# Patient Record
Sex: FEMALE | Race: WHITE | NOT HISPANIC OR LATINO | URBAN - METROPOLITAN AREA
[De-identification: names, ages, dates, MRNs, and addresses within clinical notes are randomized per-mention and may not be internally consistent; named-entity substitution may affect disease eponyms.]

---

## 2018-01-12 ENCOUNTER — EMERGENCY (EMERGENCY)
Facility: HOSPITAL | Age: 73
LOS: 1 days | Discharge: HOME | End: 2018-01-12
Attending: EMERGENCY MEDICINE | Admitting: EMERGENCY MEDICINE
Payer: MEDICARE

## 2018-01-12 VITALS
RESPIRATION RATE: 16 BRPM | DIASTOLIC BLOOD PRESSURE: 69 MMHG | TEMPERATURE: 98 F | SYSTOLIC BLOOD PRESSURE: 155 MMHG | OXYGEN SATURATION: 98 % | HEART RATE: 84 BPM

## 2018-01-12 PROCEDURE — 71046 X-RAY EXAM CHEST 2 VIEWS: CPT | Mod: 26

## 2018-01-12 PROCEDURE — 99284 EMERGENCY DEPT VISIT MOD MDM: CPT | Mod: 25,GC

## 2018-01-12 RX ORDER — ACETAMINOPHEN 500 MG
975 TABLET ORAL ONCE
Qty: 0 | Refills: 0 | Status: COMPLETED | OUTPATIENT
Start: 2018-01-12 | End: 2018-01-12

## 2018-01-12 RX ADMIN — Medication 975 MILLIGRAM(S): at 07:51

## 2018-01-12 NOTE — ED PROVIDER NOTE - ATTENDING CONTRIBUTION TO CARE
Pt with 2 days of cough, nasal congestion, body aches today.  Went to  last night, taking abx and  tessalon.  co mild sob last night.  Pos productive cough.  No fever no chills.  no headache no abd pain.  pe ncat s1s2 rrr abd no sinus congestion  pos nasal congestion  abd soft no cce  lungs cta  imp viral syndrome  r/o  pneumonia Pt with 2 days of cough, nasal congestion, body aches today.  Went to  last night, taking abx and  tessalon.  co mild sob last night.  Pos productive cough.  No fever no chills.  no headache no abd pain.  pe ncat s1s2 rrr abd no sinus congestion  pos nasal congestion  abd soft no cce  lungs cta  imp viral syndrome  r/o  pneumonia-  xray no infilrate interpreted by me, dc with nasal spray  and instructions to continue all current medication

## 2018-01-12 NOTE — ED PROVIDER NOTE - MEDICAL DECISION MAKING DETAILS
72 year-old female p/w generalized URI symptoms ongoing for a couple days; seen at urgent care yesterday and given antibiotics and cough pills.  Patient likely has a viral bronchitis - rule-out PNA with chest xray.  Low likelihood of ACS (no chest pain), PE (not hypoxic or tachypneic) and CHF (no worsened LE swelling) given patient's constellation of symptoms.

## 2018-01-12 NOTE — ED PROVIDER NOTE - PLAN OF CARE
Follow-up with your Primary Care Physician within 24-48 hours.  Please return to the Emergency Department immediately for any new, worsening or concerning symptoms; specifically those included in the attached information brochure.     You likely have a viral upper respiratory tract infection.  Please drink plenty of fluids and follow the instructions below:   1)	If you have fever greater than 100F or generalized bodyaches then please take Tylenol 650 mg every 4 hours and Motrin 600 mg every 6 hours.   2)	If you have head congestion, sinus pressure, or nasal congestion then please take Allegra 120-180 mg every 24 hours or Zyrtec use as directed.   3)	If you have throat discomfort please take cough drops, Cepacol, or Chloraseptic spray.   4)	If you have persistent dry cough please take Delsym or Robitussin.   5)	If you have difficulty bringing up mucus please take Mucinex use as directed.

## 2018-01-12 NOTE — ED PROVIDER NOTE - OBJECTIVE STATEMENT
72 year-old female with history of atrial fibrillation (on Eliquis), hypertension, diabetes mellitus (type 2) presents to the Emergency Department for congestion.  Sore throat, cough, congestion, headache, and generalized body aches ongoing for the past couple of days.  Some improvement with Tylenol and nasal sprays.  Mentions she became SOB last night and came to ED for evaluation.  No fevers, chills, vomiting, chest pain, abdominal pain, diarrhea, rash.  Was seen at urgent care yesterday - diagnosed as a viral illness and given Doxycycline and Benzonate pills.

## 2018-01-12 NOTE — ED PROVIDER NOTE - CARE PLAN
Principal Discharge DX:	Viral illness Principal Discharge DX:	Viral illness  Instructions for follow-up, activity and diet:	Follow-up with your Primary Care Physician within 24-48 hours.  Please return to the Emergency Department immediately for any new, worsening or concerning symptoms; specifically those included in the attached information brochure.     You likely have a viral upper respiratory tract infection.  Please drink plenty of fluids and follow the instructions below:   1)	If you have fever greater than 100F or generalized bodyaches then please take Tylenol 650 mg every 4 hours and Motrin 600 mg every 6 hours.   2)	If you have head congestion, sinus pressure, or nasal congestion then please take Allegra 120-180 mg every 24 hours or Zyrtec use as directed.   3)	If you have throat discomfort please take cough drops, Cepacol, or Chloraseptic spray.   4)	If you have persistent dry cough please take Delsym or Robitussin.   5)	If you have difficulty bringing up mucus please take Mucinex use as directed. Principal Discharge DX:	Viral illness  Instructions for follow-up, activity and diet:	Follow-up with your Primary Care Physician within 24-48 hours.  Please return to the Emergency Department immediately for any new, worsening or concerning symptoms; specifically those included in the attached information brochure.     You likely have a viral upper respiratory tract infection.  Please drink plenty of fluids and follow the instructions below:   1)	If you have fever greater than 100F or generalized bodyaches then please take Tylenol 650 mg every 4 hours and Motrin 600 mg every 6 hours.   2)	If you have head congestion, sinus pressure, or nasal congestion then please take Allegra 120-180 mg every 24 hours or Zyrtec use as directed.   3)	If you have throat discomfort please take cough drops, Cepacol, or Chloraseptic spray.   4)	If you have persistent dry cough please take Delsym or Robitussin.   5)	If you have difficulty bringing up mucus please take Mucinex use as directed.  Secondary Diagnosis:	Acute bronchitis, unspecified organism

## 2019-01-22 PROBLEM — E11.69 TYPE 2 DIABETES MELLITUS WITH OTHER SPECIFIED COMPLICATION: Chronic | Status: ACTIVE | Noted: 2018-01-12

## 2019-01-22 PROBLEM — I10 ESSENTIAL (PRIMARY) HYPERTENSION: Chronic | Status: ACTIVE | Noted: 2018-01-12

## 2019-01-22 PROBLEM — E78.5 HYPERLIPIDEMIA, UNSPECIFIED: Chronic | Status: ACTIVE | Noted: 2018-01-12

## 2019-01-22 PROBLEM — I48.91 UNSPECIFIED ATRIAL FIBRILLATION: Chronic | Status: ACTIVE | Noted: 2018-01-12

## 2019-01-28 ENCOUNTER — APPOINTMENT (OUTPATIENT)
Dept: PULMONOLOGY | Facility: CLINIC | Age: 74
End: 2019-01-28
Payer: MEDICARE

## 2019-01-28 VITALS
SYSTOLIC BLOOD PRESSURE: 121 MMHG | HEIGHT: 62 IN | HEART RATE: 61 BPM | BODY MASS INDEX: 46.38 KG/M2 | DIASTOLIC BLOOD PRESSURE: 74 MMHG | OXYGEN SATURATION: 94 % | WEIGHT: 252 LBS

## 2019-01-28 DIAGNOSIS — Z00.00 ENCOUNTER FOR GENERAL ADULT MEDICAL EXAMINATION W/OUT ABNORMAL FINDINGS: ICD-10-CM

## 2019-01-28 LAB — POCT - HEMOGLOBIN (HGB), QUANTITATIVE, TRANSCUTANEOUS: 14.5

## 2019-01-28 PROCEDURE — 94727 GAS DIL/WSHOT DETER LNG VOL: CPT

## 2019-01-28 PROCEDURE — 88738 HGB QUANT TRANSCUTANEOUS: CPT

## 2019-01-28 PROCEDURE — 94729 DIFFUSING CAPACITY: CPT

## 2019-01-28 PROCEDURE — 99204 OFFICE O/P NEW MOD 45 MIN: CPT | Mod: 25

## 2019-01-28 PROCEDURE — 94060 EVALUATION OF WHEEZING: CPT

## 2019-01-29 NOTE — PROCEDURE
[FreeTextEntry1] : Pulmonary function testing.\par There is a mild ventilatory impairment in a restrictive pattern. There is elevation in the RV/TLC ratio indicative of possible air trapping. There is a mild-mod diffusion impairment Corrects to normal with lung volume correction \par \par

## 2019-01-29 NOTE — PHYSICAL EXAM
[General Appearance - Well Developed] : well developed [General Appearance - Well Nourished] : well nourished [Normal Oropharynx] : normal oropharynx [Enlarged Base of the Tongue] : enlargement of the base of the tongue [Heart Sounds] : normal S1 and S2 [Murmurs] : no murmurs present [Auscultation Breath Sounds / Voice Sounds] : lungs were clear to auscultation bilaterally [Kyphosis] : kyphosis [Abdomen Soft] : soft [Abdomen Tenderness] : non-tender [Nail Clubbing] : no clubbing of the fingernails [Cyanosis, Localized] : no localized cyanosis [Petechial Hemorrhages (___cm)] : no petechial hemorrhages [] : no ischemic changes [FreeTextEntry1] : Overweight [Elongated Uvula] : no elongated uvula

## 2019-01-29 NOTE — ASSESSMENT
[FreeTextEntry1] : Program exercise and wt. loss\par trial of QVAR\par HSS\par \par F/U 1 month assess response to tx.

## 2019-01-29 NOTE — HISTORY OF PRESENT ILLNESS
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 \par \par No significant cough. Feels some congestion in chest. \par Some upper airway congestion\par \par 30 lb wt gain past year.\par \par Had cxr last week [facility] told scarring. \par \par Wheezing precip by walking.\par \par No prior hx. pulm disease. \par \par No residual defect neuro.\par no diff. swallowing or cough with sleep. \par \par No nocturnal.\par No fam hx pulm disease\par No occup.

## 2019-01-29 NOTE — CONSULT LETTER
[Dear  ___] : Dear ~SEFERINO, [Consult Letter:] : I had the pleasure of evaluating your patient, [unfilled]. [Please see my note below.] : Please see my note below. [Sincerely,] : Sincerely, [FreeTextEntry2] : Aj Boss MD\par  [FreeTextEntry3] : Mt Roman MD FCCP\par

## 2019-01-29 NOTE — DISCUSSION/SUMMARY
[FreeTextEntry1] : Restrictive disease likely related to obesity\par Less likely symptoms related to airways reactivity but cannot exclude\par R/O ANNABEL

## 2019-02-14 ENCOUNTER — MEDICATION RENEWAL (OUTPATIENT)
Age: 74
End: 2019-02-14

## 2019-02-26 ENCOUNTER — APPOINTMENT (OUTPATIENT)
Dept: PULMONOLOGY | Facility: CLINIC | Age: 74
End: 2019-02-26

## 2019-03-08 ENCOUNTER — RX RENEWAL (OUTPATIENT)
Age: 74
End: 2019-03-08

## 2019-03-11 ENCOUNTER — OTHER (OUTPATIENT)
Age: 74
End: 2019-03-11

## 2019-04-24 ENCOUNTER — APPOINTMENT (OUTPATIENT)
Dept: CARDIOLOGY | Facility: CLINIC | Age: 74
End: 2019-04-24
Payer: MEDICARE

## 2019-04-24 ENCOUNTER — APPOINTMENT (OUTPATIENT)
Dept: ENDOCRINOLOGY | Facility: CLINIC | Age: 74
End: 2019-04-24
Payer: MEDICARE

## 2019-04-24 ENCOUNTER — NON-APPOINTMENT (OUTPATIENT)
Age: 74
End: 2019-04-24

## 2019-04-24 VITALS
HEIGHT: 62 IN | TEMPERATURE: 98.3 F | OXYGEN SATURATION: 96 % | SYSTOLIC BLOOD PRESSURE: 120 MMHG | HEART RATE: 61 BPM | BODY MASS INDEX: 47.29 KG/M2 | WEIGHT: 257 LBS | DIASTOLIC BLOOD PRESSURE: 70 MMHG

## 2019-04-24 VITALS
HEART RATE: 62 BPM | OXYGEN SATURATION: 97 % | BODY MASS INDEX: 47.29 KG/M2 | TEMPERATURE: 98.3 F | DIASTOLIC BLOOD PRESSURE: 70 MMHG | HEIGHT: 62 IN | WEIGHT: 257 LBS | SYSTOLIC BLOOD PRESSURE: 120 MMHG

## 2019-04-24 DIAGNOSIS — Z82.49 FAMILY HISTORY OF ISCHEMIC HEART DISEASE AND OTHER DISEASES OF THE CIRCULATORY SYSTEM: ICD-10-CM

## 2019-04-24 DIAGNOSIS — Z87.898 PERSONAL HISTORY OF OTHER SPECIFIED CONDITIONS: ICD-10-CM

## 2019-04-24 DIAGNOSIS — Z87.440 PERSONAL HISTORY OF URINARY (TRACT) INFECTIONS: ICD-10-CM

## 2019-04-24 PROCEDURE — 83036 HEMOGLOBIN GLYCOSYLATED A1C: CPT | Mod: QW

## 2019-04-24 PROCEDURE — 82962 GLUCOSE BLOOD TEST: CPT

## 2019-04-24 PROCEDURE — 36415 COLL VENOUS BLD VENIPUNCTURE: CPT

## 2019-04-24 PROCEDURE — 93000 ELECTROCARDIOGRAM COMPLETE: CPT

## 2019-04-24 PROCEDURE — 99204 OFFICE O/P NEW MOD 45 MIN: CPT | Mod: 25

## 2019-04-24 PROCEDURE — 99214 OFFICE O/P EST MOD 30 MIN: CPT

## 2019-04-24 RX ORDER — BIOTIN 10 MG
TABLET ORAL
Refills: 0 | Status: ACTIVE | COMMUNITY

## 2019-04-24 RX ORDER — ADHESIVE TAPE 3"X 2.3 YD
50 MCG TAPE, NON-MEDICATED TOPICAL
Refills: 0 | Status: ACTIVE | COMMUNITY

## 2019-04-24 NOTE — PHYSICAL EXAM
[General Appearance - Well Developed] : well developed [Well Groomed] : well groomed [Normal Appearance] : normal appearance [General Appearance - Well Nourished] : well nourished [No Deformities] : no deformities [Normal Conjunctiva] : the conjunctiva exhibited no abnormalities [General Appearance - In No Acute Distress] : no acute distress [Eyelids - No Xanthelasma] : the eyelids demonstrated no xanthelasmas [Normal Oral Mucosa] : normal oral mucosa [No Oral Pallor] : no oral pallor [No Oral Cyanosis] : no oral cyanosis [Normal Jugular Venous A Waves Present] : normal jugular venous A waves present [Normal Jugular Venous V Waves Present] : normal jugular venous V waves present [No Jugular Venous Carballo A Waves] : no jugular venous carballo A waves [Respiration, Rhythm And Depth] : normal respiratory rhythm and effort [Exaggerated Use Of Accessory Muscles For Inspiration] : no accessory muscle use [Auscultation Breath Sounds / Voice Sounds] : lungs were clear to auscultation bilaterally [Heart Rate And Rhythm] : heart rate and rhythm were normal [Heart Sounds] : normal S1 and S2 [Abdomen Soft] : soft [Murmurs] : no murmurs present [Abdomen Tenderness] : non-tender [Abdomen Mass (___ Cm)] : no abdominal mass palpated [Abnormal Walk] : normal gait [Gait - Sufficient For Exercise Testing] : the gait was sufficient for exercise testing [Nail Clubbing] : no clubbing of the fingernails [Cyanosis, Localized] : no localized cyanosis [Petechial Hemorrhages (___cm)] : no petechial hemorrhages [Skin Color & Pigmentation] : normal skin color and pigmentation [] : no rash [No Venous Stasis] : no venous stasis [Skin Lesions] : no skin lesions [No Skin Ulcers] : no skin ulcer [Oriented To Time, Place, And Person] : oriented to person, place, and time [No Xanthoma] : no  xanthoma was observed [Affect] : the affect was normal [Mood] : the mood was normal [No Anxiety] : not feeling anxious

## 2019-04-24 NOTE — HISTORY OF PRESENT ILLNESS
[FreeTextEntry1] : Ms. HEAD is a 73 year year old  female who  returns today in follow up with regard to a history of type 2 diabetes mellitus.  There is no known history of retinopathy, nephropathy. She  too denies any history of neuropathy. .Current dm medication include Metformin 1000 mg  bid and Farxiga 5 mg.  HGM of late has shown values to be running 135 range.  There has been no significant hypoglycemia.  denies any chest pain, sob, neurologic or ophthalmologic complaints. She  too denies any new podiatric concerns. She  is up to date with his ophthalmologic visit.\par Additional medical history includes that of hypertension, hyperlipidemia, and obesity along with with a-fib and vitamin d deficiency Is on D3 2,000 iu daily.\par \par Did have 2nd bout of kidney stone-dx as uric acid on potassium citrate.\par \par \par

## 2019-04-24 NOTE — PHYSICAL EXAM
[General Appearance - Well Developed] : well developed [Well Groomed] : well groomed [Normal Appearance] : normal appearance [General Appearance - Well Nourished] : well nourished [No Deformities] : no deformities [General Appearance - In No Acute Distress] : no acute distress [Normal Conjunctiva] : the conjunctiva exhibited no abnormalities [Eyelids - No Xanthelasma] : the eyelids demonstrated no xanthelasmas [Normal Oral Mucosa] : normal oral mucosa [No Oral Pallor] : no oral pallor [No Oral Cyanosis] : no oral cyanosis [Normal Jugular Venous A Waves Present] : normal jugular venous A waves present [Normal Jugular Venous V Waves Present] : normal jugular venous V waves present [No Jugular Venous Carballo A Waves] : no jugular venous carblalo A waves [Respiration, Rhythm And Depth] : normal respiratory rhythm and effort [Exaggerated Use Of Accessory Muscles For Inspiration] : no accessory muscle use [Auscultation Breath Sounds / Voice Sounds] : lungs were clear to auscultation bilaterally [Heart Rate And Rhythm] : heart rate and rhythm were normal [Heart Sounds] : normal S1 and S2 [Abdomen Soft] : soft [Murmurs] : no murmurs present [Abdomen Tenderness] : non-tender [Abdomen Mass (___ Cm)] : no abdominal mass palpated [Gait - Sufficient For Exercise Testing] : the gait was sufficient for exercise testing [Abnormal Walk] : normal gait [Nail Clubbing] : no clubbing of the fingernails [Cyanosis, Localized] : no localized cyanosis [Petechial Hemorrhages (___cm)] : no petechial hemorrhages [Skin Color & Pigmentation] : normal skin color and pigmentation [] : no rash [No Venous Stasis] : no venous stasis [Skin Lesions] : no skin lesions [No Skin Ulcers] : no skin ulcer [No Xanthoma] : no  xanthoma was observed [Oriented To Time, Place, And Person] : oriented to person, place, and time [Affect] : the affect was normal [No Anxiety] : not feeling anxious [Mood] : the mood was normal

## 2019-04-27 LAB
BASOPHILS # BLD AUTO: 0.04 K/UL
BASOPHILS NFR BLD AUTO: 0.5 %
EOSINOPHIL # BLD AUTO: 0.08 K/UL
EOSINOPHIL NFR BLD AUTO: 1 %
HCT VFR BLD CALC: 44.5 %
HGB BLD-MCNC: 13.9 G/DL
IMM GRANULOCYTES NFR BLD AUTO: 0.3 %
LYMPHOCYTES # BLD AUTO: 2.18 K/UL
LYMPHOCYTES NFR BLD AUTO: 28.6 %
MAN DIFF?: NORMAL
MCHC RBC-ENTMCNC: 29.9 PG
MCHC RBC-ENTMCNC: 31.2 GM/DL
MCV RBC AUTO: 95.7 FL
MONOCYTES # BLD AUTO: 0.51 K/UL
MONOCYTES NFR BLD AUTO: 6.7 %
NEUTROPHILS # BLD AUTO: 4.79 K/UL
NEUTROPHILS NFR BLD AUTO: 62.9 %
PLATELET # BLD AUTO: 190 K/UL
RBC # BLD: 4.65 M/UL
RBC # FLD: 14.9 %
WBC # FLD AUTO: 7.62 K/UL

## 2019-04-30 ENCOUNTER — MEDICATION RENEWAL (OUTPATIENT)
Age: 74
End: 2019-04-30

## 2019-05-01 ENCOUNTER — RX RENEWAL (OUTPATIENT)
Age: 74
End: 2019-05-01

## 2019-05-01 NOTE — HISTORY OF PRESENT ILLNESS
[FreeTextEntry1] : This is a 73 year old  female who presents today to follow up and establish care in the new office. She states she is feeling good and has no major complaints today. Patient denies dyspnea, palpitations, chest pain, nausea, vomiting, dizziness and lightheadedness.

## 2019-05-02 LAB
25(OH)D3 SERPL-MCNC: 27 NG/ML
ALBUMIN SERPL ELPH-MCNC: 4.5 G/DL
ALP BLD-CCNC: 74 U/L
ALT SERPL-CCNC: 25 U/L
ANION GAP SERPL CALC-SCNC: 15 MMOL/L
AST SERPL-CCNC: 24 U/L
BILIRUB DIRECT SERPL-MCNC: 0.2 MG/DL
BILIRUB INDIRECT SERPL-MCNC: 0.2 MG/DL
BILIRUB SERPL-MCNC: 0.4 MG/DL
BUN SERPL-MCNC: 36 MG/DL
CALCIUM SERPL-MCNC: 9.5 MG/DL
CHLORIDE SERPL-SCNC: 109 MMOL/L
CHOLEST SERPL-MCNC: 134 MG/DL
CHOLEST/HDLC SERPL: 2.2 RATIO
CK SERPL-CCNC: 146 U/L
CO2 SERPL-SCNC: 19 MMOL/L
CREAT SERPL-MCNC: 1.3 MG/DL
ESTIMATED AVERAGE GLUCOSE: 148 MG/DL
GLUCOSE BLDC GLUCOMTR-MCNC: 122
GLUCOSE SERPL-MCNC: 127 MG/DL
HBA1C MFR BLD HPLC: 6.7
HBA1C MFR BLD HPLC: 6.8 %
HDLC SERPL-MCNC: 62 MG/DL
LDLC SERPL CALC-MCNC: 50 MG/DL
MAGNESIUM SERPL-MCNC: 1.2 MG/DL
POTASSIUM SERPL-SCNC: 4 MMOL/L
PROT SERPL-MCNC: 7.1 G/DL
SODIUM SERPL-SCNC: 143 MMOL/L
T4 FREE SERPL-MCNC: 1.3 NG/DL
TRIGL SERPL-MCNC: 110 MG/DL
TSH SERPL-ACNC: 2.33 UIU/ML

## 2019-05-06 ENCOUNTER — MESSAGE (OUTPATIENT)
Age: 74
End: 2019-05-06

## 2019-05-12 LAB — MAGNESIUM SERPL-MCNC: 1.7 MG/DL

## 2019-05-17 ENCOUNTER — MEDICATION RENEWAL (OUTPATIENT)
Age: 74
End: 2019-05-17

## 2019-05-17 RX ORDER — POTASSIUM CITRATE 10 MEQ/1
10 MEQ TABLET, EXTENDED RELEASE ORAL
Refills: 0 | Status: DISCONTINUED | COMMUNITY
End: 2019-05-17

## 2019-06-17 ENCOUNTER — INBOUND DOCUMENT (OUTPATIENT)
Age: 74
End: 2019-06-17

## 2019-07-19 ENCOUNTER — INBOUND DOCUMENT (OUTPATIENT)
Age: 74
End: 2019-07-19

## 2019-07-24 ENCOUNTER — APPOINTMENT (OUTPATIENT)
Dept: ENDOCRINOLOGY | Facility: CLINIC | Age: 74
End: 2019-07-24
Payer: MEDICARE

## 2019-07-24 VITALS
HEART RATE: 62 BPM | WEIGHT: 253 LBS | TEMPERATURE: 98.6 F | BODY MASS INDEX: 46.56 KG/M2 | DIASTOLIC BLOOD PRESSURE: 70 MMHG | SYSTOLIC BLOOD PRESSURE: 138 MMHG | HEIGHT: 62 IN | OXYGEN SATURATION: 96 %

## 2019-07-24 PROCEDURE — 82962 GLUCOSE BLOOD TEST: CPT

## 2019-07-24 PROCEDURE — 99214 OFFICE O/P EST MOD 30 MIN: CPT | Mod: 25

## 2019-07-24 PROCEDURE — 36415 COLL VENOUS BLD VENIPUNCTURE: CPT

## 2019-07-24 PROCEDURE — 83036 HEMOGLOBIN GLYCOSYLATED A1C: CPT | Mod: QW

## 2019-07-24 PROCEDURE — 82044 UR ALBUMIN SEMIQUANTITATIVE: CPT | Mod: QW

## 2019-07-24 NOTE — HISTORY OF PRESENT ILLNESS
[FreeTextEntry1] : Ms. HEAD is a 73 year year old  female who  returns today in follow up with regard to a history of type 2 diabetes mellitus.  There is no known history of retinopathy, nephropathy. She  too denies any history of neuropathy. .Current dm medication include Metformin 1000 mg  bid and Farxiga 5 mg.  HGM of late has shown values to be running in the 150's. Tests BG 1x daily.\par  There has been no significant hypoglycemia.\par Denies any chest pain, sob, neurologic or ophthalmologic complaints. She  too denies any new podiatric concerns. She  is up to date with his ophthalmologic visit- last week. \par A1c was 6.8% and  vit d 25-OH was 27. (April 2019)\par Additional medical history includes that of hypertension, hyperlipidemia, and obesity along with with a-fib and vitamin d deficiency Is on D3 2,000 iu daily.\par \par Did have 2nd bout of kidney stone-dx as uric acid  and is on potassium citrate.\par \par \par

## 2019-07-24 NOTE — PHYSICAL EXAM
[Alert] : alert [No Acute Distress] : no acute distress [Well Nourished] : well nourished [Normal Sclera/Conjunctiva] : normal sclera/conjunctiva [Well Developed] : well developed [No Proptosis] : no proptosis [Normal Oropharynx] : the oropharynx was normal [EOMI] : extra ocular movement intact [No Thyroid Nodules] : there were no palpable thyroid nodules [Thyroid Not Enlarged] : the thyroid was not enlarged [No Respiratory Distress] : no respiratory distress [No Accessory Muscle Use] : no accessory muscle use [Clear to Auscultation] : lungs were clear to auscultation bilaterally [Normal S1, S2] : normal S1 and S2 [Normal Rate] : heart rate was normal  [Regular Rhythm] : with a regular rhythm [Pedal Pulses Normal] : the pedal pulses are present [Normal Bowel Sounds] : normal bowel sounds [No Edema] : there was no peripheral edema [Not Tender] : non-tender [Soft] : abdomen soft [Anterior Cervical Nodes] : anterior cervical nodes [Post Cervical Nodes] : posterior cervical nodes [Not Distended] : not distended [Axillary Nodes] : axillary nodes [Normal] : normal and non tender [Spine Straight] : spine straight [No Spinal Tenderness] : no spinal tenderness [Normal Gait] : normal gait [No Stigmata of Cushings Syndrome] : no stigmata of cushings syndrome [Normal Strength/Tone] : muscle strength and tone were normal [No Rash] : no rash [Normal Reflexes] : deep tendon reflexes were 2+ and symmetric [No Tremors] : no tremors [Oriented x3] : oriented to person, place, and time [Acanthosis Nigricans] : no acanthosis nigricans

## 2019-08-20 LAB
25(OH)D3 SERPL-MCNC: 32.4 NG/ML
ALBUMIN SERPL ELPH-MCNC: 4.7 G/DL
ALP BLD-CCNC: 78 U/L
ALT SERPL-CCNC: 32 U/L
ANION GAP SERPL CALC-SCNC: 17 MMOL/L
AST SERPL-CCNC: 28 U/L
BILIRUB SERPL-MCNC: 0.6 MG/DL
BUN SERPL-MCNC: 35 MG/DL
CALCIUM SERPL-MCNC: 9.9 MG/DL
CHLORIDE SERPL-SCNC: 106 MMOL/L
CO2 SERPL-SCNC: 22 MMOL/L
CREAT SERPL-MCNC: 1.37 MG/DL
CREAT SPEC-SCNC: 50
CREAT SPEC-SCNC: 72 MG/DL
ESTIMATED AVERAGE GLUCOSE: 160 MG/DL
FRUCTOSAMINE SERPL-MCNC: 279 UMOL/L
GLUCOSE BLDC GLUCOMTR-MCNC: 136
GLUCOSE SERPL-MCNC: 142 MG/DL
GLYCOMARK.: 4.1 UG/ML
HBA1C MFR BLD HPLC: 7.1
HBA1C MFR BLD HPLC: 7.2 %
HDLC SERPL-MCNC: 60 MG/DL
LDLC SERPL DIRECT ASSAY-MCNC: 62 MG/DL
MAGNESIUM SERPL-MCNC: 1.6 MG/DL
MICROALBUMIN 24H UR DL<=1MG/L-MCNC: 11.5 MG/DL
MICROALBUMIN 24H UR DL<=1MG/L-MCNC: 150
MICROALBUMIN/CREAT 24H UR-RTO: 160 MG/G
MICROALBUMIN/CREAT 24H UR-RTO: >300
POTASSIUM SERPL-SCNC: 4 MMOL/L
PROT SERPL-MCNC: 7.7 G/DL
SODIUM SERPL-SCNC: 144 MMOL/L
T4 FREE SERPL-MCNC: 1.3 NG/DL
TRIGL SERPL-MCNC: 109 MG/DL
TSH SERPL-ACNC: 1.48 UIU/ML

## 2019-08-27 ENCOUNTER — APPOINTMENT (OUTPATIENT)
Dept: CARDIOLOGY | Facility: CLINIC | Age: 74
End: 2019-08-27
Payer: MEDICARE

## 2019-08-27 ENCOUNTER — NON-APPOINTMENT (OUTPATIENT)
Age: 74
End: 2019-08-27

## 2019-08-27 VITALS
HEART RATE: 71 BPM | BODY MASS INDEX: 46.74 KG/M2 | DIASTOLIC BLOOD PRESSURE: 70 MMHG | SYSTOLIC BLOOD PRESSURE: 140 MMHG | OXYGEN SATURATION: 95 % | TEMPERATURE: 98.4 F | WEIGHT: 254 LBS | HEIGHT: 62 IN

## 2019-08-27 PROCEDURE — 93000 ELECTROCARDIOGRAM COMPLETE: CPT

## 2019-08-27 PROCEDURE — 99213 OFFICE O/P EST LOW 20 MIN: CPT

## 2019-08-27 NOTE — PHYSICAL EXAM
[Normal Appearance] : normal appearance [General Appearance - Well Developed] : well developed [General Appearance - Well Nourished] : well nourished [Well Groomed] : well groomed [No Deformities] : no deformities [General Appearance - In No Acute Distress] : no acute distress [Eyelids - No Xanthelasma] : the eyelids demonstrated no xanthelasmas [Normal Conjunctiva] : the conjunctiva exhibited no abnormalities [Normal Oral Mucosa] : normal oral mucosa [No Oral Cyanosis] : no oral cyanosis [No Oral Pallor] : no oral pallor [Normal Jugular Venous A Waves Present] : normal jugular venous A waves present [Normal Jugular Venous V Waves Present] : normal jugular venous V waves present [No Jugular Venous Carballo A Waves] : no jugular venous carballo A waves [Respiration, Rhythm And Depth] : normal respiratory rhythm and effort [Exaggerated Use Of Accessory Muscles For Inspiration] : no accessory muscle use [Heart Rate And Rhythm] : heart rate and rhythm were normal [Auscultation Breath Sounds / Voice Sounds] : lungs were clear to auscultation bilaterally [Heart Sounds] : normal S1 and S2 [Murmurs] : no murmurs present [Abdomen Soft] : soft [Abdomen Tenderness] : non-tender [Abdomen Mass (___ Cm)] : no abdominal mass palpated [Gait - Sufficient For Exercise Testing] : the gait was sufficient for exercise testing [Abnormal Walk] : normal gait [Cyanosis, Localized] : no localized cyanosis [Nail Clubbing] : no clubbing of the fingernails [Petechial Hemorrhages (___cm)] : no petechial hemorrhages [Skin Color & Pigmentation] : normal skin color and pigmentation [] : no rash [No Venous Stasis] : no venous stasis [Skin Lesions] : no skin lesions [No Skin Ulcers] : no skin ulcer [No Xanthoma] : no  xanthoma was observed [Affect] : the affect was normal [Oriented To Time, Place, And Person] : oriented to person, place, and time [No Anxiety] : not feeling anxious [Mood] : the mood was normal [FreeTextEntry1] : overweight

## 2019-08-27 NOTE — HISTORY OF PRESENT ILLNESS
[FreeTextEntry1] : pt presents for f/u pt feels well ocassional fatigue .pt with dm  hgba1c 7.2% .pt with borderline creat pt s/p cva paf ..\par pt denies any chest  pain dizziness ,lightheadedness ,nausea vomiting diaphoresis\par pt with ocassional anxiety requests refill on ativan as pt with ocassional anxiety

## 2019-09-02 LAB
ANION GAP SERPL CALC-SCNC: 20 MMOL/L
APPEARANCE: ABNORMAL
BACTERIA UR CULT: NORMAL
BACTERIA: NEGATIVE
BASOPHILS # BLD AUTO: 0.04 K/UL
BASOPHILS NFR BLD AUTO: 0.5 %
BILIRUBIN URINE: NEGATIVE
BLOOD URINE: NEGATIVE
BUN SERPL-MCNC: 35 MG/DL
CALCIUM SERPL-MCNC: 10.1 MG/DL
CHLORIDE SERPL-SCNC: 104 MMOL/L
CO2 SERPL-SCNC: 21 MMOL/L
COLOR: NORMAL
CREAT SERPL-MCNC: 1.35 MG/DL
EOSINOPHIL # BLD AUTO: 0.07 K/UL
EOSINOPHIL NFR BLD AUTO: 0.9 %
FERRITIN SERPL-MCNC: 51 NG/ML
FOLATE SERPL-MCNC: 7.2 NG/ML
GLUCOSE QUALITATIVE U: ABNORMAL
GLUCOSE SERPL-MCNC: 120 MG/DL
HAPTOGLOB SERPL-MCNC: 196 MG/DL
HCT VFR BLD CALC: 49.1 %
HGB BLD-MCNC: 15.2 G/DL
HYALINE CASTS: 1 /LPF
IMM GRANULOCYTES NFR BLD AUTO: 0.3 %
IRON SERPL-MCNC: 55 UG/DL
KETONES URINE: NEGATIVE
LDH SERPL-CCNC: 208 U/L
LEUKOCYTE ESTERASE URINE: ABNORMAL
LYMPHOCYTES # BLD AUTO: 1.68 K/UL
LYMPHOCYTES NFR BLD AUTO: 22 %
MAGNESIUM SERPL-MCNC: 1.5 MG/DL
MAN DIFF?: NORMAL
MCHC RBC-ENTMCNC: 28.7 PG
MCHC RBC-ENTMCNC: 31 GM/DL
MCV RBC AUTO: 92.6 FL
MICROSCOPIC-UA: NORMAL
MONOCYTES # BLD AUTO: 0.55 K/UL
MONOCYTES NFR BLD AUTO: 7.2 %
NEUTROPHILS # BLD AUTO: 5.29 K/UL
NEUTROPHILS NFR BLD AUTO: 69.1 %
NITRITE URINE: NEGATIVE
PH URINE: 6
PLATELET # BLD AUTO: 182 K/UL
POTASSIUM SERPL-SCNC: 3.9 MMOL/L
PROTEIN URINE: NORMAL
RBC # BLD: 5.3 M/UL
RBC # FLD: 14.4 %
RED BLOOD CELLS URINE: 5 /HPF
SODIUM SERPL-SCNC: 145 MMOL/L
SPECIFIC GRAVITY URINE: 1.02
SQUAMOUS EPITHELIAL CELLS: 10 /HPF
TRANSFERRIN SERPL-MCNC: 266 MG/DL
UROBILINOGEN URINE: NORMAL
VIT B12 SERPL-MCNC: 422 PG/ML
WBC # FLD AUTO: 7.65 K/UL
WHITE BLOOD CELLS URINE: 52 /HPF

## 2019-09-06 ENCOUNTER — MEDICATION RENEWAL (OUTPATIENT)
Age: 74
End: 2019-09-06

## 2019-09-06 RX ORDER — CALCIUM CARBONATE/VITAMIN D3 500-10/5ML
400 LIQUID (ML) ORAL DAILY
Refills: 0 | Status: DISCONTINUED | COMMUNITY
End: 2019-09-06

## 2019-09-13 ENCOUNTER — INBOUND DOCUMENT (OUTPATIENT)
Age: 74
End: 2019-09-13

## 2019-10-22 ENCOUNTER — APPOINTMENT (OUTPATIENT)
Dept: CARDIOLOGY | Facility: CLINIC | Age: 74
End: 2019-10-22
Payer: MEDICARE

## 2019-10-22 DIAGNOSIS — Z23 ENCOUNTER FOR IMMUNIZATION: ICD-10-CM

## 2019-10-22 PROCEDURE — G0008: CPT

## 2019-10-22 PROCEDURE — 90653 IIV ADJUVANT VACCINE IM: CPT

## 2019-11-18 ENCOUNTER — MEDICATION RENEWAL (OUTPATIENT)
Age: 74
End: 2019-11-18

## 2019-11-18 DIAGNOSIS — Z87.39 PERSONAL HISTORY OF OTHER DISEASES OF THE MUSCULOSKELETAL SYSTEM AND CONNECTIVE TISSUE: ICD-10-CM

## 2019-11-19 ENCOUNTER — APPOINTMENT (OUTPATIENT)
Dept: ENDOCRINOLOGY | Facility: CLINIC | Age: 74
End: 2019-11-19
Payer: MEDICARE

## 2019-11-19 VITALS
BODY MASS INDEX: 47.11 KG/M2 | DIASTOLIC BLOOD PRESSURE: 82 MMHG | OXYGEN SATURATION: 95 % | WEIGHT: 256 LBS | HEART RATE: 58 BPM | HEIGHT: 62 IN | SYSTOLIC BLOOD PRESSURE: 125 MMHG | TEMPERATURE: 97.4 F

## 2019-11-19 PROCEDURE — 82962 GLUCOSE BLOOD TEST: CPT

## 2019-11-19 PROCEDURE — 99214 OFFICE O/P EST MOD 30 MIN: CPT | Mod: 25

## 2019-11-19 PROCEDURE — 36415 COLL VENOUS BLD VENIPUNCTURE: CPT

## 2019-11-19 PROCEDURE — 83036 HEMOGLOBIN GLYCOSYLATED A1C: CPT | Mod: QW

## 2019-11-19 PROCEDURE — 82044 UR ALBUMIN SEMIQUANTITATIVE: CPT | Mod: QW

## 2019-11-30 LAB
25(OH)D3 SERPL-MCNC: 31.7 NG/ML
ALBUMIN SERPL ELPH-MCNC: 4.5 G/DL
ALBUMIN: 10
ALP BLD-CCNC: 82 U/L
ALT SERPL-CCNC: 27 U/L
ANION GAP SERPL CALC-SCNC: 19 MMOL/L
AST SERPL-CCNC: 23 U/L
BASOPHILS # BLD AUTO: 0.03 K/UL
BASOPHILS NFR BLD AUTO: 0.4 %
BILIRUB SERPL-MCNC: 0.6 MG/DL
BUN SERPL-MCNC: 43 MG/DL
CALCIUM SERPL-MCNC: 10.3 MG/DL
CHLORIDE SERPL-SCNC: 99 MMOL/L
CO2 SERPL-SCNC: 25 MMOL/L
CREAT SERPL-MCNC: 1.7 MG/DL
CREAT SPEC-SCNC: 24 MG/DL
CREATININE: 50
EOSINOPHIL # BLD AUTO: 0.05 K/UL
EOSINOPHIL NFR BLD AUTO: 0.6 %
ESTIMATED AVERAGE GLUCOSE: 166 MG/DL
FOLATE SERPL-MCNC: 7.5 NG/ML
FRUCTOSAMINE SERPL-MCNC: 282 UMOL/L
GLUCOSE BLDC GLUCOMTR-MCNC: 111
GLUCOSE SERPL-MCNC: 109 MG/DL
GLYCOMARK.: 4.1 UG/ML
HBA1C MFR BLD HPLC: 7.3
HBA1C MFR BLD HPLC: 7.4 %
HCT VFR BLD CALC: 46 %
HDLC SERPL-MCNC: 61 MG/DL
HGB BLD-MCNC: 14.1 G/DL
IMM GRANULOCYTES NFR BLD AUTO: 0.2 %
LDLC SERPL DIRECT ASSAY-MCNC: 68 MG/DL
LYMPHOCYTES # BLD AUTO: 2.41 K/UL
LYMPHOCYTES NFR BLD AUTO: 28.7 %
MAGNESIUM SERPL-MCNC: 1.6 MG/DL
MAN DIFF?: NORMAL
MCHC RBC-ENTMCNC: 28.4 PG
MCHC RBC-ENTMCNC: 30.7 GM/DL
MCV RBC AUTO: 92.6 FL
MICROALBUMIN 24H UR DL<=1MG/L-MCNC: 1.5 MG/DL
MICROALBUMIN/CREAT 24H UR-RTO: 62 MG/G
MICROALBUMIN/CREAT UR TEST STR-RTO: NORMAL
MONOCYTES # BLD AUTO: 0.68 K/UL
MONOCYTES NFR BLD AUTO: 8.1 %
NEUTROPHILS # BLD AUTO: 5.2 K/UL
NEUTROPHILS NFR BLD AUTO: 62 %
PLATELET # BLD AUTO: 212 K/UL
POTASSIUM SERPL-SCNC: 3.7 MMOL/L
PROT SERPL-MCNC: 7.5 G/DL
RBC # BLD: 4.97 M/UL
RBC # FLD: 14.7 %
SODIUM SERPL-SCNC: 143 MMOL/L
T4 FREE SERPL-MCNC: 1.6 NG/DL
TRIGL SERPL-MCNC: 124 MG/DL
TSH SERPL-ACNC: 2.15 UIU/ML
VIT B12 SERPL-MCNC: 369 PG/ML
WBC # FLD AUTO: 8.39 K/UL

## 2019-12-02 ENCOUNTER — INBOUND DOCUMENT (OUTPATIENT)
Age: 74
End: 2019-12-02

## 2019-12-07 NOTE — PHYSICAL EXAM
[Alert] : alert [No Acute Distress] : no acute distress [Well Nourished] : well nourished [Normal Sclera/Conjunctiva] : normal sclera/conjunctiva [Well Developed] : well developed [EOMI] : extra ocular movement intact [No Proptosis] : no proptosis [Thyroid Not Enlarged] : the thyroid was not enlarged [Normal Oropharynx] : the oropharynx was normal [No Thyroid Nodules] : there were no palpable thyroid nodules [No Respiratory Distress] : no respiratory distress [No Accessory Muscle Use] : no accessory muscle use [Normal Rate] : heart rate was normal  [Clear to Auscultation] : lungs were clear to auscultation bilaterally [Regular Rhythm] : with a regular rhythm [Normal S1, S2] : normal S1 and S2 [Pedal Pulses Normal] : the pedal pulses are present [Normal Bowel Sounds] : normal bowel sounds [No Edema] : there was no peripheral edema [Not Tender] : non-tender [Not Distended] : not distended [Soft] : abdomen soft [Anterior Cervical Nodes] : anterior cervical nodes [Post Cervical Nodes] : posterior cervical nodes [Normal] : normal and non tender [Axillary Nodes] : axillary nodes [No Spinal Tenderness] : no spinal tenderness [Spine Straight] : spine straight [Normal Gait] : normal gait [No Stigmata of Cushings Syndrome] : no stigmata of cushings syndrome [Normal Strength/Tone] : muscle strength and tone were normal [No Rash] : no rash [Normal Reflexes] : deep tendon reflexes were 2+ and symmetric [Oriented x3] : oriented to person, place, and time [No Tremors] : no tremors [Acanthosis Nigricans] : no acanthosis nigricans

## 2019-12-07 NOTE — HISTORY OF PRESENT ILLNESS
[FreeTextEntry1] : Ms. HEAD is a 74 year year old  female who  returns today in follow up with regard to a history of type 2 diabetes mellitus. Today 's A1c is 7.3 % and  POCT bg 111mg/dl .  There is no known history of retinopathy, nephropathy. She  too denies any history of neuropathy. .Current dm medication include Metformin 1000 mg  bid and Farxiga 5 mg daily.  HGM of late has shown values to be running in the  am prior break fast 130-150's. \par  There has been no significant hypoglycemia.\par Denies any chest pain, sob, neurologic or ophthalmologic complaints. She  too denies any new podiatric concerns. She  is up to date with his ophthalmologic visit- last week. \par Additional medical history includes that of hypertension, hyperlipidemia, and obesity along with with a-fib and vitamin d deficiency Is on D3 2,000 iu daily.\par \par Did have 2nd bout of kidney stone-dx as uric acid  and is on potassium citrate.\par \par \par

## 2020-01-07 ENCOUNTER — APPOINTMENT (OUTPATIENT)
Dept: CARDIOLOGY | Facility: CLINIC | Age: 75
End: 2020-01-07
Payer: MEDICARE

## 2020-01-07 ENCOUNTER — NON-APPOINTMENT (OUTPATIENT)
Age: 75
End: 2020-01-07

## 2020-01-07 VITALS
HEIGHT: 62 IN | SYSTOLIC BLOOD PRESSURE: 140 MMHG | OXYGEN SATURATION: 96 % | TEMPERATURE: 97.6 F | HEART RATE: 68 BPM | BODY MASS INDEX: 47.29 KG/M2 | DIASTOLIC BLOOD PRESSURE: 60 MMHG | WEIGHT: 257 LBS

## 2020-01-07 DIAGNOSIS — M25.562 PAIN IN LEFT KNEE: ICD-10-CM

## 2020-01-07 PROCEDURE — 99214 OFFICE O/P EST MOD 30 MIN: CPT

## 2020-01-07 PROCEDURE — 93306 TTE W/DOPPLER COMPLETE: CPT

## 2020-01-07 PROCEDURE — 93000 ELECTROCARDIOGRAM COMPLETE: CPT

## 2020-01-07 NOTE — PHYSICAL EXAM
[General Appearance - Well Developed] : well developed [Normal Appearance] : normal appearance [No Deformities] : no deformities [Well Groomed] : well groomed [General Appearance - Well Nourished] : well nourished [Normal Conjunctiva] : the conjunctiva exhibited no abnormalities [General Appearance - In No Acute Distress] : no acute distress [Eyelids - No Xanthelasma] : the eyelids demonstrated no xanthelasmas [No Oral Pallor] : no oral pallor [Normal Oral Mucosa] : normal oral mucosa [Normal Jugular Venous V Waves Present] : normal jugular venous V waves present [Normal Jugular Venous A Waves Present] : normal jugular venous A waves present [No Oral Cyanosis] : no oral cyanosis [No Jugular Venous Carballo A Waves] : no jugular venous carballo A waves [Respiration, Rhythm And Depth] : normal respiratory rhythm and effort [Exaggerated Use Of Accessory Muscles For Inspiration] : no accessory muscle use [Auscultation Breath Sounds / Voice Sounds] : lungs were clear to auscultation bilaterally [Heart Rate And Rhythm] : heart rate and rhythm were normal [Murmurs] : no murmurs present [Heart Sounds] : normal S1 and S2 [Abdomen Soft] : soft [Abdomen Tenderness] : non-tender [Abdomen Mass (___ Cm)] : no abdominal mass palpated [Nail Clubbing] : no clubbing of the fingernails [Abnormal Walk] : normal gait [Gait - Sufficient For Exercise Testing] : the gait was sufficient for exercise testing [Cyanosis, Localized] : no localized cyanosis [Petechial Hemorrhages (___cm)] : no petechial hemorrhages [Skin Color & Pigmentation] : normal skin color and pigmentation [] : no rash [Skin Lesions] : no skin lesions [No Venous Stasis] : no venous stasis [No Skin Ulcers] : no skin ulcer [Oriented To Time, Place, And Person] : oriented to person, place, and time [No Xanthoma] : no  xanthoma was observed [Mood] : the mood was normal [No Anxiety] : not feeling anxious [Affect] : the affect was normal [FreeTextEntry1] : left knee swelling mild

## 2020-01-07 NOTE — HISTORY OF PRESENT ILLNESS
[FreeTextEntry1] : Nel is a 75yo female who presents for routine follow-up. Patient has PMH of Afib, anxiety, HLD, HTN, and mild renal insufficiency. Patient reports she is doing well overall. Reports she has been experiencing left knee pain after twisting her knee while getting up. She has been using a walker to assist with walking and states she will follow-up with ortho if no improvement. Patient also requesting refill medications. Denies chest pain, palpitations or shortness of breath.

## 2020-01-10 ENCOUNTER — MOBILE ON CALL (OUTPATIENT)
Age: 75
End: 2020-01-10

## 2020-01-10 ENCOUNTER — APPOINTMENT (OUTPATIENT)
Dept: CARDIOLOGY | Facility: CLINIC | Age: 75
End: 2020-01-10
Payer: MEDICARE

## 2020-01-10 PROCEDURE — 78452 HT MUSCLE IMAGE SPECT MULT: CPT

## 2020-01-10 PROCEDURE — A9500: CPT

## 2020-01-10 PROCEDURE — 93015 CV STRESS TEST SUPVJ I&R: CPT

## 2020-01-14 ENCOUNTER — INPATIENT (INPATIENT)
Facility: HOSPITAL | Age: 75
LOS: 1 days | Discharge: HOME | DRG: 247 | End: 2020-01-16
Attending: INTERNAL MEDICINE | Admitting: INTERNAL MEDICINE
Payer: COMMERCIAL

## 2020-01-14 ENCOUNTER — TRANSCRIPTION ENCOUNTER (OUTPATIENT)
Age: 75
End: 2020-01-14

## 2020-01-14 VITALS
DIASTOLIC BLOOD PRESSURE: 70 MMHG | TEMPERATURE: 98 F | OXYGEN SATURATION: 98 % | HEIGHT: 63 IN | SYSTOLIC BLOOD PRESSURE: 169 MMHG | HEART RATE: 57 BPM | WEIGHT: 257.06 LBS | RESPIRATION RATE: 16 BRPM

## 2020-01-14 DIAGNOSIS — R94.39 ABNORMAL RESULT OF OTHER CARDIOVASCULAR FUNCTION STUDY: ICD-10-CM

## 2020-01-14 DIAGNOSIS — Z90.49 ACQUIRED ABSENCE OF OTHER SPECIFIED PARTS OF DIGESTIVE TRACT: Chronic | ICD-10-CM

## 2020-01-14 LAB
ALBUMIN SERPL ELPH-MCNC: 4.5 G/DL — SIGNIFICANT CHANGE UP (ref 3.3–5)
ALP SERPL-CCNC: 85 U/L — SIGNIFICANT CHANGE UP (ref 40–120)
ALT FLD-CCNC: 29 U/L — SIGNIFICANT CHANGE UP (ref 10–45)
ANION GAP SERPL CALC-SCNC: 15 MMOL/L — SIGNIFICANT CHANGE UP (ref 5–17)
AST SERPL-CCNC: 24 U/L — SIGNIFICANT CHANGE UP (ref 10–40)
BILIRUB SERPL-MCNC: 0.6 MG/DL — SIGNIFICANT CHANGE UP (ref 0.2–1.2)
BUN SERPL-MCNC: 23 MG/DL — SIGNIFICANT CHANGE UP (ref 7–23)
CALCIUM SERPL-MCNC: 9.8 MG/DL — SIGNIFICANT CHANGE UP (ref 8.4–10.5)
CHLORIDE SERPL-SCNC: 103 MMOL/L — SIGNIFICANT CHANGE UP (ref 96–108)
CO2 SERPL-SCNC: 25 MMOL/L — SIGNIFICANT CHANGE UP (ref 22–31)
CREAT SERPL-MCNC: 1.23 MG/DL — SIGNIFICANT CHANGE UP (ref 0.5–1.3)
GLUCOSE BLDC GLUCOMTR-MCNC: 119 MG/DL — HIGH (ref 70–99)
GLUCOSE BLDC GLUCOMTR-MCNC: 124 MG/DL — HIGH (ref 70–99)
GLUCOSE SERPL-MCNC: 139 MG/DL — HIGH (ref 70–99)
HCT VFR BLD CALC: 44.7 % — SIGNIFICANT CHANGE UP (ref 34.5–45)
HGB BLD-MCNC: 14 G/DL — SIGNIFICANT CHANGE UP (ref 11.5–15.5)
MCHC RBC-ENTMCNC: 28.2 PG — SIGNIFICANT CHANGE UP (ref 27–34)
MCHC RBC-ENTMCNC: 31.3 GM/DL — LOW (ref 32–36)
MCV RBC AUTO: 89.9 FL — SIGNIFICANT CHANGE UP (ref 80–100)
NRBC # BLD: 0 /100 WBCS — SIGNIFICANT CHANGE UP (ref 0–0)
PLATELET # BLD AUTO: 207 K/UL — SIGNIFICANT CHANGE UP (ref 150–400)
POTASSIUM SERPL-MCNC: 3.8 MMOL/L — SIGNIFICANT CHANGE UP (ref 3.5–5.3)
POTASSIUM SERPL-SCNC: 3.8 MMOL/L — SIGNIFICANT CHANGE UP (ref 3.5–5.3)
PROT SERPL-MCNC: 7.8 G/DL — SIGNIFICANT CHANGE UP (ref 6–8.3)
RBC # BLD: 4.97 M/UL — SIGNIFICANT CHANGE UP (ref 3.8–5.2)
RBC # FLD: 14.8 % — HIGH (ref 10.3–14.5)
SODIUM SERPL-SCNC: 143 MMOL/L — SIGNIFICANT CHANGE UP (ref 135–145)
WBC # BLD: 8.08 K/UL — SIGNIFICANT CHANGE UP (ref 3.8–10.5)
WBC # FLD AUTO: 8.08 K/UL — SIGNIFICANT CHANGE UP (ref 3.8–10.5)

## 2020-01-14 PROCEDURE — 93571 IV DOP VEL&/PRESS C FLO 1ST: CPT | Mod: 26,LD

## 2020-01-14 PROCEDURE — 93010 ELECTROCARDIOGRAM REPORT: CPT | Mod: 76

## 2020-01-14 PROCEDURE — 93458 L HRT ARTERY/VENTRICLE ANGIO: CPT | Mod: 26,59

## 2020-01-14 PROCEDURE — 92928 PRQ TCAT PLMT NTRAC ST 1 LES: CPT | Mod: LD

## 2020-01-14 PROCEDURE — 99152 MOD SED SAME PHYS/QHP 5/>YRS: CPT

## 2020-01-14 RX ORDER — DILTIAZEM HCL 120 MG
360 CAPSULE, EXT RELEASE 24 HR ORAL DAILY
Refills: 0 | Status: DISCONTINUED | OUTPATIENT
Start: 2020-01-14 | End: 2020-01-16

## 2020-01-14 RX ORDER — METFORMIN HYDROCHLORIDE 850 MG/1
1 TABLET ORAL
Qty: 0 | Refills: 0 | DISCHARGE

## 2020-01-14 RX ORDER — ASPIRIN/CALCIUM CARB/MAGNESIUM 324 MG
1 TABLET ORAL
Qty: 0 | Refills: 0 | DISCHARGE

## 2020-01-14 RX ORDER — DOXAZOSIN MESYLATE 4 MG
1 TABLET ORAL AT BEDTIME
Refills: 0 | Status: DISCONTINUED | OUTPATIENT
Start: 2020-01-14 | End: 2020-01-16

## 2020-01-14 RX ORDER — CLOPIDOGREL BISULFATE 75 MG/1
75 TABLET, FILM COATED ORAL DAILY
Refills: 0 | Status: DISCONTINUED | OUTPATIENT
Start: 2020-01-15 | End: 2020-01-16

## 2020-01-14 RX ORDER — DEXTROSE 50 % IN WATER 50 %
15 SYRINGE (ML) INTRAVENOUS ONCE
Refills: 0 | Status: DISCONTINUED | OUTPATIENT
Start: 2020-01-14 | End: 2020-01-16

## 2020-01-14 RX ORDER — APIXABAN 2.5 MG/1
5 TABLET, FILM COATED ORAL EVERY 12 HOURS
Refills: 0 | Status: DISCONTINUED | OUTPATIENT
Start: 2020-01-15 | End: 2020-01-16

## 2020-01-14 RX ORDER — CLOPIDOGREL BISULFATE 75 MG/1
1 TABLET, FILM COATED ORAL
Qty: 90 | Refills: 4
Start: 2020-01-14 | End: 2021-04-07

## 2020-01-14 RX ORDER — ACETAMINOPHEN 500 MG
650 TABLET ORAL ONCE
Refills: 0 | Status: COMPLETED | OUTPATIENT
Start: 2020-01-14 | End: 2020-01-14

## 2020-01-14 RX ORDER — GLUCAGON INJECTION, SOLUTION 0.5 MG/.1ML
1 INJECTION, SOLUTION SUBCUTANEOUS ONCE
Refills: 0 | Status: DISCONTINUED | OUTPATIENT
Start: 2020-01-14 | End: 2020-01-16

## 2020-01-14 RX ORDER — SODIUM CHLORIDE 9 MG/ML
1000 INJECTION, SOLUTION INTRAVENOUS
Refills: 0 | Status: DISCONTINUED | OUTPATIENT
Start: 2020-01-14 | End: 2020-01-16

## 2020-01-14 RX ORDER — LISINOPRIL 2.5 MG/1
40 TABLET ORAL DAILY
Refills: 0 | Status: DISCONTINUED | OUTPATIENT
Start: 2020-01-15 | End: 2020-01-16

## 2020-01-14 RX ORDER — SODIUM CHLORIDE 9 MG/ML
1000 INJECTION INTRAMUSCULAR; INTRAVENOUS; SUBCUTANEOUS
Refills: 0 | Status: DISCONTINUED | OUTPATIENT
Start: 2020-01-14 | End: 2020-01-16

## 2020-01-14 RX ORDER — DEXTROSE 50 % IN WATER 50 %
12.5 SYRINGE (ML) INTRAVENOUS ONCE
Refills: 0 | Status: DISCONTINUED | OUTPATIENT
Start: 2020-01-14 | End: 2020-01-16

## 2020-01-14 RX ORDER — DEXTROSE 50 % IN WATER 50 %
25 SYRINGE (ML) INTRAVENOUS ONCE
Refills: 0 | Status: DISCONTINUED | OUTPATIENT
Start: 2020-01-14 | End: 2020-01-16

## 2020-01-14 RX ORDER — DRONEDARONE 400 MG/1
400 TABLET, FILM COATED ORAL
Refills: 0 | Status: DISCONTINUED | OUTPATIENT
Start: 2020-01-14 | End: 2020-01-16

## 2020-01-14 RX ORDER — METOPROLOL TARTRATE 50 MG
25 TABLET ORAL DAILY
Refills: 0 | Status: DISCONTINUED | OUTPATIENT
Start: 2020-01-14 | End: 2020-01-15

## 2020-01-14 RX ORDER — INSULIN LISPRO 100/ML
VIAL (ML) SUBCUTANEOUS
Refills: 0 | Status: DISCONTINUED | OUTPATIENT
Start: 2020-01-14 | End: 2020-01-16

## 2020-01-14 RX ORDER — ASPIRIN/CALCIUM CARB/MAGNESIUM 324 MG
1 TABLET ORAL
Qty: 90 | Refills: 0
Start: 2020-01-14 | End: 2020-04-12

## 2020-01-14 RX ORDER — INSULIN LISPRO 100/ML
VIAL (ML) SUBCUTANEOUS AT BEDTIME
Refills: 0 | Status: DISCONTINUED | OUTPATIENT
Start: 2020-01-14 | End: 2020-01-16

## 2020-01-14 RX ORDER — ASPIRIN/CALCIUM CARB/MAGNESIUM 324 MG
81 TABLET ORAL DAILY
Refills: 0 | Status: DISCONTINUED | OUTPATIENT
Start: 2020-01-14 | End: 2020-01-16

## 2020-01-14 RX ORDER — ATORVASTATIN CALCIUM 80 MG/1
40 TABLET, FILM COATED ORAL AT BEDTIME
Refills: 0 | Status: DISCONTINUED | OUTPATIENT
Start: 2020-01-14 | End: 2020-01-16

## 2020-01-14 RX ADMIN — DRONEDARONE 400 MILLIGRAM(S): 400 TABLET, FILM COATED ORAL at 17:25

## 2020-01-14 RX ADMIN — SODIUM CHLORIDE 100 MILLILITER(S): 9 INJECTION INTRAMUSCULAR; INTRAVENOUS; SUBCUTANEOUS at 18:48

## 2020-01-14 RX ADMIN — ATORVASTATIN CALCIUM 40 MILLIGRAM(S): 80 TABLET, FILM COATED ORAL at 21:32

## 2020-01-14 RX ADMIN — Medication 650 MILLIGRAM(S): at 18:14

## 2020-01-14 RX ADMIN — Medication 1 MILLIGRAM(S): at 21:32

## 2020-01-14 RX ADMIN — Medication 650 MILLIGRAM(S): at 17:25

## 2020-01-14 NOTE — DISCHARGE NOTE PROVIDER - CARE PROVIDERS DIRECT ADDRESSES
[email or web address]

## 2020-01-14 NOTE — DISCHARGE NOTE PROVIDER - NSDCFUADDINST_GEN_ALL_CORE_FT
Continue your medications. Do not stop Aspirin or Plavix unless instructed by your cardiologist.  No heavy lifting or pushing/pulling or strenuous activity with procedure arm for 2 weeks. No driving for 2 days. You may shower 24 hours following procedure but no soaking of your wrist in water (such as in a pool, sink, or tub) for 1 week. Check wrist site for bleeding and/or swelling daily following procedure. Call your doctor/cardiologist immediately for bleeding or swelling or if you have increased/persistent pain or drainage at the wrist site or if you have numbness, tingling or blue or white coloring of your hand or fingers.  Follow up with your cardiologist in 1- 2 weeks. You may call [facility] at [phone] or [phone] after office hours and weekends with any questions or concerns following your procedure.

## 2020-01-14 NOTE — DISCHARGE NOTE PROVIDER - CARE PROVIDER_API CALL
Aj Boss)  Cardiology; Internal Medicine  [address], [address]  [place] [address]  Phone: [phone]  Fax: [phone]  Follow Up Time: Aj Boss (MD)  Cardiology; Internal Medicine  [address] [facility] [address]  [place] [address]  Phone: [phone]  Fax: [phone]  Follow Up Time: 2 weeks    Ulises Sweet (DO)  Neurology; Vascular Neurology  [address] [facility] [address]  [place] [address]  Phone: [phone]  Fax: [phone]  Follow Up Time: 2 weeks

## 2020-01-14 NOTE — DISCHARGE NOTE PROVIDER - HOSPITAL COURSE
This is a 73 yo morbidly obese,  female with PMH of Afib ( On Eliquis, last dose on Friday 1/10/2020), CVA in 2017 w/o residual weakness, DM type 2 ( last A1C 7.3 in 2019, well managed w/o complications), anxiety, HLD, HTN, and mild renal insufficiency ( last creat 1.38 on 2019). FH significant for early CAD ( both father and mother  at age 56 from [place]).  Seen and evaluated by Dr Boss for c/c of dyspnea on exertion after ambulating a "few steps", denies chest pain, dizziness, palpitations, N&V, HA.  Had abnormal NST on 1/10/2020  which revealed " inferior ischemia" ( as per [employer] note).  Referred here today for [facility] for further evaluation.  Presently remains asymptomatic.         : MIA x1 to midLAD via RRA. Aspirin, Plavix, Eliquis for one month then, stop aspirin.     : Patient stable for discharge home today and followup with Dr. Boss in 2 weeks. This is a 75 yo morbidly obese,  female with PMH of Afib ( On Eliquis, last dose on Friday 1/10/2020), CVA in 2017 w/o residual weakness, DM type 2 ( last A1C 7.3 in 2019, well managed w/o complications), anxiety, HLD, HTN, and mild renal insufficiency ( last creat 1.38 on 2019). FH significant for early CAD ( both father and mother  at age 56 from [place]).  Seen and evaluated by Dr Boss for c/c of dyspnea on exertion after ambulating a "few steps", denies chest pain, dizziness, palpitations, N&V, HA.  Had abnormal NST on 1/10/2020  which revealed " inferior ischemia" ( as per [employer] note).  Referred here today for [facility] for further evaluation.  Presently remains asymptomatic.         : MIA x1 to midLAD via RRA. Aspirin, Plavix, Eliquis for one month then, stop aspirin.     : Patient stable for discharge home today and followup with Dr. Boss in 2 weeks.     Do not stop you Aspirin or Plavix unless instructed to do so by your cardiologist.

## 2020-01-14 NOTE — CHART NOTE - NSCHARTNOTEFT_GEN_A_CORE
Patient underwent a PCI procedure and is being admitted as they are at increased risk for major adverse cardiac and vascular events if discharged due to the following high risk characteristics:  bifurcation lesion.

## 2020-01-14 NOTE — H&P CARDIOLOGY - HISTORY OF PRESENT ILLNESS
This is a 73 yo obese,   female with PMH of Afib ( On Eliquis), anxiety, HLD, HTN, and mild renal insufficiency. Had abnormal NST which revealed " inferior ischemia" ( as per [employer] note).  Referred here today for [facility] for further evaluation.       ECHO 01/07/2020  EF 60-65%  trace MR This is a 73 yo obese,   female with PMH of Afib ( On Eliquis, last dose on), anxiety, HLD, HTN, and mild renal insufficiency ( last creat 1.38 on 09/23/2019). Had abnormal NST which revealed " inferior ischemia" ( as per [employer] note).  Referred here today for [facility] for further evaluation.       ECHO 01/07/2020  EF 60-65%  trace MR This is a 75 yo morbidly obese,  female with PMH of Afib ( On Eliquis, last dose on Friday 1/10/2020), CVA in 2017 w/o residual weakness, DM type 2 ( last A1C 7.3 in 2019, well managed w/o complications), anxiety, HLD, HTN, and mild renal insufficiency ( last creat 1.38 on 2019). FH significant for early CAD ( both father and mother  at age 56 from [place]).  Seen and evaluated by Dr Boss for c/c of dyspnea on exertion after ambulating a "few steps", denies chest pain, dizziness, palpitations, N&V, HA.  Had abnormal NST on 1/10/2020  which revealed " inferior ischemia" ( as per [employer] note).  Referred here today for [facility] for further evaluation.  Presently remains asymptomatic.   Of note: pt reports a 30lbs weight gain since CVA in , and believes shortness of breath is r/t to that.       ECHO 2020  EF 60-65%  trace MR    Narrative:     Symptoms:        Angina (Class): III       Ischemic Symptoms: dyspnea on minimal exertion     Heart Failure: no       Systolic/Diastolic/Combined:        NYHA Class (within 2 weeks):     Assessment of LVEF:       EF: 60-65%       Assessed by: ECHO       Date: 2020    Prior Cardiac Interventions:       PCI's: no       CABG: no    Noninvasive Testing:  see above   Stress Test: Date:        Protocol:        Duration of Exercise:        Symptoms:        EKG Changes:        DTS:        Myocardial Imaging:        Risk Assessment:     Echo: yes see above    Antianginal Therapies:        Beta Blockers:  Metoprolol        Calcium Channel Blockers:        Long Acting Nitrates:        Ranexa:     Associated Risk Factors:        Cerebrovascular Disease: YES        Chronic Lung Disease: No       Peripheral Arterial Disease: No       Chronic Kidney Disease (if yes, what is GFR): YES, GFR 38        Uncontrolled Diabetes (if yes, what is HgbA1C or FBS): No       Poorly Controlled Hypertension (if yes, what is SBP): No       Morbid Obesity (if yes, what is BMI): YES, BMI 45.5       History of Recent Ventricular Arrhythmia: No       Inability to Ambulate Safely: No       Need for Therapeutic Anticoagulation: YES, Eliquis        Antiplatelet or Contrast Allergy: No This is a 73 yo morbidly obese,  female with PMH of Afib ( On Eliquis, last dose on Friday 1/10/2020), CVA in 2017 w/o residual weakness, DM type 2 ( last A1C 7.3 in 2019, well managed w/o complications), anxiety, HLD, HTN, and mild renal insufficiency ( last creat 1.38 on 2019). FH significant for early CAD ( both father and mother  at age 56 from [place]).  Seen and evaluated by Dr Boss for c/c of dyspnea on exertion after ambulating a "few steps", denies chest pain, dizziness, palpitations, N&V, HA.  Had abnormal NST on 1/10/2020  which revealed " inferior ischemia" ( as per [employer] note).  Referred here today for [facility] for further evaluation.  Presently remains asymptomatic.   Of note: pt reports a 30lbs weight gain since CVA in , and believes shortness of breath is r/t to that.     +++ with no cardiac implant device/s+++    ECHO 2020  EF 60-65%  trace MR    Narrative:     Symptoms:        Angina (Class): III       Ischemic Symptoms: dyspnea on minimal exertion     Heart Failure: no       Systolic/Diastolic/Combined:        NYHA Class (within 2 weeks):     Assessment of LVEF:       EF: 60-65%       Assessed by: ECHO       Date: 2020    Prior Cardiac Interventions:       PCI's: no       CABG: no    Noninvasive Testing:  see above   Stress Test: Date:        Protocol:        Duration of Exercise:        Symptoms:        EKG Changes:        DTS:        Myocardial Imaging:        Risk Assessment:     Echo: yes see above    Antianginal Therapies:        Beta Blockers:  Metoprolol        Calcium Channel Blockers:        Long Acting Nitrates:        Ranexa:     Associated Risk Factors:        Cerebrovascular Disease: YES        Chronic Lung Disease: No       Peripheral Arterial Disease: No       Chronic Kidney Disease (if yes, what is GFR): YES, GFR 38        Uncontrolled Diabetes (if yes, what is HgbA1C or FBS): No       Poorly Controlled Hypertension (if yes, what is SBP): No       Morbid Obesity (if yes, what is BMI): YES, BMI 45.5       History of Recent Ventricular Arrhythmia: No       Inability to Ambulate Safely: No       Need for Therapeutic Anticoagulation: YES, Eliquis        Antiplatelet or Contrast Allergy: No

## 2020-01-14 NOTE — DISCHARGE NOTE PROVIDER - PROVIDER TOKENS
PROVIDER:[TOKEN:[[id number]]] PROVIDER:[TOKEN:[[id number]],FOLLOWUP:[2 weeks]],PROVIDER:[TOKEN:[[id number]],FOLLOWUP:[2 weeks]]

## 2020-01-14 NOTE — H&P CARDIOLOGY - PMH
Anxiety    Atrial fibrillation    CVA (cerebral vascular accident)    Diabetes mellitus type 2 in obese    Hyperlipemia    Hypertension    Hypomagnesemia    Knee pain, left    Renal insufficiency

## 2020-01-14 NOTE — DISCHARGE NOTE PROVIDER - NSDCCPCAREPLAN_GEN_ALL_CORE_FT
PRINCIPAL DISCHARGE DIAGNOSIS  Diagnosis: Coronary artery disease  Assessment and Plan of Treatment: one stent placed to mid LAD artery via right radial artery. Take all medications as dorected; DO NOT miss doeses of aspirin or clopidogrel (Plavix)--these medications keep your stent open. You will be on aspirin, Plavix, and Eliquis for one month, then stop taking aspirin. PRINCIPAL DISCHARGE DIAGNOSIS  Diagnosis: Coronary artery disease  Assessment and Plan of Treatment: one stent placed to mid LAD artery via right radial artery. Take all medications as dorected; DO NOT miss doeses of aspirin or clopidogrel (Plavix)--these medications keep your stent open. You will be on aspirin, Plavix, and Eliquis for one month, then stop taking aspirin.      SECONDARY DISCHARGE DIAGNOSES  Diagnosis: HLD (hyperlipidemia)  Assessment and Plan of Treatment: Your LDL cholesterol will be less than 70mg/dL   Continue with your cholesterol medications. Eat a heart healthy diet that is low in saturated fats and salt, and includes whole grains, fruits, vegetables and lean protein; exercise regularly (consult with your physician or cardiologist first); maintain a heart healthy weight. Continue to follow with your primary physician or cardiologist for treatment goals, continue medication, have liver function testing every 3 months as anti lipid medications can cause liver irritation. If you smoke - quit (A resource to help you stop smoking is the [facility] [employer] – phone number [phone].).    Diagnosis: HTN (hypertension)  Assessment and Plan of Treatment: Your blood pressure will be controlled.   Continue with your blood pressure medications; eat a heart healthy diet with low salt diet; exercise regularly (consult with your physician or cardiologist first); maintain a heart healthy weight; if you smoke - quit (A resource to help you stop smoking is the [facility] [employer] – phone number [phone].); include healthy ways to manage stress. Continue to follow with your primary care physician or cardiologist.    Diagnosis: Afib  Assessment and Plan of Treatment: Your heart rate and rhythm will be controlled.   Continue with your cardiologist and primary care MD. Continue your current medications. Call your physician for palpitations, feelings of rapid heart beat, lightheadedness, or dizziness.

## 2020-01-14 NOTE — DISCHARGE NOTE PROVIDER - NSDCMRMEDTOKEN_GEN_ALL_CORE_FT
Aspirin Enteric Coated 81 mg oral delayed release tablet: 1 tab(s) orally once a day    atorvastatin 20 mg oral tablet: 1 tab(s) orally once a day  biotin 1000 mcg oral tablet: 1 tab(s) orally once a day  Cardizem  mg/24 hours oral capsule, extended release: 1 cap(s) orally once a day  clopidogrel 75 mg oral tablet: 1 tab(s) orally once a day  doxazosin 1 mg oral tablet: 1 tab(s) orally once a day  Eliquis 5 mg oral tablet: 1 tab(s) orally 2 times a day  Farxiga 5 mg oral tablet: 1 tab(s) orally once a day  Klor-Con 10 mEq oral tablet, extended release: 2 tab(s) orally 2 times a day  lisinopril 40 mg oral tablet: 1 tab(s) orally once a day  LORazepam 0.5 mg oral tablet: 0.5 tab(s) orally once a day  magnesium oxide: 1600 milligram(s) orally once a day ( total dose) pt splits it up into 3x daily   metFORMIN 1000 mg oral tablet: 1 tab(s) orally 2 times a day; DO NOT TAKE UNTIL 1/17  Metoprolol Succinate ER 25 mg oral tablet, extended release: 1 tab(s) orally once a day  Multaq 400 mg oral tablet: 1 tab(s) orally 2 times a day Aspirin Enteric Coated 81 mg oral delayed release tablet: 1 tab(s) orally once a day    atorvastatin 20 mg oral tablet: 1 tab(s) orally once a day  biotin 1000 mcg oral tablet: 1 tab(s) orally once a day  Cardizem  mg/24 hours oral capsule, extended release: 1 cap(s) orally once a day  doxazosin 1 mg oral tablet: 1 tab(s) orally once a day  Eliquis 5 mg oral tablet: 1 tab(s) orally 2 times a day  Farxiga 5 mg oral tablet: 1 tab(s) orally once a day  Klor-Con 10 mEq oral tablet, extended release: 2 tab(s) orally 2 times a day  lisinopril 40 mg oral tablet: 1 tab(s) orally once a day  LORazepam 0.5 mg oral tablet: 0.5 tab(s) orally once a day  magnesium oxide: 1600 milligram(s) orally once a day ( total dose) pt splits it up into 3x daily   metFORMIN 1000 mg oral tablet: 1 tab(s) orally 2 times a day; DO NOT TAKE UNTIL 1/17  Metoprolol Succinate ER 25 mg oral tablet, extended release: 1 tab(s) orally once a day  Multaq 400 mg oral tablet: 1 tab(s) orally 2 times a day Aspirin Enteric Coated 81 mg oral delayed release tablet: 1 tab(s) orally once a day  for x 1 month then discontinue it   continue plavix &amp; eliquis  atorvastatin 40 mg oral tablet: 1 tab(s) orally once a day (at bedtime)  biotin 1000 mcg oral tablet: 1 tab(s) orally once a day  Cardizem  mg/24 hours oral capsule, extended release: 1 cap(s) orally once a day  clopidogrel 75 mg oral tablet: 1 tab(s) orally once a day  doxazosin 1 mg oral tablet: 1 tab(s) orally once a day  Eliquis 5 mg oral tablet: 1 tab(s) orally 2 times a day  Farxiga 5 mg oral tablet: 1 tab(s) orally once a day  Klor-Con 10 mEq oral tablet, extended release: 2 tab(s) orally 2 times a day  lisinopril 40 mg oral tablet: 1 tab(s) orally once a day  LORazepam 0.5 mg oral tablet: 0.5 tab(s) orally once a day  magnesium oxide: 1600 milligram(s) orally once a day ( total dose) pt splits it up into 3x daily   metFORMIN 1000 mg oral tablet: 1 tab(s) orally 2 times a day; DO NOT TAKE UNTIL 1/17  Multaq 400 mg oral tablet: 1 tab(s) orally 2 times a day

## 2020-01-14 NOTE — DISCHARGE NOTE PROVIDER - NSDCCPTREATMENT_GEN_ALL_CORE_FT
PRINCIPAL PROCEDURE  Procedure: Left heart catheterization  Findings and Treatment: MIA x 2 mLAD via RRA access

## 2020-01-14 NOTE — DISCHARGE NOTE PROVIDER - NSDCFUSCHEDAPPT_GEN_ALL_CORE_FT
ERNESTINA HEAD ; 03/19/2020 ; [facility] [address] ERNESTINA HEAD ; 03/19/2020 ; [facility] [address] ERNESTINA HEAD ; 03/19/2020 ; [facility] [address] ERNESTINA HEAD ; 03/19/2020 ; [facility] [address]

## 2020-01-15 DIAGNOSIS — E11.69 TYPE 2 DIABETES MELLITUS WITH OTHER SPECIFIED COMPLICATION: ICD-10-CM

## 2020-01-15 DIAGNOSIS — N28.9 DISORDER OF KIDNEY AND URETER, UNSPECIFIED: ICD-10-CM

## 2020-01-15 DIAGNOSIS — I48.91 UNSPECIFIED ATRIAL FIBRILLATION: ICD-10-CM

## 2020-01-15 DIAGNOSIS — I10 ESSENTIAL (PRIMARY) HYPERTENSION: ICD-10-CM

## 2020-01-15 DIAGNOSIS — I63.9 CEREBRAL INFARCTION, UNSPECIFIED: ICD-10-CM

## 2020-01-15 DIAGNOSIS — I25.10 ATHEROSCLEROTIC HEART DISEASE OF NATIVE CORONARY ARTERY WITHOUT ANGINA PECTORIS: ICD-10-CM

## 2020-01-15 DIAGNOSIS — E78.5 HYPERLIPIDEMIA, UNSPECIFIED: ICD-10-CM

## 2020-01-15 LAB
ANION GAP SERPL CALC-SCNC: 14 MMOL/L — SIGNIFICANT CHANGE UP (ref 5–17)
APTT BLD: 25.8 SEC — LOW (ref 27.5–36.3)
BUN SERPL-MCNC: 22 MG/DL — SIGNIFICANT CHANGE UP (ref 7–23)
CALCIUM SERPL-MCNC: 9.2 MG/DL — SIGNIFICANT CHANGE UP (ref 8.4–10.5)
CHLORIDE SERPL-SCNC: 103 MMOL/L — SIGNIFICANT CHANGE UP (ref 96–108)
CO2 SERPL-SCNC: 21 MMOL/L — LOW (ref 22–31)
CREAT SERPL-MCNC: 1.23 MG/DL — SIGNIFICANT CHANGE UP (ref 0.5–1.3)
GLUCOSE BLDC GLUCOMTR-MCNC: 127 MG/DL — HIGH (ref 70–99)
GLUCOSE BLDC GLUCOMTR-MCNC: 131 MG/DL — HIGH (ref 70–99)
GLUCOSE BLDC GLUCOMTR-MCNC: 131 MG/DL — HIGH (ref 70–99)
GLUCOSE BLDC GLUCOMTR-MCNC: 142 MG/DL — HIGH (ref 70–99)
GLUCOSE BLDC GLUCOMTR-MCNC: 158 MG/DL — HIGH (ref 70–99)
GLUCOSE BLDC GLUCOMTR-MCNC: 167 MG/DL — HIGH (ref 70–99)
GLUCOSE BLDC GLUCOMTR-MCNC: 168 MG/DL — HIGH (ref 70–99)
GLUCOSE SERPL-MCNC: 190 MG/DL — HIGH (ref 70–99)
HCT VFR BLD CALC: 41.9 % — SIGNIFICANT CHANGE UP (ref 34.5–45)
HGB BLD-MCNC: 13.2 G/DL — SIGNIFICANT CHANGE UP (ref 11.5–15.5)
INR BLD: 1 RATIO — SIGNIFICANT CHANGE UP (ref 0.88–1.16)
MAGNESIUM SERPL-MCNC: 1.7 MG/DL — SIGNIFICANT CHANGE UP (ref 1.6–2.6)
MCHC RBC-ENTMCNC: 28.3 PG — SIGNIFICANT CHANGE UP (ref 27–34)
MCHC RBC-ENTMCNC: 31.5 GM/DL — LOW (ref 32–36)
MCV RBC AUTO: 89.7 FL — SIGNIFICANT CHANGE UP (ref 80–100)
NRBC # BLD: 0 /100 WBCS — SIGNIFICANT CHANGE UP (ref 0–0)
PLATELET # BLD AUTO: 204 K/UL — SIGNIFICANT CHANGE UP (ref 150–400)
POTASSIUM SERPL-MCNC: 3.4 MMOL/L — LOW (ref 3.5–5.3)
POTASSIUM SERPL-SCNC: 3.4 MMOL/L — LOW (ref 3.5–5.3)
PROTHROM AB SERPL-ACNC: 11.5 SEC — SIGNIFICANT CHANGE UP (ref 10–12.9)
RBC # BLD: 4.67 M/UL — SIGNIFICANT CHANGE UP (ref 3.8–5.2)
RBC # FLD: 14.9 % — HIGH (ref 10.3–14.5)
SODIUM SERPL-SCNC: 138 MMOL/L — SIGNIFICANT CHANGE UP (ref 135–145)
WBC # BLD: 7.47 K/UL — SIGNIFICANT CHANGE UP (ref 3.8–10.5)
WBC # FLD AUTO: 7.47 K/UL — SIGNIFICANT CHANGE UP (ref 3.8–10.5)

## 2020-01-15 PROCEDURE — 99231 SBSQ HOSP IP/OBS SF/LOW 25: CPT

## 2020-01-15 PROCEDURE — 0042T: CPT

## 2020-01-15 PROCEDURE — 93306 TTE W/DOPPLER COMPLETE: CPT | Mod: 26

## 2020-01-15 PROCEDURE — 70551 MRI BRAIN STEM W/O DYE: CPT | Mod: 26

## 2020-01-15 PROCEDURE — 70450 CT HEAD/BRAIN W/O DYE: CPT | Mod: 26,59

## 2020-01-15 PROCEDURE — 70498 CT ANGIOGRAPHY NECK: CPT | Mod: 26

## 2020-01-15 PROCEDURE — 70496 CT ANGIOGRAPHY HEAD: CPT | Mod: 26

## 2020-01-15 RX ORDER — ACETAMINOPHEN 500 MG
650 TABLET ORAL EVERY 6 HOURS
Refills: 0 | Status: DISCONTINUED | OUTPATIENT
Start: 2020-01-15 | End: 2020-01-16

## 2020-01-15 RX ORDER — MAGNESIUM OXIDE 400 MG ORAL TABLET 241.3 MG
400 TABLET ORAL
Refills: 0 | Status: DISCONTINUED | OUTPATIENT
Start: 2020-01-15 | End: 2020-01-16

## 2020-01-15 RX ORDER — SODIUM CHLORIDE 0.65 %
1 AEROSOL, SPRAY (ML) NASAL
Refills: 0 | Status: DISCONTINUED | OUTPATIENT
Start: 2020-01-15 | End: 2020-01-16

## 2020-01-15 RX ORDER — AMLODIPINE BESYLATE 2.5 MG/1
5 TABLET ORAL DAILY
Refills: 0 | Status: DISCONTINUED | OUTPATIENT
Start: 2020-01-15 | End: 2020-01-16

## 2020-01-15 RX ORDER — CLOPIDOGREL BISULFATE 75 MG/1
1 TABLET, FILM COATED ORAL
Qty: 90 | Refills: 4
Start: 2020-01-15 | End: 2021-04-08

## 2020-01-15 RX ORDER — ACETAMINOPHEN 500 MG
650 TABLET ORAL ONCE
Refills: 0 | Status: COMPLETED | OUTPATIENT
Start: 2020-01-15 | End: 2020-01-15

## 2020-01-15 RX ORDER — MAGNESIUM SULFATE 500 MG/ML
1 VIAL (ML) INJECTION ONCE
Refills: 0 | Status: COMPLETED | OUTPATIENT
Start: 2020-01-15 | End: 2020-01-15

## 2020-01-15 RX ORDER — METOPROLOL TARTRATE 50 MG
25 TABLET ORAL AT BEDTIME
Refills: 0 | Status: DISCONTINUED | OUTPATIENT
Start: 2020-01-15 | End: 2020-01-16

## 2020-01-15 RX ORDER — MAGNESIUM OXIDE 400 MG ORAL TABLET 241.3 MG
400 TABLET ORAL ONCE
Refills: 0 | Status: COMPLETED | OUTPATIENT
Start: 2020-01-15 | End: 2020-01-15

## 2020-01-15 RX ORDER — HYDRALAZINE HCL 50 MG
10 TABLET ORAL ONCE
Refills: 0 | Status: COMPLETED | OUTPATIENT
Start: 2020-01-15 | End: 2020-01-15

## 2020-01-15 RX ORDER — HYDRALAZINE HCL 50 MG
25 TABLET ORAL THREE TIMES A DAY
Refills: 0 | Status: DISCONTINUED | OUTPATIENT
Start: 2020-01-15 | End: 2020-01-15

## 2020-01-15 RX ORDER — POTASSIUM CHLORIDE 20 MEQ
20 PACKET (EA) ORAL ONCE
Refills: 0 | Status: COMPLETED | OUTPATIENT
Start: 2020-01-15 | End: 2020-01-15

## 2020-01-15 RX ADMIN — Medication 360 MILLIGRAM(S): at 05:48

## 2020-01-15 RX ADMIN — APIXABAN 5 MILLIGRAM(S): 2.5 TABLET, FILM COATED ORAL at 18:00

## 2020-01-15 RX ADMIN — MAGNESIUM OXIDE 400 MG ORAL TABLET 400 MILLIGRAM(S): 241.3 TABLET ORAL at 18:01

## 2020-01-15 RX ADMIN — Medication 100 GRAM(S): at 02:15

## 2020-01-15 RX ADMIN — APIXABAN 5 MILLIGRAM(S): 2.5 TABLET, FILM COATED ORAL at 05:37

## 2020-01-15 RX ADMIN — MAGNESIUM OXIDE 400 MG ORAL TABLET 400 MILLIGRAM(S): 241.3 TABLET ORAL at 00:17

## 2020-01-15 RX ADMIN — Medication 650 MILLIGRAM(S): at 00:15

## 2020-01-15 RX ADMIN — Medication 81 MILLIGRAM(S): at 05:37

## 2020-01-15 RX ADMIN — Medication 650 MILLIGRAM(S): at 20:52

## 2020-01-15 RX ADMIN — Medication 650 MILLIGRAM(S): at 00:46

## 2020-01-15 RX ADMIN — LISINOPRIL 40 MILLIGRAM(S): 2.5 TABLET ORAL at 07:52

## 2020-01-15 RX ADMIN — Medication 25 MILLIGRAM(S): at 22:23

## 2020-01-15 RX ADMIN — Medication 10 MILLIGRAM(S): at 02:15

## 2020-01-15 RX ADMIN — Medication 20 MILLIEQUIVALENT(S): at 05:37

## 2020-01-15 RX ADMIN — AMLODIPINE BESYLATE 5 MILLIGRAM(S): 2.5 TABLET ORAL at 05:38

## 2020-01-15 RX ADMIN — CLOPIDOGREL BISULFATE 75 MILLIGRAM(S): 75 TABLET, FILM COATED ORAL at 05:37

## 2020-01-15 RX ADMIN — Medication 650 MILLIGRAM(S): at 21:22

## 2020-01-15 RX ADMIN — MAGNESIUM OXIDE 400 MG ORAL TABLET 400 MILLIGRAM(S): 241.3 TABLET ORAL at 13:31

## 2020-01-15 RX ADMIN — DRONEDARONE 400 MILLIGRAM(S): 400 TABLET, FILM COATED ORAL at 05:39

## 2020-01-15 RX ADMIN — Medication 1 SPRAY(S): at 03:22

## 2020-01-15 RX ADMIN — Medication 1 MILLIGRAM(S): at 22:23

## 2020-01-15 RX ADMIN — Medication 650 MILLIGRAM(S): at 12:00

## 2020-01-15 RX ADMIN — ATORVASTATIN CALCIUM 40 MILLIGRAM(S): 80 TABLET, FILM COATED ORAL at 22:23

## 2020-01-15 RX ADMIN — DRONEDARONE 400 MILLIGRAM(S): 400 TABLET, FILM COATED ORAL at 18:01

## 2020-01-15 RX ADMIN — Medication 650 MILLIGRAM(S): at 12:30

## 2020-01-15 NOTE — PROGRESS NOTE ADULT - SUBJECTIVE AND OBJECTIVE BOX
This is a 73 yo morbidly obese,  female with PMH of Afib ( On Eliquis, last dose on Friday 1/10/2020; started ASA 81mg ), CVA in 2017 w/o residual weakness, DM type 2 ( last A1C 7.3 in 2019, well managed w/o complications), anxiety, HLD, HTN, and mild renal insufficiency ( last creat 1.38 on 2019). FH significant for early CAD ( both father and mother  at age 56 from [place]).  Seen and evaluated by Dr Boss for c/c of dyspnea on exertion after ambulating a "few steps", denies chest pain, dizziness, palpitations, N&V, HA.  Had abnormal NST on 1/10/2020  which revealed " inferior ischemia" ( as per [employer] note).  Referred here today for [facility] for further evaluation.  Presently remains asymptomatic.     Subjective/Observations:   Patient s/p MIA x2 to mLAD via RRA  and was loaded with 600mg Plavix in cath lab post procedure. Around 1215am, patient reported mild headache and requested Tylenol (650mg Tylenol given as well as magnesium oxide 400mg PO). At 1243am, called to bedside by RN for change in patients mental status. Patient could not remember her own name, or name of her daughter Iris at the bedside. Patient was able to articulate her  as well as the name of her cardiologist Dr. Boss. VSS as documented. Code Stroke called. Neuro team examined patient and their exam revealed the following:  "Mild-to-moderate aphasia; some obvious loss of fluency or facility of comprehension, without significant limitation on ideas expressed or form of expression. Reduction of speech and/or comprehension, however, makes conversation about provided material difficult or impossible. For example, in conversation about provided materials, examiner can identify picture or naming card content from patient's response."	  Patient taken for emergent CT head w/o contrast and CT angio of head and neck. CT head did not reveal any acute findings; CT angio images diagnostically suboptimal. Patient returned to [facility] where her symptoms began to resolve-reevaluation by neuro team showed no need for TPA as risk outweighed benefit in this particular case. Neuro recommends MRI of head in the morning. Magnesium 1gm IV and hydralazine 10mg IV x1 given to manage headache symptoms.      REVIEW OF SYSTEMS: All other review of systems is negative unless indicated above    MEDICATIONS  (STANDING):  apixaban 5 milliGRAM(s) Oral every 12 hours  aspirin enteric coated 81 milliGRAM(s) Oral daily  atorvastatin 40 milliGRAM(s) Oral at bedtime  clopidogrel Tablet 75 milliGRAM(s) Oral daily  dextrose 5%. 1000 milliLiter(s) (50 mL/Hr) IV Continuous <Continuous>  dextrose 50% Injectable 12.5 Gram(s) IV Push once  dextrose 50% Injectable 25 Gram(s) IV Push once  dextrose 50% Injectable 25 Gram(s) IV Push once  diltiazem    milliGRAM(s) Oral daily  doxazosin 1 milliGRAM(s) Oral at bedtime  dronedarone 400 milliGRAM(s) Oral two times a day  hydrALAZINE Injectable 10 milliGRAM(s) IV Push once  insulin lispro (HumaLOG) corrective regimen sliding scale   SubCutaneous three times a day before meals  insulin lispro (HumaLOG) corrective regimen sliding scale   SubCutaneous at bedtime  lisinopril 40 milliGRAM(s) Oral daily  LORazepam     Tablet 0.5 milliGRAM(s) Oral daily  magnesium sulfate  IVPB 1 Gram(s) IV Intermittent once  metoprolol succinate ER 25 milliGRAM(s) Oral daily  potassium chloride    Tablet ER 20 milliEquivalent(s) Oral once  sodium chloride 0.9%. 1000 milliLiter(s) (100 mL/Hr) IV Continuous <Continuous>    MEDICATIONS  (PRN):  dextrose 40% Gel 15 Gram(s) Oral once PRN Blood Glucose LESS THAN 70 milliGRAM(s)/deciliter  glucagon  Injectable 1 milliGRAM(s) IntraMuscular once PRN Glucose LESS THAN 70 milligrams/deciliter      Allergies    No Known Allergies    Intolerances      Vital Signs Last 24 Hrs  T(C): 37 (2020 20:30), Max: 37 (2020 20:30)  T(F): 98.6 (2020 20:30), Max: 98.6 (2020 20:30)  HR: 59 (2020 21:30) (56 - 70)  BP: 157/78 (2020 21:30) (123/96 - 187/166)  BP(mean): 103 (2020 12:57) (103 - 103)  RR: 18 (2020 21:30) (16 - 18)  SpO2: 95% (2020 21:30) (91% - 98%)          I&O's Summary    2020 07:01  -  15 Jayson 2020 01:59  --------------------------------------------------------  IN: 240 mL / OUT: 0 mL / NET: 240 mL      Weight (kg): 116.6 ( @ 16:00)    PHYSICAL EXAM:  General: anxious but in no acute distress; daughter says patient has anxiety at baseline since CVA in 2017.   Neurology: Awake, focal deficits from Code Stroke improving, POOLE x 4  Respiratory: CTA B/L, No wheezing, rales, rhonchi  CV: RRR, S1S2, no murmurs, rubs or gallops  Abdominal: Soft, NT, ND +BS,   Extremities: No edema, + peripheral pulses  Skin: No Rashes, Hematoma, Ecchymosis  Psych: Oriented x 3, normal affect      LABS: All Labs Reviewed:                        13.2   7.47  )-----------( 204      ( 15 Jayson 2020 00:46 )             41.9                         14.0   8.08  )-----------( 207      ( 2020 14:46 )             44.7     15 Jayson 2020 00:46    138    |  103    |  22     ----------------------------<  190    3.4     |  21     |  1.23   2020 14:46    143    |  103    |  23     ----------------------------<  139    3.8     |  25     |  1.23     Ca    9.2        15 Jayson 2020 00:46  Ca    9.8        2020 14:46    TPro  7.8    /  Alb  4.5    /  TBili  0.6    /  DBili  x      /  AST  24     /  ALT  29     /  AlkPhos  85     2020 14:46

## 2020-01-15 NOTE — PROGRESS NOTE ADULT - PROBLEM SELECTOR PLAN 2
-s/p Code Stroke as described above  -plan for MRI brain today  -cardiology fellow aware of Code Stroke; Dr. Del Valle to be contacted in early AM

## 2020-01-15 NOTE — CHART NOTE - NSCHARTNOTEFT_GEN_A_CORE
Please see NP progress note from 1:59 am for code stroke event notes    Lisa Gray, BHARATH-BC  [phone]

## 2020-01-15 NOTE — PROVIDER CONTACT NOTE (CHANGE IN STATUS NOTIFICATION) - SITUATION
Pt was A&O x4 after procedure, started complaining of headache, and couldn't remember her own name or her daughter's name at bedside, and could not remember today's date or month.

## 2020-01-15 NOTE — PROVIDER CONTACT NOTE (CHANGE IN STATUS NOTIFICATION) - BACKGROUND
Pt s/p cath 1/14/2020 w/ PMH of CVA (no deficits) in 2017 on Eliquis at home for afib, no eliquis since friday 1/10

## 2020-01-15 NOTE — PROGRESS NOTE ADULT - PROBLEM SELECTOR PLAN 1
-s/p MIA x2 to mLAD via RRA  -continue aspirin, plavix, eliquis for one month, then stop aspirin  -continue statin  -follow up with Dr. Boss in 2 weeks.

## 2020-01-15 NOTE — CONSULT NOTE ADULT - ATTENDING COMMENTS
VASCULAR NEUROLOGY ATTENDING  The patient is seen and examined the history and imaging are reviewed. I agree with the resident note unless otherwise noted. Overall low suspicion for cerebrovascular event. Finding of cerebrovascular event unlikely to . Continue AC and DAPT.

## 2020-01-15 NOTE — CONSULT NOTE ADULT - ASSESSMENT
75 yo right handed lady for whom Neurology is consulted via Code Stroke in th early morning of 1/15/20 regarding complaints of intermittent headache and disorientation after undergoing cardiac catheterization and cardiac stent placement the day prior. Patient harbors a past medical history significant for morbid obesity, Afib ( On Eliquis, last dose on Friday 1/10/2020), CVA in 2017 w/o residual weakness, given TPA, DM type 2 ( last A1C 7.3 in 2019, well managed w/o complications), anxiety, HLD, HTN, and mild renal insufficiency ( last creat 1.38 on 2019). FH significant for early CAD ( both father and mother  at age 56 from MI).  Patient initially admitted on the afternoon of 20 for elective cardiac catheterization after being seen and evaluated by Dr Boss for complaints of dyspnea on exertion after ambulating a "few steps". Patient had abnormal NST on 1/10/2020  which revealed " inferior ischemia". Code Stroke called at 00:46 on 1/15/20 for difficulty recalling patients own name as well as her daughters. NIHSS of 2 on arrival for intermittently dropped words when asked to repeat and reciting her birthday when asked for her name. CTH demonstrated old R frontal infarcts and previously undescribed BL subcortical lacunar infarctions. CTA Head and Neck unremarkable. Patients disorientation improved after CT scans considerably according to my assessment and the daughter at bedside however bifrontal headache remained in the setting of fluctuating BP's. [place] of TPA was discussed with patient and daughter but with currently nonfocal exam and seeming resolution of prior deficits, the risks of therapy were deemed to outweigh the benefits to mutual agreement. This however does not extinguish the possibility that patient may have suffered acute cerebral infarction given extensive risk factors and as such further investigations are warranted.      Impression: Transient Encephalopathy 2/2 TIA vs Stroke vs Seizure w/ preserved awareness    Recommendations:    Headache to be treated symptomatically (IVF, Tylenol, Magnesium)  MRI Brain w/o  TTE with bubble study for possible valvular heart disease  Consider EEG if symptoms recur and above work up is negative  ASA 81 mg PO QD once patient passes swallow evaluation  Lipitor 80 mg PO QHS  Resume Eliquis for Atrial Fibrillatio  DVT prophylaxis with heparin/lovenox  NPO until patient passes dysphagia screen  Tele monitor  PT/OT

## 2020-01-15 NOTE — OCCUPATIONAL THERAPY INITIAL EVALUATION ADULT - LIVES WITH, PROFILE
pt lives in a private 1 story house + 3 steps to enter. Pt independent with ADLs. and ambulation./spouse/children

## 2020-01-15 NOTE — PROVIDER CONTACT NOTE (CHANGE IN STATUS NOTIFICATION) - ASSESSMENT
Pt A&Ox2, currently complaining of headache 8/10, VS consistently trending, procedure site is benign and CDI, all pulses present

## 2020-01-15 NOTE — PROGRESS NOTE ADULT - ASSESSMENT
74 yr old female with PMH of Afib on Eliquis (last dose 1/10/20), CVA 2017 w/o deficits, DM2, anxiety, HTN, HLD, CKD III s/p positive stress test presented for elective cath; 2 stents placed to LAD via RRA.

## 2020-01-15 NOTE — CONSULT NOTE ADULT - SUBJECTIVE AND OBJECTIVE BOX
HPI:  Patient is a 75 yo right handed lady for whom Neurology is consulted via Code Stroke in th early morning of 1/15/20 regarding complaints of intermittent headache and disorientation after undergoing cardiac catheterization and cardiac stent placement the day prior. Patient harbors a past medical history significant for morbid obesity, Afib ( On Eliquis, last dose on Friday 1/10/2020), CVA in 2017 w/o residual weakness, DM type 2 ( last A1C 7.3 in 2019, well managed w/o complications), anxiety, HLD, HTN, and mild renal insufficiency ( last creat 1.38 on 2019). FH significant for early CAD ( both father and mother  at age 56 from MI).  Patient initially admitted on the afternoon of 20 for elective cardiac catheterization after being seen and evaluated by Dr Boss for complaints of dyspnea on exertion after ambulating a "few steps". Patient Had abnormal NST on 1/10/2020  which revealed " inferior ischemia". Code Stroke called at 00:46 on 1/15/20 for difficulty recalling patients own name as well as her daughters. NIHSS of 2 on arrival for intermittently dropped words when asked to repeat and reciting her birthday when asked for her name. CTH demonstrated old R frontal infarcts and previously undescribed BL subcortical lacunar infarctions. CTA Head and Neck unremarkable.     (Stroke only)  NIHSS: 2  MRS: 1  ICH: 0    A 10-system ROS was performed and is negative except for those items noted above and/or in the HPI.    PAST MEDICAL & SURGICAL HISTORY:  Hypomagnesemia  Anxiety  Knee pain, left  Renal insufficiency  CVA (cerebral vascular accident)  Atrial fibrillation  Diabetes mellitus type 2 in obese  Hyperlipemia  Hypertension  History of cholecystectomy    FAMILY HISTORY:  Family history of myocardial infarction    MEDICATIONS (HOME):  Home Medications:  atorvastatin 20 mg oral tablet: 1 tab(s) orally once a day (2020 16:10)  biotin 1000 mcg oral tablet: 1 tab(s) orally once a day (2020 16:10)  Cardizem  mg/24 hours oral capsule, extended release: 1 cap(s) orally once a day (2020 16:10)  doxazosin 1 mg oral tablet: 1 tab(s) orally once a day (2020 16:10)  Eliquis 5 mg oral tablet: 1 tab(s) orally 2 times a day (2020 16:10)  Farxiga 5 mg oral tablet: 1 tab(s) orally once a day (2020 16:10)  Klor-Con 10 mEq oral tablet, extended release: 2 tab(s) orally 2 times a day (2020 16:10)  lisinopril 40 mg oral tablet: 1 tab(s) orally once a day (2020 16:10)  LORazepam 0.5 mg oral tablet: 0.5 tab(s) orally once a day (2020 16:10)  magnesium oxide: 1600 milligram(s) orally once a day ( total dose) pt splits it up into 3x daily  (2020 16:10)  metFORMIN 1000 mg oral tablet: 1 tab(s) orally 2 times a day; DO NOT TAKE UNTIL  (2020 22:36)  Metoprolol Succinate ER 25 mg oral tablet, extended release: 1 tab(s) orally once a day (2020 16:10)  Multaq 400 mg oral tablet: 1 tab(s) orally 2 times a day (2020 16:10)    MEDICATIONS  (STANDING):  amLODIPine   Tablet 5 milliGRAM(s) Oral daily  apixaban 5 milliGRAM(s) Oral every 12 hours  aspirin enteric coated 81 milliGRAM(s) Oral daily  atorvastatin 40 milliGRAM(s) Oral at bedtime  clopidogrel Tablet 75 milliGRAM(s) Oral daily  dextrose 5%. 1000 milliLiter(s) (50 mL/Hr) IV Continuous <Continuous>  dextrose 50% Injectable 12.5 Gram(s) IV Push once  dextrose 50% Injectable 25 Gram(s) IV Push once  dextrose 50% Injectable 25 Gram(s) IV Push once  diltiazem    milliGRAM(s) Oral daily  doxazosin 1 milliGRAM(s) Oral at bedtime  dronedarone 400 milliGRAM(s) Oral two times a day  insulin lispro (HumaLOG) corrective regimen sliding scale   SubCutaneous three times a day before meals  insulin lispro (HumaLOG) corrective regimen sliding scale   SubCutaneous at bedtime  lisinopril 40 milliGRAM(s) Oral daily  LORazepam     Tablet 0.5 milliGRAM(s) Oral daily  metoprolol succinate ER 25 milliGRAM(s) Oral at bedtime  potassium chloride    Tablet ER 20 milliEquivalent(s) Oral once  sodium chloride 0.9%. 1000 milliLiter(s) (100 mL/Hr) IV Continuous <Continuous>    MEDICATIONS  (PRN):  dextrose 40% Gel 15 Gram(s) Oral once PRN Blood Glucose LESS THAN 70 milliGRAM(s)/deciliter  glucagon  Injectable 1 milliGRAM(s) IntraMuscular once PRN Glucose LESS THAN 70 milligrams/deciliter  sodium chloride 0.65% Nasal 1 Spray(s) Both Nostrils two times a day PRN Nasal Congestion    ALLERGIES/INTOLERANCES:  Allergies  No Known Allergies    Intolerances    VITALS & EXAMINATION:  Vital Signs Last 24 Hrs  T(C): 37 (2020 20:30), Max: 37 (2020 20:30)  T(F): 98.6 (2020 20:30), Max: 98.6 (2020 20:30)  HR: 78 (15 Jayson 2020 01:13) (56 - 78)  BP: 155/95 (15 Jayson 2020 01:13) (123/96 - 187/166)  BP(mean): 103 (2020 12:57) (103 - 103)  RR: 18 (15 Jayson 2020 01:13) (16 - 18)  SpO2: 95% (2020 21:30) (91% - 98%)    General:  Constitutional: Obese Female, appears stated age, anxious appearing  Head: Normocephalic & atraumatic.  Extremities: No cyanosis, clubbing. At least 1+ Edema present BL in LE  Skin: No rashes, bruising, or discoloration.    Neurological (>12):  MS: Awake, alert, oriented to person, place, situation, (Believes it is 2020, corrects to January when questioned. Anxious affect. Follows all commands. Struggles with serial 7's (daughter suspects this would be present at baseline) and cannot spell World backwards    Language: Speech is clear, fluent with slight impaired repetition (able to name objects: watch, pen, glove)    CNs: PERRLA (R = 3mm, L = 3mm). VFF. EOMI no nystagmus, no diplopia. V1-3 intact to LT/pinprick, well developed masseter muscles b/l. No facial asymmetry b/l, full eye closure strength b/l. Hearing grossly normal (rubbing fingers) b/l. Symmetric palate elevation in midline. Gag reflex deferred. Head turning & shoulder shrug intact b/l. Tongue midline, normal movements, no atrophy.      Motor: Normal muscle bulk & tone. No noticeable tremor or seizure. No pronator drift. Chronic Back pain limiting LLE movement but BL LE demonstrating antigravity function in accordance with baseline    Sensation: Intact to LT/PP/Temp/Vibration/Position b/l throughout.     Cortical: Extinction on DSS (neglect): none    Reflexes:              Biceps(C5)       BR(C6)     Triceps(C7)               Patellar(L4)    Achilles(S1)    Plantar Resp  R	2	          2	             2		        2		    2		Down   L	2	          2	             2		        2		    2		Down     Coordination: intact rapid-alt movements. No dysmetria to FTN/HTS      LABORATORY:  CBC                       13.2   7.47  )-----------( 204      ( 15 Jayson 2020 00:46 )             41.9     Chem -15    138  |  103  |  22  ----------------------------<  190<H>  3.4<L>   |  21<L>  |  1.23    Ca    9.2      15 Jayson 2020 00:46  Mg     1.7     -15    TPro  7.8  /  Alb  4.5  /  TBili  0.6  /  DBili  x   /  AST  24  /  ALT  29  /  AlkPhos  85  01-14    LFTs LIVER FUNCTIONS - ( 2020 14:46 )  Alb: 4.5 g/dL / Pro: 7.8 g/dL / ALK PHOS: 85 U/L / ALT: 29 U/L / AST: 24 U/L / GGT: x           Coagulopathy PT/INR - ( 15 Jayson 2020 01:49 )   PT: 11.5 sec;   INR: 1.00 ratio         PTT - ( 15 Jayson 2020 01:49 )  PTT:25.8 sec    Radiology (XR, CT, MR, U/S, TTE/PEDRO):    < from: CT Head No Cont (01.15.20 @ 01:24) >    IMPRESSION:     No acute intracranial hemorrhage or mass effect.    Chronic right anterior MCA infarct.    < end of copied text >    < from: CT Angio Neck w/ IV Cont (01.15.20 @ 01:26) >  IMPRESSION:     CT angiography neck: No evidence of hemodynamically significant stenosisusing NASCET criteria. No evidence of vascular dissection.    CT angiography brain: No major vessel occlusion or proximal stenosis.      CT perfusion:     CBF < 30%: 0  TMax > 6s: 6  Mismatch volume: 6  Mismatch ratio: Infinite    < end of copied text >

## 2020-01-16 ENCOUNTER — TRANSCRIPTION ENCOUNTER (OUTPATIENT)
Age: 75
End: 2020-01-16

## 2020-01-16 VITALS
DIASTOLIC BLOOD PRESSURE: 59 MMHG | SYSTOLIC BLOOD PRESSURE: 122 MMHG | HEART RATE: 57 BPM | RESPIRATION RATE: 17 BRPM | OXYGEN SATURATION: 98 %

## 2020-01-16 DIAGNOSIS — I10 ESSENTIAL (PRIMARY) HYPERTENSION: ICD-10-CM

## 2020-01-16 DIAGNOSIS — E78.5 HYPERLIPIDEMIA, UNSPECIFIED: ICD-10-CM

## 2020-01-16 DIAGNOSIS — I25.10 ATHEROSCLEROTIC HEART DISEASE OF NATIVE CORONARY ARTERY WITHOUT ANGINA PECTORIS: ICD-10-CM

## 2020-01-16 DIAGNOSIS — I48.91 UNSPECIFIED ATRIAL FIBRILLATION: ICD-10-CM

## 2020-01-16 PROBLEM — M25.562 PAIN IN LEFT KNEE: Chronic | Status: ACTIVE | Noted: 2020-01-14

## 2020-01-16 PROBLEM — I63.9 CEREBRAL INFARCTION, UNSPECIFIED: Chronic | Status: ACTIVE | Noted: 2020-01-14

## 2020-01-16 PROBLEM — F41.9 ANXIETY DISORDER, UNSPECIFIED: Chronic | Status: ACTIVE | Noted: 2020-01-14

## 2020-01-16 PROBLEM — E83.42 HYPOMAGNESEMIA: Chronic | Status: ACTIVE | Noted: 2020-01-14

## 2020-01-16 PROBLEM — N28.9 DISORDER OF KIDNEY AND URETER, UNSPECIFIED: Chronic | Status: ACTIVE | Noted: 2020-01-14

## 2020-01-16 LAB
ANION GAP SERPL CALC-SCNC: 14 MMOL/L — SIGNIFICANT CHANGE UP (ref 5–17)
BUN SERPL-MCNC: 28 MG/DL — HIGH (ref 7–23)
CALCIUM SERPL-MCNC: 9.4 MG/DL — SIGNIFICANT CHANGE UP (ref 8.4–10.5)
CHLORIDE SERPL-SCNC: 104 MMOL/L — SIGNIFICANT CHANGE UP (ref 96–108)
CO2 SERPL-SCNC: 21 MMOL/L — LOW (ref 22–31)
CREAT SERPL-MCNC: 1.29 MG/DL — SIGNIFICANT CHANGE UP (ref 0.5–1.3)
GLUCOSE BLDC GLUCOMTR-MCNC: 150 MG/DL — HIGH (ref 70–99)
GLUCOSE BLDC GLUCOMTR-MCNC: 189 MG/DL — HIGH (ref 70–99)
GLUCOSE SERPL-MCNC: 167 MG/DL — HIGH (ref 70–99)
POTASSIUM SERPL-MCNC: 3.6 MMOL/L — SIGNIFICANT CHANGE UP (ref 3.5–5.3)
POTASSIUM SERPL-SCNC: 3.6 MMOL/L — SIGNIFICANT CHANGE UP (ref 3.5–5.3)
SODIUM SERPL-SCNC: 139 MMOL/L — SIGNIFICANT CHANGE UP (ref 135–145)

## 2020-01-16 PROCEDURE — 97165 OT EVAL LOW COMPLEX 30 MIN: CPT

## 2020-01-16 PROCEDURE — 85027 COMPLETE CBC AUTOMATED: CPT

## 2020-01-16 PROCEDURE — 85610 PROTHROMBIN TIME: CPT

## 2020-01-16 PROCEDURE — C1887: CPT

## 2020-01-16 PROCEDURE — 93571 IV DOP VEL&/PRESS C FLO 1ST: CPT | Mod: LD

## 2020-01-16 PROCEDURE — 70450 CT HEAD/BRAIN W/O DYE: CPT

## 2020-01-16 PROCEDURE — 99153 MOD SED SAME PHYS/QHP EA: CPT

## 2020-01-16 PROCEDURE — C1769: CPT

## 2020-01-16 PROCEDURE — 85730 THROMBOPLASTIN TIME PARTIAL: CPT

## 2020-01-16 PROCEDURE — 0042T: CPT

## 2020-01-16 PROCEDURE — 93010 ELECTROCARDIOGRAM REPORT: CPT

## 2020-01-16 PROCEDURE — C1725: CPT

## 2020-01-16 PROCEDURE — 99152 MOD SED SAME PHYS/QHP 5/>YRS: CPT

## 2020-01-16 PROCEDURE — 70496 CT ANGIOGRAPHY HEAD: CPT

## 2020-01-16 PROCEDURE — 80053 COMPREHEN METABOLIC PANEL: CPT

## 2020-01-16 PROCEDURE — 93005 ELECTROCARDIOGRAM TRACING: CPT

## 2020-01-16 PROCEDURE — 70551 MRI BRAIN STEM W/O DYE: CPT

## 2020-01-16 PROCEDURE — 80048 BASIC METABOLIC PNL TOTAL CA: CPT

## 2020-01-16 PROCEDURE — 70498 CT ANGIOGRAPHY NECK: CPT

## 2020-01-16 PROCEDURE — 93306 TTE W/DOPPLER COMPLETE: CPT

## 2020-01-16 PROCEDURE — C9600: CPT | Mod: LD

## 2020-01-16 PROCEDURE — C1894: CPT

## 2020-01-16 PROCEDURE — 93458 L HRT ARTERY/VENTRICLE ANGIO: CPT | Mod: 59

## 2020-01-16 PROCEDURE — 83735 ASSAY OF MAGNESIUM: CPT

## 2020-01-16 PROCEDURE — C1874: CPT

## 2020-01-16 PROCEDURE — 82962 GLUCOSE BLOOD TEST: CPT

## 2020-01-16 RX ORDER — ATORVASTATIN CALCIUM 80 MG/1
1 TABLET, FILM COATED ORAL
Qty: 0 | Refills: 0 | DISCHARGE

## 2020-01-16 RX ORDER — ATORVASTATIN CALCIUM 80 MG/1
1 TABLET, FILM COATED ORAL
Qty: 30 | Refills: 3
Start: 2020-01-16 | End: 2020-05-14

## 2020-01-16 RX ORDER — ASPIRIN/CALCIUM CARB/MAGNESIUM 324 MG
1 TABLET ORAL
Qty: 90 | Refills: 0
Start: 2020-01-16 | End: 2020-04-14

## 2020-01-16 RX ORDER — METOPROLOL TARTRATE 50 MG
1 TABLET ORAL
Qty: 0 | Refills: 0 | DISCHARGE

## 2020-01-16 RX ORDER — CLOPIDOGREL BISULFATE 75 MG/1
1 TABLET, FILM COATED ORAL
Qty: 90 | Refills: 4
Start: 2020-01-16 | End: 2021-04-09

## 2020-01-16 RX ADMIN — Medication 81 MILLIGRAM(S): at 05:54

## 2020-01-16 RX ADMIN — DRONEDARONE 400 MILLIGRAM(S): 400 TABLET, FILM COATED ORAL at 05:55

## 2020-01-16 RX ADMIN — APIXABAN 5 MILLIGRAM(S): 2.5 TABLET, FILM COATED ORAL at 05:54

## 2020-01-16 RX ADMIN — LISINOPRIL 40 MILLIGRAM(S): 2.5 TABLET ORAL at 05:54

## 2020-01-16 RX ADMIN — MAGNESIUM OXIDE 400 MG ORAL TABLET 400 MILLIGRAM(S): 241.3 TABLET ORAL at 12:07

## 2020-01-16 RX ADMIN — CLOPIDOGREL BISULFATE 75 MILLIGRAM(S): 75 TABLET, FILM COATED ORAL at 05:55

## 2020-01-16 RX ADMIN — AMLODIPINE BESYLATE 5 MILLIGRAM(S): 2.5 TABLET ORAL at 05:54

## 2020-01-16 RX ADMIN — Medication 360 MILLIGRAM(S): at 05:55

## 2020-01-16 NOTE — PROGRESS NOTE ADULT - SUBJECTIVE AND OBJECTIVE BOX
Patient is a 74y old  Female with  + ST (15 Jayson 2020 01:59) now s/p LHC MIA x 2 mLAD via RRA access           Allergies    No Known Allergies    Intolerances        Medications:  acetaminophen   Tablet .. 650 milliGRAM(s) Oral every 6 hours PRN  amLODIPine   Tablet 5 milliGRAM(s) Oral daily  apixaban 5 milliGRAM(s) Oral every 12 hours  aspirin enteric coated 81 milliGRAM(s) Oral daily  atorvastatin 40 milliGRAM(s) Oral at bedtime  clopidogrel Tablet 75 milliGRAM(s) Oral daily  dextrose 40% Gel 15 Gram(s) Oral once PRN  dextrose 5%. 1000 milliLiter(s) IV Continuous <Continuous>  dextrose 50% Injectable 12.5 Gram(s) IV Push once  dextrose 50% Injectable 25 Gram(s) IV Push once  dextrose 50% Injectable 25 Gram(s) IV Push once  diltiazem    milliGRAM(s) Oral daily  doxazosin 1 milliGRAM(s) Oral at bedtime  dronedarone 400 milliGRAM(s) Oral two times a day  glucagon  Injectable 1 milliGRAM(s) IntraMuscular once PRN  insulin lispro (HumaLOG) corrective regimen sliding scale   SubCutaneous three times a day before meals  insulin lispro (HumaLOG) corrective regimen sliding scale   SubCutaneous at bedtime  lisinopril 40 milliGRAM(s) Oral daily  LORazepam     Tablet 0.5 milliGRAM(s) Oral daily  magnesium oxide 400 milliGRAM(s) Oral three times a day with meals  metoprolol succinate ER 25 milliGRAM(s) Oral at bedtime  sodium chloride 0.65% Nasal 1 Spray(s) Both Nostrils two times a day PRN  sodium chloride 0.9%. 1000 milliLiter(s) IV Continuous <Continuous>      Vitals:  T(C): 36.9 (01-15-20 @ 20:40), Max: 37.7 (01-15-20 @ 13:30)  HR: 61 (01-15-20 @ 22:19) (61 - 78)  BP: 126/68 (01-15-20 @ 22:19) (126/68 - 163/66)  BP(mean): --  RR: 18 (01-15-20 @ 20:40) (18 - 18)  SpO2: 94% (01-15-20 @ 20:40) (93% - 98%)  Wt(kg): --  Daily     Daily   I&O's Summary    14 Jan 2020 07:01  -  15 Jayson 2020 07:00  --------------------------------------------------------  IN: 360 mL / OUT: 0 mL / NET: 360 mL    15 Jayson 2020 07:01  -  16 Jan 2020 00:37  --------------------------------------------------------  IN: 840 mL / OUT: 0 mL / NET: 840 mL          Physical Exam:  Appearance: Normal  Cardiovascular: S1S2, RRR, No M/R/G, no JVD, No Lower extremity edema  Procedural Access Site: RRA access. No hematoma, Non-tender to palpation, 2+ pulse, No bruit, No Ecchymosis  Musculoskeletal: No clubbing, No joint deformity   Neurologic: Non-focal  Psychiatry: AAOx3, Mood & affect appropriate  Skin: No rashes, No ecchymoses, No cyanosis    01-15    138  |  103  |  22  ----------------------------<  190<H>  3.4<L>   |  21<L>  |  1.23    Ca    9.2      15 Jayson 2020 00:46  Mg     1.7     01-15    TPro  7.8  /  Alb  4.5  /  TBili  0.6  /  DBili  x   /  AST  24  /  ALT  29  /  AlkPhos  85  01-14    PT/INR - ( 15 Jayson 2020 01:49 )   PT: 11.5 sec;   INR: 1.00 ratio         PTT - ( 15 Jayson 2020 01:49 )  PTT:25.8 sec      Interpretation of Telemetry:

## 2020-01-16 NOTE — CHART NOTE - NSCHARTNOTEFT_GEN_A_CORE
Patient is a 74y old  Female with  + ST (15 Jayson 2020 01:59) now s/p LHC MIA x 2 mLAD via RRA access, Neurology is consulted via Code Stroke in th early morning of 1/15/20 regarding complaints of intermittent headache and disorientation after undergoing cardiac catheterization and cardiac stent placement, pt has h/o CVA  Overall low suspicion for cerebrovascular event as per neurology Continue AC and DAPT. and pt to be discharged from neurology stand point. case d/w Dr huggins. Patient is a 74y old  Female with  + ST (15 Jayson 2020 01:59) now s/p LHC MIA x 2 mLAD via RRA access, Neurology is consulted via Code Stroke in th early morning of 1/15/20 regarding complaints of intermittent headache and disorientation after undergoing cardiac catheterization and cardiac stent placement, pt has h/o CVA  Overall low suspicion for cerebrovascular event as per neurology Continue AC and DAPT. and pt to be discharged from neurology stand point. case d/w Dr huggins.  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

## 2020-01-16 NOTE — PROGRESS NOTE ADULT - PROBLEM SELECTOR PLAN 4
Your heart rate and rhythm will be controlled.   Continue with your cardiologist and primary care MD. Continue your current medications. Call your physician for palpitations, feelings of rapid heart beat, lightheadedness, or dizziness.

## 2020-01-16 NOTE — PROGRESS NOTE ADULT - PROBLEM SELECTOR PLAN 3
Your LDL cholesterol will be less than 70mg/dL   Continue with your cholesterol medications. Eat a heart healthy diet that is low in saturated fats and salt, and includes whole grains, fruits, vegetables and lean protein; exercise regularly (consult with your physician or cardiologist first); maintain a heart healthy weight. Continue to follow with your primary physician or cardiologist for treatment goals, continue medication, have liver function testing every 3 months as anti lipid medications can cause liver irritation. If you smoke - quit (A resource to help you stop smoking is the [facility] – phone number [phone].).

## 2020-01-16 NOTE — PHYSICAL THERAPY INITIAL EVALUATION ADULT - PERTINENT HX OF CURRENT PROBLEM, REHAB EVAL
75 yo morbidly obese,  female with PMH of Afib ( On Eliquis ), CVA in 2017 w/o residual weakness, DM type 2, anxiety, HLD, HTN, and mild renal insufficiency. FH significant for early CAD.  Seen and evaluated by Dr Boss for c/c of dyspnea on exertion after ambulating a "few steps", presently remains asymptomatic.

## 2020-01-16 NOTE — PHYSICAL THERAPY INITIAL EVALUATION ADULT - PRECAUTIONS/LIMITATIONS, REHAB EVAL
pt reports a 30lbs weight gain since CVA in 2017, and believes shortness of breath is r/t to that. CT A Neck/ CT Brain (-) MRIHead (-) 1/14:cardiac cath-LHC MIA x 2 mLAD via RRA access 1/15: code stroke

## 2020-01-16 NOTE — PROGRESS NOTE ADULT - ASSESSMENT
Patient is a 74y old  Female with  + ST (15 Jayson 2020 01:59) now s/p LHC MIA x 2 mLAD via RRA access. Pt tolerated the procedure well, site benign. Post-procedure discharge instructions discussed and questions addressed   Pt awaiting results of   1) Bubble study  2) Brain MRI

## 2020-01-16 NOTE — DISCHARGE NOTE NURSING/CASE MANAGEMENT/SOCIAL WORK - NSDCPEPTSTRK_GEN_ALL_CORE
Stroke warning signs and symptoms/Prescribed medications/Risk factors for stroke/Need for follow up after discharge/Stroke support groups for patients, families, and friends/Stroke education booklet/Signs and symptoms of stroke/Call 911 for stroke

## 2020-01-16 NOTE — DISCHARGE NOTE NURSING/CASE MANAGEMENT/SOCIAL WORK - PATIENT PORTAL LINK FT
You can access the FollowMyHealth Patient Portal offered by [facility] by registering at the following website: [email or web address]. By joining [facility]’s FollowMyHealth portal, you will also be able to view your health information using other applications (apps) compatible with our system.

## 2020-01-16 NOTE — PROGRESS NOTE ADULT - PROBLEM SELECTOR PLAN 1
Pt remains chest pain free and understands post cath discharge instructions   No heavy lifting or pushing/pulling with procedure arm for 2 weeks. No driving for 2 days. You may shower 24 hours following the procedure but avoid baths/swimming for 1 week. Check your wrist site for bleeding and/or swelling daily following procedure and call your doctor immediately if it occurs or if you experience increased pain at the site. Follow up with your cardiologist in 1-2 weeks. You may call [facility] if you have any questions/concerns regarding your procedure [phone].

## 2020-01-22 ENCOUNTER — INPATIENT (INPATIENT)
Facility: HOSPITAL | Age: 75
LOS: 3 days | Discharge: HOME | DRG: 378 | End: 2020-01-26
Attending: HOSPITALIST | Admitting: STUDENT IN AN ORGANIZED HEALTH CARE EDUCATION/TRAINING PROGRAM
Payer: COMMERCIAL

## 2020-01-22 VITALS
SYSTOLIC BLOOD PRESSURE: 150 MMHG | DIASTOLIC BLOOD PRESSURE: 75 MMHG | HEART RATE: 85 BPM | OXYGEN SATURATION: 95 % | RESPIRATION RATE: 20 BRPM | TEMPERATURE: 98 F | HEIGHT: 63 IN | WEIGHT: 257.06 LBS

## 2020-01-22 DIAGNOSIS — F41.9 ANXIETY DISORDER, UNSPECIFIED: ICD-10-CM

## 2020-01-22 DIAGNOSIS — I10 ESSENTIAL (PRIMARY) HYPERTENSION: ICD-10-CM

## 2020-01-22 DIAGNOSIS — Z86.73 PERSONAL HISTORY OF TRANSIENT ISCHEMIC ATTACK (TIA), AND CEREBRAL INFARCTION WITHOUT RESIDUAL DEFICITS: ICD-10-CM

## 2020-01-22 DIAGNOSIS — I25.10 ATHEROSCLEROTIC HEART DISEASE OF NATIVE CORONARY ARTERY WITHOUT ANGINA PECTORIS: ICD-10-CM

## 2020-01-22 DIAGNOSIS — I48.20 CHRONIC ATRIAL FIBRILLATION, UNSPECIFIED: ICD-10-CM

## 2020-01-22 DIAGNOSIS — R53.1 WEAKNESS: ICD-10-CM

## 2020-01-22 DIAGNOSIS — E11.9 TYPE 2 DIABETES MELLITUS WITHOUT COMPLICATIONS: ICD-10-CM

## 2020-01-22 DIAGNOSIS — D62 ACUTE POSTHEMORRHAGIC ANEMIA: ICD-10-CM

## 2020-01-22 DIAGNOSIS — Z90.49 ACQUIRED ABSENCE OF OTHER SPECIFIED PARTS OF DIGESTIVE TRACT: Chronic | ICD-10-CM

## 2020-01-22 DIAGNOSIS — Z02.9 ENCOUNTER FOR ADMINISTRATIVE EXAMINATIONS, UNSPECIFIED: ICD-10-CM

## 2020-01-22 LAB
ALBUMIN SERPL ELPH-MCNC: 3.9 G/DL — SIGNIFICANT CHANGE UP (ref 3.3–5)
ALP SERPL-CCNC: 74 U/L — SIGNIFICANT CHANGE UP (ref 40–120)
ALT FLD-CCNC: 21 U/L — SIGNIFICANT CHANGE UP (ref 10–45)
ANION GAP SERPL CALC-SCNC: 16 MMOL/L — SIGNIFICANT CHANGE UP (ref 5–17)
APTT BLD: 28.9 SEC — SIGNIFICANT CHANGE UP (ref 27.5–36.3)
AST SERPL-CCNC: 18 U/L — SIGNIFICANT CHANGE UP (ref 10–40)
BASE EXCESS BLDV CALC-SCNC: -2.1 MMOL/L — LOW (ref -2–2)
BASOPHILS # BLD AUTO: 0.03 K/UL — SIGNIFICANT CHANGE UP (ref 0–0.2)
BASOPHILS NFR BLD AUTO: 0.3 % — SIGNIFICANT CHANGE UP (ref 0–2)
BILIRUB SERPL-MCNC: 0.3 MG/DL — SIGNIFICANT CHANGE UP (ref 0.2–1.2)
BLD GP AB SCN SERPL QL: NEGATIVE — SIGNIFICANT CHANGE UP
BUN SERPL-MCNC: 46 MG/DL — HIGH (ref 7–23)
CA-I SERPL-SCNC: 1.21 MMOL/L — SIGNIFICANT CHANGE UP (ref 1.12–1.3)
CALCIUM SERPL-MCNC: 9.5 MG/DL — SIGNIFICANT CHANGE UP (ref 8.4–10.5)
CHLORIDE BLDV-SCNC: 110 MMOL/L — HIGH (ref 96–108)
CHLORIDE SERPL-SCNC: 104 MMOL/L — SIGNIFICANT CHANGE UP (ref 96–108)
CK MB CFR SERPL CALC: 2.5 NG/ML — SIGNIFICANT CHANGE UP (ref 0–3.8)
CO2 BLDV-SCNC: 23 MMOL/L — SIGNIFICANT CHANGE UP (ref 22–30)
CO2 SERPL-SCNC: 19 MMOL/L — LOW (ref 22–31)
CREAT SERPL-MCNC: 1.36 MG/DL — HIGH (ref 0.5–1.3)
EOSINOPHIL # BLD AUTO: 0.06 K/UL — SIGNIFICANT CHANGE UP (ref 0–0.5)
EOSINOPHIL NFR BLD AUTO: 0.7 % — SIGNIFICANT CHANGE UP (ref 0–6)
GAS PNL BLDV: 137 MMOL/L — SIGNIFICANT CHANGE UP (ref 135–145)
GAS PNL BLDV: SIGNIFICANT CHANGE UP
GLUCOSE BLDC GLUCOMTR-MCNC: 128 MG/DL — HIGH (ref 70–99)
GLUCOSE BLDV-MCNC: 180 MG/DL — HIGH (ref 70–99)
GLUCOSE SERPL-MCNC: 184 MG/DL — HIGH (ref 70–99)
HCO3 BLDV-SCNC: 22 MMOL/L — SIGNIFICANT CHANGE UP (ref 21–29)
HCT VFR BLD CALC: 35.6 % — SIGNIFICANT CHANGE UP (ref 34.5–45)
HCT VFR BLDA CALC: 38 % — LOW (ref 39–50)
HGB BLD CALC-MCNC: 12.4 G/DL — SIGNIFICANT CHANGE UP (ref 11.5–15.5)
HGB BLD-MCNC: 11 G/DL — LOW (ref 11.5–15.5)
IMM GRANULOCYTES NFR BLD AUTO: 0.6 % — SIGNIFICANT CHANGE UP (ref 0–1.5)
INR BLD: 1.23 RATIO — HIGH (ref 0.88–1.16)
LACTATE BLDV-MCNC: 3.8 MMOL/L — HIGH (ref 0.7–2)
LYMPHOCYTES # BLD AUTO: 2 K/UL — SIGNIFICANT CHANGE UP (ref 1–3.3)
LYMPHOCYTES # BLD AUTO: 23.3 % — SIGNIFICANT CHANGE UP (ref 13–44)
MCHC RBC-ENTMCNC: 28.3 PG — SIGNIFICANT CHANGE UP (ref 27–34)
MCHC RBC-ENTMCNC: 30.9 GM/DL — LOW (ref 32–36)
MCV RBC AUTO: 91.5 FL — SIGNIFICANT CHANGE UP (ref 80–100)
MONOCYTES # BLD AUTO: 0.62 K/UL — SIGNIFICANT CHANGE UP (ref 0–0.9)
MONOCYTES NFR BLD AUTO: 7.2 % — SIGNIFICANT CHANGE UP (ref 2–14)
NEUTROPHILS # BLD AUTO: 5.82 K/UL — SIGNIFICANT CHANGE UP (ref 1.8–7.4)
NEUTROPHILS NFR BLD AUTO: 67.9 % — SIGNIFICANT CHANGE UP (ref 43–77)
NRBC # BLD: 0 /100 WBCS — SIGNIFICANT CHANGE UP (ref 0–0)
OB PNL STL: POSITIVE
PCO2 BLDV: 38 MMHG — SIGNIFICANT CHANGE UP (ref 35–50)
PH BLDV: 7.38 — SIGNIFICANT CHANGE UP (ref 7.35–7.45)
PLATELET # BLD AUTO: 221 K/UL — SIGNIFICANT CHANGE UP (ref 150–400)
PO2 BLDV: 57 MMHG — HIGH (ref 25–45)
POTASSIUM BLDV-SCNC: 3.7 MMOL/L — SIGNIFICANT CHANGE UP (ref 3.5–5.3)
POTASSIUM SERPL-MCNC: 4 MMOL/L — SIGNIFICANT CHANGE UP (ref 3.5–5.3)
POTASSIUM SERPL-SCNC: 4 MMOL/L — SIGNIFICANT CHANGE UP (ref 3.5–5.3)
PROT SERPL-MCNC: 7 G/DL — SIGNIFICANT CHANGE UP (ref 6–8.3)
PROTHROM AB SERPL-ACNC: 14.2 SEC — HIGH (ref 10–12.9)
RBC # BLD: 3.89 M/UL — SIGNIFICANT CHANGE UP (ref 3.8–5.2)
RBC # FLD: 15.1 % — HIGH (ref 10.3–14.5)
RH IG SCN BLD-IMP: POSITIVE — SIGNIFICANT CHANGE UP
RH IG SCN BLD-IMP: POSITIVE — SIGNIFICANT CHANGE UP
SAO2 % BLDV: 87 % — SIGNIFICANT CHANGE UP (ref 67–88)
SODIUM SERPL-SCNC: 139 MMOL/L — SIGNIFICANT CHANGE UP (ref 135–145)
TROPONIN T, HIGH SENSITIVITY RESULT: 21 NG/L — SIGNIFICANT CHANGE UP (ref 0–51)
WBC # BLD: 8.58 K/UL — SIGNIFICANT CHANGE UP (ref 3.8–10.5)
WBC # FLD AUTO: 8.58 K/UL — SIGNIFICANT CHANGE UP (ref 3.8–10.5)

## 2020-01-22 PROCEDURE — 99285 EMERGENCY DEPT VISIT HI MDM: CPT

## 2020-01-22 PROCEDURE — 71045 X-RAY EXAM CHEST 1 VIEW: CPT | Mod: 26

## 2020-01-22 PROCEDURE — 93010 ELECTROCARDIOGRAM REPORT: CPT

## 2020-01-22 PROCEDURE — 99223 1ST HOSP IP/OBS HIGH 75: CPT

## 2020-01-22 RX ORDER — DOXAZOSIN MESYLATE 4 MG
1 TABLET ORAL AT BEDTIME
Refills: 0 | Status: DISCONTINUED | OUTPATIENT
Start: 2020-01-22 | End: 2020-01-22

## 2020-01-22 RX ORDER — INSULIN LISPRO 100/ML
VIAL (ML) SUBCUTANEOUS
Refills: 0 | Status: DISCONTINUED | OUTPATIENT
Start: 2020-01-22 | End: 2020-01-26

## 2020-01-22 RX ORDER — DEXTROSE 50 % IN WATER 50 %
25 SYRINGE (ML) INTRAVENOUS ONCE
Refills: 0 | Status: DISCONTINUED | OUTPATIENT
Start: 2020-01-22 | End: 2020-01-26

## 2020-01-22 RX ORDER — GLUCAGON INJECTION, SOLUTION 0.5 MG/.1ML
1 INJECTION, SOLUTION SUBCUTANEOUS ONCE
Refills: 0 | Status: DISCONTINUED | OUTPATIENT
Start: 2020-01-22 | End: 2020-01-26

## 2020-01-22 RX ORDER — DEXTROSE 50 % IN WATER 50 %
15 SYRINGE (ML) INTRAVENOUS ONCE
Refills: 0 | Status: DISCONTINUED | OUTPATIENT
Start: 2020-01-22 | End: 2020-01-26

## 2020-01-22 RX ORDER — DRONEDARONE 400 MG/1
400 TABLET, FILM COATED ORAL
Refills: 0 | Status: DISCONTINUED | OUTPATIENT
Start: 2020-01-22 | End: 2020-01-26

## 2020-01-22 RX ORDER — LISINOPRIL 2.5 MG/1
40 TABLET ORAL DAILY
Refills: 0 | Status: DISCONTINUED | OUTPATIENT
Start: 2020-01-22 | End: 2020-01-26

## 2020-01-22 RX ORDER — DEXTROSE 50 % IN WATER 50 %
12.5 SYRINGE (ML) INTRAVENOUS ONCE
Refills: 0 | Status: DISCONTINUED | OUTPATIENT
Start: 2020-01-22 | End: 2020-01-26

## 2020-01-22 RX ORDER — DILTIAZEM HCL 120 MG
360 CAPSULE, EXT RELEASE 24 HR ORAL DAILY
Refills: 0 | Status: DISCONTINUED | OUTPATIENT
Start: 2020-01-22 | End: 2020-01-26

## 2020-01-22 RX ORDER — ASPIRIN/CALCIUM CARB/MAGNESIUM 324 MG
81 TABLET ORAL DAILY
Refills: 0 | Status: DISCONTINUED | OUTPATIENT
Start: 2020-01-22 | End: 2020-01-23

## 2020-01-22 RX ORDER — MAGNESIUM OXIDE 400 MG ORAL TABLET 241.3 MG
400 TABLET ORAL
Refills: 0 | Status: DISCONTINUED | OUTPATIENT
Start: 2020-01-22 | End: 2020-01-26

## 2020-01-22 RX ORDER — CLOPIDOGREL BISULFATE 75 MG/1
75 TABLET, FILM COATED ORAL DAILY
Refills: 0 | Status: DISCONTINUED | OUTPATIENT
Start: 2020-01-22 | End: 2020-01-26

## 2020-01-22 RX ORDER — POTASSIUM CHLORIDE 20 MEQ
2 PACKET (EA) ORAL
Qty: 0 | Refills: 0 | DISCHARGE

## 2020-01-22 RX ORDER — PANTOPRAZOLE SODIUM 20 MG/1
40 TABLET, DELAYED RELEASE ORAL EVERY 12 HOURS
Refills: 0 | Status: DISCONTINUED | OUTPATIENT
Start: 2020-01-22 | End: 2020-01-26

## 2020-01-22 RX ORDER — SODIUM CHLORIDE 9 MG/ML
1000 INJECTION, SOLUTION INTRAVENOUS
Refills: 0 | Status: DISCONTINUED | OUTPATIENT
Start: 2020-01-22 | End: 2020-01-26

## 2020-01-22 RX ORDER — INSULIN LISPRO 100/ML
VIAL (ML) SUBCUTANEOUS AT BEDTIME
Refills: 0 | Status: DISCONTINUED | OUTPATIENT
Start: 2020-01-22 | End: 2020-01-26

## 2020-01-22 RX ORDER — ATORVASTATIN CALCIUM 80 MG/1
40 TABLET, FILM COATED ORAL AT BEDTIME
Refills: 0 | Status: DISCONTINUED | OUTPATIENT
Start: 2020-01-22 | End: 2020-01-26

## 2020-01-22 RX ADMIN — ATORVASTATIN CALCIUM 40 MILLIGRAM(S): 80 TABLET, FILM COATED ORAL at 22:24

## 2020-01-22 RX ADMIN — PANTOPRAZOLE SODIUM 40 MILLIGRAM(S): 20 TABLET, DELAYED RELEASE ORAL at 22:24

## 2020-01-22 RX ADMIN — MAGNESIUM OXIDE 400 MG ORAL TABLET 400 MILLIGRAM(S): 241.3 TABLET ORAL at 22:24

## 2020-01-22 RX ADMIN — DRONEDARONE 400 MILLIGRAM(S): 400 TABLET, FILM COATED ORAL at 23:54

## 2020-01-22 NOTE — H&P ADULT - NSICDXPASTMEDICALHX_GEN_ALL_CORE_FT
PAST MEDICAL HISTORY:  Anxiety     Atrial fibrillation     CVA (cerebral vascular accident)     Diabetes mellitus type 2 in obese     Hyperlipemia     Hypertension     Hypomagnesemia     Knee pain, left     Renal insufficiency

## 2020-01-22 NOTE — ED PROVIDER NOTE - NS ED ROS FT
CONSTITUTIONAL: No fever, ++Generalized weakness  EYES: No redness  ENT: no sore throat  CARDIOVASCULAR: No chest pain,  RESPIRATORY: No cough, no shortness of breath  GI: No abdominal pain, no nausea, no vomiting,  GENITOURINARY: No dysuria  MUSKULOSKELETAL: No new pain in joints/muscles  SKIN: No rash  NEURO: No headache  ALL OTHER SYSTEMS NEGATIVE.

## 2020-01-22 NOTE — H&P ADULT - PROBLEM SELECTOR PLAN 2
Start home dose of aspirin, plavix, atorvastatin and lisinopril.   Patient is not on a beta blocker.

## 2020-01-22 NOTE — H&P ADULT - PROBLEM SELECTOR PLAN 3
Start home dose of multaq.   Hold eliquis for now due to concern for UGIB. CHADSVASC score of 6- goal will be to resume eliquis when safe to resume this medication. I suspect the patient has a history of chronic atrial fibrillation, since she appears to be maintaining sinus rhythm while on multaq. Confirm classification with cardiology in AM.   Start home dose of multaq.   Hold eliquis for now due to concern for UGIB. CHADSVASC score of 6- goal will be to resume eliquis when safe to resume this medication.

## 2020-01-22 NOTE — H&P ADULT - PROBLEM SELECTOR PLAN 6
Start home dose of lorazepam 0.25mg PO BID for anxiety, with hold parameters  ISTOP reviewed- Reference #: [id number]   Rx dispensed on  01/13/2020 lorazepam 0.5 mg tablet  30 quantity 30 days supply, by prescriber Venu Dunn

## 2020-01-22 NOTE — ED PROVIDER NOTE - ATTENDING CONTRIBUTION TO CARE
Patient presenting complaining of generalized malaise/weakness, decreased exercise tolerance and positional lightheadedness.  Recently admitted for cardiac cath with MIA x2 placed, started on ASA/Plavix on top of baseline eliquis.  Has noted over last few days that her stools have become darker than normal associated with her weakness developing.  No history of GIB in past.  Denying chest pains, fevers, chills.    A 14 point review of systems is negative except as in HPI or otherwise documented.    Exam:  General: Patient well appearing, vital signs within normal limits  HEENT: airway patent with moist mucous membranes  Cardiac: RRR S1/S2 with strong peripheral pulses  Respiratory: lungs clear without respiratory distress  GI: abdomen soft, non tender, non distended  Neuro: no gross neurologic deficits  Skin: warm, well perfused  Psych: normal mood and affect    Patient found to be heme positive, hemoglobin on initial testing dropped from discharge labs.  Discussed with cardiology who requested stopping eliquis but continuing asa/plavix.  Discussed with PMD (who is also a cardiologist) who agreed, requesting admission for hemoglobin monitoring and GI evaluation.

## 2020-01-22 NOTE — CONSULT NOTE ADULT - ATTENDING COMMENTS
75 yo female with past medical history of Afib apixaban, CVA 2017 w/o residual weakness, DM type 2, anxiety, HLD, HTN, and mild renal insufficiency, CAD (PCI MIA p/mLAD 1/14/20) presenting with symptomatic GIB. Holding all anti-platelet and anti-coagulation agents until after the EGD completed same day with the findings of one angioectasia with active bleeding on lavage in the duodenum. Clips (MR conditional) were placed. Recommend resumptions of clopidogrel 75 mg daily after the EGD followed by the resumption of apixaban 5 mg BID ~12 hours later. Plan to discontinue aspirin 81 mg daily. Continue supportive care and appreciate GI recommendations. Continue home regimen medications lisinopril 40 mg daily, atorvastatin 40 mg nightly, dronedarone 400 mg BID, diltiazem 360 mg daily.     Larry Juarez MD, MPH, SHRUTHI, RPVI, FACC  Cardiovascular Specialist   Isidra Desouza [facility]  c: [phone]  e: [email or web address]

## 2020-01-22 NOTE — ED ADULT NURSE NOTE - CHPI ED NUR SYMPTOMS NEG
no abdominal distension/no nausea/no burning urination/no fever/no vomiting/no hematuria/no chills/no dysuria/no diarrhea

## 2020-01-22 NOTE — ED PROVIDER NOTE - PROGRESS NOTE DETAILS
Bill ARCINIEGA: Received signout on patient. Will admit to hospitalist. Cards rec holding eliquis. FOBT positive will need GI w/u.

## 2020-01-22 NOTE — H&P ADULT - NSHPREVIEWOFSYSTEMS_GEN_ALL_CORE
REVIEW OF SYSTEMS  CONSTITUTIONAL: No fever, no chills, + fatigue, + lightheadedness  EYES: No eye pain, no vision changes  ENMT:  No difficulty hearing, no throat pain  RESPIRATORY: No cough, no sputum production; shortness of breath improved  CARDIOVASCULAR: No chest pain, no palpitations, ZAMORA improved, no leg swelling  GASTROINTESTINAL: No abdominal pain, + nausea, no vomiting, + dark stool   GENITOURINARY: No dysuria, no hematuria  NEUROLOGICAL: No headaches, no loss of strength, no numbness  SKIN: No itching, no rashes, no lesions   MUSCULOSKELETAL: No joint pain, no joint swelling; No muscle pain  HEME/LYMPH: No easy bruising, bleeding

## 2020-01-22 NOTE — ED PROVIDER NOTE - CLINICAL SUMMARY MEDICAL DECISION MAKING FREE TEXT BOX
high suspicion for GI bleed, ro re-infarct, ro fluid overload  however will ro hypothyroid, electrolyte abnormality  advanced, imaging not indicated at this time

## 2020-01-22 NOTE — CONSULT NOTE ADULT - SUBJECTIVE AND OBJECTIVE BOX
Patient seen and evaluated at bedside    Chief Complaint: dark stool    HPI:  Ms Multani is a 75 yo female with PMH of Afib ( On Eliquis), CVA in 2017 w/o residual weakness, DM type 2 ( last A1C 7.3 in 11/2019, well managed w/o complications), anxiety, HLD, HTN, and mild renal insufficiency, CAD (PCI to MIA 1/14/20) who presents with dark stool and weakness.  She noticed melena x 3 days, one bm per day.  In the ED she had positive stool guiac and hg dropped by 2 units.  No cp, shortness of breath.    Cardiologist: Dr Boss     PMHx:   Hypomagnesemia  Anxiety  Knee pain, left  Renal insufficiency  CVA (cerebral vascular accident)  Atrial fibrillation  Diabetes mellitus type 2 in obese  Hyperlipemia  Hypertension    PSHx:   History of cholecystectomy      Allergies:  No Known Allergies    Home Meds:  biotin 1000 mcg oral tablet: 1 tab(s) orally once a day (14 Jan 2020 16:10)  Cardizem  mg/24 hours oral capsule, extended release: 1 cap(s) orally once a day (14 Jan 2020 16:10)  doxazosin 1 mg oral tablet: 1 tab(s) orally once a day (14 Jan 2020 16:10)  Eliquis 5 mg oral tablet: 1 tab(s) orally 2 times a day (14 Jan 2020 16:10)  Farxiga 5 mg oral tablet: 1 tab(s) orally once a day (14 Jan 2020 16:10)  Klor-Con 10 mEq oral tablet, extended release: 2 tab(s) orally 2 times a day (14 Jan 2020 16:10)  lisinopril 40 mg oral tablet: 1 tab(s) orally once a day (14 Jan 2020 16:10)  LORazepam 0.5 mg oral tablet: 0.5 tab(s) orally once a day (14 Jan 2020 16:10)  magnesium oxide: 1600 milligram(s) orally once a day ( total dose) pt splits it up into 3x daily  (14 Jan 2020 16:10)  metFORMIN 1000 mg oral tablet: 1 tab(s) orally 2 times a day; DO NOT TAKE UNTIL 1/17 (14 Jan 2020 22:36)  Multaq 400 mg oral tablet: 1 tab(s) orally 2 times a day (14 Jan 2020 16:10)  ASA  Plavix    Current Medications: see chart      FAMILY HISTORY:  Family history of myocardial infarction      Social History:  denies smoking/alcohol/drugs    REVIEW OF SYSTEMS:  CONSTITUTIONAL: +weakness  EYES/ENT: No visual changes;  No dysphagia  NECK: No pain or stiffness  RESPIRATORY: No cough, wheezing, hemoptysis; No shortness of breath  CARDIOVASCULAR: see hpi  GASTROINTESTINAL: see hpi  BACK: No back pain  GENITOURINARY: No dysuria, frequency or hematuria  NEUROLOGICAL: No numbness or weakness  SKIN: No itching, burning, rashes, or lesions   All other review of systems is negative unless indicated above.    Physical Exam:  T(F): 97.3 (01-22), Max: 98.4 (01-22)  HR: 88 (01-22) (85 - 88)  BP: 152/58 (01-22) (150/75 - 152/58)  RR: 22 (01-22)  SpO2: 96% (01-22)  GENERAL: No acute distress  HEAD:  Atraumatic, Normocephalic  ENT: EOMI, PERRLA, conjunctiva and sclera clear, Neck supple, No JVD  CHEST/LUNG: Clear to auscultation bilaterally; No wheeze, equal breath sounds bilaterally   BACK: No spinal tenderness  HEART: Regular rate and rhythm; No murmurs, rubs, or gallops  ABDOMEN: Soft, Nontender, Nondistended; Bowel sounds present  EXTREMITIES:  No clubbing, cyanosis, or edema  PSYCH: Nl behavior, nl affect  NEUROLOGY: AAOx3, non-focal, cranial nerves intact  SKIN: Normal color, No rashes or lesions    Cardiovascular Diagnostic Testing:    ECG: Personally reviewed: sinus rhythm, lvh, nonspecific t wave abnormalities    Echo: < from: TTE with Doppler (w/Cont) (01.15.20 @ 08:45) >  Conclusions:  1. Aortic valve not well visualized; appears calcified.  2. Increased relative wall thickness with normal left  ventricular mass index, consistent with concentric left  ventricular remodeling.  3. Normal left ventricular systolic function.  4. The right ventricle is not well visualized; grossly  normal right ventricular systolic function.  5. Agitated saline injection demonstrates no evidence of a  patent foramen ovale.    < end of copied text >    Cath:  < from: Cardiac Cath Lab - Adult (01.14.20 @ 14:54) >  CORONARY VESSELS: The coronary circulation is right dominant.  LM:   --  LM: Angiography showed minor luminal irregularities with no flow  limiting lesions.  LAD:   --  Proximal LAD: There was a 80 % stenosis.  --  Mid LAD: There was a 80 % stenosis.  CX:   --  Circumflex: Angiography showed minor luminal irregularities with  no flow limiting lesions.  RCA:   --  Ostial RCA: There was a 100 % stenosis.  COMPLICATIONS: There were no complications.  DIAGNOSTIC RECOMMENDATIONS: ASA and Plavix for 1 year.  IFR was 0.55 in the LAD which was significant  INTERVENTIONAL RECOMMENDATIONS: ASA and Plavix for 1 year.  IFR was 0.55 in the LAD which wassignificant    < end of copied text >    Labs: Personally reviewed                        11.0   8.58  )-----------( 221      ( 22 Jan 2020 18:00 )             35.6     01-22    139  |  104  |  46<H>  ----------------------------<  184<H>  4.0   |  19<L>  |  1.36<H>    Ca    9.5      22 Jan 2020 18:00    TPro  7.0  /  Alb  3.9  /  TBili  0.3  /  DBili  x   /  AST  18  /  ALT  21  /  AlkPhos  74  01-22    PT/INR - ( 22 Jan 2020 18:00 )   PT: 14.2 sec;   INR: 1.23 ratio         PTT - ( 22 Jan 2020 18:00 )  PTT:28.9 sec    CARDIAC MARKERS ( 22 Jan 2020 18:00 )  21 ng/L / x     / x     / x     / x     / 2.5 ng/mL    Serum Pro-Brain Natriuretic Peptide: 180 pg/mL (01-22 @ 18:00)    Assessment and plan:    Ms Multani is a 75 yo female with PMH of Afib ( On Eliquis), CVA in 2017 w/o residual weakness, DM type 2 ( last A1C 7.3 in 11/2019, well managed w/o complications), anxiety, HLD, HTN, and mild renal insufficiency, CAD (PCI to MIA 1/14/20) who presents with a gi bleed.    CAD, recent PCI  - very important to continue ASA and Plavix despite bleed  - would only hold if patient becomes unstable  - would recommend holding eliqus for now    Hx of Hypomag  - check mg, please replete mg to > 2  - may be contributing to weakness if low    pAF  - continue home cardizem, hold eliquis for now, resume after hg stable or cleared by GI    GIB  - npo, consult gi, ppi iv bid, trend Hg      For all Cardiology service contact information, go to [email or web address] and use [email or web address]" to login. Patient seen and evaluated at bedside    Chief Complaint: dark stool    HPI:  Ms Multani is a 73 yo female with PMH of Afib (On Eliquis), CVA in 2017 w/o residual weakness, DM type 2 (last A1C 7.3 in 11/2019, well managed w/o complications), anxiety, HLD, HTN, and mild renal insufficiency, CAD (PCI to MIA 1/14/20) who presents with dark stool and weakness.  She noticed melena x 3 days, one bm per day.  In the ED she had positive stool guiac and hg dropped by 2 units.  No cp, shortness of breath.    Cardiologist: Dr Boss     PMHx:   Hypomagnesemia  Anxiety  Knee pain, left  Renal insufficiency  CVA (cerebral vascular accident)  Atrial fibrillation  Diabetes mellitus type 2 in obese  Hyperlipemia  Hypertension    PSHx:   History of cholecystectomy    Allergies:  No Known Allergies    Home Meds:  biotin 1000 mcg oral tablet: 1 tab(s) orally once a day (14 Jan 2020 16:10)  Cardizem  mg/24 hours oral capsule, extended release: 1 cap(s) orally once a day (14 Jan 2020 16:10)  doxazosin 1 mg oral tablet: 1 tab(s) orally once a day (14 Jan 2020 16:10)  Eliquis 5 mg oral tablet: 1 tab(s) orally 2 times a day (14 Jan 2020 16:10)  Farxiga 5 mg oral tablet: 1 tab(s) orally once a day (14 Jan 2020 16:10)  Klor-Con 10 mEq oral tablet, extended release: 2 tab(s) orally 2 times a day (14 Jan 2020 16:10)  lisinopril 40 mg oral tablet: 1 tab(s) orally once a day (14 Jan 2020 16:10)  LORazepam 0.5 mg oral tablet: 0.5 tab(s) orally once a day (14 Jan 2020 16:10)  magnesium oxide: 1600 milligram(s) orally once a day ( total dose) pt splits it up into 3x daily  (14 Jan 2020 16:10)  metFORMIN 1000 mg oral tablet: 1 tab(s) orally 2 times a day; DO NOT TAKE UNTIL 1/17 (14 Jan 2020 22:36)  Multaq 400 mg oral tablet: 1 tab(s) orally 2 times a day (14 Jan 2020 16:10)  ASA  Plavix    Current Medications: see chart      FAMILY HISTORY:  Family history of myocardial infarction      Social History:  denies smoking/alcohol/drugs    REVIEW OF SYSTEMS:  CONSTITUTIONAL: +weakness  EYES/ENT: No visual changes;  No dysphagia  NECK: No pain or stiffness  RESPIRATORY: No cough, wheezing, hemoptysis; No shortness of breath  CARDIOVASCULAR: see hpi  GASTROINTESTINAL: see hpi  BACK: No back pain  GENITOURINARY: No dysuria, frequency or hematuria  NEUROLOGICAL: No numbness or weakness  SKIN: No itching, burning, rashes, or lesions   All other review of systems is negative unless indicated above.    Physical Exam:  T(F): 97.3 (01-22), Max: 98.4 (01-22)  HR: 88 (01-22) (85 - 88)  BP: 152/58 (01-22) (150/75 - 152/58)  RR: 22 (01-22)  SpO2: 96% (01-22)  GENERAL: No acute distress  HEAD:  Atraumatic, Normocephalic  ENT: EOMI, PERRLA, conjunctiva and sclera clear, Neck supple, No JVD  CHEST/LUNG: Clear to auscultation bilaterally; No wheeze, equal breath sounds bilaterally   BACK: No spinal tenderness  HEART: Regular rate and rhythm; No murmurs, rubs, or gallops  ABDOMEN: Soft, Nontender, Nondistended; Bowel sounds present  EXTREMITIES:  No clubbing, cyanosis, or edema  PSYCH: Nl behavior, nl affect  NEUROLOGY: AAOx3, non-focal, cranial nerves intact  SKIN: Normal color, No rashes or lesions    Cardiovascular Diagnostic Testing:    ECG: Personally reviewed: sinus rhythm, lvh, nonspecific t wave abnormalities    Echo: < from: TTE with Doppler (w/Cont) (01.15.20 @ 08:45) >  Conclusions:  1. Aortic valve not well visualized; appears calcified.  2. Increased relative wall thickness with normal left  ventricular mass index, consistent with concentric left  ventricular remodeling.  3. Normal left ventricular systolic function.  4. The right ventricle is not well visualized; grossly  normal right ventricular systolic function.  5. Agitated saline injection demonstrates no evidence of a  patent foramen ovale.    < end of copied text >    Cath:  < from: Cardiac Cath Lab - Adult (01.14.20 @ 14:54) >  CORONARY VESSELS: The coronary circulation is right dominant.  LM:   --  LM: Angiography showed minor luminal irregularities with no flow  limiting lesions.  LAD:   --  Proximal LAD: There was a 80 % stenosis.  --  Mid LAD: There was a 80 % stenosis.  CX:   --  Circumflex: Angiography showed minor luminal irregularities with  no flow limiting lesions.  RCA:   --  Ostial RCA: There was a 100 % stenosis.  COMPLICATIONS: There were no complications.  DIAGNOSTIC RECOMMENDATIONS: ASA and Plavix for 1 year.  IFR was 0.55 in the LAD which was significant  INTERVENTIONAL RECOMMENDATIONS: ASA and Plavix for 1 year.  IFR was 0.55 in the LAD which wassignificant    < end of copied text >    Labs: Personally reviewed                        11.0   8.58  )-----------( 221      ( 22 Jan 2020 18:00 )             35.6     01-22    139  |  104  |  46<H>  ----------------------------<  184<H>  4.0   |  19<L>  |  1.36<H>    Ca    9.5      22 Jan 2020 18:00    TPro  7.0  /  Alb  3.9  /  TBili  0.3  /  DBili  x   /  AST  18  /  ALT  21  /  AlkPhos  74  01-22    PT/INR - ( 22 Jan 2020 18:00 )   PT: 14.2 sec;   INR: 1.23 ratio      PTT - ( 22 Jan 2020 18:00 )  PTT:28.9 sec    CARDIAC MARKERS ( 22 Jan 2020 18:00 )  21 ng/L / x     / x     / x     / x     / 2.5 ng/mL    Serum Pro-Brain Natriuretic Peptide: 180 pg/mL (01-22 @ 18:00)    Assessment and plan:  Ms Multani is a 73 yo female with PMH of Afib ( On Eliquis), CVA in 2017 w/o residual weakness, DM type 2 ( last A1C 7.3 in 11/2019, well managed w/o complications), anxiety, HLD, HTN, and mild renal insufficiency, CAD (PCI to MIA 1/14/20) who presents with a gi bleed.    CAD, recent PCI  - very important to continue at least one antiplatelet agent if possible unless life threatening GIB.  - would only hold if patient becomes unstable.  - would recommend holding eliqus for now.    Hx of Hypomag  - check mg, please replete mg to > 2  - may be contributing to weakness if low    pAF  - continue home cardizem, hold eliquis for now, resume after hg stable or cleared by GI    GIB  - npo, consult gi, ppi iv bid, trend Hg    For all Cardiology service contact information, go to [email or web address] and use "[email or web address]" to login.

## 2020-01-22 NOTE — H&P ADULT - HISTORY OF PRESENT ILLNESS
This patient is a 74yoF with PMH of atrial fibrillation on eliquis, CVA in 2017, T2DM (last A1C 7.3 in 11/2019), anxiety, htn, hLd, CKD stage __, CAD s/p MIA who presents to the ED for dark stool and weakness. The patient was previously admitted from 1/14-1/16/2020 for ZAMORA and abnormal NST. She underwent LHC with MIA to [facility]. She was continued with aspirin, plavix and eliquis for a month. This patient is a 74yoF with PMH of atrial fibrillation on eliquis, CVA in 2017, T2DM (last A1C 7.3 in 11/2019), anxiety, htn, hLd, CKD stage 3, CAD s/p MIA who presents to the ED for dark stool and weakness. The patient was previously admitted from 1/14-1/16/2020 for ZAMORA and abnormal NST. She underwent LHC with MIA to [facility]. She was planned to continue with aspirin, plavix and eliquis for a month. The patient returns to the ED with complaint of fatigue and dark stool. She reports that stool appeared darker since discharge from the hospital. She lacks the energy to walk around at home or even to shower, which is new. She denies any abdominal pain, but endorses feeling lightheaded and nauseous. She has no history of gastritis or PUD and has never undergone an endoscopy or colonoscopy. The patient's ZAMORA, however had resolved and she also denies having any chest pain or palpitations.

## 2020-01-22 NOTE — H&P ADULT - NSHPPHYSICALEXAM_GEN_ALL_CORE
T(C): 36.7 (01-22-20 @ 21:08), Max: 36.9 (01-22-20 @ 17:18)  HR: 65 (01-22-20 @ 21:08) (65 - 88)  BP: 124/68 (01-22-20 @ 21:08) (124/68 - 152/58)  RR: 16 (01-22-20 @ 21:08) (16 - 22)  SpO2: 96% (01-22-20 @ 21:08) (95% - 96%)  Wt(kg): --    PHYSICAL EXAM:  GENERAL: NAD, well-groomed, well-developed  HEAD:  Atraumatic, Normocephalic  EYES: EOMI, PERRLA, conjunctiva and sclera clear  ENMT: No oropharyngeal exudates, erythema or lesions,  Moist mucous membranes, good dentition  NECK: Supple, no cervical lymphadenopathy, no JVD  NERVOUS SYSTEM:  Alert & Oriented X3, CN II-XII intact, 5/5 BUE and BLE motor strength, full sensation to light touch   CHEST/LUNG: Clear to auscultation bilaterally; No rales, no  rhonchi, no wheezing  HEART: Regular rate and rhythm; No murmurs, rubs, or gallops  ABDOMEN: Soft, Nontender, Nondistended  EXTREMITIES:  2+ Peripheral Pulses, No clubbing, cyanosis, or edema  LYMPH: No lymphadenopathy noted  SKIN: No rashes or lesions

## 2020-01-22 NOTE — ED ADULT TRIAGE NOTE - MODE OF ARRIVAL
The patient was discharged home by Dr. Bravo Humphrey and Brooke Grace RN in stable condition, accompanied by spouse. The patient is alert and oriented, is in no respiratory distress. The patient's diagnosis, condition and treatment were explained to patient or parent/guardian. The patient/responsible party expressed understanding. 2 prescriptions given to pt. No work/school note given to pt. A discharge plan has been developed. A  was not involved in the process. Aftercare instructions were given to the patient. Private Vehicle

## 2020-01-22 NOTE — ED ADULT NURSE NOTE - CHIEF COMPLAINT QUOTE
increasing weakness/dizziness since cardiac cath 1/14 (2 stents placed); rectal bleeding; on Eliquis

## 2020-01-22 NOTE — ED PROVIDER NOTE - OBJECTIVE STATEMENT
74 y f pmh of cad sp stent x2 2 weeks ago, tia, stroke in the past, htn, obesity, afib on eliquis  pw weakness for 2 days  has had darker stools as well, although not black  she has had difficulty ambulating  more so than usual  she says this just not her and something is wrong  no acute pain anywhere

## 2020-01-22 NOTE — H&P ADULT - NSHPLABSRESULTS_GEN_ALL_CORE
Labs, imaging  personally reviewed by me.     CXR- my read- clear lungs bilaterally,  -- prelim- no emergent findings, follow up final read in AM    EKG sinus HR 80s, , TWI in I, AVL,     Labs Hgb downtrended from 13.2 on discharge to 11 today. BUN slightly elevated at 46, from 28. SCr shayy to 1.36 from 1.29.  Lactate elevated at 3.8. FOBT positive.     LABS:                        11.0   8.58  )-----------( 221      ( 22 Jan 2020 18:00 )             35.6     Hgb Trend: 11.0<--  01-22    139  |  104  |  46<H>  ----------------------------<  184<H>  4.0   |  19<L>  |  1.36<H>    Ca    9.5      22 Jan 2020 18:00    TPro  7.0  /  Alb  3.9  /  TBili  0.3  /  DBili  x   /  AST  18  /  ALT  21  /  AlkPhos  74  01-22    Creatinine Trend: 1.36<--, 1.29<--, 1.23<--, 1.23<--  PT/INR - ( 22 Jan 2020 18:00 )   PT: 14.2 sec;   INR: 1.23 ratio         PTT - ( 22 Jan 2020 18:00 )  PTT:28.9 sec      Venous Blood Gas:  01-22 @ 18:00  7.38/38/57/22/87  VBG Lactate: 3.8

## 2020-01-22 NOTE — H&P ADULT - PROBLEM SELECTOR PLAN 1
Drop in hemoglobin with recent history of dark stool in the setting of triple therapy is concerning for potential UGIB.  Will hold eliquis for now.  Start protonix 80mg IVP BID. Consult GI team (Dr. Bear) in AM for potential EGD.   Monitor Hgb q8h.  Maintain active T&S. Patient at this time does not meet criteria for transfusion.   Maintain 2 peripheral IV access.  Monitor VS q4h. Drop in hemoglobin with recent history of dark stool in the setting of triple therapy is concerning for potential UGIB.  Will hold eliquis for now.  Start protonix 80mg IVP BID. Consult GI team (Dr. Bear) in AM for potential EGD.   Monitor Hgb q8h.  Maintain active T&S. Patient at this time does not meet criteria for transfusion.   Maintain 2 peripheral IV access.  Monitor VS q4h.  Consult Dr. Bear in AM for potential EGD- [facility] GI TEAM [phone]

## 2020-01-22 NOTE — H&P ADULT - ATTENDING COMMENTS
Patient assigned to me by night hospitalist in charge for management and care for patient for this evening only. Care to be resumed by day hospitalist in the morning and thereafter. Patient assigned to me by night hospitalist in charge for management and care for patient for this evening only. Care to be resumed by day hospitalist in the morning and thereafter.  Maribell Lugo MD- pager [phone]

## 2020-01-22 NOTE — H&P ADULT - PROBLEM SELECTOR PLAN 4
Will hold oral hypoglycemic agents.  Start premeal and qhs fingerstick blood sugar checks.   Start sliding scale lispro premeal and qhs.

## 2020-01-22 NOTE — ED ADULT NURSE NOTE - OBJECTIVE STATEMENT
73 y/o female hx afib, DM, CVA, 2 stents, cholecystectomy 2012 presents to the ED from home c/o dark brown stools x 2 days. Pt states had cath and stent placement x 1 week ago, started on Plavix and Eliquis post TIA. Dark stools began x 2 days ago, worsening today with worsening dizziness and weakness.  Pt states had never had colonoscopy. Denies fever, chills, n/v, weakness, abd pain, diarrhea/constipation, numbness/tingling, urinary s/s, straining BM, hx of blood transfusion. Pt A&Ox3, in no respiratory distress, no CP, abd soft, nontender, nondistended. PT safety maintained with family at bedside, call bell within reach and bed in the lowest position.

## 2020-01-22 NOTE — H&P ADULT - PROBLEM SELECTOR PLAN 7
VTE ppx:   Activity:  Diet:     Transitions of Care Status:  1.  Name of PCP: Dr. Boss  2.  PCP Contacted on Admission: [ ] Y    [ ] N    3.  PCP contacted at Discharge: [ ] Y    [ ] N    [ ] N/A  4.  Post-Discharge Appointment Date and Location:  5.  Summary of Handoff given to PCP: Hold cardura. Continue cardizem, lisinopril with hold parameters

## 2020-01-22 NOTE — H&P ADULT - PROBLEM SELECTOR PLAN 8
VTE ppx:   Activity:  Diet:     Transitions of Care Status:  1.  Name of PCP: Dr. Boss  2.  PCP Contacted on Admission: [ ] Y    [ ] N    3.  PCP contacted at Discharge: [ ] Y    [ ] N    [ ] N/A  4.  Post-Discharge Appointment Date and Location:  5.  Summary of Handoff given to PCP: VTE ppx:  hold pharm ppx due to concern for bleed, venodyne boots  Activity: OOB with assistance  Diet: NPO with meds for now    Transitions of Care Status:  1.  Name of PCP: Dr. Boss  2.  PCP Contacted on Admission: [ ] Y    [ ] N    3.  PCP contacted at Discharge: [ ] Y    [ ] N    [ ] N/A  4.  Post-Discharge Appointment Date and Location:  5.  Summary of Handoff given to PCP:

## 2020-01-22 NOTE — ED ADULT TRIAGE NOTE - CHIEF COMPLAINT QUOTE
increasing weakness/dizziness since cardiac cath 1/14 (2 stents placed) increasing weakness/dizziness since cardiac cath 1/14 (2 stents placed); rectal bleeding; on Eliquis
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
## 2020-01-22 NOTE — H&P ADULT - NSHPSOCIALHISTORY_GEN_ALL_CORE
The patient lives with her  and daughter.  She denies tobacco or alcohol use.  She does not use an assistive device for ambulation

## 2020-01-22 NOTE — ED PROVIDER NOTE - PHYSICAL EXAMINATION
CONSTITUTIONAL: Non-toxic, non-diaphoretic, in no apparent distress, markedly obese  HEAD: Normocephalic; atruamatic  EYES: EOM intact   ENMT: External appears normal; normal oropharynx, moist  NECK: grossly normal active ROM,  CARD: No cyanosis, good peripheral perfusion, RRR, no MRG  RESP: Normal chest excursion with respiration; no increased work of breathing, CTAB  ABD: SNTND, stool is brown  EXT: moving all extremities, no gross disfigurement or asymmetry,  SKIN: Warm, dry, no rash  NEURO:  moving all extremities, no facial droop, no dysarthria      cn2-12 intact

## 2020-01-23 LAB
ANION GAP SERPL CALC-SCNC: 11 MMOL/L — SIGNIFICANT CHANGE UP (ref 5–17)
APTT BLD: 28.5 SEC — SIGNIFICANT CHANGE UP (ref 27.5–36.3)
BUN SERPL-MCNC: 40 MG/DL — HIGH (ref 7–23)
CALCIUM SERPL-MCNC: 8.9 MG/DL — SIGNIFICANT CHANGE UP (ref 8.4–10.5)
CHLORIDE SERPL-SCNC: 107 MMOL/L — SIGNIFICANT CHANGE UP (ref 96–108)
CO2 SERPL-SCNC: 21 MMOL/L — LOW (ref 22–31)
CREAT SERPL-MCNC: 1.27 MG/DL — SIGNIFICANT CHANGE UP (ref 0.5–1.3)
GLUCOSE BLDC GLUCOMTR-MCNC: 115 MG/DL — HIGH (ref 70–99)
GLUCOSE BLDC GLUCOMTR-MCNC: 116 MG/DL — HIGH (ref 70–99)
GLUCOSE BLDC GLUCOMTR-MCNC: 129 MG/DL — HIGH (ref 70–99)
GLUCOSE BLDC GLUCOMTR-MCNC: 140 MG/DL — HIGH (ref 70–99)
GLUCOSE SERPL-MCNC: 142 MG/DL — HIGH (ref 70–99)
HCT VFR BLD CALC: 30.3 % — LOW (ref 34.5–45)
HCT VFR BLD CALC: 32.9 % — LOW (ref 34.5–45)
HCT VFR BLD CALC: 34.3 % — LOW (ref 34.5–45)
HCV AB S/CO SERPL IA: 0.12 S/CO — SIGNIFICANT CHANGE UP (ref 0–0.99)
HCV AB SERPL-IMP: SIGNIFICANT CHANGE UP
HGB BLD-MCNC: 10 G/DL — LOW (ref 11.5–15.5)
HGB BLD-MCNC: 10.9 G/DL — LOW (ref 11.5–15.5)
HGB BLD-MCNC: 9.6 G/DL — LOW (ref 11.5–15.5)
INR BLD: 1.17 RATIO — HIGH (ref 0.88–1.16)
MCHC RBC-ENTMCNC: 28.5 PG — SIGNIFICANT CHANGE UP (ref 27–34)
MCHC RBC-ENTMCNC: 28.8 PG — SIGNIFICANT CHANGE UP (ref 27–34)
MCHC RBC-ENTMCNC: 29.3 PG — SIGNIFICANT CHANGE UP (ref 27–34)
MCHC RBC-ENTMCNC: 30.4 GM/DL — LOW (ref 32–36)
MCHC RBC-ENTMCNC: 31.7 GM/DL — LOW (ref 32–36)
MCHC RBC-ENTMCNC: 31.8 GM/DL — LOW (ref 32–36)
MCV RBC AUTO: 91 FL — SIGNIFICANT CHANGE UP (ref 80–100)
MCV RBC AUTO: 92.2 FL — SIGNIFICANT CHANGE UP (ref 80–100)
MCV RBC AUTO: 93.7 FL — SIGNIFICANT CHANGE UP (ref 80–100)
NRBC # BLD: 0 /100 WBCS — SIGNIFICANT CHANGE UP (ref 0–0)
PLATELET # BLD AUTO: 167 K/UL — SIGNIFICANT CHANGE UP (ref 150–400)
PLATELET # BLD AUTO: 195 K/UL — SIGNIFICANT CHANGE UP (ref 150–400)
PLATELET # BLD AUTO: 224 K/UL — SIGNIFICANT CHANGE UP (ref 150–400)
POTASSIUM SERPL-MCNC: 4.1 MMOL/L — SIGNIFICANT CHANGE UP (ref 3.5–5.3)
POTASSIUM SERPL-SCNC: 4.1 MMOL/L — SIGNIFICANT CHANGE UP (ref 3.5–5.3)
PROTHROM AB SERPL-ACNC: 13.5 SEC — HIGH (ref 10–12.9)
RBC # BLD: 3.33 M/UL — LOW (ref 3.8–5.2)
RBC # BLD: 3.51 M/UL — LOW (ref 3.8–5.2)
RBC # BLD: 3.72 M/UL — LOW (ref 3.8–5.2)
RBC # FLD: 15.2 % — HIGH (ref 10.3–14.5)
RBC # FLD: 15.3 % — HIGH (ref 10.3–14.5)
RBC # FLD: 15.3 % — HIGH (ref 10.3–14.5)
SODIUM SERPL-SCNC: 139 MMOL/L — SIGNIFICANT CHANGE UP (ref 135–145)
TSH SERPL-MCNC: 4.48 UIU/ML — HIGH (ref 0.27–4.2)
WBC # BLD: 7.19 K/UL — SIGNIFICANT CHANGE UP (ref 3.8–10.5)
WBC # BLD: 8.38 K/UL — SIGNIFICANT CHANGE UP (ref 3.8–10.5)
WBC # BLD: 8.93 K/UL — SIGNIFICANT CHANGE UP (ref 3.8–10.5)
WBC # FLD AUTO: 7.19 K/UL — SIGNIFICANT CHANGE UP (ref 3.8–10.5)
WBC # FLD AUTO: 8.38 K/UL — SIGNIFICANT CHANGE UP (ref 3.8–10.5)
WBC # FLD AUTO: 8.93 K/UL — SIGNIFICANT CHANGE UP (ref 3.8–10.5)

## 2020-01-23 PROCEDURE — 99222 1ST HOSP IP/OBS MODERATE 55: CPT

## 2020-01-23 PROCEDURE — 99233 SBSQ HOSP IP/OBS HIGH 50: CPT

## 2020-01-23 PROCEDURE — 93010 ELECTROCARDIOGRAM REPORT: CPT

## 2020-01-23 RX ORDER — APIXABAN 2.5 MG/1
5 TABLET, FILM COATED ORAL EVERY 12 HOURS
Refills: 0 | Status: DISCONTINUED | OUTPATIENT
Start: 2020-01-24 | End: 2020-01-26

## 2020-01-23 RX ADMIN — DRONEDARONE 400 MILLIGRAM(S): 400 TABLET, FILM COATED ORAL at 17:12

## 2020-01-23 RX ADMIN — MAGNESIUM OXIDE 400 MG ORAL TABLET 400 MILLIGRAM(S): 241.3 TABLET ORAL at 09:29

## 2020-01-23 RX ADMIN — MAGNESIUM OXIDE 400 MG ORAL TABLET 400 MILLIGRAM(S): 241.3 TABLET ORAL at 21:11

## 2020-01-23 RX ADMIN — LISINOPRIL 40 MILLIGRAM(S): 2.5 TABLET ORAL at 05:24

## 2020-01-23 RX ADMIN — DRONEDARONE 400 MILLIGRAM(S): 400 TABLET, FILM COATED ORAL at 05:24

## 2020-01-23 RX ADMIN — MAGNESIUM OXIDE 400 MG ORAL TABLET 400 MILLIGRAM(S): 241.3 TABLET ORAL at 17:12

## 2020-01-23 RX ADMIN — Medication 360 MILLIGRAM(S): at 05:24

## 2020-01-23 RX ADMIN — PANTOPRAZOLE SODIUM 40 MILLIGRAM(S): 20 TABLET, DELAYED RELEASE ORAL at 05:24

## 2020-01-23 RX ADMIN — Medication 0.25 MILLIGRAM(S): at 21:11

## 2020-01-23 RX ADMIN — PANTOPRAZOLE SODIUM 40 MILLIGRAM(S): 20 TABLET, DELAYED RELEASE ORAL at 17:12

## 2020-01-23 RX ADMIN — CLOPIDOGREL BISULFATE 75 MILLIGRAM(S): 75 TABLET, FILM COATED ORAL at 17:13

## 2020-01-23 RX ADMIN — MAGNESIUM OXIDE 400 MG ORAL TABLET 400 MILLIGRAM(S): 241.3 TABLET ORAL at 12:24

## 2020-01-23 RX ADMIN — ATORVASTATIN CALCIUM 40 MILLIGRAM(S): 80 TABLET, FILM COATED ORAL at 21:11

## 2020-01-23 NOTE — PROGRESS NOTE ADULT - ASSESSMENT
This patient is a 74yoF with PMH of atrial fibrillation on eliquis, CVA in 2017, T2DM (last A1C 7.3 in 11/2019), anxiety, HTN, HLD, CKD stage 3, CAD s/p MIA who presents to the ED for dark stool and weakness, found to have acute blood loss anemia likely 2/2 GIB in setting of triple therapy

## 2020-01-23 NOTE — PROGRESS NOTE ADULT - PROBLEM SELECTOR PLAN 6
Start home dose of lorazepam 0.25mg PO BID for anxiety, with hold parameters  ISTOP reviewed- Reference #: [id number]   Rx dispensed on  01/13/2020 lorazepam 0.5 mg tablet  30 quantity 30 days supply, by prescriber Venu Dunn

## 2020-01-23 NOTE — PROGRESS NOTE ADULT - PROBLEM SELECTOR PLAN 1
Hgb trending down 13 to 10, likely 2/2 GIB  -continue IV ppi  -care discussed with GI: plan for EGD this afternoon  -CBC q12hr for now and transfuse as needed  -will discuss with cardiology re: resumption of antiplatelet/AC given that stent only a week old

## 2020-01-23 NOTE — PROVIDER CONTACT NOTE (OTHER) - ACTION/TREATMENT ORDERED:
PA notified. Ordered EKG which showed second degree AV block(type 1) which pt has a past history of the same as per PA. Continue cardiac monitoring.

## 2020-01-23 NOTE — PROGRESS NOTE ADULT - SUBJECTIVE AND OBJECTIVE BOX
See procedure note for full detail.    Actively bleeding duodenal angioectasia on lavage, clipped. No bleeding at the end of the proecdure.    Rec  Clear liquids.  PPI BID  High risk for bleeding but given recent PCI and high CHADSVASC, CVA/TIA history I would use anticoagulants and antiplatelets as needed.  If persistent bleeding proceed to video capsule endoscopy to assess for further small bowel angioectasias.

## 2020-01-23 NOTE — PROGRESS NOTE ADULT - SUBJECTIVE AND OBJECTIVE BOX
Pre-Endoscopy Evaluation      Referring Physician: dr. lexis harman                              Procedure:  upper gastrointestinal endoscopy     Indication for Procedure: anemia, r/o gib    Pertinent History: 74y old female with acute symptomatic blood loss anemia, On ASA/Plavix/eliquis      Sedation by Anesthesia [X]    PAST MEDICAL & SURGICAL HISTORY:  Hypomagnesemia  Anxiety  Knee pain, left  Renal insufficiency  CVA (cerebral vascular accident)  Atrial fibrillation  Diabetes mellitus type 2 in obese  Hyperlipemia  Hypertension  History of cholecystectomy      PMH of Gastroparesis [ ]  Gastric Surgery [ ]  Gastric Outlet Obstruction [ ]    Allergies:    No Known Allergies    Intolerances:    Latex allergy: [ ] yes [X] no    Medications:MEDICATIONS  (STANDING):  aspirin enteric coated 81 milliGRAM(s) Oral daily  atorvastatin 40 milliGRAM(s) Oral at bedtime  clopidogrel Tablet 75 milliGRAM(s) Oral daily  dextrose 5%. 1000 milliLiter(s) (50 mL/Hr) IV Continuous <Continuous>  dextrose 50% Injectable 12.5 Gram(s) IV Push once  dextrose 50% Injectable 25 Gram(s) IV Push once  dextrose 50% Injectable 25 Gram(s) IV Push once  diltiazem    milliGRAM(s) Oral daily  dronedarone 400 milliGRAM(s) Oral two times a day  insulin lispro (HumaLOG) corrective regimen sliding scale   SubCutaneous three times a day before meals  insulin lispro (HumaLOG) corrective regimen sliding scale   SubCutaneous at bedtime  lisinopril 40 milliGRAM(s) Oral daily  LORazepam     Tablet 0.25 milliGRAM(s) Oral two times a day  magnesium oxide 400 milliGRAM(s) Oral four times a day with meals  pantoprazole  Injectable 40 milliGRAM(s) IV Push every 12 hours    MEDICATIONS  (PRN):  dextrose 40% Gel 15 Gram(s) Oral once PRN Blood Glucose LESS THAN 70 milliGRAM(s)/deciliter  glucagon  Injectable 1 milliGRAM(s) IntraMuscular once PRN Glucose LESS THAN 70 milligrams/deciliter      Smoking: [ ] yes  [X] no    AICD/PPM: [ ] yes   [X] no    Pertinent lab data:                        10.9   8.93  )-----------( 224      ( 23 Jan 2020 10:08 )             34.3     01-23    139  |  107  |  40<H>  ----------------------------<  142<H>  4.1   |  21<L>  |  1.27    Ca    8.9      23 Jan 2020 02:29    TPro  7.0  /  Alb  3.9  /  TBili  0.3  /  DBili  x   /  AST  18  /  ALT  21  /  AlkPhos  74  01-22    PT/INR - ( 23 Jan 2020 02:29 )   PT: 13.5 sec;   INR: 1.17 ratio         PTT - ( 23 Jan 2020 02:29 )  PTT:28.5 sec  CARDIAC MARKERS ( 22 Jan 2020 18:00 )  x     / x     / x     / x     / 2.5 ng/mL        CAPILLARY BLOOD GLUCOSE  POCT Blood Glucose.: 129 mg/dL (23 Jan 2020 11:28)      Physical Examination:  Daily Height in cm: 160.02 (22 Jan 2020 17:18)    Daily   Vital Signs Last 24 Hrs  T(C): 36.7 (23 Jan 2020 11:53), Max: 36.9 (22 Jan 2020 17:18)  T(F): 98.1 (23 Jan 2020 11:53), Max: 98.5 (23 Jan 2020 04:26)  HR: 67 (23 Jan 2020 11:53) (67 - 88)  BP: 111/62 (23 Jan 2020 11:53) (111/62 - 152/58)  BP(mean): 87 (22 Jan 2020 21:08) (87 - 87)  RR: 18 (23 Jan 2020 11:53) (16 - 22)  SpO2: 94% (23 Jan 2020 11:53) (94% - 97%)    Drug Dosing Weight  Height (cm): 160.02 (22 Jan 2020 17:18)  Weight (kg): 116.6 (22 Jan 2020 17:18)  BMI (kg/m2): 45.5 (22 Jan 2020 17:18)  BSA (m2): 2.15 (22 Jan 2020 17:18)    Constitutional: NAD      Neck:  No JVD    Respiratory: CTAB/L    Cardiovascular: S1 and S2    Gastrointestinal: BS+, soft, NT/ND    Extremities: No peripheral edema    Neurological: A/O x 3    : No Gentile    Skin: No rashes    Comments:      The patient is a suitable candidate for the planned procedure unless box checked [ ]  No, explain:

## 2020-01-23 NOTE — PROGRESS NOTE ADULT - PROBLEM SELECTOR PLAN 2
s/p MIA 1/14/2020  -given the recent MIA, need to be on antiplatelet agent  -will discuss with cards after EGD

## 2020-01-23 NOTE — CONSULT NOTE ADULT - ASSESSMENT
Impression  Acute symptomatic blood loss anemia, suspect upper GI source with melena/elevated BUN/Cr  On ASA/Plavix/eliquis, last dose yesterday.    Rec  NPO  PPI gtt  EGD today

## 2020-01-23 NOTE — CONSULT NOTE ADULT - SUBJECTIVE AND OBJECTIVE BOX
Patient is a 74y old  Female who presents with a chief complaint of dark stool and weakness (23 Jan 2020 13:02)      HPI:  This patient is a 74yoF with PMH of atrial fibrillation on eliquis, CVA in 2017, T2DM (last A1C 7.3 in 11/2019), anxiety, htn, hLd, CKD stage 3, CAD s/p MIA who presents to the ED for dark stool and weakness. The patient was previously admitted from 1/14-1/16/2020 for ZAMORA and abnormal NST. She underwent LHC with MIA to [facility]. She was planned to continue with aspirin, plavix and eliquis for a month. The patient returns to the ED with complaint of fatigue and dark stool. She reports that stool appeared darker since discharge from the hospital. She lacks the energy to walk around at home or even to shower, which is new. She denies any abdominal pain, but endorses feeling lightheaded and nauseous. She has no history of gastritis or PUD and has never undergone an endoscopy or colonoscopy. The patient's ZAMORA, however had resolved and she also denies having any chest pain or palpitations. (22 Jan 2020 20:31)      PAST MEDICAL & SURGICAL HISTORY:  Hypomagnesemia  Anxiety  Knee pain, left  Renal insufficiency  CVA (cerebral vascular accident)  Atrial fibrillation  Diabetes mellitus type 2 in obese  Hyperlipemia  Hypertension  History of cholecystectomy      MEDICATIONS  (STANDING):  aspirin enteric coated 81 milliGRAM(s) Oral daily  atorvastatin 40 milliGRAM(s) Oral at bedtime  clopidogrel Tablet 75 milliGRAM(s) Oral daily  dextrose 5%. 1000 milliLiter(s) (50 mL/Hr) IV Continuous <Continuous>  dextrose 50% Injectable 12.5 Gram(s) IV Push once  dextrose 50% Injectable 25 Gram(s) IV Push once  dextrose 50% Injectable 25 Gram(s) IV Push once  diltiazem    milliGRAM(s) Oral daily  dronedarone 400 milliGRAM(s) Oral two times a day  insulin lispro (HumaLOG) corrective regimen sliding scale   SubCutaneous three times a day before meals  insulin lispro (HumaLOG) corrective regimen sliding scale   SubCutaneous at bedtime  lisinopril 40 milliGRAM(s) Oral daily  LORazepam     Tablet 0.25 milliGRAM(s) Oral two times a day  magnesium oxide 400 milliGRAM(s) Oral four times a day with meals  pantoprazole  Injectable 40 milliGRAM(s) IV Push every 12 hours    MEDICATIONS  (PRN):  dextrose 40% Gel 15 Gram(s) Oral once PRN Blood Glucose LESS THAN 70 milliGRAM(s)/deciliter  glucagon  Injectable 1 milliGRAM(s) IntraMuscular once PRN Glucose LESS THAN 70 milligrams/deciliter      Allergies    No Known Allergies    Intolerances        Review of Systems:    General:  see HPI  CV:  No pain, palpitations, hypo/hypertension  Resp:  No dyspnea, cough, tachypnea, wheezing  GI:  see HPI  :  No pain, bleeding, incontinence, nocturia  Muscle:  No pain, weakness  Neuro:  No weakness, tingling, memory problems  Psych:  No fatigue, insomnia, mood problems, depression  Endocrine:  No polyuria, polydypsia, cold/heat intolerance  Heme:  No petechiae, ecchymosis, easy bruisability  Skin:  No rash, tattoos, scars, edema      Vital Signs Last 24 Hrs  T(C): 36.7 (23 Jan 2020 11:53), Max: 36.9 (22 Jan 2020 17:18)  T(F): 98.1 (23 Jan 2020 11:53), Max: 98.5 (23 Jan 2020 04:26)  HR: 67 (23 Jan 2020 11:53) (67 - 88)  BP: 111/62 (23 Jan 2020 11:53) (111/62 - 152/58)  BP(mean): 87 (22 Jan 2020 21:08) (87 - 87)  RR: 18 (23 Jan 2020 11:53) (16 - 22)  SpO2: 94% (23 Jan 2020 11:53) (94% - 97%)    PHYSICAL EXAM:    Constitutional: NAD, well-developed  Neck: No LAD, supple  Respiratory: CTA and P  Cardiovascular: S1 and S2, RRR, no M  Gastrointestinal: BS+, soft, NT/ND, neg HSM,  Extremities: No peripheral edema, neg clubbing, cyanosis  Vascular: 2+ peripheral pulses  Neurological: A/O x 3, no focal deficits  Psychiatric: Normal mood, normal affect  Skin: No rashes      LABS:                        10.9   8.93  )-----------( 224      ( 23 Jan 2020 10:08 )             34.3                         10.0   8.38  )-----------( 167      ( 23 Jan 2020 03:19 )             32.9                         11.0   8.58  )-----------( 221      ( 22 Jan 2020 18:00 )             35.6     01-23    139  |  107  |  40<H>  ----------------------------<  142<H>  4.1   |  21<L>  |  1.27    Ca    8.9      23 Jan 2020 02:29    TPro  7.0  /  Alb  3.9  /  TBili  0.3  /  DBili  x   /  AST  18  /  ALT  21  /  AlkPhos  74  01-22    PT/INR - ( 23 Jan 2020 02:29 )   PT: 13.5 sec;   INR: 1.17 ratio         PTT - ( 23 Jan 2020 02:29 )  PTT:28.5 sec    LIVER FUNCTIONS - ( 22 Jan 2020 18:00 )  Alb: 3.9 g/dL / Pro: 7.0 g/dL / ALK PHOS: 74 U/L / ALT: 21 U/L / AST: 18 U/L / GGT: x                 RADIOLOGY & ADDITIONAL TESTS:

## 2020-01-23 NOTE — PROGRESS NOTE ADULT - PROBLEM SELECTOR PLAN 3
CHADSVASC score of 6 with hx of TIA/CVA  -eliquis on hold at this time but given high risk, favor to resume  -fu EGD result

## 2020-01-23 NOTE — PROGRESS NOTE ADULT - SUBJECTIVE AND OBJECTIVE BOX
Patient is a 74y old  Female who presents with a chief complaint of dark stool and weakness (22 Jan 2020 20:31)      SUBJECTIVE / OVERNIGHT EVENTS:  no acute events overnight, vss, afebrile  pt reports she had another episode of melena this morning  she is concerned about her ACs - what to be resumed    tele reviewed: NSR 60-80s, 1st degree AVB    ROS:  14 point ROS negative in detail except stated as above    MEDICATIONS  (STANDING):  aspirin enteric coated 81 milliGRAM(s) Oral daily  atorvastatin 40 milliGRAM(s) Oral at bedtime  clopidogrel Tablet 75 milliGRAM(s) Oral daily  dextrose 5%. 1000 milliLiter(s) (50 mL/Hr) IV Continuous <Continuous>  dextrose 50% Injectable 12.5 Gram(s) IV Push once  dextrose 50% Injectable 25 Gram(s) IV Push once  dextrose 50% Injectable 25 Gram(s) IV Push once  diltiazem    milliGRAM(s) Oral daily  dronedarone 400 milliGRAM(s) Oral two times a day  insulin lispro (HumaLOG) corrective regimen sliding scale   SubCutaneous three times a day before meals  insulin lispro (HumaLOG) corrective regimen sliding scale   SubCutaneous at bedtime  lisinopril 40 milliGRAM(s) Oral daily  LORazepam     Tablet 0.25 milliGRAM(s) Oral two times a day  magnesium oxide 400 milliGRAM(s) Oral four times a day with meals  pantoprazole  Injectable 40 milliGRAM(s) IV Push every 12 hours    MEDICATIONS  (PRN):  dextrose 40% Gel 15 Gram(s) Oral once PRN Blood Glucose LESS THAN 70 milliGRAM(s)/deciliter  glucagon  Injectable 1 milliGRAM(s) IntraMuscular once PRN Glucose LESS THAN 70 milligrams/deciliter      CAPILLARY BLOOD GLUCOSE      POCT Blood Glucose.: 129 mg/dL (23 Jan 2020 11:28)  POCT Blood Glucose.: 140 mg/dL (23 Jan 2020 07:30)  POCT Blood Glucose.: 128 mg/dL (22 Jan 2020 22:21)    I&O's Summary    22 Jan 2020 07:01  -  23 Jan 2020 07:00  --------------------------------------------------------  IN: 0 mL / OUT: 300 mL / NET: -300 mL        PHYSICAL EXAM:  Vital Signs Last 24 Hrs  T(C): 36.7 (23 Jan 2020 11:53), Max: 36.9 (22 Jan 2020 17:18)  T(F): 98.1 (23 Jan 2020 11:53), Max: 98.5 (23 Jan 2020 04:26)  HR: 67 (23 Jan 2020 11:53) (67 - 88)  BP: 111/62 (23 Jan 2020 11:53) (111/62 - 152/58)  BP(mean): 87 (22 Jan 2020 21:08) (87 - 87)  RR: 18 (23 Jan 2020 11:53) (16 - 22)  SpO2: 94% (23 Jan 2020 11:53) (94% - 97%)    GENERAL: NAD, well-developed  HEAD:  Atraumatic, Normocephalic  EYES: EOMI, PERRLA, conjunctiva and sclera clear  NECK: Supple, No JVD  CHEST/LUNG: Clear to auscultation bilaterally; No wheeze  HEART: Regular rate and rhythm; No murmurs, rubs, or gallops  ABDOMEN: Soft, Nontender, Nondistended; Bowel sounds present  EXTREMITIES:  2+ Peripheral Pulses, No clubbing, cyanosis, or edema  NEUROLOGY: AAOx3; non-focal  SKIN: No rashes or lesions    LABS:  personally reviewed                        10.9   8.93  )-----------( 224      ( 23 Jan 2020 10:08 )             34.3     01-23    139  |  107  |  40<H>  ----------------------------<  142<H>  4.1   |  21<L>  |  1.27    Ca    8.9      23 Jan 2020 02:29    TPro  7.0  /  Alb  3.9  /  TBili  0.3  /  DBili  x   /  AST  18  /  ALT  21  /  AlkPhos  74  01-22    PT/INR - ( 23 Jan 2020 02:29 )   PT: 13.5 sec;   INR: 1.17 ratio         PTT - ( 23 Jan 2020 02:29 )  PTT:28.5 sec  CARDIAC MARKERS ( 22 Jan 2020 18:00 )  x     / x     / x     / x     / 2.5 ng/mL          RADIOLOGY & ADDITIONAL TESTS:    Imaging Personally Reviewed:    Consultant(s) Notes Reviewed:  GI, cardiology    Care Discussed with Consultants/Other Providers: Dr. Riley, Dr. Boss

## 2020-01-24 DIAGNOSIS — K92.2 GASTROINTESTINAL HEMORRHAGE, UNSPECIFIED: ICD-10-CM

## 2020-01-24 LAB
25(OH)D3 SERPL-MCNC: 32.6 NG/ML
ALBUMIN SERPL ELPH-MCNC: 4.5 G/DL
ALP BLD-CCNC: 85 U/L
ALT SERPL-CCNC: 24 U/L
ANION GAP SERPL CALC-SCNC: 10 MMOL/L — SIGNIFICANT CHANGE UP (ref 5–17)
ANION GAP SERPL CALC-SCNC: 11 MMOL/L — SIGNIFICANT CHANGE UP (ref 5–17)
ANION GAP SERPL CALC-SCNC: 19 MMOL/L
APPEARANCE: ABNORMAL
AST SERPL-CCNC: 21 U/L
BACTERIA: NEGATIVE
BASOPHILS # BLD AUTO: 0.03 K/UL
BASOPHILS NFR BLD AUTO: 0.4 %
BILIRUB DIRECT SERPL-MCNC: 0.2 MG/DL
BILIRUB INDIRECT SERPL-MCNC: 0.3 MG/DL
BILIRUB SERPL-MCNC: 0.5 MG/DL
BILIRUBIN URINE: NEGATIVE
BLOOD URINE: NEGATIVE
BUN SERPL-MCNC: 24 MG/DL — HIGH (ref 7–23)
BUN SERPL-MCNC: 27 MG/DL — HIGH (ref 7–23)
BUN SERPL-MCNC: 37 MG/DL
CALCIUM SERPL-MCNC: 10 MG/DL
CALCIUM SERPL-MCNC: 9 MG/DL — SIGNIFICANT CHANGE UP (ref 8.4–10.5)
CALCIUM SERPL-MCNC: 9.3 MG/DL — SIGNIFICANT CHANGE UP (ref 8.4–10.5)
CHLORIDE SERPL-SCNC: 103 MMOL/L — SIGNIFICANT CHANGE UP (ref 96–108)
CHLORIDE SERPL-SCNC: 104 MMOL/L — SIGNIFICANT CHANGE UP (ref 96–108)
CHLORIDE SERPL-SCNC: 107 MMOL/L
CHOLEST SERPL-MCNC: 134 MG/DL
CHOLEST/HDLC SERPL: 2.3 RATIO
CK SERPL-CCNC: 119 U/L
CO2 SERPL-SCNC: 21 MMOL/L
CO2 SERPL-SCNC: 22 MMOL/L — SIGNIFICANT CHANGE UP (ref 22–31)
CO2 SERPL-SCNC: 25 MMOL/L — SIGNIFICANT CHANGE UP (ref 22–31)
COLOR: NORMAL
CREAT SERPL-MCNC: 1.3 MG/DL — SIGNIFICANT CHANGE UP (ref 0.5–1.3)
CREAT SERPL-MCNC: 1.35 MG/DL — HIGH (ref 0.5–1.3)
CREAT SERPL-MCNC: 1.42 MG/DL
EOSINOPHIL # BLD AUTO: 0.05 K/UL
EOSINOPHIL NFR BLD AUTO: 0.6 %
GLUCOSE BLDC GLUCOMTR-MCNC: 126 MG/DL — HIGH (ref 70–99)
GLUCOSE BLDC GLUCOMTR-MCNC: 135 MG/DL — HIGH (ref 70–99)
GLUCOSE BLDC GLUCOMTR-MCNC: 136 MG/DL — HIGH (ref 70–99)
GLUCOSE BLDC GLUCOMTR-MCNC: 165 MG/DL — HIGH (ref 70–99)
GLUCOSE QUALITATIVE U: ABNORMAL
GLUCOSE SERPL-MCNC: 118 MG/DL — HIGH (ref 70–99)
GLUCOSE SERPL-MCNC: 119 MG/DL
GLUCOSE SERPL-MCNC: 167 MG/DL — HIGH (ref 70–99)
HCT VFR BLD CALC: 31.4 % — LOW (ref 34.5–45)
HCT VFR BLD CALC: 32.5 % — LOW (ref 34.5–45)
HCT VFR BLD CALC: 34.5 % — SIGNIFICANT CHANGE UP (ref 34.5–45)
HCT VFR BLD CALC: 46.2 %
HDLC SERPL-MCNC: 59 MG/DL
HGB BLD-MCNC: 10.2 G/DL — LOW (ref 11.5–15.5)
HGB BLD-MCNC: 10.4 G/DL — LOW (ref 11.5–15.5)
HGB BLD-MCNC: 14.6 G/DL
HGB BLD-MCNC: 9.7 G/DL — LOW (ref 11.5–15.5)
HYALINE CASTS: 0 /LPF
IMM GRANULOCYTES NFR BLD AUTO: 0.2 %
KETONES URINE: NEGATIVE
LDLC SERPL CALC-MCNC: 52 MG/DL
LDLC SERPL DIRECT ASSAY-MCNC: 56 MG/DL
LEUKOCYTE ESTERASE URINE: ABNORMAL
LYMPHOCYTES # BLD AUTO: 2.03 K/UL
LYMPHOCYTES NFR BLD AUTO: 24.5 %
MAGNESIUM SERPL-MCNC: 1.8 MG/DL
MAN DIFF?: NORMAL
MCHC RBC-ENTMCNC: 28.2 PG — SIGNIFICANT CHANGE UP (ref 27–34)
MCHC RBC-ENTMCNC: 28.4 PG
MCHC RBC-ENTMCNC: 28.5 PG — SIGNIFICANT CHANGE UP (ref 27–34)
MCHC RBC-ENTMCNC: 29 PG — SIGNIFICANT CHANGE UP (ref 27–34)
MCHC RBC-ENTMCNC: 30.1 GM/DL — LOW (ref 32–36)
MCHC RBC-ENTMCNC: 30.9 GM/DL — LOW (ref 32–36)
MCHC RBC-ENTMCNC: 31.4 GM/DL — LOW (ref 32–36)
MCHC RBC-ENTMCNC: 31.6 GM/DL
MCV RBC AUTO: 89.9 FL
MCV RBC AUTO: 90.8 FL — SIGNIFICANT CHANGE UP (ref 80–100)
MCV RBC AUTO: 91.3 FL — SIGNIFICANT CHANGE UP (ref 80–100)
MCV RBC AUTO: 96.1 FL — SIGNIFICANT CHANGE UP (ref 80–100)
MICROSCOPIC-UA: NORMAL
MONOCYTES # BLD AUTO: 0.65 K/UL
MONOCYTES NFR BLD AUTO: 7.9 %
NEUTROPHILS # BLD AUTO: 5.5 K/UL
NEUTROPHILS NFR BLD AUTO: 66.4 %
NITRITE URINE: NEGATIVE
NRBC # BLD: 0 /100 WBCS — SIGNIFICANT CHANGE UP (ref 0–0)
PH URINE: 6
PLATELET # BLD AUTO: 196 K/UL — SIGNIFICANT CHANGE UP (ref 150–400)
PLATELET # BLD AUTO: 201 K/UL — SIGNIFICANT CHANGE UP (ref 150–400)
PLATELET # BLD AUTO: 221 K/UL — SIGNIFICANT CHANGE UP (ref 150–400)
PLATELET # BLD AUTO: 224 K/UL
POTASSIUM SERPL-MCNC: 3.8 MMOL/L — SIGNIFICANT CHANGE UP (ref 3.5–5.3)
POTASSIUM SERPL-MCNC: 4.4 MMOL/L — SIGNIFICANT CHANGE UP (ref 3.5–5.3)
POTASSIUM SERPL-SCNC: 3.8 MMOL/L — SIGNIFICANT CHANGE UP (ref 3.5–5.3)
POTASSIUM SERPL-SCNC: 4 MMOL/L
POTASSIUM SERPL-SCNC: 4.4 MMOL/L — SIGNIFICANT CHANGE UP (ref 3.5–5.3)
PROT SERPL-MCNC: 7.4 G/DL
PROTEIN URINE: NORMAL
RBC # BLD: 3.44 M/UL — LOW (ref 3.8–5.2)
RBC # BLD: 3.58 M/UL — LOW (ref 3.8–5.2)
RBC # BLD: 3.59 M/UL — LOW (ref 3.8–5.2)
RBC # BLD: 5.14 M/UL
RBC # FLD: 15 %
RBC # FLD: 15 % — HIGH (ref 10.3–14.5)
RBC # FLD: 15 % — HIGH (ref 10.3–14.5)
RBC # FLD: 15.3 % — HIGH (ref 10.3–14.5)
RED BLOOD CELLS URINE: 8 /HPF
SODIUM SERPL-SCNC: 137 MMOL/L — SIGNIFICANT CHANGE UP (ref 135–145)
SODIUM SERPL-SCNC: 138 MMOL/L — SIGNIFICANT CHANGE UP (ref 135–145)
SODIUM SERPL-SCNC: 147 MMOL/L
SPECIFIC GRAVITY URINE: 1.02
SQUAMOUS EPITHELIAL CELLS: 3 /HPF
TRIGL SERPL-MCNC: 116 MG/DL
UROBILINOGEN URINE: NORMAL
WBC # BLD: 7.14 K/UL — SIGNIFICANT CHANGE UP (ref 3.8–10.5)
WBC # BLD: 7.49 K/UL — SIGNIFICANT CHANGE UP (ref 3.8–10.5)
WBC # BLD: 8.98 K/UL — SIGNIFICANT CHANGE UP (ref 3.8–10.5)
WBC # FLD AUTO: 7.14 K/UL — SIGNIFICANT CHANGE UP (ref 3.8–10.5)
WBC # FLD AUTO: 7.49 K/UL — SIGNIFICANT CHANGE UP (ref 3.8–10.5)
WBC # FLD AUTO: 8.28 K/UL
WBC # FLD AUTO: 8.98 K/UL — SIGNIFICANT CHANGE UP (ref 3.8–10.5)
WHITE BLOOD CELLS URINE: 11 /HPF

## 2020-01-24 PROCEDURE — 99232 SBSQ HOSP IP/OBS MODERATE 35: CPT

## 2020-01-24 PROCEDURE — 99233 SBSQ HOSP IP/OBS HIGH 50: CPT

## 2020-01-24 RX ADMIN — LISINOPRIL 40 MILLIGRAM(S): 2.5 TABLET ORAL at 05:31

## 2020-01-24 RX ADMIN — Medication 360 MILLIGRAM(S): at 05:31

## 2020-01-24 RX ADMIN — MAGNESIUM OXIDE 400 MG ORAL TABLET 400 MILLIGRAM(S): 241.3 TABLET ORAL at 07:57

## 2020-01-24 RX ADMIN — DRONEDARONE 400 MILLIGRAM(S): 400 TABLET, FILM COATED ORAL at 17:02

## 2020-01-24 RX ADMIN — PANTOPRAZOLE SODIUM 40 MILLIGRAM(S): 20 TABLET, DELAYED RELEASE ORAL at 17:04

## 2020-01-24 RX ADMIN — CLOPIDOGREL BISULFATE 75 MILLIGRAM(S): 75 TABLET, FILM COATED ORAL at 10:59

## 2020-01-24 RX ADMIN — APIXABAN 5 MILLIGRAM(S): 2.5 TABLET, FILM COATED ORAL at 05:31

## 2020-01-24 RX ADMIN — Medication 0.25 MILLIGRAM(S): at 21:28

## 2020-01-24 RX ADMIN — MAGNESIUM OXIDE 400 MG ORAL TABLET 400 MILLIGRAM(S): 241.3 TABLET ORAL at 17:02

## 2020-01-24 RX ADMIN — PANTOPRAZOLE SODIUM 40 MILLIGRAM(S): 20 TABLET, DELAYED RELEASE ORAL at 05:31

## 2020-01-24 RX ADMIN — MAGNESIUM OXIDE 400 MG ORAL TABLET 400 MILLIGRAM(S): 241.3 TABLET ORAL at 11:00

## 2020-01-24 RX ADMIN — APIXABAN 5 MILLIGRAM(S): 2.5 TABLET, FILM COATED ORAL at 17:02

## 2020-01-24 RX ADMIN — MAGNESIUM OXIDE 400 MG ORAL TABLET 400 MILLIGRAM(S): 241.3 TABLET ORAL at 21:28

## 2020-01-24 RX ADMIN — ATORVASTATIN CALCIUM 40 MILLIGRAM(S): 80 TABLET, FILM COATED ORAL at 21:28

## 2020-01-24 RX ADMIN — DRONEDARONE 400 MILLIGRAM(S): 400 TABLET, FILM COATED ORAL at 05:31

## 2020-01-24 NOTE — PROGRESS NOTE ADULT - ASSESSMENT
74F with PMH AFib on Eliquis, CVA 2017 w/o residual weakness, HTN, HLD, DM2 and CAD with recent PCI p/mLAD 1/14 a/w GIB with EGD identifying single actively bleeding duodenum angioectasia now s/p clip with no further bleeding and stable hemoglobin.    1. GIB s/p EGD with bleeding angioectesia s/p clip  - no further bleeding, Hb stable  - continue PPI BID and follow-up GI recs    2. CAD s/p recent MIA:   - discontinue ASA   - continue Plavix 75mg daily and Eliquis 5mg BID    3. pAFib:   - remains in SR  - continue Dronedarone 400mg BID and Cardizem CD 360mg daily    Inocente Cruz M.D.  Cardiology Fellow  [phone]  For all Cardiology service contact information, go to [email or web address] and use "[email or web address]" to login. 74F with PMH AFib on Eliquis, CVA 2017 w/o residual weakness, HTN, HLD, DM2 and CAD with recent PCI p/mLAD 1/14 a/w GIB with EGD identifying single actively bleeding duodenum angioectasia now s/p clip with no further bleeding and stable hemoglobin.     1. GIB s/p EGD with bleeding angioectesia s/p clip  - no further bleeding, Hb stable  - continue PPI BID and follow-up GI recs    2. CAD s/p recent MIA:   - discontinue ASA   - continue Plavix 75mg daily and Eliquis 5mg BID    3. pAFib:   - remains in SR  - continue Dronedarone 400mg BID and Cardizem CD 360mg daily    Inocente Cruz M.D.  Cardiology Fellow  [phone]  For all Cardiology service contact information, go to [email or web address] and use "[email or web address]" to login.

## 2020-01-24 NOTE — DIETITIAN INITIAL EVALUATION ADULT. - PROBLEM SELECTOR PLAN 8
VTE ppx:  hold pharm ppx due to concern for bleed, venodyne boots  Activity: OOB with assistance  Diet: NPO with meds for now    Transitions of Care Status:  1.  Name of PCP: Dr. Boss  2.  PCP Contacted on Admission: [ ] Y    [ ] N    3.  PCP contacted at Discharge: [ ] Y    [ ] N    [ ] N/A  4.  Post-Discharge Appointment Date and Location:  5.  Summary of Handoff given to PCP:

## 2020-01-24 NOTE — DIETITIAN INITIAL EVALUATION ADULT. - REASON INDICATOR FOR ASSESSMENT
Pt seen for BMI>40 on 6TOW.  Information obtained from: pt, daughter at bedside, electronic medical record

## 2020-01-24 NOTE — DIETITIAN INITIAL EVALUATION ADULT. - PROBLEM SELECTOR PLAN 6
Start home dose of lorazepam 0.25mg PO BID for anxiety, with hold parameters  ISTOP reviewed- Reference #: [id number]   Rx dispensed on  01/13/2020 lorazepam 0.5 mg tablet  30 quantity 30 days supply, by prescriber Venu Dunn

## 2020-01-24 NOTE — CHART NOTE - NSCHARTNOTEFT_GEN_A_CORE
Upon Nutritional Assessment by the Registered Dietitian your patient was determined to meet criteria / has evidence of the following diagnosis/diagnoses:          [ ]  Mild Protein Calorie Malnutrition        [ ]  Moderate Protein Calorie Malnutrition        [ ] Severe Protein Calorie Malnutrition        [ ] Unspecified Protein Calorie Malnutrition        [ ] Underweight / BMI <19        [x] Morbid Obesity / BMI > 40      Findings as based on:  [x] Comprehensive nutrition assessment   [ ] Nutrition Focused Physical Exam  [x] Other:  BMI 45.5, 223% IBW, Lack of adherence to nutrition related recommendations      Nutrition Plan/Recommendations:    1. Recommend advance diet to Low Fiber, DASH/TLC and Consistent Carbohydrate (no evening snacks) therapeutic diet as feasible   2. Provide/review nutrition education as indicated   3. Will continue to monitor nutrient intake, wt, labs, f/u per protocol    RD remains available. Kya Day RD, CDN Pager: [phone]       PROVIDER Section:     By signing this assessment you are acknowledging and agree with the diagnosis/diagnoses assigned by the Registered Dietitian    Comments:

## 2020-01-24 NOTE — PROGRESS NOTE ADULT - SUBJECTIVE AND OBJECTIVE BOX
Actively bleeding duodenal avm clipped.  On plavix, xarelto per cardiology.      PAST MEDICAL & SURGICAL HISTORY:  Hypomagnesemia  Anxiety  Knee pain, left  Renal insufficiency  CVA (cerebral vascular accident)  Atrial fibrillation  Diabetes mellitus type 2 in obese  Hyperlipemia  Hypertension  History of cholecystectomy      MEDICATIONS  (STANDING):  aspirin enteric coated 81 milliGRAM(s) Oral daily  atorvastatin 40 milliGRAM(s) Oral at bedtime  clopidogrel Tablet 75 milliGRAM(s) Oral daily  dextrose 5%. 1000 milliLiter(s) (50 mL/Hr) IV Continuous <Continuous>  dextrose 50% Injectable 12.5 Gram(s) IV Push once  dextrose 50% Injectable 25 Gram(s) IV Push once  dextrose 50% Injectable 25 Gram(s) IV Push once  diltiazem    milliGRAM(s) Oral daily  dronedarone 400 milliGRAM(s) Oral two times a day  insulin lispro (HumaLOG) corrective regimen sliding scale   SubCutaneous three times a day before meals  insulin lispro (HumaLOG) corrective regimen sliding scale   SubCutaneous at bedtime  lisinopril 40 milliGRAM(s) Oral daily  LORazepam     Tablet 0.25 milliGRAM(s) Oral two times a day  magnesium oxide 400 milliGRAM(s) Oral four times a day with meals  pantoprazole  Injectable 40 milliGRAM(s) IV Push every 12 hours    MEDICATIONS  (PRN):  dextrose 40% Gel 15 Gram(s) Oral once PRN Blood Glucose LESS THAN 70 milliGRAM(s)/deciliter  glucagon  Injectable 1 milliGRAM(s) IntraMuscular once PRN Glucose LESS THAN 70 milligrams/deciliter      Allergies    No Known Allergies    Intolerances        Review of Systems:    General:  see HPI  CV:  No pain, palpitations, hypo/hypertension  Resp:  No dyspnea, cough, tachypnea, wheezing  GI:  see HPI  :  No pain, bleeding, incontinence, nocturia  Muscle:  No pain, weakness  Neuro:  No weakness, tingling, memory problems  Psych:  No fatigue, insomnia, mood problems, depression  Endocrine:  No polyuria, polydypsia, cold/heat intolerance  Heme:  No petechiae, ecchymosis, easy bruisability  Skin:  No rash, tattoos, scars, edema      Vital Signs Last 24 Hrs  T(C): 36.9 (24 Jan 2020 12:09), Max: 36.9 (24 Jan 2020 12:09)  T(F): 98.5 (24 Jan 2020 12:09), Max: 98.5 (24 Jan 2020 12:09)  HR: 64 (24 Jan 2020 12:09) (64 - 73)  BP: 123/62 (24 Jan 2020 12:09) (117/73 - 128/72)  BP(mean): --  RR: 18 (24 Jan 2020 12:09) (17 - 18)  SpO2: 95% (24 Jan 2020 12:09) (95% - 97%)    PHYSICAL EXAM:    Constitutional: NAD, well-developed  Neck: No LAD, supple  Respiratory: CTA and P  Cardiovascular: S1 and S2, RRR, no M  Gastrointestinal: BS+, soft, NT/ND, neg HSM,  Extremities: No peripheral edema, neg clubbing, cyanosis  Vascular: 2+ peripheral pulses  Neurological: A/O x 3, no focal deficits  Psychiatric: Normal mood, normal affect  Skin: No rashes      LABS:                        10.2   7.49  )-----------( 221      ( 24 Jan 2020 09:33 )             32.5     01-24    138  |  103  |  24<H>  ----------------------------<  167<H>  4.4   |  25  |  1.35<H>    Ca    9.3      24 Jan 2020 09:33    TPro  7.0  /  Alb  3.9  /  TBili  0.3  /  DBili  x   /  AST  18  /  ALT  21  /  AlkPhos  74  01-22    LIVER FUNCTIONS - ( 22 Jan 2020 18:00 )  Alb: 3.9 g/dL / Pro: 7.0 g/dL / ALK PHOS: 74 U/L / ALT: 21 U/L / AST: 18 U/L / GGT: x           PT/INR - ( 23 Jan 2020 02:29 )   PT: 13.5 sec;   INR: 1.17 ratio         PTT - ( 23 Jan 2020 02:29 )  PTT:28.5 sec                           RADIOLOGY & ADDITIONAL TESTS:

## 2020-01-24 NOTE — PROGRESS NOTE ADULT - SUBJECTIVE AND OBJECTIVE BOX
Mohsin Khan, MD  Attending Physician, Division Of Hospital Medicine  Pager: [phone], Office: [phone]  Off hour pager: [phone]    Patient is a 74y old  Female who presents with a chief complaint of dark stool and weakness     SUBJECTIVE / OVERNIGHT EVENTS:  Seen, examined the patient this morning, daughter at bedside  Resting in bed, no c/o abdominal pain, no more dark stool, had BM. No chest pain, feels better and not tired.  Hemodynamically stable. Tele- SR , sec 2 HB- type 2      MEDICATIONS  (STANDING):  apixaban 5 milliGRAM(s) Oral every 12 hours  atorvastatin 40 milliGRAM(s) Oral at bedtime  clopidogrel Tablet 75 milliGRAM(s) Oral daily  dextrose 5%. 1000 milliLiter(s) (50 mL/Hr) IV Continuous <Continuous>  dextrose 50% Injectable 12.5 Gram(s) IV Push once  dextrose 50% Injectable 25 Gram(s) IV Push once  dextrose 50% Injectable 25 Gram(s) IV Push once  diltiazem    milliGRAM(s) Oral daily  dronedarone 400 milliGRAM(s) Oral two times a day  insulin lispro (HumaLOG) corrective regimen sliding scale   SubCutaneous three times a day before meals  insulin lispro (HumaLOG) corrective regimen sliding scale   SubCutaneous at bedtime  lisinopril 40 milliGRAM(s) Oral daily  LORazepam     Tablet 0.25 milliGRAM(s) Oral two times a day  magnesium oxide 400 milliGRAM(s) Oral four times a day with meals  pantoprazole  Injectable 40 milliGRAM(s) IV Push every 12 hours    MEDICATIONS  (PRN):  dextrose 40% Gel 15 Gram(s) Oral once PRN Blood Glucose LESS THAN 70 milliGRAM(s)/deciliter  glucagon  Injectable 1 milliGRAM(s) IntraMuscular once PRN Glucose LESS THAN 70 milligrams/deciliter      Vital Signs Last 24 Hrs  T(C): 36.9 (24 Jan 2020 12:09), Max: 36.9 (24 Jan 2020 12:09)  T(F): 98.5 (24 Jan 2020 12:09), Max: 98.5 (24 Jan 2020 12:09)  HR: 64 (24 Jan 2020 12:09) (64 - 73)  BP: 123/62 (24 Jan 2020 12:09) (117/73 - 128/72)  BP(mean): --  RR: 18 (24 Jan 2020 12:09) (17 - 18)  SpO2: 95% (24 Jan 2020 12:09) (95% - 97%)  CAPILLARY BLOOD GLUCOSE      POCT Blood Glucose.: 135 mg/dL (24 Jan 2020 11:37)  POCT Blood Glucose.: 126 mg/dL (24 Jan 2020 07:28)  POCT Blood Glucose.: 116 mg/dL (23 Jan 2020 21:21)  POCT Blood Glucose.: 115 mg/dL (23 Jan 2020 16:48)    I&O's Summary    23 Jan 2020 07:01  -  24 Jan 2020 07:00  --------------------------------------------------------  IN: 300 mL / OUT: 300 mL / NET: 0 mL        PHYSICAL EXAM:-  GENERAL: NAD, well-developed  EYES: EOMI, PERRLA, conjunctiva and sclera clear  NECK: Supple, No JVD, no thyromegaly  CHEST/LUNG: Clear to auscultation bilaterally; No wheeze  HEART: Regular rate and rhythm; S1, S2 audible, No murmurs, rubs, or gallops  ABDOMEN: Soft, Nontender, Nondistended; Bowel sounds present  EXTREMITIES:  2+ Peripheral Pulses, No clubbing, cyanosis, or edema  NEURO: AAOx3, no focal deficit      LABS:                        10.2   7.49  )-----------( 221      ( 24 Jan 2020 09:33 )             32.5     01-24    138  |  103  |  24<H>  ----------------------------<  167<H>  4.4   |  25  |  1.35<H>    Ca    9.3      24 Jan 2020 09:33    TPro  7.0  /  Alb  3.9  /  TBili  0.3  /  DBili  x   /  AST  18  /  ALT  21  /  AlkPhos  74  01-22    PT/INR - ( 23 Jan 2020 02:29 )   PT: 13.5 sec;   INR: 1.17 ratio         PTT - ( 23 Jan 2020 02:29 )  PTT:28.5 sec  CARDIAC MARKERS ( 22 Jan 2020 18:00 )  x     / x     / x     / x     / 2.5 ng/mL          RADIOLOGY & ADDITIONAL TESTS:    Imaging Personally Reviewed: EGD  Consultant(s) Notes Reviewed: Cardiology, GI  Care Discussed with Consultants/Other Providers: Cradiology, GI

## 2020-01-24 NOTE — PROGRESS NOTE ADULT - ASSESSMENT
Impression  Acute symptomatic blood loss anemia, duodenal avm clipped.    Rec  Diet advanced  No further bleeding.  Continue PPI  Stable on plavix, xarelto    [phone]

## 2020-01-24 NOTE — PROGRESS NOTE ADULT - ASSESSMENT
This patient is a 74yoF with PMH of atrial fibrillation on eliquis, CVA in 2017, T2DM (last A1C 7.3 in 11/2019), anxiety, HTN, HLD, CKD stage 3, CAD s/p MIA who presents to the ED for dark stool and weakness, found to have acute blood loss anemia 2/2 bleeding duodenal AVM, s/p clip

## 2020-01-24 NOTE — PROGRESS NOTE ADULT - SUBJECTIVE AND OBJECTIVE BOX
CARDIOLOGY PROGRESS NOTE    Subjective/24 hour events:   - s/p EGD with actively bleeding ulcer  - Denies chest pain, palpitations, SOB, lightheadedness, dizziness.    Telemetry: SR, 60-90    MEDICATIONS  (STANDING):  apixaban 5 milliGRAM(s) Oral every 12 hours  atorvastatin 40 milliGRAM(s) Oral at bedtime  clopidogrel Tablet 75 milliGRAM(s) Oral daily  diltiazem    milliGRAM(s) Oral daily  dronedarone 400 milliGRAM(s) Oral two times a day  insulin lispro (HumaLOG) corrective regimen sliding scale   SubCutaneous three times a day before meals  insulin lispro (HumaLOG) corrective regimen sliding scale   SubCutaneous at bedtime  lisinopril 40 milliGRAM(s) Oral daily  LORazepam     Tablet 0.25 milliGRAM(s) Oral two times a day  magnesium oxide 400 milliGRAM(s) Oral four times a day with meals  pantoprazole  Injectable 40 milliGRAM(s) IV Push every 12 hours    Vital Signs Last 24 Hrs  T(C): 36.6 (24 Jan 2020 04:20), Max: 36.8 (23 Jan 2020 16:04)  T(F): 97.9 (24 Jan 2020 04:20), Max: 98.3 (23 Jan 2020 16:04)  HR: 65 (24 Jan 2020 04:20) (65 - 73)  BP: 119/75 (24 Jan 2020 04:20) (111/62 - 128/72)  BP(mean): --  RR: 18 (24 Jan 2020 04:20) (17 - 18)  SpO2: 96% (24 Jan 2020 04:20) (94% - 97%)    I&O's Summary  23 Jan 2020 07:01  -  24 Jan 2020 07:00  --------------------------------------------------------  IN: 300 mL / OUT: 300 mL / NET: 0 mL    Physical Exam:  General: No distress. Comfortable  HEENT: EOMI	  Neck: JVP not elevated  Chest: Clear to auscultation bilaterally  CV: RRR. Normal S1 and S2. No murmurs, rubs, or gallops. No edema.  Abdomen: Soft, non-distended, non-tender  Skin: No rashes, ecchymoses, or skin breakdown  Extremities: Warm.  Neuro: Alert and oriented x 3. No focal deficits.  Psych: Mood and affect appropriate    Labs:                 9.7    7.14  )-----------( 196      ( 24 Jan 2020 01:23 )             31.4     01-24    137  |  104  |  27<H>  ----------------------------<  118<H>  3.8   |  22  |  1.30    Ca    9.0      24 Jan 2020 01:18    TPro  7.0  /  Alb  3.9  /  TBili  0.3  /  DBili  x   /  AST  18  /  ALT  21  /  AlkPhos  74  01-22    PT/INR - ( 23 Jan 2020 02:29 )   PT: 13.5 sec;   INR: 1.17 ratio    PTT - ( 23 Jan 2020 02:29 )  PTT:28.5 sec    pro-BNP: Serum Pro-Brain Natriuretic Peptide: 180 pg/mL (01-22 @ 18:00)

## 2020-01-24 NOTE — PROGRESS NOTE ADULT - PROBLEM SELECTOR PLAN 2
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

## 2020-01-24 NOTE — DIETITIAN INITIAL EVALUATION ADULT. - PHYSICAL APPEARANCE
obese/other (specify)/well nourished Height: 63 inches, Weight: 257 pounds (dosing)  BMI: 45.5 kg/m2 IBW: 115 pounds (+/-10%), %IBW: 223%  Pertinent Info: 1+ edema to b/l legs noted, no pressure injuries noted at this time in nursing flow sheet.

## 2020-01-24 NOTE — DIETITIAN INITIAL EVALUATION ADULT. - OTHER INFO
Pt is 74yoF with PMH significant for "atrial fibrillation on eliquis, CVA in 2017, T2DM (last A1C 7.3 in 11/2019), anxiety, HTN, HLD, CKD stage 3, CAD s/p MIA who presents to the ED for dark stool and weakness, found to have acute blood loss anemia likely 2/2 GIB in setting of triple therapy"    Therapeutic Diet PTA: pt reports no therapeutic diet followed PTA. Pt does the cooking at home, eats 3 meals a day, admits to large portion sizes at meals. States she cooks with salt at home and consumes take out foods. Drinks sugar sweetened beverages and fruit juice at times and diet recall indicates excessive CHO intake at times. States she has been told to cut back on her salt intake in the past, but she hasn't.  Pt reports good appetite and po intake at baseline.    Self monitoring PTA: Pt endorses SMBG 2-3x/daily PTA with a usual BG range of 124-130 mg/dL and no hypoglycemia or hyperglycemia events reported. Pt follows up with an endocrinologist. Reports taking oral BG meds PTA, no insulin. Last HgbA1c per chart 11/2019 was 7.3%, indicative of largely adequate BG control.    Nutrition Supplements PTA: Mg oxide, Potassium citrate  NKFA reported.    Pt UBW: ~257 lbs, as reflected by dosing wt, and pt states wt has been stable >2 years. Pt reports she weighed ~220 lbs prior to CVA in 2017 and since has had limited physical captivity and attributes wt gain to such. ~37 lb wt gain indicated.     Pt denies chewing/swallowing difficulties.  Pt has no c/o nausea, vomiting, diarrhea, or constipation. Tolerating clear liquids at this time. Of note pt with inadequate diet order now x 3 days, pending GI input regarding diet advancement.    Nutrition education provided: Pt is 74yoF with PMH significant for "atrial fibrillation on eliquis, CVA in 2017, T2DM (last A1C 7.3 in 11/2019), anxiety, HTN, HLD, CKD stage 3, CAD s/p MIA who presents to the ED for dark stool and weakness, found to have acute blood loss anemia likely 2/2 GIB in setting of triple therapy"    Therapeutic Diet PTA: pt reports no therapeutic diet followed PTA. Pt does the cooking at home, eats 3 meals a day, admits to large portion sizes at meals. States she cooks with salt at home and consumes take out foods. Drinks sugar sweetened beverages and fruit juice at times and diet recall indicates excessive CHO and fat  intake at times. States she has been told to cut back on her salt intake in the past, but she hasn't. Pt daughter at bedside states pt family has tried to encourage pt to make changes, but pt struggles to make changes.  Pt reports good appetite and po intake at baseline.    Self monitoring PTA: Pt endorses SMBG 2-3x/daily PTA with a usual BG range of 124-130 mg/dL and no hypoglycemia or hyperglycemia events reported. Pt follows up with an endocrinologist. Reports taking oral BG meds PTA, no insulin. Last HgbA1c per chart 11/2019 was 7.3%, indicative of largely adequate BG control.    Nutrition Supplements PTA: Mg oxide, Potassium citrate  NKFA reported.    Pt UBW: ~257 lbs, as reflected by dosing wt, and pt states wt has been stable >2 years. Pt reports she weighed ~220 lbs prior to CVA in 2017 and since has had limited physical captivity and attributes wt gain to such. ~37 lb wt gain indicated.     Pt denies chewing/swallowing difficulties.  Pt has no c/o nausea, vomiting, diarrhea, or constipation. Tolerating clear liquids at this time. Of note pt with inadequate diet order now x 3 days, pending GI input regarding diet advancement.    Nutrition education provided as follows: reviewed indications for low sodium and heart healthy diet, reading nutrition facts labels for sodium and sugar content, limiting take out foods, strategies for reducing sodium intake, avoiding concentrated sweets and sugar sweetened beverages, portion control, healthy meal pattern, SMBG and following up with MDs as indicated as well as encouraging pt to consider outpatient nutrition counseling. Written materials and outpatient resources provided. Pt able to teach back points. Pt is 74yoF with PMH significant for "atrial fibrillation on eliquis, CVA in 2017, T2DM (last A1C 7.3 in 11/2019), anxiety, HTN, HLD, CKD stage 3, CAD s/p MIA who presents to the ED for dark stool and weakness, found to have acute blood loss anemia likely 2/2 GIB in setting of triple therapy"    Therapeutic Diet PTA: pt reports no therapeutic diet followed PTA. Pt does the cooking at home, eats 3 meals a day, admits to large portion sizes at meals. States she cooks with salt at home and consumes take out foods. Drinks sugar sweetened beverages and fruit juice at times and diet recall indicates excessive CHO and fat  intake at times. States she has been told to cut back on her salt intake in the past, but she hasn't. Pt daughter at bedside states pt family has tried to encourage pt to make changes, but pt struggles to make changes.  Pt reports good appetite and po intake at baseline.    Self monitoring PTA: Pt endorses SMBG 2-3x/daily PTA with a usual BG range of 124-130 mg/dL and no hypoglycemia or hyperglycemia events reported. Pt follows up with an endocrinologist. Reports taking oral BG meds PTA, no insulin. Last HgbA1c per chart 11/2019 was 7.3%, indicative of largely adequate BG control.    Nutrition Supplements PTA: Mg oxide, Potassium citrate  NKFA reported.    Pt UBW: ~257 lbs, as reflected by dosing wt, and pt states wt has been stable >2 years. Pt reports she weighed ~220 lbs prior to CVA in 2017 and since has had limited physical captivity and attributes wt gain to such. ~37 lb wt gain indicated.     Pt denies chewing/swallowing difficulties.  Pt has no c/o nausea, vomiting, diarrhea, or constipation. Tolerating clear liquids at this time. Of note pt with inadequate diet order now x 3 days, pending GI input regarding diet advancement.    Nutrition education provided as follows: reviewed indications for low sodium and heart healthy diet, reading nutrition facts labels for sodium and sugar content, limiting take out foods, strategies for reducing sodium intake, avoiding concentrated sweets and sugar sweetened beverages, portion control, healthy meal pattern, SMBG and following up with MDs as indicated as well as encouraging pt to consider outpatient nutrition counseling.   Attempted to engage pt in addition DM nutrition education, however pt states she isn't interested- prefers to focus on heart healthy nutrition education.  Written materials and outpatient resources provided. Pt able to teach back points.

## 2020-01-24 NOTE — DIETITIAN INITIAL EVALUATION ADULT. - PROBLEM SELECTOR PLAN 3
I suspect the patient has a history of chronic atrial fibrillation, since she appears to be maintaining sinus rhythm while on multaq. Confirm classification with cardiology in AM.   Start home dose of multaq.   Hold eliquis for now due to concern for UGIB. CHADSVASC score of 6- goal will be to resume eliquis when safe to resume this medication.

## 2020-01-24 NOTE — PROGRESS NOTE ADULT - PROBLEM SELECTOR PLAN 1
EGD showed one angiectasia with active bleed in duodenum, s/p clip per GI  - stable H/H, on PPI bid  - on Plavix and Eliquis now per GI and Cardiology recommendation, more benefits than risks

## 2020-01-24 NOTE — DIETITIAN INITIAL EVALUATION ADULT. - ADD RECOMMEND
1. Recommend advance diet to Low Fiber, DASH/TLC and Consistent Carbohydrate (no evening snacks) therapeutic diet as feasible 2. Provide/review nutrition education as indicated 3. Will continue to monitor nutrient intake, wt, labs, f/u per protocol

## 2020-01-24 NOTE — PROGRESS NOTE ADULT - PROBLEM SELECTOR PLAN 4
[id number] score of 6 with hx of TIA/CVA  - Eliquis 5mg bid to continue as more benefit than risk at this point per GI and Cardiology

## 2020-01-24 NOTE — DIETITIAN INITIAL EVALUATION ADULT. - NUTRITIONGOAL OUTCOME1
Pt to consider wt loss towards IBW and diet/lifestyle changes upon discharge Pt to consider wt loss towards IBW upon discharge

## 2020-01-24 NOTE — DIETITIAN INITIAL EVALUATION ADULT. - PROBLEM SELECTOR PLAN 5
Continue home dose of aspirin.  Will hold eliquis for now. I discussed with patient the plan to hold eliquis at this time due to the concern for bleeding resulting in the drop in hemoglobin, and that the goal will be to resume eliquis when it is safe to resume the medication.  Of note, patient had a code stroke called during her previous hospitalization due to an episode of AMS. She was evaluated by the neurology team at that time- there was low suspicion for a cerebrovascular event. MRI performed at that time found no evidence of acute infarction. Her chronic infarctions in R frontal and parietal lobes, and chronic lacunar infarcts of basal ganglia bilaterally were noted, with mild-moderate chronic microvascular ischemic disease.

## 2020-01-25 LAB
ANION GAP SERPL CALC-SCNC: 14 MMOL/L — SIGNIFICANT CHANGE UP (ref 5–17)
BLD GP AB SCN SERPL QL: NEGATIVE — SIGNIFICANT CHANGE UP
BUN SERPL-MCNC: 25 MG/DL — HIGH (ref 7–23)
CALCIUM SERPL-MCNC: 9 MG/DL — SIGNIFICANT CHANGE UP (ref 8.4–10.5)
CHLORIDE SERPL-SCNC: 105 MMOL/L — SIGNIFICANT CHANGE UP (ref 96–108)
CO2 SERPL-SCNC: 21 MMOL/L — LOW (ref 22–31)
CREAT SERPL-MCNC: 1.44 MG/DL — HIGH (ref 0.5–1.3)
GLUCOSE BLDC GLUCOMTR-MCNC: 135 MG/DL — HIGH (ref 70–99)
GLUCOSE BLDC GLUCOMTR-MCNC: 147 MG/DL — HIGH (ref 70–99)
GLUCOSE BLDC GLUCOMTR-MCNC: 171 MG/DL — HIGH (ref 70–99)
GLUCOSE BLDC GLUCOMTR-MCNC: 174 MG/DL — HIGH (ref 70–99)
GLUCOSE SERPL-MCNC: 166 MG/DL — HIGH (ref 70–99)
HCT VFR BLD CALC: 31.1 % — LOW (ref 34.5–45)
HCT VFR BLD CALC: 33.6 % — LOW (ref 34.5–45)
HGB BLD-MCNC: 10.5 G/DL — LOW (ref 11.5–15.5)
HGB BLD-MCNC: 9.9 G/DL — LOW (ref 11.5–15.5)
MCHC RBC-ENTMCNC: 28.5 PG — SIGNIFICANT CHANGE UP (ref 27–34)
MCHC RBC-ENTMCNC: 29 PG — SIGNIFICANT CHANGE UP (ref 27–34)
MCHC RBC-ENTMCNC: 31.3 GM/DL — LOW (ref 32–36)
MCHC RBC-ENTMCNC: 31.8 GM/DL — LOW (ref 32–36)
MCV RBC AUTO: 91.2 FL — SIGNIFICANT CHANGE UP (ref 80–100)
MCV RBC AUTO: 91.3 FL — SIGNIFICANT CHANGE UP (ref 80–100)
NRBC # BLD: 0 /100 WBCS — SIGNIFICANT CHANGE UP (ref 0–0)
PLATELET # BLD AUTO: 209 K/UL — SIGNIFICANT CHANGE UP (ref 150–400)
PLATELET # BLD AUTO: 224 K/UL — SIGNIFICANT CHANGE UP (ref 150–400)
POTASSIUM SERPL-MCNC: 4.1 MMOL/L — SIGNIFICANT CHANGE UP (ref 3.5–5.3)
POTASSIUM SERPL-SCNC: 4.1 MMOL/L — SIGNIFICANT CHANGE UP (ref 3.5–5.3)
RBC # BLD: 3.41 M/UL — LOW (ref 3.8–5.2)
RBC # BLD: 3.68 M/UL — LOW (ref 3.8–5.2)
RBC # FLD: 15.1 % — HIGH (ref 10.3–14.5)
RBC # FLD: 15.2 % — HIGH (ref 10.3–14.5)
RH IG SCN BLD-IMP: POSITIVE — SIGNIFICANT CHANGE UP
SODIUM SERPL-SCNC: 140 MMOL/L — SIGNIFICANT CHANGE UP (ref 135–145)
WBC # BLD: 7.34 K/UL — SIGNIFICANT CHANGE UP (ref 3.8–10.5)
WBC # BLD: 7.85 K/UL — SIGNIFICANT CHANGE UP (ref 3.8–10.5)
WBC # FLD AUTO: 7.34 K/UL — SIGNIFICANT CHANGE UP (ref 3.8–10.5)
WBC # FLD AUTO: 7.85 K/UL — SIGNIFICANT CHANGE UP (ref 3.8–10.5)

## 2020-01-25 PROCEDURE — 99232 SBSQ HOSP IP/OBS MODERATE 35: CPT

## 2020-01-25 RX ADMIN — DRONEDARONE 400 MILLIGRAM(S): 400 TABLET, FILM COATED ORAL at 05:16

## 2020-01-25 RX ADMIN — CLOPIDOGREL BISULFATE 75 MILLIGRAM(S): 75 TABLET, FILM COATED ORAL at 10:38

## 2020-01-25 RX ADMIN — Medication 0.25 MILLIGRAM(S): at 12:02

## 2020-01-25 RX ADMIN — MAGNESIUM OXIDE 400 MG ORAL TABLET 400 MILLIGRAM(S): 241.3 TABLET ORAL at 21:34

## 2020-01-25 RX ADMIN — LISINOPRIL 40 MILLIGRAM(S): 2.5 TABLET ORAL at 05:16

## 2020-01-25 RX ADMIN — Medication 360 MILLIGRAM(S): at 05:16

## 2020-01-25 RX ADMIN — APIXABAN 5 MILLIGRAM(S): 2.5 TABLET, FILM COATED ORAL at 16:43

## 2020-01-25 RX ADMIN — PANTOPRAZOLE SODIUM 40 MILLIGRAM(S): 20 TABLET, DELAYED RELEASE ORAL at 16:44

## 2020-01-25 RX ADMIN — APIXABAN 5 MILLIGRAM(S): 2.5 TABLET, FILM COATED ORAL at 05:16

## 2020-01-25 RX ADMIN — MAGNESIUM OXIDE 400 MG ORAL TABLET 400 MILLIGRAM(S): 241.3 TABLET ORAL at 16:43

## 2020-01-25 RX ADMIN — Medication 1: at 16:40

## 2020-01-25 RX ADMIN — MAGNESIUM OXIDE 400 MG ORAL TABLET 400 MILLIGRAM(S): 241.3 TABLET ORAL at 08:32

## 2020-01-25 RX ADMIN — PANTOPRAZOLE SODIUM 40 MILLIGRAM(S): 20 TABLET, DELAYED RELEASE ORAL at 05:16

## 2020-01-25 RX ADMIN — ATORVASTATIN CALCIUM 40 MILLIGRAM(S): 80 TABLET, FILM COATED ORAL at 21:34

## 2020-01-25 RX ADMIN — MAGNESIUM OXIDE 400 MG ORAL TABLET 400 MILLIGRAM(S): 241.3 TABLET ORAL at 10:38

## 2020-01-25 RX ADMIN — DRONEDARONE 400 MILLIGRAM(S): 400 TABLET, FILM COATED ORAL at 16:43

## 2020-01-25 NOTE — PROGRESS NOTE ADULT - PROBLEM SELECTOR PLAN 2
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  - GI and cardiology recommended to c/w Plavix and Eliquis given recent coronary stents and h/o CVA from a.fib  - CBC at 3pm today

## 2020-01-25 NOTE — PROGRESS NOTE ADULT - PROBLEM SELECTOR PLAN 4
[id number] score of 6 with hx of TIA/CVA  - Eliquis 5mg bid to continue as more benefit than risk at this point per GI and Cardiology

## 2020-01-25 NOTE — PROGRESS NOTE ADULT - ASSESSMENT
Recent Upper GI bleeding from duodenal AVM, now s/p Endoscopy with therapeutic clipping.    No sign of ongoing bleeding    Rec:    Continue daily PPI  No GI objection to d/c  has follow up scheduled with Dr Dawood Boss this week

## 2020-01-25 NOTE — PROGRESS NOTE ADULT - SUBJECTIVE AND OBJECTIVE BOX
No events.   Denies dark or black stools.    Tolerating diet.          Review of Systems:    General:  No wt loss, fevers, chills, night sweats,fatigue,   CV:  No pain, palpitatioins, hypo/hypertension  Resp:  No dyspnea, cough, tachypnea, wheezing  GI:  No pain, No nausea, No vomiting, No diarrhea, No constipatiion, No weight loss, No fever, No pruritis, No rectal bleeding, No tarry stools, No dysphagia,  :  No pain, bleeding, incontinence, nocturia      Vital Signs Last 24 Hrs  T(C): 36.8 (25 Jan 2020 04:09), Max: 36.9 (24 Jan 2020 12:09)  T(F): 98.3 (25 Jan 2020 04:09), Max: 98.5 (24 Jan 2020 12:09)  HR: 76 (25 Jan 2020 04:09) (64 - 81)  BP: 124/67 (25 Jan 2020 04:09) (102/73 - 124/67)  BP(mean): --  RR: 18 (25 Jan 2020 04:09) (18 - 18)  SpO2: 93% (25 Jan 2020 04:09) (93% - 99%)    PHYSICAL EXAM:    Constitutional: NAD, well-developed  Neck: No LAD, supple  Respiratory: CTA and P  Cardiovascular: S1 and S2, RRR, no M  Gastrointestinal: BS+, soft, NT/ND, neg HSM,    MEDICATIONS  (STANDING):  apixaban 5 milliGRAM(s) Oral every 12 hours  atorvastatin 40 milliGRAM(s) Oral at bedtime  clopidogrel Tablet 75 milliGRAM(s) Oral daily  dextrose 5%. 1000 milliLiter(s) (50 mL/Hr) IV Continuous <Continuous>  dextrose 50% Injectable 12.5 Gram(s) IV Push once  dextrose 50% Injectable 25 Gram(s) IV Push once  dextrose 50% Injectable 25 Gram(s) IV Push once  diltiazem    milliGRAM(s) Oral daily  dronedarone 400 milliGRAM(s) Oral two times a day  insulin lispro (HumaLOG) corrective regimen sliding scale   SubCutaneous three times a day before meals  insulin lispro (HumaLOG) corrective regimen sliding scale   SubCutaneous at bedtime  lisinopril 40 milliGRAM(s) Oral daily  LORazepam     Tablet 0.25 milliGRAM(s) Oral two times a day  magnesium oxide 400 milliGRAM(s) Oral four times a day with meals  pantoprazole  Injectable 40 milliGRAM(s) IV Push every 12 hours    MEDICATIONS  (PRN):  dextrose 40% Gel 15 Gram(s) Oral once PRN Blood Glucose LESS THAN 70 milliGRAM(s)/deciliter  glucagon  Injectable 1 milliGRAM(s) IntraMuscular once PRN Glucose LESS THAN 70 milligrams/deciliter      Allergies    No Known Allergies    Intolerances          LABS:                        10.4   8.98  )-----------( 201      ( 24 Jan 2020 20:51 )             34.5                         10.2   7.49  )-----------( 221      ( 24 Jan 2020 09:33 )             32.5                         9.7    7.14  )-----------( 196      ( 24 Jan 2020 01:23 )             31.4     01-25    140  |  105  |  25<H>  ----------------------------<  166<H>  4.1   |  21<L>  |  1.44<H>    Ca    9.0      25 Jan 2020 06:55          LIVER FUNCTIONS - ( 22 Jan 2020 18:00 )  Alb: 3.9 g/dL / Pro: 7.0 g/dL / ALK PHOS: 74 U/L / ALT: 21 U/L / AST: 18 U/L / GGT: x               RADIOLOGY & ADDITIONAL TESTS:

## 2020-01-25 NOTE — PROGRESS NOTE ADULT - SUBJECTIVE AND OBJECTIVE BOX
Mohsin Khan, MD  Attending Physician, Division Of Hospital Medicine  Pager: [phone], Office: [phone]  Off hour pager: [phone]    Patient is a 74y old  Female who presents with a chief complaint of dark stool and weakness     SUBJECTIVE / OVERNIGHT EVENTS:  Seen, examined the patient this am  Resting in bed, no c/o chest pain, SOB or dark stool, feels ok otherwise, worried about Hb 9.9 from 10.2  Hemodynamically stable, Tele- SR 60-90s      MEDICATIONS  (STANDING):  apixaban 5 milliGRAM(s) Oral every 12 hours  atorvastatin 40 milliGRAM(s) Oral at bedtime  clopidogrel Tablet 75 milliGRAM(s) Oral daily  dextrose 5%. 1000 milliLiter(s) (50 mL/Hr) IV Continuous <Continuous>  dextrose 50% Injectable 12.5 Gram(s) IV Push once  dextrose 50% Injectable 25 Gram(s) IV Push once  dextrose 50% Injectable 25 Gram(s) IV Push once  diltiazem    milliGRAM(s) Oral daily  dronedarone 400 milliGRAM(s) Oral two times a day  insulin lispro (HumaLOG) corrective regimen sliding scale   SubCutaneous three times a day before meals  insulin lispro (HumaLOG) corrective regimen sliding scale   SubCutaneous at bedtime  lisinopril 40 milliGRAM(s) Oral daily  LORazepam     Tablet 0.25 milliGRAM(s) Oral two times a day  magnesium oxide 400 milliGRAM(s) Oral four times a day with meals  pantoprazole  Injectable 40 milliGRAM(s) IV Push every 12 hours    MEDICATIONS  (PRN):  dextrose 40% Gel 15 Gram(s) Oral once PRN Blood Glucose LESS THAN 70 milliGRAM(s)/deciliter  glucagon  Injectable 1 milliGRAM(s) IntraMuscular once PRN Glucose LESS THAN 70 milligrams/deciliter      Vital Signs Last 24 Hrs  T(C): 36.8 (25 Jan 2020 04:09), Max: 36.9 (24 Jan 2020 12:09)  T(F): 98.3 (25 Jan 2020 04:09), Max: 98.5 (24 Jan 2020 12:09)  HR: 76 (25 Jan 2020 04:09) (64 - 81)  BP: 124/67 (25 Jan 2020 04:09) (102/73 - 124/67)  BP(mean): --  RR: 18 (25 Jan 2020 04:09) (18 - 18)  SpO2: 93% (25 Jan 2020 04:09) (93% - 99%)  CAPILLARY BLOOD GLUCOSE      POCT Blood Glucose.: 147 mg/dL (25 Jan 2020 07:20)  POCT Blood Glucose.: 165 mg/dL (24 Jan 2020 21:03)  POCT Blood Glucose.: 136 mg/dL (24 Jan 2020 16:31)  POCT Blood Glucose.: 135 mg/dL (24 Jan 2020 11:37)    I&O's Summary    24 Jan 2020 07:01  -  25 Jan 2020 07:00  --------------------------------------------------------  IN: 600 mL / OUT: 0 mL / NET: 600 mL        PHYSICAL EXAM:-  GENERAL: NAD, well-developed  EYES: EOMI, PERRLA, conjunctiva and sclera clear  NECK: Supple, No JVD, no thyromegaly  CHEST/LUNG: Clear to auscultation bilaterally; No wheeze  HEART: Regular rate and rhythm; S1, S2 audible, No murmurs, rubs, or gallops  ABDOMEN: Soft, Nontender, Nondistended; Bowel sounds present  EXTREMITIES:  2+ Peripheral Pulses, No clubbing, cyanosis, or edema  NEURO: AAOx3, no focal deficit      LABS:                        9.9    7.34  )-----------( 209      ( 25 Jan 2020 09:16 )             31.1     01-25    140  |  105  |  25<H>  ----------------------------<  166<H>  4.1   |  21<L>  |  1.44<H>    Ca    9.0      25 Jan 2020 06:55      RADIOLOGY & ADDITIONAL TESTS:    Imaging Personally Reviewed: EGD  Consultant(s) Notes Reviewed:  GI, Card  Care Discussed with Consultants/Other Providers: GI, Card

## 2020-01-26 ENCOUNTER — TRANSCRIPTION ENCOUNTER (OUTPATIENT)
Age: 75
End: 2020-01-26

## 2020-01-26 VITALS
DIASTOLIC BLOOD PRESSURE: 82 MMHG | SYSTOLIC BLOOD PRESSURE: 148 MMHG | RESPIRATION RATE: 18 BRPM | HEART RATE: 68 BPM | OXYGEN SATURATION: 96 % | TEMPERATURE: 98 F

## 2020-01-26 LAB
ANION GAP SERPL CALC-SCNC: 12 MMOL/L — SIGNIFICANT CHANGE UP (ref 5–17)
BUN SERPL-MCNC: 25 MG/DL — HIGH (ref 7–23)
CALCIUM SERPL-MCNC: 9.7 MG/DL — SIGNIFICANT CHANGE UP (ref 8.4–10.5)
CHLORIDE SERPL-SCNC: 103 MMOL/L — SIGNIFICANT CHANGE UP (ref 96–108)
CO2 SERPL-SCNC: 23 MMOL/L — SIGNIFICANT CHANGE UP (ref 22–31)
CREAT SERPL-MCNC: 1.53 MG/DL — HIGH (ref 0.5–1.3)
GLUCOSE BLDC GLUCOMTR-MCNC: 149 MG/DL — HIGH (ref 70–99)
GLUCOSE BLDC GLUCOMTR-MCNC: 154 MG/DL — HIGH (ref 70–99)
GLUCOSE SERPL-MCNC: 168 MG/DL — HIGH (ref 70–99)
HCT VFR BLD CALC: 35.3 % — SIGNIFICANT CHANGE UP (ref 34.5–45)
HGB BLD-MCNC: 10.9 G/DL — LOW (ref 11.5–15.5)
MCHC RBC-ENTMCNC: 28.1 PG — SIGNIFICANT CHANGE UP (ref 27–34)
MCHC RBC-ENTMCNC: 30.9 GM/DL — LOW (ref 32–36)
MCV RBC AUTO: 91 FL — SIGNIFICANT CHANGE UP (ref 80–100)
NRBC # BLD: 0 /100 WBCS — SIGNIFICANT CHANGE UP (ref 0–0)
PLATELET # BLD AUTO: 245 K/UL — SIGNIFICANT CHANGE UP (ref 150–400)
POTASSIUM SERPL-MCNC: 4.2 MMOL/L — SIGNIFICANT CHANGE UP (ref 3.5–5.3)
POTASSIUM SERPL-SCNC: 4.2 MMOL/L — SIGNIFICANT CHANGE UP (ref 3.5–5.3)
RBC # BLD: 3.88 M/UL — SIGNIFICANT CHANGE UP (ref 3.8–5.2)
RBC # FLD: 15.4 % — HIGH (ref 10.3–14.5)
SODIUM SERPL-SCNC: 138 MMOL/L — SIGNIFICANT CHANGE UP (ref 135–145)
WBC # BLD: 8.67 K/UL — SIGNIFICANT CHANGE UP (ref 3.8–10.5)
WBC # FLD AUTO: 8.67 K/UL — SIGNIFICANT CHANGE UP (ref 3.8–10.5)

## 2020-01-26 PROCEDURE — 83880 ASSAY OF NATRIURETIC PEPTIDE: CPT

## 2020-01-26 PROCEDURE — 86901 BLOOD TYPING SEROLOGIC RH(D): CPT

## 2020-01-26 PROCEDURE — 83605 ASSAY OF LACTIC ACID: CPT

## 2020-01-26 PROCEDURE — 82962 GLUCOSE BLOOD TEST: CPT

## 2020-01-26 PROCEDURE — 86803 HEPATITIS C AB TEST: CPT

## 2020-01-26 PROCEDURE — 86900 BLOOD TYPING SEROLOGIC ABO: CPT

## 2020-01-26 PROCEDURE — 80048 BASIC METABOLIC PNL TOTAL CA: CPT

## 2020-01-26 PROCEDURE — 86850 RBC ANTIBODY SCREEN: CPT

## 2020-01-26 PROCEDURE — 82803 BLOOD GASES ANY COMBINATION: CPT

## 2020-01-26 PROCEDURE — 99285 EMERGENCY DEPT VISIT HI MDM: CPT

## 2020-01-26 PROCEDURE — 84484 ASSAY OF TROPONIN QUANT: CPT

## 2020-01-26 PROCEDURE — 80053 COMPREHEN METABOLIC PANEL: CPT

## 2020-01-26 PROCEDURE — 84132 ASSAY OF SERUM POTASSIUM: CPT

## 2020-01-26 PROCEDURE — 99239 HOSP IP/OBS DSCHRG MGMT >30: CPT

## 2020-01-26 PROCEDURE — 84443 ASSAY THYROID STIM HORMONE: CPT

## 2020-01-26 PROCEDURE — 82947 ASSAY GLUCOSE BLOOD QUANT: CPT

## 2020-01-26 PROCEDURE — 84295 ASSAY OF SERUM SODIUM: CPT

## 2020-01-26 PROCEDURE — 85610 PROTHROMBIN TIME: CPT

## 2020-01-26 PROCEDURE — 82435 ASSAY OF BLOOD CHLORIDE: CPT

## 2020-01-26 PROCEDURE — 93005 ELECTROCARDIOGRAM TRACING: CPT

## 2020-01-26 PROCEDURE — 83036 HEMOGLOBIN GLYCOSYLATED A1C: CPT

## 2020-01-26 PROCEDURE — 82272 OCCULT BLD FECES 1-3 TESTS: CPT

## 2020-01-26 PROCEDURE — 82553 CREATINE MB FRACTION: CPT

## 2020-01-26 PROCEDURE — 85027 COMPLETE CBC AUTOMATED: CPT

## 2020-01-26 PROCEDURE — 71045 X-RAY EXAM CHEST 1 VIEW: CPT

## 2020-01-26 PROCEDURE — 85730 THROMBOPLASTIN TIME PARTIAL: CPT

## 2020-01-26 PROCEDURE — C1889: CPT

## 2020-01-26 PROCEDURE — 82330 ASSAY OF CALCIUM: CPT

## 2020-01-26 PROCEDURE — 85014 HEMATOCRIT: CPT

## 2020-01-26 RX ORDER — POTASSIUM CITRATE MONOHYDRATE 100 %
0 POWDER (GRAM) MISCELLANEOUS
Qty: 0 | Refills: 0 | DISCHARGE

## 2020-01-26 RX ORDER — PANTOPRAZOLE SODIUM 20 MG/1
1 TABLET, DELAYED RELEASE ORAL
Qty: 60 | Refills: 0
Start: 2020-01-26 | End: 2020-02-24

## 2020-01-26 RX ADMIN — PANTOPRAZOLE SODIUM 40 MILLIGRAM(S): 20 TABLET, DELAYED RELEASE ORAL at 05:30

## 2020-01-26 RX ADMIN — CLOPIDOGREL BISULFATE 75 MILLIGRAM(S): 75 TABLET, FILM COATED ORAL at 10:32

## 2020-01-26 RX ADMIN — MAGNESIUM OXIDE 400 MG ORAL TABLET 400 MILLIGRAM(S): 241.3 TABLET ORAL at 07:56

## 2020-01-26 RX ADMIN — LISINOPRIL 40 MILLIGRAM(S): 2.5 TABLET ORAL at 05:29

## 2020-01-26 RX ADMIN — DRONEDARONE 400 MILLIGRAM(S): 400 TABLET, FILM COATED ORAL at 05:29

## 2020-01-26 RX ADMIN — Medication 0.25 MILLIGRAM(S): at 05:38

## 2020-01-26 RX ADMIN — MAGNESIUM OXIDE 400 MG ORAL TABLET 400 MILLIGRAM(S): 241.3 TABLET ORAL at 10:31

## 2020-01-26 RX ADMIN — Medication 360 MILLIGRAM(S): at 05:29

## 2020-01-26 RX ADMIN — APIXABAN 5 MILLIGRAM(S): 2.5 TABLET, FILM COATED ORAL at 05:29

## 2020-01-26 NOTE — PROGRESS NOTE ADULT - REASON FOR ADMISSION
dark stool and weakness

## 2020-01-26 NOTE — PROGRESS NOTE ADULT - PROBLEM SELECTOR PLAN 4
[id number] score of 6 with hx of TIA/CVA  - Eliquis 5mg bid to continue as more benefit than risk at this point per GI and Cardiology

## 2020-01-26 NOTE — PROGRESS NOTE ADULT - SUBJECTIVE AND OBJECTIVE BOX
Mohsin Khan, MD  Attending Physician, Division Of Hospital Medicine  Pager: [phone], Office: [phone]  Off hour pager: [phone]    Patient is a 74y old  Female who presents with a chief complaint of dark stool and weakness     SUBJECTIVE / OVERNIGHT EVENTS:  Seen, examined the patient around 9am  Walking in hallway, no c/o chest pain, SOB or dark stool, feels ok otherwise, Hb 10.9 stable  Hemodynamically stable, Tele- SR 60-90s      MEDICATIONS  (STANDING):  apixaban 5 milliGRAM(s) Oral every 12 hours  atorvastatin 40 milliGRAM(s) Oral at bedtime  clopidogrel Tablet 75 milliGRAM(s) Oral daily  dextrose 5%. 1000 milliLiter(s) (50 mL/Hr) IV Continuous <Continuous>  dextrose 50% Injectable 12.5 Gram(s) IV Push once  dextrose 50% Injectable 25 Gram(s) IV Push once  dextrose 50% Injectable 25 Gram(s) IV Push once  diltiazem    milliGRAM(s) Oral daily  dronedarone 400 milliGRAM(s) Oral two times a day  insulin lispro (HumaLOG) corrective regimen sliding scale   SubCutaneous three times a day before meals  insulin lispro (HumaLOG) corrective regimen sliding scale   SubCutaneous at bedtime  lisinopril 40 milliGRAM(s) Oral daily  LORazepam     Tablet 0.25 milliGRAM(s) Oral two times a day  magnesium oxide 400 milliGRAM(s) Oral four times a day with meals  pantoprazole  Injectable 40 milliGRAM(s) IV Push every 12 hours    MEDICATIONS  (PRN):  dextrose 40% Gel 15 Gram(s) Oral once PRN Blood Glucose LESS THAN 70 milliGRAM(s)/deciliter  glucagon  Injectable 1 milliGRAM(s) IntraMuscular once PRN Glucose LESS THAN 70 milligrams/deciliter      Vital Signs Last 24 Hrs  T(C): 36.8 (25 Jan 2020 04:09), Max: 36.9 (24 Jan 2020 12:09)  T(F): 98.3 (25 Jan 2020 04:09), Max: 98.5 (24 Jan 2020 12:09)  HR: 76 (25 Jan 2020 04:09) (64 - 81)  BP: 124/67 (25 Jan 2020 04:09) (102/73 - 124/67)  BP(mean): --  RR: 18 (25 Jan 2020 04:09) (18 - 18)  SpO2: 93% (25 Jan 2020 04:09) (93% - 99%)  CAPILLARY BLOOD GLUCOSE      POCT Blood Glucose.: 147 mg/dL (25 Jan 2020 07:20)  POCT Blood Glucose.: 165 mg/dL (24 Jan 2020 21:03)  POCT Blood Glucose.: 136 mg/dL (24 Jan 2020 16:31)  POCT Blood Glucose.: 135 mg/dL (24 Jan 2020 11:37)    I&O's Summary    24 Jan 2020 07:01  -  25 Jan 2020 07:00  --------------------------------------------------------  IN: 600 mL / OUT: 0 mL / NET: 600 mL        PHYSICAL EXAM:-  GENERAL: NAD, well-developed  EYES: EOMI, PERRLA, conjunctiva and sclera clear  NECK: Supple, No JVD, no thyromegaly  CHEST/LUNG: Clear to auscultation bilaterally; No wheeze  HEART: Regular rate and rhythm; S1, S2 audible, No murmurs, rubs, or gallops  ABDOMEN: Soft, Nontender, Nondistended; Bowel sounds present  EXTREMITIES:  2+ Peripheral Pulses, No clubbing, cyanosis, or edema  NEURO: AAOx3, no focal deficit      LABS:             Hb 10.9     140  |  105  |  25<H>  ----------------------------<  166<H>  4.1   |  21<L>  |  1.44<H>    Ca    9.0      25 Jan 2020 06:55      RADIOLOGY & ADDITIONAL TESTS:    Imaging Personally Reviewed: EGD  Consultant(s) Notes Reviewed:  GI, Card  Care Discussed with Consultants/Other Providers: GI, Card

## 2020-01-26 NOTE — PROGRESS NOTE ADULT - ATTENDING COMMENTS
Plan as above. Clopidogrel 75 mg daily and apixaban 5 mg BID. Follow up outpatient. Please call with questions. Signing off.     Larry Juarez MD, MPH, SHRUTHI, RPVI, FAC  Cardiovascular Specialist   Isidra Desouza [facility]  c: [phone]  e: [email or web address]
d/c planning in 2 days if no more black stool and stable Hb  Daughter at bedside is aware about the plan
spoke to daughter about the plan  - d/c time 43 min
Niesha Simmons MD  Division of Hospital Medicine  Cell: [phone]  Pager: [phone]  Office: [phone]

## 2020-01-26 NOTE — DISCHARGE NOTE PROVIDER - PROVIDER TOKENS
PROVIDER:[TOKEN:[[id number]],FOLLOWUP:[1 week]],PROVIDER:[TOKEN:[[id number]],FOLLOWUP:[2 weeks]]

## 2020-01-26 NOTE — DISCHARGE NOTE PROVIDER - HOSPITAL COURSE
74yoF with PMH of atrial fibrillation on eliquis, CVA in 2017, T2DM (last A1C 7.3 in 11/2019), anxiety, HTN, HLD, CKD stage 3, CAD s/p MIA who presents to the ED for dark stool and weakness, found to have acute blood loss anemia 2/2 bleeding duodenal AVM, s/p clip         Problem/Plan - 1:    ·  Problem: Duodenal hemorrhage.  Plan: No more dark stool. EGD showed one angiectasia with active bleed in duodenum, s/p clip per GI. Hb 10.9 stable.    - on PPI 40mg bid    - c/w Plavix and Eliquis per GI and Cardiology recommendation, more benefits than risks, no ASA    - D/C home today as cleared by GI and Cardiology. Daughter is aware    - f/u with GI in 2 weeks ( Dr Riley) , and Cardiology ( Dr Dawood Fox)  in 10 days.          Problem/Plan - 2:    ·  Problem: Acute blood loss anemia.  Plan: Acute bleed from duodenal AVM, s/p clip and Hb is 10.9, no dark stool.    - GI and cardiology recommended to c/w Plavix and Eliquis given recent coronary stents and h/o CVA from a.fib.          Problem/Plan - 3:    ·  Problem: Coronary artery disease involving native coronary artery of native heart without angina pectoris.  Plan: No chest pain, SOB. s/p MIA 1/14/2020    - Cardiology plan noted, c/w Plavix, statin, No ASA.          Problem/Plan - 4:    ·  Problem: Chronic atrial fibrillation.  Plan: [id number] score of 6 with hx of TIA/CVA    - Eliquis 5mg bid to continue as more benefit than risk at this point per GI and Cardiology.          Problem/Plan - 5:    ·  Problem: T2DM (type 2 diabetes mellitus).  Plan: FSS+ISS.          Problem/Plan - 6:    Problem: History of CVA (cerebrovascular accident). Plan: Plan as above    - c/w Plavix, statin and Eliquis but no ASA.         Problem/Plan - 7:    ·  Problem: HTN (hypertension).  Plan: BP has been stable    - Hold Cardura. Continue Cardizem, lisinopril with hold parameters. This is a 74yoF with PMH of atrial fibrillation on Eliquis, CVA in 2017, T2DM (last A1C 7.3% in 11/2019), anxiety, HTN, HLD, CKD stage 3, CAD s/p MIA who presented in ED for dark stool and weakness, found to have acute blood loss anemia. She was admitted in Tele given recent coronary stents for NSTEMI, GI evaluated. Plan of care as follows-         1. Acute blood loss anemia secondary to duodenal angiectasia- EGD showed one angiectasia with active bleed in duodenum, s/p clip per GI. Hb remains 10.9 stable.     Was started on protonix 40mg bid, observed on Plavix and Eliquis per GI and Cardiology recommendations, and discontinued ASA given more benefits than risks with recent MI, a.fib    She will f/u with GI in 2 weeks ( Dr Riley) , and Cardiology ( Dr Dawood Fox)  in 10 days.         2. Coronary artery disease involving native coronary artery of native heart without angina pectoris-  Plan: No chest pain, SOB. s/p MIA 1/14/2020    - Cardiology plans to c/w Plavix, statin, No ASA.         3. Chronic atrial fibrillation-  [id number] score of 6 with hx of TIA/CVA-    - Eliquis 5mg bid to continue as more benefit than risk at this point per GI and Cardiology.         4. T2DM (type 2 diabetes mellitus)-  HbA1C is 7.3%, c/w home meds         5. History of CVA (cerebrovascular accident)- c/w Plavix, statin and Eliquis but no ASA.         6. HTN (hypertension)- BP has been stable, c/w Cardura. Continue Cardizem, lisinopril

## 2020-01-26 NOTE — DISCHARGE NOTE NURSING/CASE MANAGEMENT/SOCIAL WORK - PATIENT PORTAL LINK FT
You can access the FollowMyHealth Patient Portal offered by [facility] by registering at the following website: [email or web address]. By joining [employer]’s FollowMyHealth portal, you will also be able to view your health information using other applications (apps) compatible with our system.

## 2020-01-26 NOTE — DISCHARGE NOTE PROVIDER - NSDCCPCAREPLAN_GEN_ALL_CORE_FT
PRINCIPAL DISCHARGE DIAGNOSIS  Diagnosis: Duodenal ulcer  Assessment and Plan of Treatment: No more dark stool. EGD showed one angiectasia with active bleed in duodenum, s/p clip per GI. Hb 10.9 stable.  - on PPI 40mg bid  - c/w Plavix and Eliquis per GI and Cardiology recommendation, more benefits than risks, no ASA  - f/u with GI in 2 weeks ( Dr Riley) , and Cardiology ( Dr Dawood Fox)  in 10 days.      SECONDARY DISCHARGE DIAGNOSES  Diagnosis: HTN (hypertension)  Assessment and Plan of Treatment: Low salt diet  Activity as tolerated.  Take all medication as prescribed.  Follow up with your medical doctor for routine blood pressure monitoring at your next visit.  Notify your doctor if you have any of the following symptoms:   Dizziness, Lightheadedness, Blurry vision, Headache, Chest pain, Shortness of breath      Diagnosis: Afib  Assessment and Plan of Treatment: Atrial fibrillation is the most common heart rhythm problem & has the risk of stroke & heart attack  It helps if you control your blood pressure, not drink more than 1-2 alcohol drinks per day, cut down on caffeine, getting treatment for over active thyroid gland, & getting exercise  Call your doctor if you feel your heart racing or beating unusually, chest tightness or pain, lightheaded, faint, shortness of breath especially with exercise  It is important to take your heart medication as prescribed  You may be on anticoagulation which is very important to take as directed - you may need blood work to monitor drug levels      Diagnosis: CAD (coronary artery disease)  Assessment and Plan of Treatment: Coronary artery disease is a condition where the arteries the supply the heart muscle get clogges with fatty deposits & puts you at risk for a heart attack  Call your doctor if you have any new pain, pressure, or discomfort in the center of your chest, pain, tingling or discomfort in arms, back, neck, jaw, or stomach, shortness of breath, nausea, vomiting, burping or heartburn, sweating, cold and clammy skin, racing or abnormal heartbeat for more than 10 minutes or if they keep coming & going.  Call 911 and do not tr to get to hospital by care  You can help yourself with lefestyle changes (quitting smoking if you Informed pt of the various negative side effects of smoking including risk of COPD, Lung Ca etc  Strongly recommended that pt stops smoking and pt given various options of smoking cessasion tools such as NRT's and other pharmacotherapies), eat lots of fruits & vegetables & low fat dairy products, not a lot of meat & fatty foods, walk or some form of physical activity most days of the week, lose weight if you are overweight  Take your cardiac medication as prescribed to lower cholesterol, to lower blood pressure, aspirin to prevent blood clots, and diabetes control  Make sure to keep appointments with doctor for cardiac follow up care      Diagnosis: Diabetes  Assessment and Plan of Treatment: HgA1C this admission.  Make sure you get your HgA1c checked every three months.  If you take oral diabetes medications, check your blood glucose two times a day.  If you take insulin, check your blood glucose before meals and at bedtime.  It's important not to skip any meals.  Keep a log of your blood glucose results and always take it with you to your doctor appointments.  Keep a list of your current medications including injectables and over the counter medications and bring this medication list with you to all your doctor appointments.  If you have not seen your opthalmologist this year call for appointment.  Check your feet daily for redness, sores, or openings. Do not self treat. If no improvement in two days call your primary care physician for an appointment.  Low blood sugar (hypoglycemia) is a blood sugar below 70mg/dl. Check your blood sugar if you feel signs/symptoms of hypoglycemia. If your blood sugar is below 70 take 15 grams of carbohydrates (ex 4 oz of apple juice, 3-4 glucosr tablets, or 4-6 oz of regular soda) wait 15 minutes and repeat blood sugar to make sure it comes up above 70.  If your blood sugar is above 70 and you are due for a meal, have a meal.  If you are not due for a meal have a snack.  This snack helps keeps your blood sugar at a safe range.      Diagnosis: Anemia  Assessment and Plan of Treatment: Symptoms to report, bleeding, palpitations, fatigue, pale skin, cold skin, dizziness. Take medications as ordered by PCP

## 2020-01-26 NOTE — PROGRESS NOTE ADULT - PROBLEM SELECTOR PLAN 1
No more dark stool. EGD showed one angiectasia with active bleed in duodenum, s/p clip per GI. Hb 10.9 stable.  - on PPI 40mg bid  - c/w Plavix and Eliquis per GI and Cardiology recommendation, more benefits than risks, no ASA  - D/C home today as cleared by GI and Cardiology. Daughter is aware  - f/u with GI in 2 weeks ( Dr Riley) , and Cardiology ( Dr Dawood Fox)  in 10 days

## 2020-01-26 NOTE — DISCHARGE NOTE PROVIDER - CARE PROVIDER_API CALL
Aj Boss)  Cardiology; Internal Medicine  [address], [address]  [place] [address]  Phone: [phone]  Fax: [phone]  Follow Up Time: 1 week    Akira Riley)  Internal Medicine  [address], [address]  [place] [address]  Phone: [phone]  Fax: [phone]  Follow Up Time: 2 weeks

## 2020-01-26 NOTE — PROGRESS NOTE ADULT - PROBLEM SELECTOR PLAN 2
Acute bleed from duodenal AVM, s/p clip and Hb is 10.9, no dark stool.  - GI and cardiology recommended to c/w Plavix and Eliquis given recent coronary stents and h/o CVA from a.fib

## 2020-01-26 NOTE — DISCHARGE NOTE PROVIDER - NSDCMRMEDTOKEN_GEN_ALL_CORE_FT
atorvastatin 40 mg oral tablet: 1 tab(s) orally once a day (at bedtime)  Cardizem  mg/24 hours oral capsule, extended release: 1 cap(s) orally once a day  clopidogrel 75 mg oral tablet: 1 tab(s) orally once a day  doxazosin 1 mg oral tablet: 1 tab(s) orally once a day  Eliquis 5 mg oral tablet: 1 tab(s) orally 2 times a day  Farxiga 5 mg oral tablet: 1 tab(s) orally once a day  lisinopril 40 mg oral tablet: 1 tab(s) orally once a day  LORazepam 0.5 mg oral tablet: 0.5 tab(s) orally 2 times a day  magnesium oxide: 1600 milligram(s) orally once a day ( total dose) pt splits it up into 3x daily   metFORMIN 1000 mg oral tablet: 1 tab(s) orally 2 times a day; DO NOT TAKE UNTIL 1/17  Multaq 400 mg oral tablet: 1 tab(s) orally 2 times a day  Protonix 40 mg oral delayed release tablet: 1 tab(s) orally 2 times a day

## 2020-01-26 NOTE — DISCHARGE NOTE PROVIDER - NSDCFUSCHEDAPPT_GEN_ALL_CORE_FT
ERNESTINA HEAD ; 01/29/2020 ; [facility] [address]  ERNESTINA HEAD ; 03/19/2020 ; [facility] [address] ERNESTINA HEAD ; 01/29/2020 ; [facility] [address]  ERNESTINA HEAD ; 03/19/2020 ; [facility] [address]

## 2020-01-26 NOTE — PROGRESS NOTE ADULT - PROBLEM SELECTOR PLAN 8
VTE ppx:  hold pharm ppx due to concern for bleed, venodyne boots  Activity: OOB with assistance  Diet: NPO with meds for now    Transitions of Care Status:  1.  Name of PCP: Dr. Boss  2.  PCP Contacted on Admission: [ x] Y    [ ] N    3.  PCP contacted at Discharge: [ ] Y    [ ] N    [ ] N/A  4.  Post-Discharge Appointment Date and Location:  5.  Summary of Handoff given to PCP:
Transitions of Care Status:  1.  Name of PCP: Dawood Fox  2.  PCP Contacted on Admission: [x ] Y    [ ] N    3.  PCP contacted at Discharge: [x ] Y    [ ] N    [ ] N/A  4.  Post-Discharge Appointment Date and Location: Office  5.  Summary of Handoff given to PCP: Dr Dawood Fox

## 2020-01-29 ENCOUNTER — APPOINTMENT (OUTPATIENT)
Dept: CARDIOLOGY | Facility: CLINIC | Age: 75
End: 2020-01-29
Payer: MEDICARE

## 2020-01-29 ENCOUNTER — NON-APPOINTMENT (OUTPATIENT)
Age: 75
End: 2020-01-29

## 2020-01-29 VITALS
SYSTOLIC BLOOD PRESSURE: 130 MMHG | WEIGHT: 250 LBS | OXYGEN SATURATION: 98 % | BODY MASS INDEX: 46.01 KG/M2 | HEIGHT: 62 IN | TEMPERATURE: 97.9 F | HEART RATE: 96 BPM | DIASTOLIC BLOOD PRESSURE: 50 MMHG

## 2020-01-29 PROCEDURE — 93000 ELECTROCARDIOGRAM COMPLETE: CPT

## 2020-01-29 PROCEDURE — 99214 OFFICE O/P EST MOD 30 MIN: CPT

## 2020-01-29 NOTE — HISTORY OF PRESENT ILLNESS
[FreeTextEntry1] : This is a 74 year old lady with a PMH of HTN, HLD, DM, CAD, and AFib presents today after recent Hospitalization to [facility]. Patient was admitted to the hospital the first time on 01/14/2020 when she had two stents placed, she was kept in the hospital until 01/16/2020 due to a neurologic complication. Patient had complete neuro workup which showed a history of Multiple TIA's in the past, however, no current TIA or CVA. Patient needs to follow up with Neurology. Patient was discharged home and experienced dark sticky black stool and was very weak. Patient was admitted to the hospital and was found to have a a Dudenal GI bleed. Patient to follow up with Dr. Riley(GI). Patient states that aside from feeling fatigued, patient is feeling better. Patient denies dyspnea, palpitations, chest pain, nausea, vomiting, dizziness and lightheadedness.\par

## 2020-01-29 NOTE — REVIEW OF SYSTEMS
[Feeling Fatigued] : feeling fatigued [Negative] : Heme/Lymph [Fever] : no fever [Headache] : no headache [Recent Weight Gain (___ Lbs)] : no recent weight gain [Recent Weight Loss (___ Lbs)] : no recent weight loss [Chills] : no chills

## 2020-01-29 NOTE — PHYSICAL EXAM
[General Appearance - Well Developed] : well developed [Normal Appearance] : normal appearance [Well Groomed] : well groomed [General Appearance - Well Nourished] : well nourished [No Deformities] : no deformities [General Appearance - In No Acute Distress] : no acute distress [Eyelids - No Xanthelasma] : the eyelids demonstrated no xanthelasmas [Normal Conjunctiva] : the conjunctiva exhibited no abnormalities [Normal Oral Mucosa] : normal oral mucosa [No Oral Pallor] : no oral pallor [No Oral Cyanosis] : no oral cyanosis [Normal Jugular Venous A Waves Present] : normal jugular venous A waves present [Normal Jugular Venous V Waves Present] : normal jugular venous V waves present [No Jugular Venous Carballo A Waves] : no jugular venous carballo A waves [Respiration, Rhythm And Depth] : normal respiratory rhythm and effort [Exaggerated Use Of Accessory Muscles For Inspiration] : no accessory muscle use [Auscultation Breath Sounds / Voice Sounds] : lungs were clear to auscultation bilaterally [Heart Rate And Rhythm] : heart rate and rhythm were normal [Heart Sounds] : normal S1 and S2 [Murmurs] : no murmurs present [Abdomen Soft] : soft [Abdomen Tenderness] : non-tender [Abdomen Mass (___ Cm)] : no abdominal mass palpated [Abnormal Walk] : normal gait [Gait - Sufficient For Exercise Testing] : the gait was sufficient for exercise testing [Nail Clubbing] : no clubbing of the fingernails [Cyanosis, Localized] : no localized cyanosis [Petechial Hemorrhages (___cm)] : no petechial hemorrhages [Skin Color & Pigmentation] : normal skin color and pigmentation [] : no rash [No Venous Stasis] : no venous stasis [Skin Lesions] : no skin lesions [No Skin Ulcers] : no skin ulcer [No Xanthoma] : no  xanthoma was observed [Oriented To Time, Place, And Person] : oriented to person, place, and time [Affect] : the affect was normal [No Anxiety] : not feeling anxious [Mood] : the mood was normal

## 2020-01-29 NOTE — REASON FOR VISIT
[Follow-Up - From Hospitalization] : follow-up of a recent hospitalization for [Coronary Artery Disease] : coronary artery disease [Atrial Fibrillation] : atrial fibrillation [Hyperlipidemia] : hyperlipidemia [Hypertension] : hypertension [Discharge Date: ___] : Discharge Date: [unfilled] [FreeTextEntry1] : F/U after recent hospital discharge at [facility]

## 2020-02-08 LAB
ANION GAP SERPL CALC-SCNC: 18 MMOL/L
APPEARANCE: ABNORMAL
BACTERIA UR CULT: NORMAL
BACTERIA: ABNORMAL
BASOPHILS # BLD AUTO: 0.03 K/UL
BASOPHILS NFR BLD AUTO: 0.4 %
BILIRUBIN URINE: NEGATIVE
BLOOD URINE: NEGATIVE
BUN SERPL-MCNC: 47 MG/DL
CALCIUM SERPL-MCNC: 9.8 MG/DL
CHLORIDE SERPL-SCNC: 106 MMOL/L
CO2 SERPL-SCNC: 20 MMOL/L
COLOR: NORMAL
CREAT SERPL-MCNC: 1.88 MG/DL
EOSINOPHIL # BLD AUTO: 0.07 K/UL
EOSINOPHIL NFR BLD AUTO: 0.9 %
FERRITIN SERPL-MCNC: 32 NG/ML
FOLATE SERPL-MCNC: 6.2 NG/ML
GLUCOSE QUALITATIVE U: ABNORMAL
GLUCOSE SERPL-MCNC: 141 MG/DL
HCT VFR BLD CALC: 35 %
HGB BLD-MCNC: 10.5 G/DL
HYALINE CASTS: 2 /LPF
IMM GRANULOCYTES NFR BLD AUTO: 0.4 %
IRON SERPL-MCNC: 60 UG/DL
KETONES URINE: NEGATIVE
LEUKOCYTE ESTERASE URINE: ABNORMAL
LYMPHOCYTES # BLD AUTO: 1.75 K/UL
LYMPHOCYTES NFR BLD AUTO: 21.6 %
MAN DIFF?: NORMAL
MCHC RBC-ENTMCNC: 28.2 PG
MCHC RBC-ENTMCNC: 30 GM/DL
MCV RBC AUTO: 94.1 FL
MICROSCOPIC-UA: NORMAL
MONOCYTES # BLD AUTO: 0.54 K/UL
MONOCYTES NFR BLD AUTO: 6.7 %
NEUTROPHILS # BLD AUTO: 5.68 K/UL
NEUTROPHILS NFR BLD AUTO: 70 %
NITRITE URINE: NEGATIVE
PH URINE: 5.5
PLATELET # BLD AUTO: 275 K/UL
POTASSIUM SERPL-SCNC: 4.3 MMOL/L
PROTEIN URINE: NORMAL
RBC # BLD: 3.72 M/UL
RBC # FLD: 15.6 %
RED BLOOD CELLS URINE: 4 /HPF
SODIUM SERPL-SCNC: 145 MMOL/L
SPECIFIC GRAVITY URINE: 1.02
SQUAMOUS EPITHELIAL CELLS: 8 /HPF
TRANSFERRIN SERPL-MCNC: 287 MG/DL
URIC ACID CRYSTALS: ABNORMAL
UROBILINOGEN URINE: NORMAL
WBC # FLD AUTO: 8.1 K/UL
WHITE BLOOD CELLS URINE: 18 /HPF

## 2020-02-10 ENCOUNTER — LABORATORY RESULT (OUTPATIENT)
Age: 75
End: 2020-02-10

## 2020-02-13 ENCOUNTER — APPOINTMENT (OUTPATIENT)
Dept: CARDIOLOGY | Facility: CLINIC | Age: 75
End: 2020-02-13
Payer: MEDICARE

## 2020-02-13 ENCOUNTER — NON-APPOINTMENT (OUTPATIENT)
Age: 75
End: 2020-02-13

## 2020-02-13 VITALS
TEMPERATURE: 98.3 F | SYSTOLIC BLOOD PRESSURE: 140 MMHG | OXYGEN SATURATION: 97 % | WEIGHT: 250 LBS | HEART RATE: 96 BPM | HEIGHT: 62 IN | BODY MASS INDEX: 46.01 KG/M2 | DIASTOLIC BLOOD PRESSURE: 58 MMHG

## 2020-02-13 PROCEDURE — 99214 OFFICE O/P EST MOD 30 MIN: CPT

## 2020-02-13 PROCEDURE — 93000 ELECTROCARDIOGRAM COMPLETE: CPT

## 2020-02-13 NOTE — HISTORY OF PRESENT ILLNESS
[FreeTextEntry1] : pt presents for f/u pt s/p labs increased craet .pt s/p ptci doing well .pt with cough and congestion x 3 days .pt with c.p with cough pt denies any  dizziness ,lightheadedness ,nausea vomiting diaphoresis.pt denies arthralgias \par

## 2020-02-13 NOTE — PHYSICAL EXAM
[General Appearance - Well Developed] : well developed [Normal Appearance] : normal appearance [Well Groomed] : well groomed [General Appearance - Well Nourished] : well nourished [No Deformities] : no deformities [General Appearance - In No Acute Distress] : no acute distress [Normal Conjunctiva] : the conjunctiva exhibited no abnormalities [Eyelids - No Xanthelasma] : the eyelids demonstrated no xanthelasmas [Normal Oral Mucosa] : normal oral mucosa [No Oral Pallor] : no oral pallor [No Oral Cyanosis] : no oral cyanosis [Normal Jugular Venous A Waves Present] : normal jugular venous A waves present [Normal Jugular Venous V Waves Present] : normal jugular venous V waves present [No Jugular Venous Carballo A Waves] : no jugular venous carballo A waves [Heart Rate And Rhythm] : heart rate and rhythm were normal [FreeTextEntry1] : increased expiratory phase  [Heart Sounds] : normal S1 and S2 [Murmurs] : no murmurs present [Abdomen Soft] : soft [Abdomen Tenderness] : non-tender [Abdomen Mass (___ Cm)] : no abdominal mass palpated [Abnormal Walk] : normal gait [Gait - Sufficient For Exercise Testing] : the gait was sufficient for exercise testing [Nail Clubbing] : no clubbing of the fingernails [Cyanosis, Localized] : no localized cyanosis [Petechial Hemorrhages (___cm)] : no petechial hemorrhages [Skin Color & Pigmentation] : normal skin color and pigmentation [] : no rash [No Venous Stasis] : no venous stasis [Skin Lesions] : no skin lesions [No Skin Ulcers] : no skin ulcer [No Xanthoma] : no  xanthoma was observed [Oriented To Time, Place, And Person] : oriented to person, place, and time [Affect] : the affect was normal [Mood] : the mood was normal [No Anxiety] : not feeling anxious

## 2020-02-17 LAB
ANION GAP SERPL CALC-SCNC: 20 MMOL/L
APPEARANCE: CLEAR
BACTERIA UR CULT: NORMAL
BACTERIA: NEGATIVE
BASOPHILS # BLD AUTO: 0.02 K/UL
BASOPHILS NFR BLD AUTO: 0.3 %
BILIRUBIN URINE: NEGATIVE
BLOOD URINE: NEGATIVE
BUN SERPL-MCNC: 31 MG/DL
CALCIUM SERPL-MCNC: 9.5 MG/DL
CHLORIDE SERPL-SCNC: 107 MMOL/L
CO2 SERPL-SCNC: 18 MMOL/L
COLOR: NORMAL
CREAT SERPL-MCNC: 1.47 MG/DL
EOSINOPHIL # BLD AUTO: 0.11 K/UL
EOSINOPHIL NFR BLD AUTO: 1.6 %
GLUCOSE QUALITATIVE U: ABNORMAL
GLUCOSE SERPL-MCNC: 112 MG/DL
HCT VFR BLD CALC: 32.1 %
HGB BLD-MCNC: 9.7 G/DL
HYALINE CASTS: 1 /LPF
IMM GRANULOCYTES NFR BLD AUTO: 0.4 %
KETONES URINE: NEGATIVE
LEUKOCYTE ESTERASE URINE: NEGATIVE
LYMPHOCYTES # BLD AUTO: 1.67 K/UL
LYMPHOCYTES NFR BLD AUTO: 23.9 %
MAN DIFF?: NORMAL
MCHC RBC-ENTMCNC: 27.6 PG
MCHC RBC-ENTMCNC: 30.2 GM/DL
MCV RBC AUTO: 91.2 FL
MICROSCOPIC-UA: NORMAL
MONOCYTES # BLD AUTO: 0.5 K/UL
MONOCYTES NFR BLD AUTO: 7.2 %
NEUTROPHILS # BLD AUTO: 4.65 K/UL
NEUTROPHILS NFR BLD AUTO: 66.6 %
NITRITE URINE: NEGATIVE
PH URINE: 6
PLATELET # BLD AUTO: 266 K/UL
POTASSIUM SERPL-SCNC: 4 MMOL/L
PROTEIN URINE: NORMAL
RBC # BLD: 3.52 M/UL
RBC # FLD: 14.9 %
RED BLOOD CELLS URINE: 2 /HPF
SODIUM SERPL-SCNC: 145 MMOL/L
SPECIFIC GRAVITY URINE: 1.02
SQUAMOUS EPITHELIAL CELLS: 2 /HPF
UROBILINOGEN URINE: NORMAL
WBC # FLD AUTO: 6.98 K/UL
WHITE BLOOD CELLS URINE: 3 /HPF

## 2020-03-15 LAB
BASOPHILS # BLD AUTO: 0.01 K/UL
BASOPHILS NFR BLD AUTO: 0.1 %
EOSINOPHIL # BLD AUTO: 0.01 K/UL
EOSINOPHIL NFR BLD AUTO: 0.1 %
FERRITIN SERPL-MCNC: 16 NG/ML
FOLATE SERPL-MCNC: 6 NG/ML
HCT VFR BLD CALC: 34.5 %
HGB BLD-MCNC: 10.3 G/DL
IMM GRANULOCYTES NFR BLD AUTO: 0.7 %
IRON SERPL-MCNC: 18 UG/DL
LYMPHOCYTES # BLD AUTO: 1.51 K/UL
LYMPHOCYTES NFR BLD AUTO: 16.5 %
MAN DIFF?: NORMAL
MCHC RBC-ENTMCNC: 27 PG
MCHC RBC-ENTMCNC: 29.9 GM/DL
MCV RBC AUTO: 90.3 FL
MONOCYTES # BLD AUTO: 0.5 K/UL
MONOCYTES NFR BLD AUTO: 5.5 %
NEUTROPHILS # BLD AUTO: 7.05 K/UL
NEUTROPHILS NFR BLD AUTO: 77.1 %
PLATELET # BLD AUTO: 347 K/UL
RBC # BLD: 3.82 M/UL
RBC # FLD: 14.8 %
VIT B12 SERPL-MCNC: 418 PG/ML
WBC # FLD AUTO: 9.14 K/UL

## 2020-03-19 ENCOUNTER — APPOINTMENT (OUTPATIENT)
Dept: CARDIOLOGY | Facility: CLINIC | Age: 75
End: 2020-03-19
Payer: MEDICARE

## 2020-03-19 VITALS
DIASTOLIC BLOOD PRESSURE: 54 MMHG | SYSTOLIC BLOOD PRESSURE: 144 MMHG | BODY MASS INDEX: 46.01 KG/M2 | HEART RATE: 97 BPM | HEIGHT: 62 IN | OXYGEN SATURATION: 98 % | WEIGHT: 250 LBS | TEMPERATURE: 98.7 F

## 2020-03-19 DIAGNOSIS — R53.83 OTHER FATIGUE: ICD-10-CM

## 2020-03-19 PROCEDURE — 99214 OFFICE O/P EST MOD 30 MIN: CPT

## 2020-03-19 NOTE — REVIEW OF SYSTEMS
[Feeling Fatigued] : feeling fatigued [Shortness Of Breath] : shortness of breath [Negative] : Heme/Lymph [Fever] : no fever [Headache] : no headache [Recent Weight Gain (___ Lbs)] : no recent weight gain [Chills] : no chills [Recent Weight Loss (___ Lbs)] : no recent weight loss

## 2020-03-19 NOTE — PHYSICAL EXAM
[General Appearance - Well Developed] : well developed [Normal Appearance] : normal appearance [Well Groomed] : well groomed [General Appearance - Well Nourished] : well nourished [No Deformities] : no deformities [General Appearance - In No Acute Distress] : no acute distress [Normal Conjunctiva] : the conjunctiva exhibited no abnormalities [Eyelids - No Xanthelasma] : the eyelids demonstrated no xanthelasmas [Normal Oral Mucosa] : normal oral mucosa [No Oral Pallor] : no oral pallor [No Oral Cyanosis] : no oral cyanosis [Normal Jugular Venous A Waves Present] : normal jugular venous A waves present [Normal Jugular Venous V Waves Present] : normal jugular venous V waves present [No Jugular Venous Carballo A Waves] : no jugular venous carballo A waves [Respiration, Rhythm And Depth] : normal respiratory rhythm and effort [Exaggerated Use Of Accessory Muscles For Inspiration] : no accessory muscle use [Auscultation Breath Sounds / Voice Sounds] : lungs were clear to auscultation bilaterally [Heart Rate And Rhythm] : heart rate and rhythm were normal [Heart Sounds] : normal S1 and S2 [Murmurs] : no murmurs present [Abdomen Soft] : soft [Abdomen Tenderness] : non-tender [Abdomen Mass (___ Cm)] : no abdominal mass palpated [Abnormal Walk] : normal gait [Gait - Sufficient For Exercise Testing] : the gait was sufficient for exercise testing [Nail Clubbing] : no clubbing of the fingernails [Cyanosis, Localized] : no localized cyanosis [Petechial Hemorrhages (___cm)] : no petechial hemorrhages [Skin Color & Pigmentation] : normal skin color and pigmentation [] : no rash [No Venous Stasis] : no venous stasis [Skin Lesions] : no skin lesions [No Skin Ulcers] : no skin ulcer [No Xanthoma] : no  xanthoma was observed [Oriented To Time, Place, And Person] : oriented to person, place, and time [Affect] : the affect was normal [Mood] : the mood was normal [No Anxiety] : not feeling anxious

## 2020-03-19 NOTE — HISTORY OF PRESENT ILLNESS
[FreeTextEntry1] : Heather capone a 74 year old lady who presents today for follow up. Patient states that she has been experiencing shortness of breath intermittently. Patient did follow up with Dr. Carmona on March 16, 2020 and she states that she was told that things were stable until June, for follow up. Bloodwork from [facility] showed Hypomagnesium, patient states that she has been taking 600 mg of Magneisum for the past three days. Patient also with low vitamin D, and taking Vitamin D 2K units daily. Patient states that she has been feeling tired  and think it is due to the anemia. Patient denies palpitations, chest pain, nausea, vomiting, dizziness and lightheadedness.pt with baseline dyspnea /much improved s/p ptci .pt with ocassional anxiety \par

## 2020-03-29 LAB
ANION GAP SERPL CALC-SCNC: 16 MMOL/L
BASOPHILS # BLD AUTO: 0.02 K/UL
BASOPHILS NFR BLD AUTO: 0.2 %
BUN SERPL-MCNC: 38 MG/DL
CALCIUM SERPL-MCNC: 9.6 MG/DL
CHLORIDE SERPL-SCNC: 105 MMOL/L
CO2 SERPL-SCNC: 21 MMOL/L
CREAT SERPL-MCNC: 1.54 MG/DL
EOSINOPHIL # BLD AUTO: 0.03 K/UL
EOSINOPHIL NFR BLD AUTO: 0.3 %
FERRITIN SERPL-MCNC: 11 NG/ML
FOLATE SERPL-MCNC: 5.8 NG/ML
GLUCOSE SERPL-MCNC: 99 MG/DL
HCT VFR BLD CALC: 31.3 %
HGB BLD-MCNC: 8.8 G/DL
IMM GRANULOCYTES NFR BLD AUTO: 0.3 %
IRON SATN MFR SERPL: 5 %
IRON SERPL-MCNC: 21 UG/DL
LYMPHOCYTES # BLD AUTO: 2.2 K/UL
LYMPHOCYTES NFR BLD AUTO: 25.1 %
MAGNESIUM SERPL-MCNC: 1.4 MG/DL
MAN DIFF?: NORMAL
MCHC RBC-ENTMCNC: 23.8 PG
MCHC RBC-ENTMCNC: 28.1 GM/DL
MCV RBC AUTO: 84.6 FL
MONOCYTES # BLD AUTO: 0.62 K/UL
MONOCYTES NFR BLD AUTO: 7.1 %
NEUTROPHILS # BLD AUTO: 5.87 K/UL
NEUTROPHILS NFR BLD AUTO: 67 %
PLATELET # BLD AUTO: 362 K/UL
POTASSIUM SERPL-SCNC: 4.3 MMOL/L
RBC # BLD: 3.7 M/UL
RBC # FLD: 16.7 %
SODIUM SERPL-SCNC: 142 MMOL/L
TIBC SERPL-MCNC: 424 UG/DL
UIBC SERPL-MCNC: 403 UG/DL
VIT B12 SERPL-MCNC: 415 PG/ML
WBC # FLD AUTO: 8.77 K/UL

## 2020-05-07 ENCOUNTER — NON-APPOINTMENT (OUTPATIENT)
Age: 75
End: 2020-05-07

## 2020-05-07 ENCOUNTER — LABORATORY RESULT (OUTPATIENT)
Age: 75
End: 2020-05-07

## 2020-05-07 ENCOUNTER — APPOINTMENT (OUTPATIENT)
Dept: CARDIOLOGY | Facility: CLINIC | Age: 75
End: 2020-05-07
Payer: MEDICARE

## 2020-05-07 ENCOUNTER — APPOINTMENT (OUTPATIENT)
Dept: ENDOCRINOLOGY | Facility: CLINIC | Age: 75
End: 2020-05-07
Payer: MEDICARE

## 2020-05-07 VITALS
HEIGHT: 62 IN | TEMPERATURE: 98.1 F | OXYGEN SATURATION: 98 % | SYSTOLIC BLOOD PRESSURE: 140 MMHG | HEART RATE: 99 BPM | BODY MASS INDEX: 46.56 KG/M2 | DIASTOLIC BLOOD PRESSURE: 70 MMHG | WEIGHT: 253 LBS

## 2020-05-07 VITALS
TEMPERATURE: 98.1 F | WEIGHT: 253 LBS | HEART RATE: 99 BPM | HEIGHT: 62 IN | SYSTOLIC BLOOD PRESSURE: 150 MMHG | OXYGEN SATURATION: 98 % | BODY MASS INDEX: 46.56 KG/M2 | DIASTOLIC BLOOD PRESSURE: 70 MMHG

## 2020-05-07 DIAGNOSIS — M25.569 PAIN IN UNSPECIFIED KNEE: ICD-10-CM

## 2020-05-07 DIAGNOSIS — Z20.828 CONTACT WITH AND (SUSPECTED) EXPOSURE TO OTHER VIRAL COMMUNICABLE DISEASES: ICD-10-CM

## 2020-05-07 LAB
GLUCOSE BLDC GLUCOMTR-MCNC: 197
HBA1C MFR BLD HPLC: 5.7

## 2020-05-07 PROCEDURE — 82962 GLUCOSE BLOOD TEST: CPT

## 2020-05-07 PROCEDURE — 99214 OFFICE O/P EST MOD 30 MIN: CPT

## 2020-05-07 PROCEDURE — 83036 HEMOGLOBIN GLYCOSYLATED A1C: CPT | Mod: QW

## 2020-05-07 RX ORDER — IRON POLYSACCHARIDE COMPLEX 150 MG
150 CAPSULE ORAL DAILY
Qty: 180 | Refills: 1 | Status: DISCONTINUED | COMMUNITY
Start: 2020-03-26 | End: 2020-05-07

## 2020-05-07 NOTE — PHYSICAL EXAM
[General Appearance - Well Developed] : well developed [Normal Appearance] : normal appearance [General Appearance - Well Nourished] : well nourished [Well Groomed] : well groomed [General Appearance - In No Acute Distress] : no acute distress [No Deformities] : no deformities [Normal Conjunctiva] : the conjunctiva exhibited no abnormalities [Eyelids - No Xanthelasma] : the eyelids demonstrated no xanthelasmas [Normal Oral Mucosa] : normal oral mucosa [No Oral Pallor] : no oral pallor [Normal Jugular Venous V Waves Present] : normal jugular venous V waves present [Normal Jugular Venous A Waves Present] : normal jugular venous A waves present [No Oral Cyanosis] : no oral cyanosis [No Jugular Venous Carballo A Waves] : no jugular venous carballo A waves [Respiration, Rhythm And Depth] : normal respiratory rhythm and effort [Exaggerated Use Of Accessory Muscles For Inspiration] : no accessory muscle use [Heart Sounds] : normal S1 and S2 [Heart Rate And Rhythm] : heart rate and rhythm were normal [Auscultation Breath Sounds / Voice Sounds] : lungs were clear to auscultation bilaterally [Abdomen Soft] : soft [Murmurs] : no murmurs present [Abdomen Tenderness] : non-tender [Abdomen Mass (___ Cm)] : no abdominal mass palpated [Cyanosis, Localized] : no localized cyanosis [FreeTextEntry1] : reproducible pain knees right greater then left  [Nail Clubbing] : no clubbing of the fingernails [Petechial Hemorrhages (___cm)] : no petechial hemorrhages [] : no rash [No Venous Stasis] : no venous stasis [Skin Color & Pigmentation] : normal skin color and pigmentation [No Skin Ulcers] : no skin ulcer [Skin Lesions] : no skin lesions [No Xanthoma] : no  xanthoma was observed [Affect] : the affect was normal [Oriented To Time, Place, And Person] : oriented to person, place, and time [Mood] : the mood was normal [No Anxiety] : not feeling anxious

## 2020-05-07 NOTE — HISTORY OF PRESENT ILLNESS
[FreeTextEntry1] : Ms. HEAD is a 74 year year old  female who  returns today in follow up with regard to a history  She did have 2 coronary stents placed in January and then had gastric ulcer requiring iron transfusions Now on Plavix and Eliquis. .Current dm medication include Metformin 1000 mg  bid and Farxiga 5 mg.  HGMq of late has shown values to be running in the mld 100p's.  Did have cortisone injection yesterday with bg today at 190\par  There has been no significant hypoglycemia.\par Denies any chest pain, sob, neurologic or ophthalmologic complaints. She  too denies any new podiatric concerns. She  is up to date with his ophthalmologic visit- last week. \par Additional medical history includes that of hypertension, hyperlipidemia, and obesity along with with a-fib and vitamin d deficiency Is on D3 2,000 iu daily.\par \par Did have 2nd bout of kidney stone-dx as uric acid  and is on potassium citrate.\par \par \par

## 2020-05-07 NOTE — HISTORY OF PRESENT ILLNESS
[FreeTextEntry1] : pt presents for f/u pt with improved fatigue getting iron infusions with heme .pt s/p ptci 1/20 .pt with right knee and s/p injections steroid with ortho .pt with improvement .pt with improved dyspnea pt denies any chest  pain dizziness ,lightheadedness ,nausea vomiting diaphoresis pt with stable anxiety requests refill on lorazepam \par

## 2020-05-07 NOTE — PHYSICAL EXAM
[Alert] : alert [Well Nourished] : well nourished [Well Developed] : well developed [No Acute Distress] : no acute distress [Normal Sclera/Conjunctiva] : normal sclera/conjunctiva [EOMI] : extra ocular movement intact [Normal Oropharynx] : the oropharynx was normal [No Proptosis] : no proptosis [Thyroid Not Enlarged] : the thyroid was not enlarged [No Thyroid Nodules] : no palpable thyroid nodules [Well Healed Scar] : well healed scar [No Respiratory Distress] : no respiratory distress [No Accessory Muscle Use] : no accessory muscle use [Clear to Auscultation] : lungs were clear to auscultation bilaterally [Normal S1, S2] : normal S1 and S2 [Normal Rate] : heart rate was normal [Regular Rhythm] : with a regular rhythm [No Edema] : no peripheral edema [Pedal Pulses Normal] : the pedal pulses are present [Not Tender] : non-tender [Normal Bowel Sounds] : normal bowel sounds [Soft] : abdomen soft [Not Distended] : not distended [Normal Anterior Cervical Nodes] : no anterior cervical lymphadenopathy [Normal Posterior Cervical Nodes] : no posterior cervical lymphadenopathy [No Spinal Tenderness] : no spinal tenderness [Spine Straight] : spine straight [No Stigmata of Cushings Syndrome] : no stigmata of Cushings Syndrome [Normal Gait] : normal gait [Normal Strength/Tone] : muscle strength and tone were normal [No Rash] : no rash [Normal Reflexes] : deep tendon reflexes were 2+ and symmetric [Acanthosis Nigricans] : no acanthosis nigricans [No Tremors] : no tremors [Oriented x3] : oriented to person, place, and time

## 2020-05-10 LAB
25(OH)D3 SERPL-MCNC: 29.3 NG/ML
ALBUMIN SERPL ELPH-MCNC: 4.5 G/DL
ALP BLD-CCNC: 90 U/L
ALT SERPL-CCNC: 22 U/L
ANION GAP SERPL CALC-SCNC: 21 MMOL/L
APPEARANCE: CLEAR
AST SERPL-CCNC: 20 U/L
BACTERIA UR CULT: NORMAL
BACTERIA: NEGATIVE
BASOPHILS # BLD AUTO: 0.01 K/UL
BASOPHILS NFR BLD AUTO: 0.1 %
BILIRUB SERPL-MCNC: 0.2 MG/DL
BILIRUBIN URINE: NEGATIVE
BLOOD URINE: NEGATIVE
BUN SERPL-MCNC: 32 MG/DL
CALCIUM SERPL-MCNC: 9.4 MG/DL
CHLORIDE SERPL-SCNC: 104 MMOL/L
CHOLEST SERPL-MCNC: 144 MG/DL
CHOLEST/HDLC SERPL: 2.2 RATIO
CK SERPL-CCNC: 90 U/L
CO2 SERPL-SCNC: 18 MMOL/L
COLOR: NORMAL
CREAT SERPL-MCNC: 1.17 MG/DL
CREAT SPEC-SCNC: 49 MG/DL
EOSINOPHIL # BLD AUTO: 0.17 K/UL
EOSINOPHIL NFR BLD AUTO: 1.9 %
ESTIMATED AVERAGE GLUCOSE: 117 MG/DL
FERRITIN SERPL-MCNC: 286 NG/ML
FOLATE SERPL-MCNC: 7.1 NG/ML
GLUCOSE QUALITATIVE U: ABNORMAL
GLUCOSE SERPL-MCNC: 206 MG/DL
HAPTOGLOB SERPL-MCNC: 219 MG/DL
HBA1C MFR BLD HPLC: 5.7 %
HCT VFR BLD CALC: 41.7 %
HDLC SERPL-MCNC: 65 MG/DL
HGB BLD-MCNC: 12.2 G/DL
HYALINE CASTS: 0 /LPF
IMM GRANULOCYTES NFR BLD AUTO: 0.3 %
IRON SERPL-MCNC: 54 UG/DL
KETONES URINE: NEGATIVE
LDH SERPL-CCNC: 194 U/L
LDLC SERPL CALC-MCNC: 60 MG/DL
LDLC SERPL DIRECT ASSAY-MCNC: 66 MG/DL
LEUKOCYTE ESTERASE URINE: NEGATIVE
LYMPHOCYTES # BLD AUTO: 0.55 K/UL
LYMPHOCYTES NFR BLD AUTO: 6.2 %
MAGNESIUM SERPL-MCNC: 1.4 MG/DL
MAN DIFF?: NORMAL
MCHC RBC-ENTMCNC: 26.2 PG
MCHC RBC-ENTMCNC: 29.3 GM/DL
MCV RBC AUTO: 89.5 FL
MICROALBUMIN 24H UR DL<=1MG/L-MCNC: 5.8 MG/DL
MICROALBUMIN/CREAT 24H UR-RTO: 118 MG/G
MICROSCOPIC-UA: NORMAL
MONOCYTES # BLD AUTO: 0.2 K/UL
MONOCYTES NFR BLD AUTO: 2.2 %
NEUTROPHILS # BLD AUTO: 7.95 K/UL
NEUTROPHILS NFR BLD AUTO: 89.3 %
NITRITE URINE: NEGATIVE
PH URINE: 6
PLATELET # BLD AUTO: 227 K/UL
POTASSIUM SERPL-SCNC: 4 MMOL/L
PROT SERPL-MCNC: 7.3 G/DL
PROTEIN URINE: NORMAL
RBC # BLD: 4.66 M/UL
RBC # FLD: NORMAL
RED BLOOD CELLS URINE: 1 /HPF
SARS-COV-2 IGG SERPL IA-ACNC: 0.1 INDEX
SARS-COV-2 IGG SERPL QL IA: NEGATIVE
SODIUM SERPL-SCNC: 143 MMOL/L
SPECIFIC GRAVITY URINE: 1.02
SQUAMOUS EPITHELIAL CELLS: 1 /HPF
T4 FREE SERPL-MCNC: 1.4 NG/DL
TRANSFERRIN SERPL-MCNC: 250 MG/DL
TRIGL SERPL-MCNC: 91 MG/DL
TSH SERPL-ACNC: 0.51 UIU/ML
URATE SERPL-MCNC: 5.3 MG/DL
UROBILINOGEN URINE: NORMAL
VIT B12 SERPL-MCNC: 418 PG/ML
WBC # FLD AUTO: 8.91 K/UL
WHITE BLOOD CELLS URINE: 2 /HPF

## 2020-05-31 LAB
FERRITIN SERPL-MCNC: 96 NG/ML
MAGNESIUM SERPL-MCNC: 1.1 MG/DL

## 2020-07-11 ENCOUNTER — RX RENEWAL (OUTPATIENT)
Age: 75
End: 2020-07-11

## 2020-08-06 ENCOUNTER — APPOINTMENT (OUTPATIENT)
Dept: CARDIOLOGY | Facility: CLINIC | Age: 75
End: 2020-08-06
Payer: COMMERCIAL

## 2020-08-06 ENCOUNTER — NON-APPOINTMENT (OUTPATIENT)
Age: 75
End: 2020-08-06

## 2020-08-06 VITALS
OXYGEN SATURATION: 97 % | HEART RATE: 88 BPM | HEIGHT: 62 IN | TEMPERATURE: 97.5 F | BODY MASS INDEX: 45.82 KG/M2 | WEIGHT: 249 LBS | DIASTOLIC BLOOD PRESSURE: 65 MMHG | SYSTOLIC BLOOD PRESSURE: 128 MMHG

## 2020-08-06 DIAGNOSIS — Z86.73 PERSONAL HISTORY OF TRANSIENT ISCHEMIC ATTACK (TIA), AND CEREBRAL INFARCTION W/OUT RESIDUAL DEFICITS: ICD-10-CM

## 2020-08-06 DIAGNOSIS — M79.603 PAIN IN ARM, UNSPECIFIED: ICD-10-CM

## 2020-08-06 DIAGNOSIS — M79.602 PAIN IN LEFT ARM: ICD-10-CM

## 2020-08-06 PROCEDURE — 93000 ELECTROCARDIOGRAM COMPLETE: CPT

## 2020-08-06 PROCEDURE — 99214 OFFICE O/P EST MOD 30 MIN: CPT

## 2020-08-06 RX ORDER — CHLORHEXIDINE GLUCONATE 4 %
5 LIQUID (ML) TOPICAL
Refills: 0 | Status: DISCONTINUED | COMMUNITY
End: 2020-08-06

## 2020-08-06 RX ORDER — THIAMINE HCL 100 MG
500 TABLET ORAL TWICE DAILY
Refills: 0 | Status: ACTIVE | COMMUNITY
Start: 2020-08-06

## 2020-08-06 NOTE — PHYSICAL EXAM
[General Appearance - Well Developed] : well developed [Normal Appearance] : normal appearance [Well Groomed] : well groomed [General Appearance - Well Nourished] : well nourished [No Deformities] : no deformities [General Appearance - In No Acute Distress] : no acute distress [Eyelids - No Xanthelasma] : the eyelids demonstrated no xanthelasmas [Normal Conjunctiva] : the conjunctiva exhibited no abnormalities [No Oral Pallor] : no oral pallor [Normal Oral Mucosa] : normal oral mucosa [No Oral Cyanosis] : no oral cyanosis [Respiration, Rhythm And Depth] : normal respiratory rhythm and effort [Exaggerated Use Of Accessory Muscles For Inspiration] : no accessory muscle use [Heart Rate And Rhythm] : heart rate and rhythm were normal [Auscultation Breath Sounds / Voice Sounds] : lungs were clear to auscultation bilaterally [Murmurs] : no murmurs present [Heart Sounds] : normal S1 and S2 [Abdomen Soft] : soft [Abdomen Tenderness] : non-tender [Abdomen Mass (___ Cm)] : no abdominal mass palpated [Abnormal Walk] : normal gait [Gait - Sufficient For Exercise Testing] : the gait was sufficient for exercise testing [Nail Clubbing] : no clubbing of the fingernails [Cyanosis, Localized] : no localized cyanosis [Petechial Hemorrhages (___cm)] : no petechial hemorrhages [FreeTextEntry1] : left shoulder and neck and back and left shouldeer pain  [Skin Color & Pigmentation] : normal skin color and pigmentation [No Venous Stasis] : no venous stasis [] : no rash [Skin Lesions] : no skin lesions [No Skin Ulcers] : no skin ulcer [No Xanthoma] : no  xanthoma was observed [Oriented To Time, Place, And Person] : oriented to person, place, and time [Affect] : the affect was normal [Mood] : the mood was normal [No Anxiety] : not feeling anxious
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
## 2020-08-07 ENCOUNTER — APPOINTMENT (OUTPATIENT)
Dept: CARDIOLOGY | Facility: CLINIC | Age: 75
End: 2020-08-07
Payer: MEDICARE

## 2020-08-07 PROCEDURE — 93306 TTE W/DOPPLER COMPLETE: CPT

## 2020-08-08 NOTE — PATIENT PROFILE ADULT - BILL PAYMENT
[facility] Progress Note    Date: 2020    Name: Merari Blood    MRN: [id number]         : 1945      Mr. Bustamante arrived in the [facility] today at 8957, in stable condition, here for Every 48 hour, IV Daptomycin Antibiotic Therapy. He was assessed and education was provided. Mr. Pancho Coleman vitals were reviewed. Visit Vitals  /70 (BP 1 Location: Left arm, BP Patient Position: Sitting)   Pulse 86   Temp 99 °F (37.2 °C)   Resp 20   SpO2 97%            His right upper arm single lumen PICC line was noted to be dry and intact. Daptomycin (Cubicin) 600 mg IV, was administered via his PICC line over approximately 2 minutes, per order, and without incident. After completion of the IV Daptomycin, the PICC line was flushed well per protocol with NS & Heparin, and the PICC line was left dry and intact. Mr. Daniel Gonzalez tolerated well, and had no complaints. Mr. Daniel Gonzalez was discharged from [place] [id number] in stable condition at 3129. George Noel He is to return on Monday, 8-10-20, at 1000, for his next appointment, for every 48 hour, IV Daptomycin antibiotic therapy.      Blaise English RN  2020  8:52 AM no

## 2020-08-30 LAB
25(OH)D3 SERPL-MCNC: 35.1 NG/ML
ALBUMIN SERPL ELPH-MCNC: 4.5 G/DL
ALP BLD-CCNC: 80 U/L
ALT SERPL-CCNC: 23 U/L
ANION GAP SERPL CALC-SCNC: 15 MMOL/L
AST SERPL-CCNC: 17 U/L
BASOPHILS # BLD AUTO: 0.02 K/UL
BASOPHILS NFR BLD AUTO: 0.2 %
BILIRUB DIRECT SERPL-MCNC: 0.2 MG/DL
BILIRUB INDIRECT SERPL-MCNC: 0.2 MG/DL
BILIRUB SERPL-MCNC: 0.4 MG/DL
BUN SERPL-MCNC: 30 MG/DL
CALCIUM SERPL-MCNC: 10.3 MG/DL
CHLORIDE SERPL-SCNC: 104 MMOL/L
CHOLEST SERPL-MCNC: 125 MG/DL
CHOLEST/HDLC SERPL: 1.9 RATIO
CK SERPL-CCNC: 70 U/L
CO2 SERPL-SCNC: 25 MMOL/L
CREAT SERPL-MCNC: 1.39 MG/DL
CREAT SPEC-SCNC: 98 MG/DL
EOSINOPHIL # BLD AUTO: 0.03 K/UL
EOSINOPHIL NFR BLD AUTO: 0.4 %
ESTIMATED AVERAGE GLUCOSE: 151 MG/DL
FERRITIN SERPL-MCNC: 31 NG/ML
GLUCOSE SERPL-MCNC: 114 MG/DL
HBA1C MFR BLD HPLC: 6.9 %
HCT VFR BLD CALC: 43.9 %
HDLC SERPL-MCNC: 65 MG/DL
HGB BLD-MCNC: 12.9 G/DL
IMM GRANULOCYTES NFR BLD AUTO: 0.5 %
IRON SATN MFR SERPL: 12 %
IRON SERPL-MCNC: 43 UG/DL
LDLC SERPL CALC-MCNC: 35 MG/DL
LYMPHOCYTES # BLD AUTO: 2.19 K/UL
LYMPHOCYTES NFR BLD AUTO: 25.9 %
MAGNESIUM SERPL-MCNC: 1.9 MG/DL
MAN DIFF?: NORMAL
MCHC RBC-ENTMCNC: 27.4 PG
MCHC RBC-ENTMCNC: 29.4 GM/DL
MCV RBC AUTO: 93.4 FL
MICROALBUMIN 24H UR DL<=1MG/L-MCNC: 4.9 MG/DL
MICROALBUMIN/CREAT 24H UR-RTO: 50 MG/G
MONOCYTES # BLD AUTO: 0.69 K/UL
MONOCYTES NFR BLD AUTO: 8.2 %
NEUTROPHILS # BLD AUTO: 5.48 K/UL
NEUTROPHILS NFR BLD AUTO: 64.8 %
PLATELET # BLD AUTO: 228 K/UL
POTASSIUM SERPL-SCNC: 4.3 MMOL/L
PROT SERPL-MCNC: 7.2 G/DL
RBC # BLD: 4.7 M/UL
RBC # FLD: 15.5 %
SODIUM SERPL-SCNC: 145 MMOL/L
T4 FREE SERPL-MCNC: 1.7 NG/DL
TIBC SERPL-MCNC: 350 UG/DL
TRANSFERRIN SERPL-MCNC: 282 MG/DL
TRIGL SERPL-MCNC: 123 MG/DL
TSH SERPL-ACNC: 1.77 UIU/ML
UIBC SERPL-MCNC: 307 UG/DL
WBC # FLD AUTO: 8.45 K/UL

## 2020-09-22 ENCOUNTER — APPOINTMENT (OUTPATIENT)
Dept: CARDIOLOGY | Facility: CLINIC | Age: 75
End: 2020-09-22
Payer: MEDICARE

## 2020-09-22 ENCOUNTER — APPOINTMENT (OUTPATIENT)
Dept: ENDOCRINOLOGY | Facility: CLINIC | Age: 75
End: 2020-09-22
Payer: MEDICARE

## 2020-09-22 ENCOUNTER — MED ADMIN CHARGE (OUTPATIENT)
Age: 75
End: 2020-09-22

## 2020-09-22 VITALS
SYSTOLIC BLOOD PRESSURE: 124 MMHG | WEIGHT: 249 LBS | HEART RATE: 78 BPM | TEMPERATURE: 97.5 F | HEIGHT: 62 IN | OXYGEN SATURATION: 97 % | BODY MASS INDEX: 45.82 KG/M2 | DIASTOLIC BLOOD PRESSURE: 82 MMHG

## 2020-09-22 VITALS
HEART RATE: 92 BPM | BODY MASS INDEX: 45.82 KG/M2 | OXYGEN SATURATION: 97 % | WEIGHT: 249 LBS | DIASTOLIC BLOOD PRESSURE: 60 MMHG | HEIGHT: 62 IN | TEMPERATURE: 97.5 F | SYSTOLIC BLOOD PRESSURE: 166 MMHG

## 2020-09-22 DIAGNOSIS — R79.0 ABNORMAL LVL OF BLOOD MINERAL: ICD-10-CM

## 2020-09-22 DIAGNOSIS — M54.5 LOW BACK PAIN: ICD-10-CM
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
## 2020-09-22 PROCEDURE — 83036 HEMOGLOBIN GLYCOSYLATED A1C: CPT | Mod: QW

## 2020-09-22 PROCEDURE — 99214 OFFICE O/P EST MOD 30 MIN: CPT

## 2020-09-22 PROCEDURE — 82962 GLUCOSE BLOOD TEST: CPT

## 2020-09-22 PROCEDURE — 99213 OFFICE O/P EST LOW 20 MIN: CPT

## 2020-09-22 NOTE — HISTORY OF PRESENT ILLNESS
[FreeTextEntry1] : Ms. HEAD is a 74 year year old  female who  returns today in follow up with regard to a history of type 2 dm.   She did have 2 coronary stents placed in January and then had gastric ulcer requiring iron transfusions Now on Plavix and Eliquis. .Current dm medication include Metformin 1000 mg  bid and Farxiga 5 mg.  HGM of late has shown values to be running in the mld 100p's.  Did have cortisone injection yesterday with bg today at 190\par  There has been no significant hypoglycemia.\par Denies any chest pain, sob, neurologic or ophthalmologic complaints. She  too denies any new podiatric concerns. She  is up to date with his ophthalmologic visit- last week. \par Additional medical history includes that of hypertension, hyperlipidemia, and obesity along with with a-fib and vitamin d deficiency Is on D3 2,000 iu daily.\par A1c 6.9% on 08/06\par Bg's 120's to 150's at varied times of the day.\par \par Did have 2nd bout of kidney stone-dx as uric acid  and is on potassium citrate.\par \par \par

## 2020-09-22 NOTE — PHYSICAL EXAM
[General Appearance - Well Developed] : well developed [Normal Appearance] : normal appearance [Well Groomed] : well groomed [General Appearance - Well Nourished] : well nourished [No Deformities] : no deformities [General Appearance - In No Acute Distress] : no acute distress [Normal Conjunctiva] : the conjunctiva exhibited no abnormalities [Eyelids - No Xanthelasma] : the eyelids demonstrated no xanthelasmas [Normal Oral Mucosa] : normal oral mucosa [No Oral Pallor] : no oral pallor [No Oral Cyanosis] : no oral cyanosis [Normal Jugular Venous A Waves Present] : normal jugular venous A waves present [Normal Jugular Venous V Waves Present] : normal jugular venous V waves present [No Jugular Venous Carballo A Waves] : no jugular venous carballo A waves [Respiration, Rhythm And Depth] : normal respiratory rhythm and effort [Exaggerated Use Of Accessory Muscles For Inspiration] : no accessory muscle use [Auscultation Breath Sounds / Voice Sounds] : lungs were clear to auscultation bilaterally [Heart Rate And Rhythm] : heart rate and rhythm were normal [Heart Sounds] : normal S1 and S2 [Murmurs] : no murmurs present [Abdomen Soft] : soft [Abdomen Tenderness] : non-tender [Abdomen Mass (___ Cm)] : no abdominal mass palpated [Abnormal Walk] : normal gait [Gait - Sufficient For Exercise Testing] : the gait was sufficient for exercise testing [Nail Clubbing] : no clubbing of the fingernails [Cyanosis, Localized] : no localized cyanosis [Petechial Hemorrhages (___cm)] : no petechial hemorrhages [Skin Color & Pigmentation] : normal skin color and pigmentation [] : no rash [No Venous Stasis] : no venous stasis [Skin Lesions] : no skin lesions [No Skin Ulcers] : no skin ulcer [No Xanthoma] : no  xanthoma was observed [Oriented To Time, Place, And Person] : oriented to person, place, and time [Affect] : the affect was normal [Mood] : the mood was normal [No Anxiety] : not feeling anxious [FreeTextEntry1] : left shoulder pain

## 2020-09-22 NOTE — REASON FOR VISIT
[Follow-Up - Clinic] : a clinic follow-up of [Atrial Fibrillation] : atrial fibrillation [Coronary Artery Disease] : coronary artery disease [Hyperlipidemia] : hyperlipidemia [Hypertension] : hypertension [FreeTextEntry1] : f/u medical issues

## 2020-09-22 NOTE — HISTORY OF PRESENT ILLNESS
[FreeTextEntry1] : Nel is a 75 y.o female with a PMHx of CVA, CAD s/p x2 stent 1/2020 on clopidogrel, Afib, DM, GIB, HTN, HLD, WILLA who presents today for routine follow-up. Patient states she is still experiencing neck pain and left shoulder pain and is following with ortho who ordered an MRI of her left shoulder which she will schedule. She also completed CT chest which was stable. Patient also request TDap vaccine today as her daughter is pregnant.

## 2020-10-16 ENCOUNTER — MED ADMIN CHARGE (OUTPATIENT)
Age: 75
End: 2020-10-16

## 2020-10-16 ENCOUNTER — APPOINTMENT (OUTPATIENT)
Dept: CARDIOLOGY | Facility: CLINIC | Age: 75
End: 2020-10-16
Payer: MEDICARE

## 2020-10-16 PROCEDURE — XXXXX: CPT

## 2020-10-21 LAB — GLUCOSE BLDC GLUCOMTR-MCNC: 104

## 2020-10-24 ENCOUNTER — RX RENEWAL (OUTPATIENT)
Age: 75
End: 2020-10-24

## 2020-11-02 LAB
CREAT SPEC-SCNC: 38 MG/DL
MICROALBUMIN 24H UR DL<=1MG/L-MCNC: 4.4 MG/DL
MICROALBUMIN/CREAT 24H UR-RTO: 116 MG/G

## 2020-11-12 ENCOUNTER — APPOINTMENT (OUTPATIENT)
Dept: CARDIOLOGY | Facility: CLINIC | Age: 75
End: 2020-11-12
Payer: MEDICARE

## 2020-11-12 ENCOUNTER — NON-APPOINTMENT (OUTPATIENT)
Age: 75
End: 2020-11-12

## 2020-11-12 VITALS
SYSTOLIC BLOOD PRESSURE: 130 MMHG | BODY MASS INDEX: 46.01 KG/M2 | HEIGHT: 62 IN | OXYGEN SATURATION: 97 % | HEART RATE: 90 BPM | DIASTOLIC BLOOD PRESSURE: 68 MMHG | WEIGHT: 250 LBS | TEMPERATURE: 98.4 F

## 2020-11-12 DIAGNOSIS — E83.42 HYPOMAGNESEMIA: ICD-10-CM

## 2020-11-12 DIAGNOSIS — M54.2 CERVICALGIA: ICD-10-CM

## 2020-11-12 DIAGNOSIS — M25.512 PAIN IN LEFT SHOULDER: ICD-10-CM

## 2020-11-12 PROCEDURE — 93000 ELECTROCARDIOGRAM COMPLETE: CPT

## 2020-11-12 PROCEDURE — 99072 ADDL SUPL MATRL&STAF TM PHE: CPT

## 2020-11-12 PROCEDURE — 99214 OFFICE O/P EST MOD 30 MIN: CPT

## 2020-11-12 NOTE — HISTORY OF PRESENT ILLNESS
[FreeTextEntry1] : Nel is a 75 y.o female with a PMHx of CVA, CAD s/p x2 stent 1/2020 on clopidogrel, Afib, DM, GIB, HTN, HLD, WILLA who presents today for routine follow-up. Patient previously seen in office on 9/22/20 for left shoulder and neck pain. Patient was referred to [facility] for this pain and ordered for MRI of left shoulder which she completed and found she has a tear. She is receiving cortisone injections with an orthopedist. Patient also reports she sees Dr. Keita for hx of iron deficiency and recently had lab work drawn and was told she needs iron infusion next week.  Patient denies chest pain, shortness of breath, palpitations, dizziness, vision changes, n/v, abdominal pain, changes in bowel/bladder habits,  or appetite.

## 2020-11-15 ENCOUNTER — TRANSCRIPTION ENCOUNTER (OUTPATIENT)
Age: 75
End: 2020-11-15

## 2020-12-09 RX ORDER — DAPAGLIFLOZIN 5 MG/1
5 TABLET, FILM COATED ORAL DAILY
Qty: 90 | Refills: 1 | Status: DISCONTINUED | COMMUNITY
Start: 1900-01-01 | End: 2020-12-09

## 2021-01-14 ENCOUNTER — APPOINTMENT (OUTPATIENT)
Dept: ENDOCRINOLOGY | Facility: CLINIC | Age: 76
End: 2021-01-14
Payer: MEDICARE

## 2021-01-14 VITALS
HEART RATE: 94 BPM | OXYGEN SATURATION: 99 % | WEIGHT: 252.56 LBS | DIASTOLIC BLOOD PRESSURE: 80 MMHG | TEMPERATURE: 98.2 F | BODY MASS INDEX: 46.48 KG/M2 | HEIGHT: 62 IN | SYSTOLIC BLOOD PRESSURE: 126 MMHG

## 2021-01-14 DIAGNOSIS — R94.39 ABNORMAL RESULT OF OTHER CARDIOVASCULAR FUNCTION STUDY: ICD-10-CM

## 2021-01-14 PROCEDURE — 83036 HEMOGLOBIN GLYCOSYLATED A1C: CPT | Mod: QW

## 2021-01-14 PROCEDURE — 82962 GLUCOSE BLOOD TEST: CPT

## 2021-01-14 PROCEDURE — 36415 COLL VENOUS BLD VENIPUNCTURE: CPT

## 2021-01-14 PROCEDURE — 99214 OFFICE O/P EST MOD 30 MIN: CPT | Mod: 25

## 2021-01-14 RX ORDER — GLIMEPIRIDE 1 MG/1
1 TABLET ORAL
Qty: 45 | Refills: 0 | Status: DISCONTINUED | COMMUNITY
Start: 2020-12-09 | End: 2021-01-14

## 2021-01-14 NOTE — HISTORY OF PRESENT ILLNESS
[FreeTextEntry1] : Ms. HEAD is a 75 year year old  female who  returns today in follow up with regard to a history of type 2 dm.   She did have 2 coronary stents placed in January and then had gastric ulcer requiring iron transfusions Now on Plavix and Eliquis. .Current dm medication include Metformin 1000 mg  bid and Farxiga 5 mg.  She was swithced to amaryl 1/2 mg 1/2 MG and MEtfromin 50 mg jsu once daily.OOK FOR TWO WEEKS-FELT SHAKY BUT BG NOOT LOW-WERE HIGHER  too gaiend wt and had constipatio She took for a bout 2 weeks-but o nher own cahgned back to the oirigial meds metfrormib  100 mg bid and farxiga 5 mg with bg's low 100's.  GFR ahd been 37 in November.HGM of late has shown values to be running in the mld 100p's. Lowest 109-125 -rare.\par  There has been no significant hypoglycemia.\par Denies any chest pain, sob, neurologic or ophthalmologic complaints. She  too denies any new podiatric concerns. She  is up to date with his ophthalmologic visit- last week. \par Additional medical history includes that of hypertension, hyperlipidemia, and obesity along with with a-fib and vitamin d deficiency Is on D3 2,000 iu daily.\par \par Stillhad low iron and anemia but received iron infsion x 2 and hb was up to 11.5 -12.6 range. -Per Dr. clemens\par Did have 2nd bout of kidney stone-dx as uric acid -was on potassium  citrate-now taking sugar free lemonadde insteasd.\par \par \par

## 2021-01-20 ENCOUNTER — NON-APPOINTMENT (OUTPATIENT)
Age: 76
End: 2021-01-20

## 2021-01-20 DIAGNOSIS — R79.89 OTHER SPECIFIED ABNORMAL FINDINGS OF BLOOD CHEMISTRY: ICD-10-CM

## 2021-01-20 LAB
25(OH)D3 SERPL-MCNC: 41.3 NG/ML
ALBUMIN SERPL ELPH-MCNC: 4.3 G/DL
ALP BLD-CCNC: 114 U/L
ALT SERPL-CCNC: 20 U/L
ANION GAP SERPL CALC-SCNC: 17 MMOL/L
AST SERPL-CCNC: 22 U/L
BILIRUB SERPL-MCNC: 0.4 MG/DL
BUN SERPL-MCNC: 38 MG/DL
CALCIUM SERPL-MCNC: 9.7 MG/DL
CHLORIDE SERPL-SCNC: 105 MMOL/L
CHOLEST SERPL-MCNC: 149 MG/DL
CO2 SERPL-SCNC: 19 MMOL/L
CREAT SERPL-MCNC: 1.66 MG/DL
ESTIMATED AVERAGE GLUCOSE: 140 MG/DL
FRUCTOSAMINE SERPL-MCNC: 274 UMOL/L
GLUCOSE BLDC GLUCOMTR-MCNC: 129
GLUCOSE SERPL-MCNC: 151 MG/DL
GLYCOMARK.: 4.8 UG/ML
HBA1C MFR BLD HPLC: 6.5 %
HBA1C MFR BLD HPLC: 6.6
HDLC SERPL-MCNC: 55 MG/DL
LDLC SERPL DIRECT ASSAY-MCNC: 70 MG/DL
POTASSIUM SERPL-SCNC: 4.1 MMOL/L
PROT SERPL-MCNC: 7.5 G/DL
SODIUM SERPL-SCNC: 140 MMOL/L
T3FREE SERPL-MCNC: 2.59 PG/ML
T4 FREE SERPL-MCNC: 1.6 NG/DL
TRIGL SERPL-MCNC: 115 MG/DL
TSH SERPL-ACNC: 1.58 UIU/ML

## 2021-01-30 ENCOUNTER — RX RENEWAL (OUTPATIENT)
Age: 76
End: 2021-01-30

## 2021-02-05 ENCOUNTER — NON-APPOINTMENT (OUTPATIENT)
Age: 76
End: 2021-02-05

## 2021-02-05 LAB
ALBUMIN SERPL ELPH-MCNC: 4.2 G/DL
ALP BLD-CCNC: 118 U/L
ALT SERPL-CCNC: 16 U/L
ANION GAP SERPL CALC-SCNC: 14 MMOL/L
AST SERPL-CCNC: 17 U/L
BILIRUB SERPL-MCNC: 0.3 MG/DL
BUN SERPL-MCNC: 30 MG/DL
CALCIUM SERPL-MCNC: 8.6 MG/DL
CHLORIDE SERPL-SCNC: 106 MMOL/L
CO2 SERPL-SCNC: 24 MMOL/L
CREAT SERPL-MCNC: 1.5 MG/DL
CREAT SPEC-SCNC: 46 MG/DL
GLUCOSE SERPL-MCNC: 109 MG/DL
MICROALBUMIN 24H UR DL<=1MG/L-MCNC: 2.6 MG/DL
MICROALBUMIN/CREAT 24H UR-RTO: 56 MG/G
POTASSIUM SERPL-SCNC: 3.6 MMOL/L
PROT SERPL-MCNC: 7 G/DL
SODIUM SERPL-SCNC: 145 MMOL/L

## 2021-02-21 DIAGNOSIS — Z01.818 ENCOUNTER FOR OTHER PREPROCEDURAL EXAMINATION: ICD-10-CM

## 2021-02-22 ENCOUNTER — APPOINTMENT (OUTPATIENT)
Dept: DISASTER EMERGENCY | Facility: CLINIC | Age: 76
End: 2021-02-22

## 2021-02-23 LAB — SARS-COV-2 N GENE NPH QL NAA+PROBE: NOT DETECTED

## 2021-02-25 ENCOUNTER — OUTPATIENT (OUTPATIENT)
Dept: OUTPATIENT SERVICES | Facility: HOSPITAL | Age: 76
LOS: 1 days | Discharge: HOME | End: 2021-02-25
Payer: MEDICARE

## 2021-02-25 VITALS
DIASTOLIC BLOOD PRESSURE: 72 MMHG | RESPIRATION RATE: 22 BRPM | OXYGEN SATURATION: 96 % | HEART RATE: 69 BPM | SYSTOLIC BLOOD PRESSURE: 129 MMHG

## 2021-02-25 VITALS
RESPIRATION RATE: 26 BRPM | TEMPERATURE: 97 F | HEART RATE: 87 BPM | OXYGEN SATURATION: 98 % | WEIGHT: 250 LBS | HEIGHT: 63 IN | SYSTOLIC BLOOD PRESSURE: 150 MMHG | DIASTOLIC BLOOD PRESSURE: 94 MMHG

## 2021-02-25 DIAGNOSIS — Z90.49 ACQUIRED ABSENCE OF OTHER SPECIFIED PARTS OF DIGESTIVE TRACT: Chronic | ICD-10-CM

## 2021-02-25 DIAGNOSIS — K31.819 ANGIODYSPLASIA OF STOMACH AND DUODENUM WITHOUT BLEEDING: ICD-10-CM

## 2021-02-25 DIAGNOSIS — Z95.5 PRESENCE OF CORONARY ANGIOPLASTY IMPLANT AND GRAFT: Chronic | ICD-10-CM

## 2021-02-25 PROCEDURE — 43235 EGD DIAGNOSTIC BRUSH WASH: CPT

## 2021-02-25 PROCEDURE — 82962 GLUCOSE BLOOD TEST: CPT

## 2021-02-25 RX ORDER — SODIUM CHLORIDE 9 MG/ML
500 INJECTION INTRAMUSCULAR; INTRAVENOUS; SUBCUTANEOUS
Refills: 0 | Status: COMPLETED | OUTPATIENT
Start: 2021-02-25 | End: 2021-02-25

## 2021-02-25 RX ORDER — DRONEDARONE 400 MG/1
1 TABLET, FILM COATED ORAL
Qty: 0 | Refills: 0 | DISCHARGE

## 2021-02-25 RX ORDER — APIXABAN 2.5 MG/1
1 TABLET, FILM COATED ORAL
Qty: 0 | Refills: 0 | DISCHARGE

## 2021-02-25 RX ORDER — DILTIAZEM HCL 120 MG
1 CAPSULE, EXT RELEASE 24 HR ORAL
Qty: 0 | Refills: 0 | DISCHARGE

## 2021-02-25 RX ORDER — DOXAZOSIN MESYLATE 4 MG
1 TABLET ORAL
Qty: 0 | Refills: 0 | DISCHARGE

## 2021-02-25 RX ORDER — MAGNESIUM OXIDE 400 MG ORAL TABLET 241.3 MG
1600 TABLET ORAL
Qty: 0 | Refills: 0 | DISCHARGE

## 2021-02-25 RX ORDER — DAPAGLIFLOZIN 10 MG/1
1 TABLET, FILM COATED ORAL
Qty: 0 | Refills: 0 | DISCHARGE

## 2021-02-25 RX ORDER — LISINOPRIL 2.5 MG/1
1 TABLET ORAL
Qty: 0 | Refills: 0 | DISCHARGE

## 2021-02-25 RX ORDER — METFORMIN HYDROCHLORIDE 850 MG/1
1 TABLET ORAL
Qty: 0 | Refills: 0 | DISCHARGE

## 2021-02-25 RX ADMIN — SODIUM CHLORIDE 30 MILLILITER(S): 9 INJECTION INTRAMUSCULAR; INTRAVENOUS; SUBCUTANEOUS at 12:57

## 2021-02-25 NOTE — ASU DISCHARGE PLAN (ADULT/PEDIATRIC) - NSDISCH_DIET_ENDO_ALL_CORE_FT
Pulmonology and Critical Care Progress Note    Subjective:     Chief Complaint:   Chief Complaint   Patient presents with    Altered mental status      Patient seen and examined at the bedside. The patient underwent surgery, aortobifemoral bypass surgery 10/6/20. Post surgery he was left on the ventilator. Apparently blood loss was about 3.5 L. When he returned to the ICU he was on multiple pressors. No significant endotracheal tube secretions. He has NG tube to low intermittent suction. Dark brownish fluid. He has a left radial arterial line. He has a right subclavian central venous catheter with CVP monitoring. CVP is 8. urine output has improved. Distal pulses are with Dopplers. Wife is at the bedside also. He is on dopamine at 5 mics. Also on fentanyl 150 mics per hour. He does wake up easily. He is getting multiple rounds of potassium and also magnesium. He is to get blood transfusion and Lasix. He had a CT of the abdomen pelvis done over the night. Discussed below. Cardiac catheterization was done on 10/5. Noted. His nuclear medicine cardiac cath showed ischemia of the inferior wall. Modified barium swallow test showed issues of dysphagia.     Review of Systems:  Has chronic Reese catheter placement    Current Facility-Administered Medications   Medication Dose Route Frequency Provider Last Rate Last Dose    midazolam (PF) (VERSED) injection 4 mg  4 mg IntraVENous Q2H PRN Bree Olson MD   4 mg at 10/08/20 1375    potassium chloride 10 mEq in 100 ml IVPB  10 mEq IntraVENous Q1H Bree Olson  mL/hr at 10/08/20 1140 10 mEq at 10/08/20 1140    furosemide (LASIX) injection 20 mg  20 mg IntraVENous BID Bree Olson MD   20 mg at 10/08/20 7367    0.9% sodium chloride infusion 250 mL  250 mL IntraVENous PRN Enrique Magana MD        TPN ADULT - CENTRAL   IntraVENous CONTINUOUS Enrique Magana MD        0.9% sodium chloride infusion 250 mL  250 mL IntraVENous PRN David Olson MD        piperacillin-tazobactam (ZOSYN) 3.375 g in 0.9% sodium chloride (MBP/ADV) 100 mL MBP  3.375 g IntraVENous Q8H David Olson MD 25 mL/hr at 10/08/20 0603 3.375 g at 10/08/20 0603    propofol (DIPRIVAN) 10 mg/mL infusion  5 mcg/kg/min IntraVENous TITRATE David Olson MD 6.3 mL/hr at 10/07/20 1633 20 mcg/kg/min at 10/07/20 1633    fentaNYL (PF) 1,500 mcg/30 mL (50 mcg/mL) infusion  75 mcg/hr IntraVENous TITRATE David Olson MD 2.5 mL/hr at 10/08/20 0833 125 mcg/hr at 10/08/20 1611    0.9% sodium chloride infusion 250 mL  250 mL IntraVENous PRN David Olson MD        PHENYLephrine (ALISTAIR-SYNEPHRINE) 30 mg in 0.9% sodium chloride 250 mL infusion   mcg/min IntraVENous CONTINUOUS David Olson MD 50 mL/hr at 10/07/20 0050 100 mcg/min at 10/07/20 0050    0.9% sodium chloride infusion 250 mL  250 mL IntraVENous PRN David Olson MD        DOPamine (INTROPIN) 400 mg in dextrose 5% 250 mL infusion  5 mcg/kg/min IntraVENous TITRATE David Olson MD 9.8 mL/hr at 10/08/20 0052 5 mcg/kg/min at 10/08/20 0052    0.9% sodium chloride infusion 250 mL  250 mL IntraVENous PRN David Olson MD        vasopressin (VASOSTRICT) 20 Units in 0.9% sodium chloride 100 mL infusion  0.01-0.1 Units/min IntraVENous TITRATE David Olson MD   Stopped at 10/08/20 0500    pantoprazole (PROTONIX) 40 mg in 0.9% sodium chloride 10 mL injection  40 mg IntraVENous Q12H Judith Araujo MD   40 mg at 10/08/20 0849    albuterol-ipratropium (DUO-NEB) 2.5 MG-0.5 MG/3 ML  3 mL Nebulization Q6H PRN Judith Araujo MD        influenza vaccine 2020-21 (4 yrs+)(PF) (FLUCELVAX QUAD) injection 0.5 mL  0.5 mL IntraMUSCular PRIOR TO DISCHARGE Amaury Avila MD   Stopped at 10/07/20 0900    0.9% sodium chloride infusion 250 mL  250 mL IntraVENous PRN David Olson MD        diphenhydrAMINE (BENADRYL) capsule 50 mg  50 mg Oral BID PRN Rudi Li NP   50 mg at 10/03/20 0129    phosphorus (K PHOS NEUTRAL) 250 mg tablet 2 Tab  2 Tab Oral BID Keyla Montiel MD   Stopped at 10/08/20 0900    levoFLOXacin (LEVAQUIN) tablet 500 mg  500 mg Oral Q24H Keyla Montiel MD   Stopped at 10/06/20 1100    predniSONE (DELTASONE) tablet 20 mg  20 mg Oral DAILY WITH BREAKFAST Keyla Montiel MD   Stopped at 10/07/20 0800    oxyCODONE IR (ROXICODONE) tablet 15 mg  15 mg Oral Q4H PRN Keyla Montiel MD   15 mg at 10/06/20 1228    dextrose 5 % - 0.45% NaCl infusion  50 mL/hr IntraVENous CONTINUOUS Wesley, Jonathan Porter MD 50 mL/hr at 10/07/20 1633 50 mL/hr at 10/07/20 1633    metoprolol tartrate (LOPRESSOR) tablet 50 mg  50 mg Oral BID Dilcia Guillen MD   Stopped at 10/06/20 2100    enoxaparin (LOVENOX) injection 40 mg  40 mg SubCUTAneous Q24H Berenice Meigs, MD   Stopped at 10/05/20 2141    aspirin delayed-release tablet 81 mg  81 mg Oral DAILY Nimco Martin NP   Stopped at 10/07/20 0900    acetaminophen (TYLENOL) tablet 650 mg  650 mg Oral Q6H PRN Gisele Lnog NP   650 mg at 20 0950    ondansetron (ZOFRAN) injection 4 mg  4 mg IntraVENous Q8H PRN Gisele Long NP   4 mg at 20 0402    guaiFENesin (ROBITUSSIN) 20 mg/mL oral liquid 400 mg  400 mg Oral Q6H PRN Nimco Martin NP   Stopped at 20 1855            No Known Allergies        Objective:     Blood pressure 115/69, pulse 93, temperature 97.4 °F (36.3 °C), resp. rate 10, height 6' 0.05\" (1.83 m), weight 52.2 kg (115 lb 1.3 oz), SpO2 96 %. Temp (24hrs), Av.9 °F (36.1 °C), Min:94.7 °F (34.8 °C), Max:97.6 °F (36.4 °C)      Intake and Output:  Current Shift: No intake/output data recorded. Last 3 Shifts: 10/06 1901 - 10/08 0700  In: 8750 [I.V.:8000]  Out: 8193 [Urine:3425; Drains:1185]    Physical Exam:     General: Lying in bed, on ventilator. Not sedated at this time. He wakes up easily. And is appropriate. Throat and Neck: Supple. No JVD. He has a right subclavian central line. He has an 8.0 endotracheal tube in place. He has NG tube in the right nares. To low intermittent suction. Dark brownish small amount of fluid is noted in the canister. Lung: Reduced air entry bilaterally. Occasional rhonchi. Heart: S1+S2. No murmurs  Abdomen: Status post surgery. He has a midline incision. He has 2 HELGA drains one on each side. Bowel sounds are hypoactive. Extremities:  He has more pitting edema. Distal pulses of the lower extremities are noted with Dopplers. Left radial arterial line. : Reese catheter in place. Making some urine output. Skin: No cyanosis  Neurologic: Wakes up easily, grossly nonfocal   .  Never dated problem with  Approximately  Lab/Data Review: All lab results for the last 24 hours reviewed.   Recent Results (from the past 24 hour(s))   EKG, 12 LEAD, SUBSEQUENT    Collection Time: 10/07/20  1:39 PM   Result Value Ref Range    Ventricular Rate 90 BPM    Atrial Rate 89 BPM    QRS Duration 76 ms    Q-T Interval 326 ms    QTC Calculation (Bezet) 398 ms    Calculated R Axis 71 degrees    Calculated T Axis 79 degrees    Diagnosis       Accelerated Junctional rhythm  Nonspecific T wave abnormality  Abnormal ECG  When compared with ECG of 26-SEP-2020 14:00,  Junctional rhythm has replaced Sinus rhythm  ST elevation now present in Inferior leads  Nonspecific T wave abnormality has replaced inverted T waves in Inferior   leads  QT has shortened  Confirmed by [facility] [place] [facility] ([id number]) on 10/7/2020 5:64:55 PM     METABOLIC PANEL, BASIC    Collection Time: 10/07/20  1:45 PM   Result Value Ref Range    Sodium 141 136 - 145 mmol/L    Potassium 2.9 (L) 3.5 - 5.1 mmol/L    Chloride 107 97 - 108 mmol/L    CO2 25 21 - 32 mmol/L    Anion gap 9 5 - 15 mmol/L    Glucose 142 (H) 65 - 100 mg/dL    BUN 7 6 - 20 mg/dL    Creatinine 0.72 0.70 - 1.30 mg/dL    BUN/Creatinine ratio 10 (L) 12 - 20      GFR est AA >60 >60 ml/min/1.73m2    GFR est non-AA >60 >60 ml/min/1.73m2    Calcium 7.2 (L) 8.5 - 10.1 mg/dL   TROPONIN I    Collection Time: 10/07/20  2:30 PM   Result Value Ref Range    Troponin-I, Qt. 0.06 (H) <0.05 ng/mL   BLOOD GAS, ARTERIAL    Collection Time: 10/07/20  3:00 PM   Result Value Ref Range    pH 7.496 (H) 7.35 - 7.45      PCO2 28 (L) 35 - 45 mmHg    PO2 138 (H) 75 - 100 mmHg    O2  >95 %    BICARBONATE 23 22 - 26 mmol/L    BASE DEFICIT 1.6 0 - 2 mmol/L    O2 METHOD VENT      FIO2 35.0 %    MODE SIMV      Tidal volume 500      PRESSURE SUPPORT 12.0      EPAP/CPAP/PEEP 5.0      SITE Arterial Line      ENIO'S TEST PASS     HEMATOCRIT    Collection Time: 10/08/20  1:10 AM   Result Value Ref Range    HCT 19.3 (L) 36.6 - 50.3 %   HEMOGLOBIN    Collection Time: 10/08/20  1:10 AM   Result Value Ref Range    HGB 6.8 (L) 12.1 - 17.0 g/dL   CBC WITH AUTOMATED DIFF    Collection Time: 10/08/20  4:00 AM   Result Value Ref Range    WBC 22.4 (H) 4.1 - 11.1 K/uL    RBC 2.29 (L) 4.10 - 5.70 M/uL    HGB 7.0 (L) 12.1 - 17.0 g/dL    HCT 19.9 (L) 36.6 - 50.3 %    MCV 86.9 80.0 - 99.0 FL    MCH 30.6 26.0 - 34.0 PG    MCHC 35.2 30.0 - 36.5 g/dL    RDW 16.2 (H) 11.5 - 14.5 %    PLATELET 946 (L) 069 - 400 K/uL    MPV 10.4 8.9 - 12.9 FL    NRBC 4.2 (H) 0  WBC    ABSOLUTE NRBC 0.94 (H) 0.00 - 0.01 K/uL    NEUTROPHILS 80 (H) 32 - 75 %    ABS. NEUTROPHILS 19.0 (H) 1.8 - 8.0 K/UL    LYMPHOCYTES 11 (L) 12 - 49 %    MONOCYTES 11 5 - 13 %    EOSINOPHILS 0 0 - 7 %    BASOPHILS 0 0 - 1 %    IMMATURE GRANULOCYTES 1 (H) 0.0 - 0.5 %    ABS. LYMPHOCYTES 2.6 0.8 - 3.5 K/UL    ABS. MONOCYTES 2.4 (H) 0.0 - 1.0 K/UL    ABS. EOSINOPHILS 0.0 0.0 - 0.4 K/UL    ABS. BASOPHILS 0.0 0.0 - 0.1 K/UL    ABS. IMM.  GRANS. 0.2 (H) 0.00 - 0.04 K/UL    DF AUTOMATED     METABOLIC PANEL, BASIC    Collection Time: 10/08/20  4:00 AM   Result Value Ref Range    Sodium 142 136 - 145 mmol/L    Potassium 2.6 (LL) 3.5 - 5.1 mmol/L    Chloride 109 (H) 97 - 108 mmol/L    CO2 24 21 - 32 mmol/L    Anion gap 9 5 - 15 mmol/L    Glucose 104 (H) 65 - 100 mg/dL    BUN 7 6 - 20 mg/dL Creatinine 0.66 (L) 0.70 - 1.30 mg/dL    BUN/Creatinine ratio 11 (L) 12 - 20      GFR est AA >60 >60 ml/min/1.73m2    GFR est non-AA >60 >60 ml/min/1.73m2    Calcium 7.1 (L) 8.5 - 10.1 mg/dL   BLOOD GAS, ARTERIAL    Collection Time: 10/08/20  5:09 AM   Result Value Ref Range    pH 7.46 (H) 7.35 - 7.45      PCO2 31 (L) 35 - 45 mmHg    PO2 47 (LL) 75 - 100 mmHg    O2 SAT 87 (L) >95 %    BICARBONATE 23 22 - 26 mmol/L    BASE DEFICIT 1.2 0 - 2 mmol/L    O2 METHOD VENT      FIO2 28 %    MODE SIMV      SPONTANEOUS RATE PENDING     PRESSURE SUPPORT 12      EPAP/CPAP/PEEP 5      Sample source Arterial      SITE Right Radial      ENIO'S TEST Positive      Critical value read back CALLED TO MEL CALLEJAS RN BY DOUG SCHULTE CRT      chest X-ray      CTA ABD ART W RUNOFF W WO CONT   Final Result   IMPRESSION:   1. Interval postoperative changes from aorto-bifemoral artery bypass. The bypass   graft is patent. The proximal and distal anastomoses are patent. 2. Patent left renal artery. Wedge shaped perfusion defect in the inferior pole   of the left kidney consistent with parenchymal infarct. 3. Patent diminutive right renal artery. Patchy parenchymal perfusion, improved   compared to the preoperative study from 9/29/2020.   4. Patent right and left lower extremity arteries without occlusion or   significant stenosis. 5. Stable mild short segment focal atherosclerotic dissection flap at the right   anterolateral aspect of the descending thoracic aorta at the T10 level. 6. Postoperative changes from splenectomy. Approximately 6.1 cm TR by 2.1 cm AP   by 8.4 cm CC hematoma in the splenic bed without findings of active contrast   extravasation. A surgical drain is present in the splenectomy bed extending to   beneath the left hemidiaphragm. 7. Expected pneumoperitoneum given recent surgery. Bilateral retroperitoneal   low-attenuation stranding and/or small volume fluid.  Diffuse mesenteric   stranding and/or small volume fluid in the  pelvis. 8. Distended gallbladder with cholelithiasis and prominent common bile duct as   seen similarly on the preoperative study from 9/29/2020/.   9. Diffuse anasarca. Diffuse subcutaneous edema of the lower extremities, right   greater than left. Negative for right and left lower extremity DVT. XR CHEST PORT   Final Result   IMPRESSION:   1. The patient's life support lines and tubes are unchanged and are in   satisfactory position. 2.  There is increasing hypoventilation and atelectasis within the lung bases   greater on the left compared to the right. 3.  There also is increasing hazy opacity along the lower left hemithorax most   compatible with a layering moderate-sized left pleural effusion. XR CHEST PORT   Final Result   IMPRESSION:   1. The endotracheal tube is in satisfactory position. 2.  There is a hypoventilation of the lung bases with lung base atelectasis. The   remainder of the lung parenchyma is relatively clear. There is vascular pruning   within the upper lobes suspect for underlying emphysema. 3.  There is free intra-abdominal air likely a function of recent abdominal   surgery. XR CHEST PORT   Final Result   IMPRESSION: Postoperative findings, postprocedural findings, medical devices as   described. Minimal right lung base atelectasis may be residual adhesive   intraoperative atelectasis. Ronold Kanner XR ABD (KUB)   Final Result   Findings/impression:      Numerous surgical clips project over the left upper quadrant and mid abdomen. Midline skin staples noted. Drainage tubes project over the pelvis, lower   abdomen and left upper quadrant. Enteric tube tip projects over the proximal   stomach. No unexpected radiopaque foreign bodies are identified. Oral contrast material throughout the colon. Bowel gas pattern is   nonobstructive. No acute osseous abdomen identified.             XR SWALLOW FUNC VIDEO   Final Result   Impression: Significant You should resume your previous diet unless otherwise instructed by your doctor. Do not drink alcoholic beverages for the next 24 hours. hypopharyngeal residue. Episodes of penetration with   all consistencies. Episode of flash aspiration after water wash. XR CHEST PORT   Final Result   Impression: Underexpanded lungs with bibasilar atelectasis. CTA ABDOMEN PELV W CONT   Final Result   IMPRESSION: Mid abdominal aortic occlusion, with threatened right kidney and   fairly good collateralization to the legs. This could be causing a mesenteric   steal phenomenon, however      MRI BRAIN WO CONT   Final Result   Impression: No evidence of acute hemorrhage, mass or infarct. No sinusitis or   mastoiditis. Please see above discussion. CTA CHEST W OR W WO CONT   Final Result   IMPRESSION: Limited by breathing motion. 1. No large pulmonary arterial filling defect. 2. Bronchial thickening with right greater than left lower lung atelectasis or   pneumonia/pneumonitis. Bubbly debris in the trachea. 3. Atherosclerosis. Abrupt discontinuation of contrast in the aorta on the very   inferior slices. Contrast is seen in the celiac artery and SMA and the left   renal artery. The right kidney demonstrates decreased attenuation compared to   the left concerning for medical renal disease to include ischemia. Recommend CTA   abdomen/pelvis. Called report to charge nurse Carrie Diaz at 9:05 PM 9/27/2020.   4. Cholelithiasis within distended gallbladder. 5. Dilated small bowel. 6. Other findings as above. XR CHEST PORT   Final Result   IMPRESSION:      No airspace disease in the lungs. Follow-up as clinically indicated. CT HEAD WO CONT   Final Result   Impression: Unremarkable head CT without contrast.  No infarct, mass, or    hemorrhage. Please see full report. XR CHEST SNGL V   Final Result   Impression: No diagnostic abnormality.             US RETROPERITONEUM COMP    (Results Pending)       CT Results  (Last 48 hours)               10/08/20 0521  CTA ABD ART W RUNOFF W WO CONT Final result    Impression:  IMPRESSION: 1.Interval postoperative changes from aorto-bifemoral artery bypass. The bypass   graft is patent. The proximal and distal anastomoses are patent. 2. Patent left renal artery. Wedge shaped perfusion defect in the inferior pole   of the left kidney consistent with parenchymal infarct. 3. Patent diminutive right renal artery. Patchy parenchymal perfusion, improved   compared to the preoperative study from 9/29/2020.   4. Patent right and left lower extremity arteries without occlusion or   significant stenosis. 5. Stable mild short segment focal atherosclerotic dissection flap at the right   anterolateral aspect of the descending thoracic aorta at the T10 level. 6. Postoperative changes from splenectomy. Approximately 6.1 cm TR by 2.1 cm AP   by 8.4 cm CC hematoma in the splenic bed without findings of active contrast   extravasation. A surgical drain is present in the splenectomy bed extending to   beneath the left hemidiaphragm. 7. Expected pneumoperitoneum given recent surgery. Bilateral retroperitoneal   low-attenuation stranding and/or small volume fluid. Diffuse mesenteric   stranding and/or small volume fluid in the  pelvis. 8. Distended gallbladder with cholelithiasis and prominent common bile duct as   seen similarly on the preoperative study from 9/29/2020/.   9. Diffuse anasarca. Diffuse subcutaneous edema of the lower extremities, right   greater than left. Negative for right and left lower extremity DVT. Narrative:  INDICATION: Postop aortobifemoral bypass, evaluate left renal artery and distal   leg arterial system       TECHNIQUE: CTA performed from the level of the [place] through the feet after   bolus administration of 100 mL of Isovue-370 intravenous. Images are reviewed   and processed at a workstation according to the CT angiogram protocol with 3-D   and/or MIP post processing imaging generated.        Automated mA/kV exposure control was utilized and patient examination was   performed in strict accordance with principles of ALARA. COMPARISON: CTA abdomen and pelvis, 9/29/2020       FINDINGS:    Vascular: The imaged portion of the descending thoracic aorta is patent and normal in   caliber. There is an unchanged mild short segment focal atherosclerotic   dissection flap at the 10:00 position of the descending thoracic aorta at the   T10 level. The suprarenal abdominal aorta is patent with advanced atherosclerosis causing   mild luminal narrowing. The celiac, splenic, common hepatic, gastroduodenal, and intrahepatic arteries   are patent. The superior mesenteric artery and distal arborization is patent. The inferior mesenteric artery is occluded at its origin- an expected finding   given aortobifemoral bypass. The left renal artery is patent. The right renal artery is diminutive and   patent, as seen similarly on the prior study. Interval postoperative changes from aorto-bifemoral artery bypass. The bypass   graft is patent. The proximal and distal anastomoses are patent. The native   juxtarenal and infrarenal abdominal aorta and right and left iliac arteries are   occluded. The right common femoral, profunda femoral, superficial femoral, popliteal,   anterior tibial, posterior tibial, and peroneal arteries are patent. The   dorsalis pedis and posterior tibial arteries of the right foot are patent. Left common femoral and profunda femoral arteries are patent. The left   superficial femoral artery is patent with with up to 25-30% stenosis distally. The left popliteal artery is patent. The left anterior tibial, peroneal,   posterior tibial arteries are patent. The left dorsalis pedis and posterior   tibial arteries the foot are patent. The bilateral tibioperoneal, popliteal, femoral, deep femoral, common femoral,   iliac veins and IVC are patent. The portal, superior mesenteric, and splenic   veins are patent.        There are left greater than right pleural effusions and left greater than right   basilar lung atelectasis. There is pneumoperitoneum, probably in the upper abdomen with scattered locules   of gas within the mid abdomen and pelvis. There are postoperative changes from splenectomy with multiple surgical clips in   the splenectomy bed. There is approximately 6.1 cm TR by 2.1 cm AP by 8.4 cm CC   hematoma in the splenic bed without findings of active contrast extravasation. A   surgical drain is present in the splenectomy bed extending to beneath the left   hemidiaphragm. Multiple surgical clips are present in the upper left abdomen adjacent to the   suprarenal aorta and adjacent to the aortobifemoral bypass. The liver is normal in appearance given limitations of arterial contrast   enhanced timing. The gallbladder is distended. There is cholelithiasis. The common bile duct is   dilated measuring up to 9 mm in diameter down to the ampulla. No   choledocholithiasis is identified. The pancreas demonstrates normal enhancement. There are areas of peripancreatic   stranding and low-attenuation fluid. The adrenal glands are normal in appearance. Again seen is chronic right renal atrophy with patchy areas of parenchymal   enhancement, improved compared to the preoperative study. There is a wedge   shaped perfusion defect in the inferior pole of the left kidney consistent with   infarct. No hydroureteronephrosis. The stomach is decompressed with enteric tube in place. The small and large   bowel is nonobstructed. There is bilateral retroperitoneal low-attenuation   stranding and/or small volume fluid. There is diffuse mesenteric stranding   and/or small volume fluid in the pelvis. A Reese catheter is present within the urinary bladder. Gas is present within   the urinary bladder, presumably related to the indwelling Reese catheter. Diffuse anasarca.  There is diffuse fatty atrophy of the posterior compartments   of the thighs and of the lower legs. There is diffuse subcutaneous edema of the   lower extremities, right greater than left. No osseous blastic or lytic lesion. Assessment:     1. Acute respiratory failure, now back on the ventilator after aortobifemoral bypass surgery on 10/6/2020.  2.  Acute metabolic encephalopathy, resolved  3. Aspiration pneumonia  4. Severe peripheral vascular disease  5. UTI  6. Hypertension  7. Familial polymyositis    Plan:     1. Hypoxic respiratory failure. Patient had improved and had been on room air. On 10/6/2020 he underwent aortobifemoral bypass surgery. Post surgery he was left on the ventilator. EBL was 3.5 L. When he arrived to the ICU he was on multiple pressors. He is now off pressors. On dopamine at 5 mics. He is sedated with fentanyl at 150 mics. However wakes up easily and appears to be appropriate. Small endotracheal tube secretions. His chest x-ray this morning shows increasing opacity left side, most likely pleural effusion. Arterial blood gases this morning showed 7.46/31/47. On 28% ventilator. However he has good saturations with a good Plath. We will simply repeat blood gases. He has a left radial arterial line. Vent settings include 500/28%/IMV10/PS12/5. Vent mechanics are reasonable. Minimal endotracheal tube secretions. Assess for weaning later today or most likely in the morning. However he does not appear to be ready for extubation today. He is getting multiple rounds of potassium as well as magnesium. He is to get blood transfusion and started on diuretics which I agree with. Nephrology input noted. 2.  Aspiration pneumonia:  He is currently on the ventilator. He is on Zosyn and Levaquin. Also on prednisone. On nebulized bronchodilator treatments. Modified barium swallow showed penetration and significant dysphagia.     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 Patient may require PEG tube near future. He had been on puréed diet with nectar thickened liquids. 3.  Metabolic encephalopathy. He has a progressive neurologic disorder. Brain MRI negative    Further recommendation per Neurology. 4.  Dehydration and metabolic acidosis. Monitor kidney function after surgery. 5.  Possible urinary tract infection. 6.  Hypertension. Dyslipidemia and severe peripheral vascular disease. 7.  Myocardial ischemia:  He underwent nuclear stress testing which showed significant inferior wall ischemia. He underwent cardiac catheterization on 10/5/2020 which showed nonobstructive coronary artery disease. Medical management is planned at this time.      Thank you for allowing me to participate in the care of this patient. I will follow the patient closely with you. DVT and GI prophylaxis. He is on Lovenox and Protonix IV. If he is not extubated by tomorrow morning consider nutrition. Repeat all labs in the morning. Replenish electrolytes as needed. He has NG tube to low intermittent suction. Holding nutrition at this time. Discussed with the wife at the bedside. Patient is critically ill at this time. Discussed with the nursing team.  More than 50 minutes spent with patient care.   Thank you for involving me in the care of this patient      Ivory Vega MD  [facility]  10/8/2020

## 2021-02-25 NOTE — PRE-ANESTHESIA EVALUATION ADULT - NSANTHOSAYNRD_GEN_A_CORE
No. ANNABEL screening performed.  STOP BANG Legend: 0-2 = LOW Risk; 3-4 = INTERMEDIATE Risk; 5-8 = HIGH Risk

## 2021-02-25 NOTE — ASU PATIENT PROFILE, ADULT - PATIENT'S HEIGHT AND WEIGHT RECORDED IN THE VITAL SIGNS FLOWSHEET
Pt was brought in after overdosing on hydroxizine  Pt states she took 60 50mg tablets in attempts to kill herself  Pt denies current suicidal thoughts and states "I've been in a funky mood the past couple of days"  Pt states she has a history of forcing herself to throw up "because I was so anxious"  She reports that she had a recent medication change this week from her PCP who prescribes her medications  She deneis HI/MARCUS/SHANA  Pt signed 201      Pt has capital [employer],  placed to [phone] where Maria Esther Langford approved 7 days from 10/9-10/15/2017 with reference number [id number] yes

## 2021-03-13 ENCOUNTER — RX RENEWAL (OUTPATIENT)
Age: 76
End: 2021-03-13

## 2021-03-18 ENCOUNTER — APPOINTMENT (OUTPATIENT)
Dept: ENDOCRINOLOGY | Facility: CLINIC | Age: 76
End: 2021-03-18
Payer: MEDICARE

## 2021-03-18 ENCOUNTER — APPOINTMENT (OUTPATIENT)
Dept: CARDIOLOGY | Facility: CLINIC | Age: 76
End: 2021-03-18
Payer: MEDICARE

## 2021-03-18 ENCOUNTER — NON-APPOINTMENT (OUTPATIENT)
Age: 76
End: 2021-03-18

## 2021-03-18 VITALS
WEIGHT: 254 LBS | SYSTOLIC BLOOD PRESSURE: 128 MMHG | DIASTOLIC BLOOD PRESSURE: 72 MMHG | HEART RATE: 93 BPM | BODY MASS INDEX: 46.46 KG/M2 | OXYGEN SATURATION: 95 % | RESPIRATION RATE: 17 BRPM

## 2021-03-18 VITALS
WEIGHT: 254 LBS | OXYGEN SATURATION: 95 % | SYSTOLIC BLOOD PRESSURE: 125 MMHG | HEIGHT: 62 IN | BODY MASS INDEX: 46.74 KG/M2 | HEART RATE: 93 BPM | DIASTOLIC BLOOD PRESSURE: 72 MMHG | TEMPERATURE: 98.2 F

## 2021-03-18 DIAGNOSIS — R94.31 ABNORMAL ELECTROCARDIOGRAM [ECG] [EKG]: ICD-10-CM

## 2021-03-18 DIAGNOSIS — N28.9 DISORDER OF KIDNEY AND URETER, UNSPECIFIED: ICD-10-CM

## 2021-03-18 DIAGNOSIS — K92.2 GASTROINTESTINAL HEMORRHAGE, UNSPECIFIED: ICD-10-CM

## 2021-03-18 DIAGNOSIS — L81.2 FRECKLES: ICD-10-CM

## 2021-03-18 PROCEDURE — 99214 OFFICE O/P EST MOD 30 MIN: CPT

## 2021-03-18 PROCEDURE — 82962 GLUCOSE BLOOD TEST: CPT

## 2021-03-18 PROCEDURE — 93000 ELECTROCARDIOGRAM COMPLETE: CPT

## 2021-03-18 RX ORDER — METFORMIN HYDROCHLORIDE 500 MG/1
500 TABLET, COATED ORAL TWICE DAILY
Qty: 180 | Refills: 1 | Status: DISCONTINUED | COMMUNITY
End: 2021-03-18

## 2021-03-18 RX ORDER — DAPAGLIFLOZIN 10 MG/1
10 TABLET, FILM COATED ORAL
Qty: 90 | Refills: 1 | Status: DISCONTINUED | COMMUNITY
Start: 2021-01-14 | End: 2021-03-18

## 2021-03-18 RX ORDER — ASPIRIN ENTERIC COATED TABLETS 81 MG 81 MG/1
81 TABLET, DELAYED RELEASE ORAL DAILY
Qty: 90 | Refills: 2 | Status: ACTIVE | COMMUNITY
Start: 2021-03-18 | End: 1900-01-01

## 2021-03-18 NOTE — HISTORY OF PRESENT ILLNESS
[FreeTextEntry1] : Ms. HEAD is a 75 year  old  female who  returns today in follow up with regard to a history of type 2 dm.   She did have 2 coronary stents placed in January and then had gastric ulcer requiring iron transfusions Now on Plavix and Eliquis. .Current dm medication include Metformin 500mg once daily and Farxiga 5 mg. Pt states she has not seen an improvement on Metformin. HGM of late has shown values to be running in the 180s in the AM and after lunch in the 170-180 range   .  \par  There has been no significant hypoglycemia.\par Denies any chest pain, sob, neurologic or ophthalmologic complaints. She  too denies any new podiatric concerns. She  is up to date with his ophthalmologic visit- last week. \par GFR 34\par Additional medical history includes that of hypertension, hyperlipidemia, and obesity along with with a-fib and vitamin d deficiency Is on D3 2,000 iu daily.\par Did have 2nd bout of kidney stone-dx as uric acid  and is on potassium citrate.\par States she had small bleeding in the colon that was alleviated after stopping Eloquis but has since continued taking.\par \par

## 2021-03-18 NOTE — HISTORY OF PRESENT ILLNESS
[FreeTextEntry1] : This is a 75 year old lady with a  PMHx of CVA, CAD s/p x2 stent 1/2020 on clopidogrel, Afib, DM, GIB, HTN, HLD, WILLA who presents today for routine follow-up. Patient states that she is feeling good and has no complaints for today. Patient states that she has been following closely with GI (Dr. Riley) for a small upper GI bleed. Patient had the full workup and patient states that the doctor said that it was so small they were not able to visualize on endoscopy. Patient hads a follow up with Hematologist in May 2021. Patient was seen by Dr. Nuñez as her DM has not been controlled recently. She explains that her HgBA1c was %7.8 today. Patient was started on Tradjenta and taken off Metformin and farxiga. Patient reports worsening anxiety.  Patient denies dyspnea, palpitations, chest pain, nausea, vomiting, dizziness and lightheadedness.\par

## 2021-03-19 LAB — GLUCOSE BLDC GLUCOMTR-MCNC: 240

## 2021-04-05 ENCOUNTER — NON-APPOINTMENT (OUTPATIENT)
Age: 76
End: 2021-04-05

## 2021-04-07 RX ORDER — BLOOD-GLUCOSE METER
W/DEVICE KIT MISCELLANEOUS
Qty: 1 | Refills: 0 | Status: ACTIVE | COMMUNITY
Start: 2021-04-07 | End: 1900-01-01

## 2021-05-01 LAB
ALBUMIN SERPL ELPH-MCNC: 4.4 G/DL
ALP BLD-CCNC: 87 U/L
ALT SERPL-CCNC: 15 U/L
ANION GAP SERPL CALC-SCNC: 15 MMOL/L
ANION GAP SERPL CALC-SCNC: 19 MMOL/L
AST SERPL-CCNC: 23 U/L
BASOPHILS # BLD AUTO: 0.02 K/UL
BASOPHILS # BLD AUTO: 0.02 K/UL
BASOPHILS NFR BLD AUTO: 0.2 %
BASOPHILS NFR BLD AUTO: 0.3 %
BILIRUB DIRECT SERPL-MCNC: 0.1 MG/DL
BILIRUB INDIRECT SERPL-MCNC: 0.1 MG/DL
BILIRUB SERPL-MCNC: 0.3 MG/DL
BUN SERPL-MCNC: 43 MG/DL
BUN SERPL-MCNC: 50 MG/DL
CALCIUM SERPL-MCNC: 10 MG/DL
CALCIUM SERPL-MCNC: 9.9 MG/DL
CHLORIDE SERPL-SCNC: 103 MMOL/L
CHLORIDE SERPL-SCNC: 103 MMOL/L
CHOLEST SERPL-MCNC: 137 MG/DL
CK SERPL-CCNC: 94 U/L
CO2 SERPL-SCNC: 20 MMOL/L
CO2 SERPL-SCNC: 23 MMOL/L
CREAT SERPL-MCNC: 1.38 MG/DL
CREAT SERPL-MCNC: 1.54 MG/DL
EOSINOPHIL # BLD AUTO: 0.01 K/UL
EOSINOPHIL # BLD AUTO: 0.05 K/UL
EOSINOPHIL NFR BLD AUTO: 0.1 %
EOSINOPHIL NFR BLD AUTO: 0.7 %
ESTIMATED AVERAGE GLUCOSE: 148 MG/DL
ESTIMATED AVERAGE GLUCOSE: 183 MG/DL
FERRITIN SERPL-MCNC: 26 NG/ML
GLUCOSE SERPL-MCNC: 111 MG/DL
GLUCOSE SERPL-MCNC: 151 MG/DL
HBA1C MFR BLD HPLC: 6.8 %
HBA1C MFR BLD HPLC: 8 %
HCT VFR BLD CALC: 31.6 %
HCT VFR BLD CALC: 41 %
HDLC SERPL-MCNC: 68 MG/DL
HGB BLD-MCNC: 12.7 G/DL
HGB BLD-MCNC: 9.2 G/DL
IMM GRANULOCYTES NFR BLD AUTO: 0.3 %
IMM GRANULOCYTES NFR BLD AUTO: 0.4 %
IRON SATN MFR SERPL: 10 %
IRON SERPL-MCNC: 41 UG/DL
LDLC SERPL CALC-MCNC: 53 MG/DL
LDLC SERPL DIRECT ASSAY-MCNC: 54 MG/DL
LYMPHOCYTES # BLD AUTO: 1.77 K/UL
LYMPHOCYTES # BLD AUTO: 1.86 K/UL
LYMPHOCYTES NFR BLD AUTO: 17.7 %
LYMPHOCYTES NFR BLD AUTO: 24.4 %
MAGNESIUM SERPL-MCNC: 1.7 MG/DL
MAGNESIUM SERPL-MCNC: 1.8 MG/DL
MAN DIFF?: NORMAL
MAN DIFF?: NORMAL
MCHC RBC-ENTMCNC: 23.5 PG
MCHC RBC-ENTMCNC: 27.9 PG
MCHC RBC-ENTMCNC: 29.1 GM/DL
MCHC RBC-ENTMCNC: 31 GM/DL
MCV RBC AUTO: 80.6 FL
MCV RBC AUTO: 89.9 FL
MONOCYTES # BLD AUTO: 0.5 K/UL
MONOCYTES # BLD AUTO: 0.66 K/UL
MONOCYTES NFR BLD AUTO: 6.6 %
MONOCYTES NFR BLD AUTO: 6.6 %
NEUTROPHILS # BLD AUTO: 5.17 K/UL
NEUTROPHILS # BLD AUTO: 7.51 K/UL
NEUTROPHILS NFR BLD AUTO: 67.6 %
NEUTROPHILS NFR BLD AUTO: 75.1 %
NONHDLC SERPL-MCNC: 69 MG/DL
PLATELET # BLD AUTO: 236 K/UL
PLATELET # BLD AUTO: 348 K/UL
POTASSIUM SERPL-SCNC: 3.8 MMOL/L
POTASSIUM SERPL-SCNC: 4.4 MMOL/L
PROT SERPL-MCNC: 7.8 G/DL
RBC # BLD: 3.92 M/UL
RBC # BLD: 4.56 M/UL
RBC # FLD: 15.3 %
RBC # FLD: 16.3 %
SODIUM SERPL-SCNC: 141 MMOL/L
SODIUM SERPL-SCNC: 142 MMOL/L
TIBC SERPL-MCNC: 396 UG/DL
TRANSFERRIN SERPL-MCNC: 299 MG/DL
TRIGL SERPL-MCNC: 76 MG/DL
UIBC SERPL-MCNC: 355 UG/DL
WBC # FLD AUTO: 10 K/UL
WBC # FLD AUTO: 7.63 K/UL

## 2021-05-06 ENCOUNTER — APPOINTMENT (OUTPATIENT)
Dept: ENDOCRINOLOGY | Facility: CLINIC | Age: 76
End: 2021-05-06
Payer: MEDICARE

## 2021-05-06 VITALS
WEIGHT: 257 LBS | HEART RATE: 85 BPM | OXYGEN SATURATION: 97 % | SYSTOLIC BLOOD PRESSURE: 122 MMHG | BODY MASS INDEX: 47.01 KG/M2 | DIASTOLIC BLOOD PRESSURE: 80 MMHG | RESPIRATION RATE: 16 BRPM

## 2021-05-06 PROCEDURE — 83036 HEMOGLOBIN GLYCOSYLATED A1C: CPT | Mod: QW

## 2021-05-06 PROCEDURE — 99214 OFFICE O/P EST MOD 30 MIN: CPT

## 2021-05-25 ENCOUNTER — NON-APPOINTMENT (OUTPATIENT)
Age: 76
End: 2021-05-25

## 2021-06-07 ENCOUNTER — APPOINTMENT (OUTPATIENT)
Dept: PULMONOLOGY | Facility: CLINIC | Age: 76
End: 2021-06-07
Payer: MEDICARE

## 2021-06-07 VITALS
OXYGEN SATURATION: 93 % | DIASTOLIC BLOOD PRESSURE: 67 MMHG | SYSTOLIC BLOOD PRESSURE: 147 MMHG | TEMPERATURE: 97.8 F | HEART RATE: 88 BPM

## 2021-06-07 DIAGNOSIS — R06.83 SNORING: ICD-10-CM

## 2021-06-07 DIAGNOSIS — J47.9 BRONCHIECTASIS, UNCOMPLICATED: ICD-10-CM

## 2021-06-07 PROCEDURE — 99214 OFFICE O/P EST MOD 30 MIN: CPT

## 2021-06-10 PROBLEM — J47.9 BRONCHIECTASIS: Status: ACTIVE | Noted: 2021-06-10

## 2021-06-10 NOTE — ASSESSMENT
[FreeTextEntry1] : Repeat lung function \par HSS to wait until acute issues resolve. \par No indication for f/u CT

## 2021-06-10 NOTE — DISCUSSION/SUMMARY
[FreeTextEntry1] : Minimal basilar bronchiectasis. \par Pulmonary granuloma not of clinical concern.\par Patient clinically stable.\par Possible obstructive sleep apnea syndrome.\par Restrictive pulmonary physiology in the past likely related to body habitus.
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
## 2021-06-10 NOTE — PHYSICAL EXAM
[1+ Pitting] : 1+ pitting [Supple] : supple [No JVD] : no jvd [Normal S1, S2] : normal s1, s2 [No Murmurs] : no murmurs [Clear to Auscultation Bilaterally] : clear to auscultation bilaterally [Normal to Percussion] : normal to percussion [Benign] : benign [No Clubbing] : no clubbing [No Cyanosis] : no cyanosis [No Edema] : no edema [TextBox_2] : Overweight
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
## 2021-06-18 LAB — HBA1C MFR BLD HPLC: 7.9

## 2021-06-19 NOTE — HISTORY OF PRESENT ILLNESS
[FreeTextEntry1] : Ms. HEAD is a 75 year  old  female who  returns today in follow up with regard to a history of type 2 dm.   She did have 2 coronary stents placed in January and then had gastric ulcer requiring iron transfusions Now on Plavix and Eliquis. .Current dm medication include Metformin 1000 mg  bid and Farxiga 5 mg.  HGM of late has shown values to be running in the          .  \par  There has been no significant hypoglycemia.\par Denies any chest pain, sob, neurologic or ophthalmologic complaints. She  too denies any new podiatric concerns. She  is up to date with his ophthalmologic visit- last week. \par Additional medical history includes that of hypertension, hyperlipidemia, and obesity along with with a-fib and vitamin d deficiency Is on D3 2,000 iu daily.\par Did have 2nd bout of kidney stone-dx as uric acid  and is on potassium citrate.\par \par \par

## 2021-07-04 ENCOUNTER — RX RENEWAL (OUTPATIENT)
Age: 76
End: 2021-07-04

## 2021-07-15 ENCOUNTER — APPOINTMENT (OUTPATIENT)
Dept: CARDIOLOGY | Facility: CLINIC | Age: 76
End: 2021-07-15
Payer: MEDICARE

## 2021-07-15 ENCOUNTER — NON-APPOINTMENT (OUTPATIENT)
Age: 76
End: 2021-07-15

## 2021-07-15 VITALS
HEART RATE: 82 BPM | SYSTOLIC BLOOD PRESSURE: 150 MMHG | TEMPERATURE: 99 F | HEIGHT: 62 IN | DIASTOLIC BLOOD PRESSURE: 70 MMHG | BODY MASS INDEX: 47.29 KG/M2 | WEIGHT: 257 LBS | OXYGEN SATURATION: 96 %

## 2021-07-15 VITALS — SYSTOLIC BLOOD PRESSURE: 130 MMHG | DIASTOLIC BLOOD PRESSURE: 60 MMHG

## 2021-07-15 DIAGNOSIS — D64.9 ANEMIA, UNSPECIFIED: ICD-10-CM

## 2021-07-15 DIAGNOSIS — R93.89 ABNORMAL FINDINGS ON DIAGNOSTIC IMAGING OF OTHER SPECIFIED BODY STRUCTURES: ICD-10-CM

## 2021-07-15 DIAGNOSIS — R06.00 DYSPNEA, UNSPECIFIED: ICD-10-CM

## 2021-07-15 PROCEDURE — 99214 OFFICE O/P EST MOD 30 MIN: CPT

## 2021-07-15 PROCEDURE — 93000 ELECTROCARDIOGRAM COMPLETE: CPT

## 2021-07-15 NOTE — HISTORY OF PRESENT ILLNESS
[FreeTextEntry1] : This is a 76 year old lady with a PMHx of CVA, CAD s/p x2 stent 1/2020 on clopidogrel, Afib, DM, GIB, HTN, HLD, WILLA who presents today for routine follow-up. Patient is accompanied by her daughter today. Patient states that she is feeling good and has no complaints at this time. Patient is following closely with hematologist and receiving iron infusions. Patient was seen by GI and had colonoscopy. Patient still taking Eliquis and ASA. Patient was seen by Dr. Nuñez recently, and is scheduled for CT Abdomen to evaluate adrenal nodules. Patient palpitations, chest pain, nausea, vomiting, dizziness and lightheadedness.pt with ocassional dyspnea \par

## 2021-07-15 NOTE — PHYSICAL EXAM

## 2021-07-18 LAB
25(OH)D3 SERPL-MCNC: 36.7 NG/ML
ALBUMIN SERPL ELPH-MCNC: 4.6 G/DL
ALP BLD-CCNC: 110 U/L
ALT SERPL-CCNC: 27 U/L
ANION GAP SERPL CALC-SCNC: 15 MMOL/L
AST SERPL-CCNC: 28 U/L
BASOPHILS # BLD AUTO: 0.04 K/UL
BASOPHILS NFR BLD AUTO: 0.4 %
BILIRUB DIRECT SERPL-MCNC: 0.1 MG/DL
BILIRUB INDIRECT SERPL-MCNC: 0.3 MG/DL
BILIRUB SERPL-MCNC: 0.4 MG/DL
BUN SERPL-MCNC: 35 MG/DL
CALCIUM SERPL-MCNC: 9.8 MG/DL
CHLORIDE SERPL-SCNC: 102 MMOL/L
CHOLEST SERPL-MCNC: 158 MG/DL
CK SERPL-CCNC: 188 U/L
CO2 SERPL-SCNC: 27 MMOL/L
CREAT SERPL-MCNC: 1.47 MG/DL
EOSINOPHIL # BLD AUTO: 0.08 K/UL
EOSINOPHIL NFR BLD AUTO: 0.9 %
FERRITIN SERPL-MCNC: 94 NG/ML
FOLATE SERPL-MCNC: >20 NG/ML
GLUCOSE SERPL-MCNC: 86 MG/DL
HCT VFR BLD CALC: 41.9 %
HDLC SERPL-MCNC: 57 MG/DL
HGB BLD-MCNC: 13.2 G/DL
IMM GRANULOCYTES NFR BLD AUTO: 0.4 %
IRON SATN MFR SERPL: 19 %
IRON SERPL-MCNC: 59 UG/DL
LDLC SERPL CALC-MCNC: 75 MG/DL
LYMPHOCYTES # BLD AUTO: 2.25 K/UL
LYMPHOCYTES NFR BLD AUTO: 25.1 %
MAGNESIUM SERPL-MCNC: 1.5 MG/DL
MAN DIFF?: NORMAL
MCHC RBC-ENTMCNC: 29.1 PG
MCHC RBC-ENTMCNC: 31.5 GM/DL
MCV RBC AUTO: 92.5 FL
MONOCYTES # BLD AUTO: 0.7 K/UL
MONOCYTES NFR BLD AUTO: 7.8 %
NEUTROPHILS # BLD AUTO: 5.84 K/UL
NEUTROPHILS NFR BLD AUTO: 65.4 %
NONHDLC SERPL-MCNC: 100 MG/DL
NT-PROBNP SERPL-MCNC: 301 PG/ML
PLATELET # BLD AUTO: 211 K/UL
POTASSIUM SERPL-SCNC: 3.7 MMOL/L
PROT SERPL-MCNC: 7.7 G/DL
RBC # BLD: 4.53 M/UL
RBC # FLD: 17.2 %
SODIUM SERPL-SCNC: 143 MMOL/L
T4 FREE SERPL-MCNC: 1.5 NG/DL
TIBC SERPL-MCNC: 317 UG/DL
TRANSFERRIN SERPL-MCNC: 260 MG/DL
TRIGL SERPL-MCNC: 125 MG/DL
TSH SERPL-ACNC: 2 UIU/ML
UIBC SERPL-MCNC: 258 UG/DL
VIT B12 SERPL-MCNC: 489 PG/ML
WBC # FLD AUTO: 8.95 K/UL

## 2021-07-22 ENCOUNTER — NON-APPOINTMENT (OUTPATIENT)
Age: 76
End: 2021-07-22

## 2021-08-05 ENCOUNTER — APPOINTMENT (OUTPATIENT)
Dept: ENDOCRINOLOGY | Facility: CLINIC | Age: 76
End: 2021-08-05
Payer: MEDICARE

## 2021-08-05 VITALS
RESPIRATION RATE: 16 BRPM | TEMPERATURE: 98.6 F | SYSTOLIC BLOOD PRESSURE: 120 MMHG | OXYGEN SATURATION: 97 % | HEART RATE: 94 BPM | DIASTOLIC BLOOD PRESSURE: 74 MMHG | WEIGHT: 261 LBS | BODY MASS INDEX: 47.74 KG/M2

## 2021-08-05 LAB — HBA1C MFR BLD HPLC: 6.2

## 2021-08-05 PROCEDURE — 83036 HEMOGLOBIN GLYCOSYLATED A1C: CPT | Mod: QW

## 2021-08-05 PROCEDURE — 99214 OFFICE O/P EST MOD 30 MIN: CPT

## 2021-08-07 LAB
ANION GAP SERPL CALC-SCNC: 13 MMOL/L
BUN SERPL-MCNC: 11 MG/DL
CALCIUM SERPL-MCNC: 9.4 MG/DL
CHLORIDE SERPL-SCNC: 102 MMOL/L
CO2 SERPL-SCNC: 26 MMOL/L
CREAT SERPL-MCNC: 0.61 MG/DL
GLUCOSE SERPL-MCNC: 62 MG/DL
MAGNESIUM SERPL-MCNC: 2.3 MG/DL
POTASSIUM SERPL-SCNC: 3.4 MMOL/L
SODIUM SERPL-SCNC: 141 MMOL/L

## 2021-08-09 ENCOUNTER — NON-APPOINTMENT (OUTPATIENT)
Age: 76
End: 2021-08-09

## 2021-08-18 ENCOUNTER — NON-APPOINTMENT (OUTPATIENT)
Age: 76
End: 2021-08-18

## 2021-08-21 NOTE — HISTORY OF PRESENT ILLNESS
[FreeTextEntry1] : Ms. HEAD is a 75 year  old  female who  returns today in follow up with regard to a history of type 2 dm.   She did have 2 coronary stents placed in January and then had gastric ulcer requiring iron transfusions Now on Plavix and Eliquis. Current dm medication include Metformin 500mg BID,  Tradjenta 5mg and Glimepiride 1mg daily.  HGM of late has shown values to be running in the 120-130's. She reports she is checking every other day.  There has been no significant hypoglycemia.\par Denies any chest pain, sob, neurologic or ophthalmologic complaints. She  too denies any new podiatric concerns. She  is up to date with his ophthalmologic visit- last week. \par Additional medical history includes that of hypertension, hyperlipidemia, and obesity along with with a-fib and vitamin d deficiency Is on D3 2,000 iu daily.\par Did have 2nd bout of kidney stone-dx as uric acid  and is on potassium citrate.\par \par She was having some swelling to bilateral lower extremities and was started on lasix by . \par \par POCT A1C today in office 6.2%, previous 7.9%. \par Saw optho June told stable-todl of beginning sof macualr degen.  Dr Yuen\par took diuretic and stayin nina lasix 40 mgg-,bnpp today.

## 2021-08-24 ENCOUNTER — APPOINTMENT (OUTPATIENT)
Dept: CARDIOLOGY | Facility: CLINIC | Age: 76
End: 2021-08-24
Payer: MEDICARE

## 2021-08-24 ENCOUNTER — NON-APPOINTMENT (OUTPATIENT)
Age: 76
End: 2021-08-24

## 2021-08-24 VITALS
BODY MASS INDEX: 47.66 KG/M2 | OXYGEN SATURATION: 96 % | TEMPERATURE: 98.1 F | HEIGHT: 62 IN | HEART RATE: 100 BPM | SYSTOLIC BLOOD PRESSURE: 138 MMHG | WEIGHT: 259 LBS | DIASTOLIC BLOOD PRESSURE: 60 MMHG

## 2021-08-24 DIAGNOSIS — N28.9 DISORDER OF KIDNEY AND URETER, UNSPECIFIED: ICD-10-CM

## 2021-08-24 DIAGNOSIS — I31.3 PERICARDIAL EFFUSION (NONINFLAMMATORY): ICD-10-CM

## 2021-08-24 PROCEDURE — 93000 ELECTROCARDIOGRAM COMPLETE: CPT | Mod: 59

## 2021-08-24 PROCEDURE — 93015 CV STRESS TEST SUPVJ I&R: CPT

## 2021-08-24 PROCEDURE — A9500: CPT

## 2021-08-24 PROCEDURE — 78452 HT MUSCLE IMAGE SPECT MULT: CPT

## 2021-08-24 PROCEDURE — 99214 OFFICE O/P EST MOD 30 MIN: CPT | Mod: 25

## 2021-08-24 RX ORDER — REGADENOSON 0.08 MG/ML
0.4 INJECTION, SOLUTION INTRAVENOUS
Qty: 1 | Refills: 0 | Status: COMPLETED | OUTPATIENT
Start: 2021-08-24

## 2021-08-24 RX ADMIN — REGADENOSON 5 MG/5ML: 0.08 INJECTION, SOLUTION INTRAVENOUS at 00:00

## 2021-08-24 NOTE — PHYSICAL EXAM
[Well Developed] : well developed [Well Nourished] : well nourished [No Acute Distress] : no acute distress [Normal Conjunctiva] : normal conjunctiva [Normal Venous Pressure] : normal venous pressure [No Carotid Bruit] : no carotid bruit [Normal S1, S2] : normal S1, S2 [No Murmur] : no murmur [No Rub] : no rub [No Gallop] : no gallop [Clear Lung Fields] : clear lung fields [Good Air Entry] : good air entry [Soft] : abdomen soft [No Respiratory Distress] : no respiratory distress  [Non Tender] : non-tender [No Masses/organomegaly] : no masses/organomegaly [Normal Bowel Sounds] : normal bowel sounds [Normal Gait] : normal gait [No Edema] : no edema [No Cyanosis] : no cyanosis [No Clubbing] : no clubbing [No Varicosities] : no varicosities [No Rash] : no rash [Moves all extremities] : moves all extremities [No Skin Lesions] : no skin lesions [No Focal Deficits] : no focal deficits [Normal Speech] : normal speech [Alert and Oriented] : alert and oriented [Normal memory] : normal memory

## 2021-08-30 ENCOUNTER — NON-APPOINTMENT (OUTPATIENT)
Age: 76
End: 2021-08-30

## 2021-09-05 ENCOUNTER — RX RENEWAL (OUTPATIENT)
Age: 76
End: 2021-09-05

## 2021-09-10 ENCOUNTER — NON-APPOINTMENT (OUTPATIENT)
Age: 76
End: 2021-09-10

## 2021-09-11 ENCOUNTER — RX RENEWAL (OUTPATIENT)
Age: 76
End: 2021-09-11

## 2021-09-14 NOTE — ED ADULT TRIAGE NOTE - CHIEF COMPLAINT QUOTE
How Severe Is Your Skin Lesion?: moderate pt states her nose and head are congested "for a couple days now." endorses cough and bodyaches, denies chest pain, fever and chills. was seen by urgent care and was told it was viral- given tessalon and doxycycline. Have Your Skin Lesions Been Treated?: not been treated Is This A New Presentation, Or A Follow-Up?: Skin Lesions

## 2021-09-23 RX ORDER — BLOOD SUGAR DIAGNOSTIC
STRIP MISCELLANEOUS
Qty: 2 | Refills: 3 | Status: ACTIVE | COMMUNITY
Start: 2021-01-20 | End: 1900-01-01

## 2021-09-26 ENCOUNTER — RX RENEWAL (OUTPATIENT)
Age: 76
End: 2021-09-26

## 2021-09-27 ENCOUNTER — NON-APPOINTMENT (OUTPATIENT)
Age: 76
End: 2021-09-27

## 2021-09-27 LAB
ANION GAP SERPL CALC-SCNC: 14 MMOL/L
ANION GAP SERPL CALC-SCNC: 15 MMOL/L
ANION GAP SERPL CALC-SCNC: 16 MMOL/L
BUN SERPL-MCNC: 38 MG/DL
BUN SERPL-MCNC: 41 MG/DL
BUN SERPL-MCNC: 60 MG/DL
CALCIUM SERPL-MCNC: 10 MG/DL
CALCIUM SERPL-MCNC: 10.1 MG/DL
CALCIUM SERPL-MCNC: 10.4 MG/DL
CHLORIDE SERPL-SCNC: 103 MMOL/L
CHLORIDE SERPL-SCNC: 103 MMOL/L
CHLORIDE SERPL-SCNC: 105 MMOL/L
CO2 SERPL-SCNC: 24 MMOL/L
CO2 SERPL-SCNC: 24 MMOL/L
CO2 SERPL-SCNC: 25 MMOL/L
CREAT SERPL-MCNC: 1.4 MG/DL
CREAT SERPL-MCNC: 1.58 MG/DL
CREAT SERPL-MCNC: 2.17 MG/DL
GLUCOSE SERPL-MCNC: 120 MG/DL
GLUCOSE SERPL-MCNC: 123 MG/DL
GLUCOSE SERPL-MCNC: 128 MG/DL
MAGNESIUM SERPL-MCNC: 1.7 MG/DL
POTASSIUM SERPL-SCNC: 4.1 MMOL/L
POTASSIUM SERPL-SCNC: 4.1 MMOL/L
POTASSIUM SERPL-SCNC: 4.3 MMOL/L
SODIUM SERPL-SCNC: 142 MMOL/L
SODIUM SERPL-SCNC: 143 MMOL/L
SODIUM SERPL-SCNC: 144 MMOL/L

## 2021-10-11 ENCOUNTER — APPOINTMENT (OUTPATIENT)
Dept: CARDIOLOGY | Facility: CLINIC | Age: 76
End: 2021-10-11
Payer: MEDICARE

## 2021-10-11 VITALS
HEIGHT: 62 IN | WEIGHT: 259 LBS | DIASTOLIC BLOOD PRESSURE: 68 MMHG | SYSTOLIC BLOOD PRESSURE: 140 MMHG | HEART RATE: 87 BPM | TEMPERATURE: 97.6 F | OXYGEN SATURATION: 97 % | BODY MASS INDEX: 47.66 KG/M2

## 2021-10-11 DIAGNOSIS — R07.89 OTHER CHEST PAIN: ICD-10-CM

## 2021-10-11 DIAGNOSIS — G47.9 SLEEP DISORDER, UNSPECIFIED: ICD-10-CM

## 2021-10-11 PROCEDURE — G0008: CPT

## 2021-10-11 PROCEDURE — 90662 IIV NO PRSV INCREASED AG IM: CPT

## 2021-10-11 PROCEDURE — 99213 OFFICE O/P EST LOW 20 MIN: CPT

## 2021-10-11 RX ORDER — BENZONATATE 200 MG/1
200 CAPSULE ORAL
Qty: 40 | Refills: 0 | Status: DISCONTINUED | COMMUNITY
Start: 2020-02-13 | End: 2021-10-11

## 2021-10-11 RX ORDER — DOXYCYCLINE 100 MG/1
100 TABLET, FILM COATED ORAL
Qty: 20 | Refills: 0 | Status: DISCONTINUED | COMMUNITY
Start: 2020-02-13 | End: 2021-10-11

## 2021-10-11 NOTE — HISTORY OF PRESENT ILLNESS
[FreeTextEntry1] : This is a 76 year old lady with a PMHx of CVA, CAD s/p x2 stent 1/2020 on clopidogrel, Afib, DM, GIB, HTN, HLD, WILLA who presents today for follow up. Patient recently seen in office 8/24/21 and ordered for Lexiscan after ED visit for chest pain. Lexiscan study was normal. Patient was also referred to Dr. Roman for restlessness at night. Patient did not yet schedule appt to see Pulmonologist. Patient with no new issues or concerns for today.

## 2021-10-12 ENCOUNTER — APPOINTMENT (OUTPATIENT)
Dept: PULMONOLOGY | Facility: CLINIC | Age: 76
End: 2021-10-12

## 2021-10-26 ENCOUNTER — APPOINTMENT (OUTPATIENT)
Dept: PULMONOLOGY | Facility: CLINIC | Age: 76
End: 2021-10-26

## 2021-11-27 ENCOUNTER — RX RENEWAL (OUTPATIENT)
Age: 76
End: 2021-11-27

## 2021-12-03 ENCOUNTER — RX RENEWAL (OUTPATIENT)
Age: 76
End: 2021-12-03

## 2021-12-09 ENCOUNTER — RESULT CHARGE (OUTPATIENT)
Age: 76
End: 2021-12-09

## 2021-12-09 ENCOUNTER — APPOINTMENT (OUTPATIENT)
Dept: ENDOCRINOLOGY | Facility: CLINIC | Age: 76
End: 2021-12-09
Payer: MEDICARE

## 2021-12-09 VITALS
WEIGHT: 267 LBS | DIASTOLIC BLOOD PRESSURE: 82 MMHG | HEIGHT: 62 IN | SYSTOLIC BLOOD PRESSURE: 142 MMHG | TEMPERATURE: 97.9 F | HEART RATE: 88 BPM | BODY MASS INDEX: 49.13 KG/M2 | OXYGEN SATURATION: 98 %

## 2021-12-09 LAB
GLUCOSE BLDC GLUCOMTR-MCNC: 130
HBA1C MFR BLD HPLC: 5.6

## 2021-12-09 PROCEDURE — 83036 HEMOGLOBIN GLYCOSYLATED A1C: CPT | Mod: QW

## 2021-12-09 PROCEDURE — 36415 COLL VENOUS BLD VENIPUNCTURE: CPT

## 2021-12-09 PROCEDURE — 99214 OFFICE O/P EST MOD 30 MIN: CPT | Mod: 25

## 2021-12-10 LAB
ANION GAP SERPL CALC-SCNC: 13 MMOL/L
BUN SERPL-MCNC: 43 MG/DL
CALCIUM SERPL-MCNC: 10.4 MG/DL
CHLORIDE SERPL-SCNC: 102 MMOL/L
CO2 SERPL-SCNC: 27 MMOL/L
CREAT SERPL-MCNC: 1.42 MG/DL
CYSTATIN C SERPL-MCNC: 1.79 MG/L
GFR/BSA.PRED SERPLBLD CYS-BASED-ARV: 31 ML/MIN
GLUCOSE SERPL-MCNC: 129 MG/DL
POTASSIUM SERPL-SCNC: 4.3 MMOL/L
SODIUM SERPL-SCNC: 142 MMOL/L
T3FREE SERPL-MCNC: 2.19 PG/ML
T4 FREE SERPL-MCNC: 1.3 NG/DL
TSH SERPL-ACNC: 1.24 UIU/ML

## 2021-12-13 ENCOUNTER — NON-APPOINTMENT (OUTPATIENT)
Age: 76
End: 2021-12-13

## 2021-12-15 ENCOUNTER — RX RENEWAL (OUTPATIENT)
Age: 76
End: 2021-12-15

## 2021-12-16 ENCOUNTER — NON-APPOINTMENT (OUTPATIENT)
Age: 76
End: 2021-12-16

## 2021-12-16 RX ORDER — METFORMIN HYDROCHLORIDE 500 MG/1
500 TABLET, COATED ORAL TWICE DAILY
Qty: 180 | Refills: 2 | Status: DISCONTINUED | COMMUNITY
Start: 2021-05-06 | End: 2021-12-16

## 2021-12-18 VITALS
BODY MASS INDEX: 49.13 KG/M2 | OXYGEN SATURATION: 98 % | RESPIRATION RATE: 16 BRPM | WEIGHT: 267 LBS | HEART RATE: 88 BPM | SYSTOLIC BLOOD PRESSURE: 142 MMHG | TEMPERATURE: 97.9 F | DIASTOLIC BLOOD PRESSURE: 82 MMHG | HEIGHT: 62 IN

## 2021-12-19 ENCOUNTER — RX RENEWAL (OUTPATIENT)
Age: 76
End: 2021-12-19

## 2022-02-22 ENCOUNTER — APPOINTMENT (OUTPATIENT)
Dept: CARDIOLOGY | Facility: CLINIC | Age: 77
End: 2022-02-22
Payer: MEDICARE

## 2022-02-22 ENCOUNTER — NON-APPOINTMENT (OUTPATIENT)
Age: 77
End: 2022-02-22

## 2022-02-22 VITALS
SYSTOLIC BLOOD PRESSURE: 152 MMHG | TEMPERATURE: 97.9 F | OXYGEN SATURATION: 97 % | WEIGHT: 268 LBS | BODY MASS INDEX: 49.32 KG/M2 | HEIGHT: 62 IN | DIASTOLIC BLOOD PRESSURE: 70 MMHG | HEART RATE: 87 BPM

## 2022-02-22 DIAGNOSIS — Z12.11 ENCOUNTER FOR SCREENING FOR MALIGNANT NEOPLASM OF COLON: ICD-10-CM

## 2022-02-22 DIAGNOSIS — Z12.39 ENCOUNTER FOR OTHER SCREENING FOR MALIGNANT NEOPLASM OF BREAST: ICD-10-CM

## 2022-02-22 PROCEDURE — 99214 OFFICE O/P EST MOD 30 MIN: CPT

## 2022-02-22 PROCEDURE — 93000 ELECTROCARDIOGRAM COMPLETE: CPT

## 2022-02-22 NOTE — HISTORY OF PRESENT ILLNESS
[FreeTextEntry1] : This is a 76 year female with a Pmhx of CAD adrenal nodule, renal insufficiency, iron def anemia, who presents to the office for \par  \par pt reports feeling Ángel any complaints. \par \par Pt denies any CP, SOB, heart palpitations, dizziness, abdominal pain N/V/D fever or chills\par

## 2022-02-24 NOTE — DIETITIAN INITIAL EVALUATION ADULT. - PROBLEM SELECTOR PLAN 1
----- Message from Glory Bray sent at 2/23/2022  9:32 AM EST -----  Subject: Message to Provider     Please call patient. Called once and was on hold   for 15mins. Called back and I was on hold for 6mins and then answered and   disconnected. LVM for pt to return call. Drop in hemoglobin with recent history of dark stool in the setting of triple therapy is concerning for potential UGIB.  Will hold eliquis for now.  Start protonix 80mg IVP BID. Consult GI team (Dr. Bear) in AM for potential EGD.   Monitor Hgb q8h.  Maintain active T&S. Patient at this time does not meet criteria for transfusion.   Maintain 2 peripheral IV access.  Monitor VS q4h.  Consult Dr. Bear in AM for potential EGD- [facility] GI TEAM [phone]

## 2022-03-12 ENCOUNTER — RX RENEWAL (OUTPATIENT)
Age: 77
End: 2022-03-12

## 2022-03-18 NOTE — OCCUPATIONAL THERAPY INITIAL EVALUATION ADULT - LEVEL OF INDEPENDENCE, REHAB EVAL
35M with PMH asthma, seasonal allergies who p/w asthma exacerbation with sudden onset today at work.  Reports exposure to paint, dust at work site that led to his current state.  Wheezing, dyspnea principal symptoms endorsed.  Cough (nonproductive) also endorsed.  Denies sick contacts.  Denies any infectious symptomatolgy.  Has had asthma attacks before, but none to this severity.   First time in ED, hospital for this.  Reports use of Cetirizine, Montelukast, and Albuterol INH with good control typically.  He reports having some allergic symptoms to pet dander, hair.      In ED given steroids, nebulized treatment with good response.  Wheezing persists though patient comfortable, speaking full sentences, and GEN.  Patient agreeable to further management.  CXR WNL. Flu, COVID negative.  Other labs pending - ordered. Will be admitted for management with asthma order set.   History obtained with assistance by .    Pt received in chair

## 2022-03-24 ENCOUNTER — APPOINTMENT (OUTPATIENT)
Dept: CARDIOLOGY | Facility: CLINIC | Age: 77
End: 2022-03-24
Payer: MEDICARE

## 2022-03-24 ENCOUNTER — APPOINTMENT (OUTPATIENT)
Dept: ENDOCRINOLOGY | Facility: CLINIC | Age: 77
End: 2022-03-24
Payer: MEDICARE

## 2022-03-24 VITALS
OXYGEN SATURATION: 94 % | SYSTOLIC BLOOD PRESSURE: 124 MMHG | TEMPERATURE: 98.6 F | DIASTOLIC BLOOD PRESSURE: 70 MMHG | WEIGHT: 268 LBS | BODY MASS INDEX: 49.02 KG/M2 | RESPIRATION RATE: 16 BRPM | HEART RATE: 97 BPM

## 2022-03-24 LAB — GLUCOSE BLDC GLUCOMTR-MCNC: 193

## 2022-03-24 PROCEDURE — 99214 OFFICE O/P EST MOD 30 MIN: CPT

## 2022-03-24 PROCEDURE — 93306 TTE W/DOPPLER COMPLETE: CPT

## 2022-03-24 PROCEDURE — 82962 GLUCOSE BLOOD TEST: CPT

## 2022-03-24 NOTE — HISTORY OF PRESENT ILLNESS
[FreeTextEntry1] : Ms. HEAD is a 76 year old female who returns today in follow up with regard to a history of type 2 dm. She did have 2 coronary stents placed in January and then had gastric ulcer requiring iron transfusions Now on Plavix and Eliquis. Current dm medication include Farxiga 5 mg, Tradjenta 5mg and Glimepiride 1mg full tab daily. Discontinued taking Metformin 1000 mg bid. HGM of late has shown values to be running   150s- 170s  in the am, 130s in the afternoon. She reports she is checking every day. There has been no significant hypoglycemia or hypoglycemic symptoms. Patient has gained 10 lbs since June. \par Denies any chest pain, sob, neurologic or ophthalmologic complaints. She too denies any new podiatric concerns. She is up to date with his ophthalmologic visit- Saw optho June told stable-told of beginnings of  macular degen. Following with Dr Yuen.\par 2/22/2022 A1c returned at 7.6% ; previously 5.6% from 12/9/2021\par POCT glucose returned today at  193  mg/dL \par 2/22/2022 creatinine was 1.54, eGFR was 34\par Additional medical history includes that of hypertension, hyperlipidemia, and obesity along with with a-fib and vitamin d deficiency Is on D3 2,000 iu daily. On vitamin supplement for macular degeneration. \par Did have 2nd bout of kidney stone-dx as uric acid and is on potassium citrate.\par \par She was having some swelling to bilateral lower extremities and was started on lasix by . \par Recently had cortisone injection in her right knee for arthritis. Follows with Dr. May \par took diuretic and staying on lasix 40 mg\par Had both doses of covid vaccine as well as booster,

## 2022-04-10 ENCOUNTER — FORM ENCOUNTER (OUTPATIENT)
Age: 77
End: 2022-04-10

## 2022-04-19 ENCOUNTER — APPOINTMENT (OUTPATIENT)
Dept: ORTHOPEDIC SURGERY | Facility: CLINIC | Age: 77
End: 2022-04-19
Payer: MEDICARE

## 2022-04-19 VITALS — WEIGHT: 268 LBS | HEIGHT: 62 IN | BODY MASS INDEX: 49.32 KG/M2

## 2022-04-19 PROCEDURE — 99213 OFFICE O/P EST LOW 20 MIN: CPT

## 2022-04-19 PROCEDURE — 99212 OFFICE O/P EST SF 10 MIN: CPT

## 2022-04-19 NOTE — PHYSICAL EXAM
[Left] : left shoulder [4 ___] : forward flexion 4[unfilled]/5 [4___] : external rotation 4[unfilled]/5 [] : no swelling [TWNoteComboBox7] : active forward flexion 115 degrees [de-identified] : active abduction 100 degrees [TWNoteComboBox6] : internal rotation L5 [de-identified] : external rotation 45 degrees

## 2022-04-29 NOTE — ED ADULT TRIAGE NOTE - BP NONINVASIVE SYSTOLIC (MM HG)
[de-identified] : CT-4/18/22\par \par IMPRESSION:\par \par Mucosal thickening is present within the anteriorly draining paranasal sinuses and their drainage pathways. There is prominent leftward septal deviation and pneumatization of the right middle turbinate, with generalized mucosal thickening throughout the nasal cavity that may reflect the presence of rhinitis. Please see report above for further detail.\par  150

## 2022-05-03 NOTE — OCCUPATIONAL THERAPY INITIAL EVALUATION ADULT - PERTINENT HX OF CURRENT PROBLEM, REHAB EVAL
75 yo morbidly obese,  female with PMH of Afib ( On Eliquis ), CVA in 2017 w/o residual weakness, DM type 2, anxiety, HLD, HTN, and mild renal insufficiency. FH significant for early CAD.  Seen and evaluated by Dr Boss for c/c of dyspnea on exertion after ambulating a "few steps",   Presently remains asymptomatic.  pt reports a 30lbs weight gain since CVA in 2017, and believes shortness of breath is r/t to that. CT A Neck/ CT Brain (-) Patient/Caregiver provided printed discharge information.

## 2022-05-27 ENCOUNTER — RX RENEWAL (OUTPATIENT)
Age: 77
End: 2022-05-27

## 2022-05-30 ENCOUNTER — RX RENEWAL (OUTPATIENT)
Age: 77
End: 2022-05-30

## 2022-06-02 ENCOUNTER — APPOINTMENT (OUTPATIENT)
Dept: ORTHOPEDIC SURGERY | Facility: CLINIC | Age: 77
End: 2022-06-02
Payer: MEDICARE

## 2022-06-02 VITALS — BODY MASS INDEX: 49.32 KG/M2 | WEIGHT: 268 LBS | HEIGHT: 62 IN

## 2022-06-02 PROCEDURE — 99213 OFFICE O/P EST LOW 20 MIN: CPT

## 2022-06-02 NOTE — PHYSICAL EXAM
[Left] : left shoulder [4 ___] : forward flexion 4[unfilled]/5 [4___] : external rotation 4[unfilled]/5 [] : no swelling [TWNoteComboBox7] : active forward flexion 115 degrees [de-identified] : active abduction 100 degrees [TWNoteComboBox6] : internal rotation L5 [de-identified] : external rotation 45 degrees

## 2022-06-02 NOTE — HISTORY OF PRESENT ILLNESS
[de-identified] : physical therapy [] : no [FreeTextEntry1] : left shoulder  [FreeTextEntry5] : no changes since the last visit. She feels that she has a bruise where her tear is

## 2022-06-02 NOTE — HISTORY OF PRESENT ILLNESS
[de-identified] : physical therapy [] : no [FreeTextEntry1] : left shoulder  [FreeTextEntry5] : no changes since the last visit. She feels that she has a bruise where her tear is

## 2022-06-02 NOTE — PHYSICAL EXAM
[Left] : left shoulder [4 ___] : forward flexion 4[unfilled]/5 [4___] : external rotation 4[unfilled]/5 [] : no swelling [TWNoteComboBox7] : active forward flexion 115 degrees [de-identified] : active abduction 100 degrees [TWNoteComboBox6] : internal rotation L5 [de-identified] : external rotation 45 degrees

## 2022-06-06 ENCOUNTER — RX RENEWAL (OUTPATIENT)
Age: 77
End: 2022-06-06

## 2022-06-23 ENCOUNTER — APPOINTMENT (OUTPATIENT)
Dept: CARDIOLOGY | Facility: CLINIC | Age: 77
End: 2022-06-23
Payer: MEDICARE

## 2022-06-23 ENCOUNTER — LABORATORY RESULT (OUTPATIENT)
Age: 77
End: 2022-06-23

## 2022-06-23 ENCOUNTER — APPOINTMENT (OUTPATIENT)
Dept: ENDOCRINOLOGY | Facility: CLINIC | Age: 77
End: 2022-06-23
Payer: MEDICARE

## 2022-06-23 ENCOUNTER — NON-APPOINTMENT (OUTPATIENT)
Age: 77
End: 2022-06-23

## 2022-06-23 VITALS
SYSTOLIC BLOOD PRESSURE: 128 MMHG | DIASTOLIC BLOOD PRESSURE: 72 MMHG | OXYGEN SATURATION: 95 % | TEMPERATURE: 98.6 F | HEART RATE: 88 BPM | RESPIRATION RATE: 15 BRPM

## 2022-06-23 VITALS
WEIGHT: 293 LBS | TEMPERATURE: 98.5 F | BODY MASS INDEX: 57.52 KG/M2 | OXYGEN SATURATION: 94 % | HEIGHT: 60 IN | DIASTOLIC BLOOD PRESSURE: 76 MMHG | SYSTOLIC BLOOD PRESSURE: 150 MMHG | HEART RATE: 89 BPM

## 2022-06-23 VITALS — BODY MASS INDEX: 51.95 KG/M2 | WEIGHT: 266 LBS

## 2022-06-23 LAB
GLUCOSE BLDC GLUCOMTR-MCNC: 170
HBA1C MFR BLD HPLC: 6.9

## 2022-06-23 PROCEDURE — 99214 OFFICE O/P EST MOD 30 MIN: CPT | Mod: 25

## 2022-06-23 PROCEDURE — 93000 ELECTROCARDIOGRAM COMPLETE: CPT

## 2022-06-23 PROCEDURE — 36415 COLL VENOUS BLD VENIPUNCTURE: CPT

## 2022-06-23 PROCEDURE — 83036 HEMOGLOBIN GLYCOSYLATED A1C: CPT | Mod: QW

## 2022-06-23 PROCEDURE — 82962 GLUCOSE BLOOD TEST: CPT

## 2022-06-23 PROCEDURE — 99213 OFFICE O/P EST LOW 20 MIN: CPT

## 2022-06-23 NOTE — HISTORY OF PRESENT ILLNESS
[FreeTextEntry1] : Ms. HEAD is a 76 year old female who returns today in follow up with regard to a history of type 2 dm. She did have 2 coronary stents placed in January and then had gastric ulcer requiring iron transfusions Now on Plavix and Eliquis. Current dm medication include Farxiga 5 mg, Tradjenta 5mg and Glimepiride 1mg half tab in AM and one full tab HS.   Discontinued taking Metformin 1000 mg bid. \par \par HGM of late has shown values to be running  170s- low 200s in the am, 140s- 150s in the afternoon.   There has been no significant hypoglycemia or hypoglycemic symptoms. Patient has gained 10 lbs since June- has started [employer]. \par \par Denies any chest pain, sob, neurologic or ophthalmologic complaints. She too denies any new podiatric concerns. She is up to date with his ophthalmologic visit- Saw optho June told stable-told of beginnings of macular degen. Following with Dr Yuen.\par \par POCT A1C returned today at  6.9%  ; previously   7.6% in 2/22/22 \par POCT glucose returned today at   170  mg/dL \par 2/22/2022 creatinine was 1.54, eGFR was 34\par \par Additional medical history includes that of hypertension, hyperlipidemia, and obesity along with with a-fib and vitamin d deficiency Is on D3 2,000 iu daily. On vitamin supplement for macular degeneration. \par \par Did have 2nd bout of kidney stone-dx as uric acid and is on potassium citrate.\par \par She was having some swelling to bilateral lower extremities and was started on lasix by . Took diuretic and staying on lasix 40 mg (not using daily)\par \par \par Recently had cortisone injection in her right knee for arthritis. Follows with Dr. May \par \par Had both doses of covid vaccine as well as booster,

## 2022-06-23 NOTE — HISTORY OF PRESENT ILLNESS
[FreeTextEntry1] : This is a 76 year female with a Pmhx of HTN HLD DM AI WILLA RI afib, CAD who presents to the office for routine follow up\par \par pt reports feeling well Deines any complaints \par Pt denies any CP, SOB, heart palpitations, dizziness, abdominal pain N/V/D fever or chills\par

## 2022-07-03 ENCOUNTER — RX RENEWAL (OUTPATIENT)
Age: 77
End: 2022-07-03

## 2022-07-08 DIAGNOSIS — R79.89 OTHER SPECIFIED ABNORMAL FINDINGS OF BLOOD CHEMISTRY: ICD-10-CM

## 2022-07-14 ENCOUNTER — APPOINTMENT (OUTPATIENT)
Dept: ORTHOPEDIC SURGERY | Facility: CLINIC | Age: 77
End: 2022-07-14

## 2022-07-14 VITALS — WEIGHT: 266 LBS | HEIGHT: 60 IN | BODY MASS INDEX: 52.22 KG/M2

## 2022-07-14 PROCEDURE — 99213 OFFICE O/P EST LOW 20 MIN: CPT

## 2022-07-14 NOTE — PHYSICAL EXAM
[Left] : left shoulder [4 ___] : forward flexion 4[unfilled]/5 [4___] : external rotation 4[unfilled]/5 [] : no swelling [TWNoteComboBox7] : active forward flexion 115 degrees [de-identified] : active abduction 100 degrees [TWNoteComboBox6] : internal rotation L5 [de-identified] : external rotation 45 degrees

## 2022-07-14 NOTE — REASON FOR VISIT
[FreeTextEntry2] : 7/14/22- Continues PT for left shoulder pain\par 6/2/22- follow up left shoulder partial RCT, doing PT, no changes

## 2022-07-15 LAB
ALBUMIN SERPL ELPH-MCNC: 4.5 G/DL
ALBUMIN SERPL ELPH-MCNC: 4.7 G/DL
ALBUMIN SERPL ELPH-MCNC: 4.8 G/DL
ALP BLD-CCNC: 75 U/L
ALP BLD-CCNC: 77 U/L
ALP BLD-CCNC: 80 U/L
ALT SERPL-CCNC: 46 U/L
ALT SERPL-CCNC: 61 U/L
ALT SERPL-CCNC: 63 U/L
ANION GAP SERPL CALC-SCNC: 12 MMOL/L
APPEARANCE: ABNORMAL
AST SERPL-CCNC: 33 U/L
AST SERPL-CCNC: 47 U/L
AST SERPL-CCNC: 48 U/L
BACTERIA UR CULT: NORMAL
BACTERIA: NEGATIVE
BASOPHILS # BLD AUTO: 0.03 K/UL
BASOPHILS NFR BLD AUTO: 0.5 %
BILIRUB DIRECT SERPL-MCNC: 0.2 MG/DL
BILIRUB INDIRECT SERPL-MCNC: 0.4 MG/DL
BILIRUB SERPL-MCNC: 0.5 MG/DL
BILIRUB SERPL-MCNC: 0.5 MG/DL
BILIRUBIN URINE: NEGATIVE
BLOOD URINE: NORMAL
BUN SERPL-MCNC: 35 MG/DL
CALCIUM OXALATE CRYSTALS: NEGATIVE
CALCIUM SERPL-MCNC: 10.1 MG/DL
CHLORIDE SERPL-SCNC: 102 MMOL/L
CHOLEST SERPL-MCNC: 179 MG/DL
CK SERPL-CCNC: 172 U/L
CO2 SERPL-SCNC: 28 MMOL/L
COLOR: YELLOW
CREAT SERPL-MCNC: 1.57 MG/DL
EGFR: 34 ML/MIN/1.73M2
EOSINOPHIL # BLD AUTO: 0.09 K/UL
EOSINOPHIL NFR BLD AUTO: 1.4 %
ESTIMATED AVERAGE GLUCOSE: 160 MG/DL
FERRITIN SERPL-MCNC: 37 NG/ML
FOLATE SERPL-MCNC: 18.5 NG/ML
GGT SERPL-CCNC: 27 U/L
GLUCOSE QUALITATIVE U: ABNORMAL
GLUCOSE SERPL-MCNC: 147 MG/DL
GRANULAR CASTS: 0 /LPF
HBA1C MFR BLD HPLC: 7.2 %
HCT VFR BLD CALC: 45 %
HDLC SERPL-MCNC: 59 MG/DL
HGB BLD-MCNC: 14.2 G/DL
HYALINE CASTS: 0 /LPF
IMM GRANULOCYTES NFR BLD AUTO: 0.2 %
IRON SATN MFR SERPL: 26 %
IRON SERPL-MCNC: 97 UG/DL
KETONES URINE: NEGATIVE
LDLC SERPL CALC-MCNC: 93 MG/DL
LEUKOCYTE ESTERASE URINE: ABNORMAL
LYMPHOCYTES # BLD AUTO: 1.59 K/UL
LYMPHOCYTES NFR BLD AUTO: 24.5 %
MAGNESIUM SERPL-MCNC: 2.5 MG/DL
MAN DIFF?: NORMAL
MCHC RBC-ENTMCNC: 28.5 PG
MCHC RBC-ENTMCNC: 31.6 GM/DL
MCV RBC AUTO: 90.2 FL
MICROSCOPIC-UA: NORMAL
MONOCYTES # BLD AUTO: 0.5 K/UL
MONOCYTES NFR BLD AUTO: 7.7 %
NEUTROPHILS # BLD AUTO: 4.27 K/UL
NEUTROPHILS NFR BLD AUTO: 65.7 %
NITRITE URINE: POSITIVE
NONHDLC SERPL-MCNC: 120 MG/DL
PH URINE: 6.5
PLATELET # BLD AUTO: 164 K/UL
POTASSIUM SERPL-SCNC: 4 MMOL/L
PROT SERPL-MCNC: 7.4 G/DL
PROT SERPL-MCNC: 7.6 G/DL
PROTEIN URINE: ABNORMAL
RBC # BLD: 4.99 M/UL
RBC # FLD: 20.6 %
RED BLOOD CELLS URINE: 5 /HPF
SODIUM SERPL-SCNC: 142 MMOL/L
SPECIFIC GRAVITY URINE: 1.01
SQUAMOUS EPITHELIAL CELLS: 2 /HPF
T4 FREE SERPL-MCNC: 1.4 NG/DL
TIBC SERPL-MCNC: 374 UG/DL
TRANSFERRIN SERPL-MCNC: 332 MG/DL
TRIGL SERPL-MCNC: 135 MG/DL
TRIPLE PHOSPHATE CRYSTALS: NEGATIVE
TSH SERPL-ACNC: 2.06 UIU/ML
TSH SERPL-ACNC: 2.09 UIU/ML
UIBC SERPL-MCNC: 278 UG/DL
URIC ACID CRYSTALS: NEGATIVE
UROBILINOGEN URINE: NORMAL
VIT B12 SERPL-MCNC: 604 PG/ML
WBC # FLD AUTO: 6.49 K/UL
WHITE BLOOD CELLS URINE: 55 /HPF

## 2022-08-01 ENCOUNTER — FORM ENCOUNTER (OUTPATIENT)
Age: 77
End: 2022-08-01

## 2022-08-09 ENCOUNTER — NON-APPOINTMENT (OUTPATIENT)
Age: 77
End: 2022-08-09

## 2022-08-14 ENCOUNTER — FORM ENCOUNTER (OUTPATIENT)
Age: 77
End: 2022-08-14

## 2022-08-19 ENCOUNTER — APPOINTMENT (OUTPATIENT)
Dept: ORTHOPEDIC SURGERY | Facility: CLINIC | Age: 77
End: 2022-08-19

## 2022-08-19 VITALS — HEIGHT: 60 IN | BODY MASS INDEX: 52.22 KG/M2 | WEIGHT: 266 LBS

## 2022-08-19 PROCEDURE — 20610 DRAIN/INJ JOINT/BURSA W/O US: CPT | Mod: 50

## 2022-08-19 PROCEDURE — 99213 OFFICE O/P EST LOW 20 MIN: CPT | Mod: 25

## 2022-08-19 NOTE — REASON FOR VISIT
[FreeTextEntry2] : 08/19/22- continues pt for left shoulder pain, wants to start viscoinjections for right knee pain\par 7/14/22- Continues PT for left shoulder pain\par 6/2/22- follow up left shoulder partial RCT, doing PT, no changes

## 2022-08-19 NOTE — PROCEDURE
[Large Joint Injection] : Large joint injection [Right] : of the right [Knee] : knee [Pain] : pain [Alcohol] : alcohol [Betadine] : betadine [Ethyl Chloride sprayed topically] : ethyl chloride sprayed topically [Euflexxa] : Euflexxa [#1] : series #1

## 2022-08-19 NOTE — PHYSICAL EXAM
[Left] : left shoulder [4 ___] : forward flexion 4[unfilled]/5 [4___] : external rotation 4[unfilled]/5 [Positive] : positive Wong [Bilateral] : knee bilaterally [AP] : anteroposterior [Lateral] : lateral [advanced tricompartmental OA with medial compartment narrowing and varus alignment] : advanced tricompartmental OA with medial compartment narrowing and varus alignment [TWNoteComboBox6] : internal rotation L5 [de-identified] : external rotation 45 degrees [] : no effusion [TWNoteComboBox7] : flexion 115 degrees [de-identified] : extension 0 degrees

## 2022-08-25 ENCOUNTER — APPOINTMENT (OUTPATIENT)
Dept: ORTHOPEDIC SURGERY | Facility: CLINIC | Age: 77
End: 2022-08-25

## 2022-08-25 VITALS — WEIGHT: 266 LBS | HEIGHT: 60 IN | BODY MASS INDEX: 52.22 KG/M2

## 2022-08-25 PROCEDURE — 99213 OFFICE O/P EST LOW 20 MIN: CPT

## 2022-08-25 NOTE — HISTORY OF PRESENT ILLNESS
[de-identified] : 8/25/22: Here for follow up. Continuing PT for left shoulder\par 7/14/22- Continues PT for left shoulder pain\par 6/2/22- follow up left shoulder partial RCT, doing PT, no changes.  [FreeTextEntry1] : left shoulder [FreeTextEntry5] : no changes,  [de-identified] : physical therapy

## 2022-08-25 NOTE — ASSESSMENT
[FreeTextEntry1] : 78 y/o F with L RTC responding well to PT. New Physical therapy rx given. Follow up in 6 weeks.

## 2022-08-25 NOTE — PHYSICAL EXAM
[Left] : left shoulder [4 ___] : forward flexion 4[unfilled]/5 [4___] : external rotation 4[unfilled]/5 [] : no swelling [TWNoteComboBox7] : active forward flexion 115 degrees [de-identified] : active abduction 100 degrees [TWNoteComboBox6] : internal rotation L5 [de-identified] : external rotation 45 degrees

## 2022-08-26 ENCOUNTER — APPOINTMENT (OUTPATIENT)
Dept: ORTHOPEDIC SURGERY | Facility: CLINIC | Age: 77
End: 2022-08-26

## 2022-08-26 PROCEDURE — 20610 DRAIN/INJ JOINT/BURSA W/O US: CPT | Mod: RT

## 2022-08-26 PROCEDURE — 99213 OFFICE O/P EST LOW 20 MIN: CPT | Mod: 25

## 2022-08-26 NOTE — PROCEDURE
[Large Joint Injection] : Large joint injection [Right] : of the right [Knee] : knee [Pain] : pain [Alcohol] : alcohol [Betadine] : betadine [Ethyl Chloride sprayed topically] : ethyl chloride sprayed topically [Euflexxa] : Euflexxa [#2] : series #2

## 2022-08-26 NOTE — PHYSICAL EXAM
[Left] : left shoulder [4 ___] : forward flexion 4[unfilled]/5 [4___] : external rotation 4[unfilled]/5 [Positive] : positive Wong [Bilateral] : knee bilaterally [AP] : anteroposterior [Lateral] : lateral [advanced tricompartmental OA with medial compartment narrowing and varus alignment] : advanced tricompartmental OA with medial compartment narrowing and varus alignment [TWNoteComboBox6] : internal rotation L5 [de-identified] : external rotation 45 degrees [] : no effusion [TWNoteComboBox7] : flexion 115 degrees [de-identified] : extension 0 degrees

## 2022-08-29 ENCOUNTER — RX RENEWAL (OUTPATIENT)
Age: 77
End: 2022-08-29

## 2022-09-02 ENCOUNTER — APPOINTMENT (OUTPATIENT)
Dept: ORTHOPEDIC SURGERY | Facility: CLINIC | Age: 77
End: 2022-09-02

## 2022-09-02 DIAGNOSIS — M17.11 UNILATERAL PRIMARY OSTEOARTHRITIS, RIGHT KNEE: ICD-10-CM

## 2022-09-02 PROCEDURE — 20610 DRAIN/INJ JOINT/BURSA W/O US: CPT

## 2022-09-02 PROCEDURE — 99213 OFFICE O/P EST LOW 20 MIN: CPT | Mod: 25

## 2022-09-02 NOTE — PHYSICAL EXAM
[Left] : left shoulder [4 ___] : forward flexion 4[unfilled]/5 [4___] : external rotation 4[unfilled]/5 [Positive] : positive Wong [Bilateral] : knee bilaterally [AP] : anteroposterior [Lateral] : lateral [advanced tricompartmental OA with medial compartment narrowing and varus alignment] : advanced tricompartmental OA with medial compartment narrowing and varus alignment [TWNoteComboBox6] : internal rotation L5 [de-identified] : external rotation 45 degrees [] : no effusion [TWNoteComboBox7] : flexion 115 degrees [de-identified] : extension 0 degrees

## 2022-09-02 NOTE — PROCEDURE
[Large Joint Injection] : Large joint injection [Right] : of the right [Knee] : knee [Pain] : pain [Alcohol] : alcohol [Betadine] : betadine [Ethyl Chloride sprayed topically] : ethyl chloride sprayed topically [Euflexxa] : Euflexxa [#3] : series #3 [] : Patient tolerated procedure well [Call if redness, pain or fever occur] : call if redness, pain or fever occur [Apply ice for 15min out of every hour for the next 12-24 hours as tolerated] : apply ice for 15 minutes out of every hour for the next 12-24 hours as tolerated [Patient was advised to rest the joint(s) for ____ days] : patient was advised to rest the joint(s) for [unfilled] days [Previous OTC use and PT nontherapeutic] : patient has tried OTC's including aspirin, Ibuprofen, Aleve, etc or prescription NSAIDS, and/or exercises at home and/or physical therapy without satisfactory response [Patient had decreased mobility in the joint] : patient had decreased mobility in the joint [Risks, benefits, alternatives discussed / Verbal consent obtained] : the risks benefits, and alternatives have been discussed, and verbal consent was obtained

## 2022-09-02 NOTE — HISTORY OF PRESENT ILLNESS
[de-identified] : 9-2-22- for continued euflexxa inj right knee #3 [FreeTextEntry1] : left shoulder [de-identified] : physical therapy No

## 2022-09-12 ENCOUNTER — RX RENEWAL (OUTPATIENT)
Age: 77
End: 2022-09-12

## 2022-09-19 ENCOUNTER — RX RENEWAL (OUTPATIENT)
Age: 77
End: 2022-09-19

## 2022-09-27 RX ORDER — PANTOPRAZOLE 40 MG/1
40 TABLET, DELAYED RELEASE ORAL
Qty: 90 | Refills: 1 | Status: DISCONTINUED | COMMUNITY
Start: 2020-02-13 | End: 2022-09-27

## 2022-09-28 ENCOUNTER — RX RENEWAL (OUTPATIENT)
Age: 77
End: 2022-09-28

## 2022-09-29 ENCOUNTER — FORM ENCOUNTER (OUTPATIENT)
Age: 77
End: 2022-09-29

## 2022-09-29 ENCOUNTER — APPOINTMENT (OUTPATIENT)
Dept: ORTHOPEDIC SURGERY | Facility: CLINIC | Age: 77
End: 2022-09-29

## 2022-09-29 VITALS — WEIGHT: 266 LBS | BODY MASS INDEX: 52.22 KG/M2 | HEIGHT: 60 IN

## 2022-09-29 PROCEDURE — 99213 OFFICE O/P EST LOW 20 MIN: CPT

## 2022-09-29 NOTE — HISTORY OF PRESENT ILLNESS
[7] : 7 [Dull/Aching] : dull/aching [Localized] : localized [Constant] : constant [de-identified] : 8/25/22: Here for follow up. Continuing PT for left shoulder\par 7/14/22- Continues PT for left shoulder pain\par 6/2/22- follow up left shoulder partial RCT, doing PT, no changes.  [] : no [FreeTextEntry1] : left shoulder [FreeTextEntry3] : 07/27/2020 [FreeTextEntry5] : no changes,  [de-identified] : physical therapy

## 2022-09-29 NOTE — REASON FOR VISIT
[FreeTextEntry2] : 9/29/22- Continues to c/o left shoulder pain, doing PT which gives her symptomatic relief

## 2022-09-29 NOTE — PHYSICAL EXAM
[Left] : left shoulder [4 ___] : forward flexion 4[unfilled]/5 [4___] : external rotation 4[unfilled]/5 [] : no swelling [TWNoteComboBox7] : active forward flexion 115 degrees [de-identified] : active abduction 100 degrees [TWNoteComboBox6] : internal rotation L5 [de-identified] : external rotation 45 degrees

## 2022-10-06 ENCOUNTER — NON-APPOINTMENT (OUTPATIENT)
Age: 77
End: 2022-10-06

## 2022-10-14 ENCOUNTER — APPOINTMENT (OUTPATIENT)
Dept: ORTHOPEDIC SURGERY | Facility: CLINIC | Age: 77
End: 2022-10-14

## 2022-10-14 VITALS — BODY MASS INDEX: 47.48 KG/M2 | WEIGHT: 268 LBS | HEIGHT: 63 IN

## 2022-10-14 DIAGNOSIS — M17.12 UNILATERAL PRIMARY OSTEOARTHRITIS, LEFT KNEE: ICD-10-CM

## 2022-10-14 PROCEDURE — 20610 DRAIN/INJ JOINT/BURSA W/O US: CPT | Mod: LT

## 2022-10-14 PROCEDURE — 99213 OFFICE O/P EST LOW 20 MIN: CPT | Mod: 25

## 2022-10-14 NOTE — PHYSICAL EXAM
[Left] : left shoulder [4 ___] : forward flexion 4[unfilled]/5 [4___] : external rotation 4[unfilled]/5 [Positive] : positive Wong [Bilateral] : knee bilaterally [AP] : anteroposterior [Lateral] : lateral [advanced tricompartmental OA with medial compartment narrowing and varus alignment] : advanced tricompartmental OA with medial compartment narrowing and varus alignment [TWNoteComboBox6] : internal rotation L5 [de-identified] : external rotation 45 degrees [] : no effusion [TWNoteComboBox7] : flexion 115 degrees [de-identified] : extension 0 degrees

## 2022-10-14 NOTE — PROCEDURE
[Large Joint Injection] : Large joint injection [Left] : of the left [Knee] : knee [Pain] : pain [Alcohol] : alcohol [Betadine] : betadine [Ethyl Chloride sprayed topically] : ethyl chloride sprayed topically [___ cc    3mg] :  Betamethasone (Celestone) ~Vcc of 3mg [___ cc    1%] : Lidocaine ~Vcc of 1%  [] : Patient tolerated procedure well [Call if redness, pain or fever occur] : call if redness, pain or fever occur [Apply ice for 15min out of every hour for the next 12-24 hours as tolerated] : apply ice for 15 minutes out of every hour for the next 12-24 hours as tolerated [Patient was advised to rest the joint(s) for ____ days] : patient was advised to rest the joint(s) for [unfilled] days [Previous OTC use and PT nontherapeutic] : patient has tried OTC's including aspirin, Ibuprofen, Aleve, etc or prescription NSAIDS, and/or exercises at home and/or physical therapy without satisfactory response [Patient had decreased mobility in the joint] : patient had decreased mobility in the joint [Risks, benefits, alternatives discussed / Verbal consent obtained] : the risks benefits, and alternatives have been discussed, and verbal consent was obtained

## 2022-10-14 NOTE — HISTORY OF PRESENT ILLNESS
[Gradual] : gradual [8] : 8 [7] : 7 [Sharp] : sharp [Intermittent] : intermittent [Injection therapy] : injection therapy [Standing] : standing [Walking] : walking [Stairs] : stairs [] : yes [de-identified] : 9-2-22- for continued euflexxa inj right knee #3 [FreeTextEntry1] : left shoulder [de-identified] : physical therapy

## 2022-10-31 ENCOUNTER — APPOINTMENT (OUTPATIENT)
Dept: ENDOCRINOLOGY | Facility: CLINIC | Age: 77
End: 2022-10-31

## 2022-10-31 ENCOUNTER — APPOINTMENT (OUTPATIENT)
Dept: CARDIOLOGY | Facility: CLINIC | Age: 77
End: 2022-10-31

## 2022-10-31 VITALS
BODY MASS INDEX: 46.95 KG/M2 | OXYGEN SATURATION: 95 % | DIASTOLIC BLOOD PRESSURE: 80 MMHG | TEMPERATURE: 98 F | HEIGHT: 63 IN | SYSTOLIC BLOOD PRESSURE: 141 MMHG | WEIGHT: 265 LBS | HEART RATE: 96 BPM

## 2022-10-31 VITALS
SYSTOLIC BLOOD PRESSURE: 141 MMHG | HEIGHT: 63 IN | OXYGEN SATURATION: 95 % | TEMPERATURE: 98 F | DIASTOLIC BLOOD PRESSURE: 80 MMHG | HEART RATE: 96 BPM | BODY MASS INDEX: 46.95 KG/M2 | WEIGHT: 265 LBS

## 2022-10-31 LAB
GLUCOSE BLDC GLUCOMTR-MCNC: 251
HBA1C MFR BLD HPLC: 7.7

## 2022-10-31 PROCEDURE — 99214 OFFICE O/P EST MOD 30 MIN: CPT

## 2022-10-31 PROCEDURE — G0008: CPT

## 2022-10-31 PROCEDURE — 82962 GLUCOSE BLOOD TEST: CPT

## 2022-10-31 PROCEDURE — 90662 IIV NO PRSV INCREASED AG IM: CPT

## 2022-10-31 PROCEDURE — 83036 HEMOGLOBIN GLYCOSYLATED A1C: CPT | Mod: QW

## 2022-10-31 RX ORDER — LINAGLIPTIN 5 MG/1
5 TABLET, FILM COATED ORAL DAILY
Qty: 90 | Refills: 2 | Status: DISCONTINUED | COMMUNITY
Start: 2021-03-18 | End: 2022-10-31

## 2022-10-31 NOTE — HISTORY OF PRESENT ILLNESS
[FreeTextEntry1] : This is a 78 y/o female with a pmhx of HTN HLD DM AI WILLA RI afib, CAD who presents to the office for routine follow up. Pt has no acute concerns or complaints. She is requesting the flu vaccine today. Patient denies chest pain, dyspnea, palpitations, dizziness, syncope, changes in bowel/bladder habits or appetite. \par \par

## 2022-11-03 NOTE — HISTORY OF PRESENT ILLNESS
[FreeTextEntry1] : Ms. HEAD is a 77 year old female who returns today in follow up with regard to a history of type 2 dm. She did have 2 coronary stents placed in January and then had gastric ulcer requiring iron transfusions Now on Plavix and Eliquis.\par \par  Current dm medication include Farxiga 10 mg, Tradjenta 5mg and Glimepiride 1mg half tab in AM and one full tab HS. Discontinued taking Metformin 1000 mg bid. \par \par HGM of late has shown values to be running 170s in the am, 140-150s at lunch time. There has been no significant hypoglycemia or hypoglycemic symptoms. \par \par Patient has gained 10 lbs since June- has started Weight Watchers. \par \par Denies any chest pain, sob, neurologic or ophthalmologic complaints. She too denies any new podiatric concerns. She is up to date with his ophthalmologic visit- Saw optho June told stable-told of beginnings of macular degen. Following with Dr Yuen.\par \par \par POCT A1C returned today 7.9% ; previously 7.2 % on 06/23/2022.\par POCT glucose returned today at 251 mg/dl \par \par \par Additional medical history includes that of hypertension, hyperlipidemia, and obesity along with with a-fib and vitamin d deficiency Is on D3 2,000 iu daily. On vitamin supplement for macular degeneration. \par following with Dr. Carmona \par Did have 2nd bout of kidney stone-dx as uric acid and is on potassium citrate.\par \par She was having some swelling to bilateral lower extremities and was started on lasix by . Took diuretic and staying on lasix 40 mg (not using daily)\par \par Had both doses of covid vaccine as well as booster, \par

## 2022-11-10 ENCOUNTER — APPOINTMENT (OUTPATIENT)
Dept: ORTHOPEDIC SURGERY | Facility: CLINIC | Age: 77
End: 2022-11-10

## 2022-11-10 PROCEDURE — 99213 OFFICE O/P EST LOW 20 MIN: CPT

## 2022-11-10 NOTE — REASON FOR VISIT
[FreeTextEntry2] : 11/10/22- Doing PT for left shoulder which helps\par 9/29/22- Continues to c/o left shoulder pain, doing PT which gives her symptomatic relief

## 2022-11-10 NOTE — PHYSICAL EXAM
[Left] : left shoulder [4 ___] : forward flexion 4[unfilled]/5 [4___] : external rotation 4[unfilled]/5 [Positive] : positive Wong [Bilateral] : knee bilaterally [AP] : anteroposterior [Lateral] : lateral [advanced tricompartmental OA with medial compartment narrowing and varus alignment] : advanced tricompartmental OA with medial compartment narrowing and varus alignment [TWNoteComboBox6] : internal rotation L5 [de-identified] : external rotation 45 degrees [] : no effusion [TWNoteComboBox7] : flexion 115 degrees [de-identified] : extension 0 degrees

## 2022-11-10 NOTE — HISTORY OF PRESENT ILLNESS
[Gradual] : gradual [Sharp] : sharp [Injection therapy] : injection therapy [Standing] : standing [Walking] : walking [Stairs] : stairs [7] : 7 [Dull/Aching] : dull/aching [Localized] : localized [Constant] : constant [de-identified] : 8/25/22: Here for follow up. Continuing PT for left shoulder\par 7/14/22- Continues PT for left shoulder pain\par 6/2/22- follow up left shoulder partial RCT, doing PT, no changes.  [] : no [FreeTextEntry1] : left shoulder [FreeTextEntry3] : 07/27/2020 [FreeTextEntry5] : no changes,  [de-identified] : physical therapy

## 2022-11-10 NOTE — PROCEDURE
[Large Joint Injection] : Large joint injection [] : Patient tolerated procedure well [Call if redness, pain or fever occur] : call if redness, pain or fever occur [Apply ice for 15min out of every hour for the next 12-24 hours as tolerated] : apply ice for 15 minutes out of every hour for the next 12-24 hours as tolerated [Patient was advised to rest the joint(s) for ____ days] : patient was advised to rest the joint(s) for [unfilled] days [Previous OTC use and PT nontherapeutic] : patient has tried OTC's including aspirin, Ibuprofen, Aleve, etc or prescription NSAIDS, and/or exercises at home and/or physical therapy without satisfactory response [Patient had decreased mobility in the joint] : patient had decreased mobility in the joint [Risks, benefits, alternatives discussed / Verbal consent obtained] : the risks benefits, and alternatives have been discussed, and verbal consent was obtained

## 2022-12-22 ENCOUNTER — APPOINTMENT (OUTPATIENT)
Dept: ORTHOPEDIC SURGERY | Facility: CLINIC | Age: 77
End: 2022-12-22

## 2022-12-22 VITALS — HEIGHT: 63 IN | BODY MASS INDEX: 46.95 KG/M2 | WEIGHT: 265 LBS

## 2022-12-22 PROCEDURE — 99213 OFFICE O/P EST LOW 20 MIN: CPT

## 2022-12-22 NOTE — HISTORY OF PRESENT ILLNESS
[Gradual] : gradual [7] : 7 [Dull/Aching] : dull/aching [Localized] : localized [Sharp] : sharp [Constant] : constant [Injection therapy] : injection therapy [Standing] : standing [Walking] : walking [Stairs] : stairs [de-identified] : 8/25/22: Here for follow up. Continuing PT for left shoulder\par 7/14/22- Continues PT for left shoulder pain\par 6/2/22- follow up left shoulder partial RCT, doing PT, no changes.  [] : no [FreeTextEntry1] : left shoulder [FreeTextEntry3] : 07/27/2020 [FreeTextEntry5] : no changes,  [de-identified] : physical therapy

## 2022-12-22 NOTE — REASON FOR VISIT
[FreeTextEntry2] : 12/22/22-  Continues PT on left shoulder, gives some relief\par 11/10/22- Doing PT for left shoulder which helps\par 9/29/22- Continues to c/o left shoulder pain, doing PT which gives her symptomatic relief

## 2022-12-22 NOTE — PHYSICAL EXAM
[Left] : left shoulder [4 ___] : forward flexion 4[unfilled]/5 [4___] : external rotation 4[unfilled]/5 [Positive] : positive Wong [Bilateral] : knee bilaterally [AP] : anteroposterior [Lateral] : lateral [advanced tricompartmental OA with medial compartment narrowing and varus alignment] : advanced tricompartmental OA with medial compartment narrowing and varus alignment [TWNoteComboBox6] : internal rotation L5 [de-identified] : external rotation 45 degrees [] : no effusion [TWNoteComboBox7] : flexion 115 degrees [de-identified] : extension 0 degrees

## 2023-01-12 ENCOUNTER — APPOINTMENT (OUTPATIENT)
Dept: ENDOCRINOLOGY | Facility: CLINIC | Age: 78
End: 2023-01-12
Payer: MEDICARE

## 2023-01-12 VITALS
HEART RATE: 96 BPM | WEIGHT: 272 LBS | HEIGHT: 63 IN | DIASTOLIC BLOOD PRESSURE: 80 MMHG | TEMPERATURE: 98.5 F | BODY MASS INDEX: 48.2 KG/M2 | SYSTOLIC BLOOD PRESSURE: 160 MMHG | OXYGEN SATURATION: 94 %

## 2023-01-12 LAB
GLUCOSE BLDC GLUCOMTR-MCNC: 134
HBA1C MFR BLD HPLC: 7.2

## 2023-01-12 PROCEDURE — 83036 HEMOGLOBIN GLYCOSYLATED A1C: CPT | Mod: QW

## 2023-01-12 PROCEDURE — 99214 OFFICE O/P EST MOD 30 MIN: CPT

## 2023-01-12 PROCEDURE — 82962 GLUCOSE BLOOD TEST: CPT

## 2023-01-12 RX ORDER — DEXAMETHASONE 1 MG/1
1 TABLET ORAL
Qty: 1 | Refills: 0 | Status: ACTIVE | COMMUNITY
Start: 2023-01-12 | End: 1900-01-01

## 2023-01-17 NOTE — HISTORY OF PRESENT ILLNESS
[FreeTextEntry1] : Ms. HEAD is a 77 year old female who returns today in follow up with regard to a history of type 2 dm. She did have 2 coronary stents placed in January and then had gastric ulcer requiring iron transfusions Now on Plavix and Eliquis.\par \par  Current dm medication include Farxiga 10 mg, and Glimepiride 1mg 1 tab BID. Started Mounjaro in October, is on 5 mg/week. \par Discontinued taking Metformin 1000 mg bid.  She reports now a curb in her appetite but denies weight loss. Current weight today is 272lbs previously was 265lb in October. \par \par HGM of late has shown values to be running 's. There has been no significant hypoglycemia or hypoglycemic symptoms. \par \par Patient has gained 10 lbs since June- no longer on weight watchers. \par \par Denies any chest pain, sob, neurologic or ophthalmologic complaints. She too denies any new podiatric concerns. She is up to date with his ophthalmologic visit- Saw optho June told stable-told of beginnings of macular degen. Following with Dr Yuen.\par \par POCT A1C returned today  7.2% ; previously  7.7 % on 10/31/22 \par POCT glucose returned today at  135 mg/dl\par \par Additional medical history includes that of hypertension, hyperlipidemia, and obesity along with with a-fib and vitamin d deficiency Is no longer on vitamin D supplement, on vitamin supplement for macular degeneration. \par following with Dr. Carmona \par Did have 2nd bout of kidney stone-dx as uric acid and is on potassium citrate.\par \par She was having some swelling to bilateral lower extremities and was started on lasix by .  staying on lasix 40 mg (not using daily)\par \par Had both doses of covid vaccine as well as booster,

## 2023-01-26 ENCOUNTER — APPOINTMENT (OUTPATIENT)
Dept: ORTHOPEDIC SURGERY | Facility: CLINIC | Age: 78
End: 2023-01-26
Payer: MEDICARE

## 2023-01-26 VITALS — HEIGHT: 63 IN | BODY MASS INDEX: 48.2 KG/M2 | WEIGHT: 272 LBS

## 2023-01-26 PROCEDURE — 99213 OFFICE O/P EST LOW 20 MIN: CPT

## 2023-01-26 NOTE — PHYSICAL EXAM
[Left] : left shoulder [4 ___] : forward flexion 4[unfilled]/5 [4___] : external rotation 4[unfilled]/5 [Positive] : positive Wong [Bilateral] : knee bilaterally [AP] : anteroposterior [Lateral] : lateral [advanced tricompartmental OA with medial compartment narrowing and varus alignment] : advanced tricompartmental OA with medial compartment narrowing and varus alignment [TWNoteComboBox6] : internal rotation L5 [de-identified] : external rotation 45 degrees [] : no effusion [TWNoteComboBox7] : flexion 115 degrees [de-identified] : extension 0 degrees

## 2023-01-26 NOTE — REASON FOR VISIT
[FreeTextEntry2] : 1/26/23- Still gets sharp pain left arm even without activity\par 12/22/22-  Continues PT on left shoulder, gives some relief\par 11/10/22- Doing PT for left shoulder which helps\par 9/29/22- Continues to c/o left shoulder pain, doing PT which gives her symptomatic relief

## 2023-01-26 NOTE — HISTORY OF PRESENT ILLNESS
[Gradual] : gradual [7] : 7 [Dull/Aching] : dull/aching [Localized] : localized [Sharp] : sharp [Constant] : constant [Injection therapy] : injection therapy [Standing] : standing [Walking] : walking [Stairs] : stairs [de-identified] : 8/25/22: Here for follow up. Continuing PT for left shoulder\par 7/14/22- Continues PT for left shoulder pain\par 6/2/22- follow up left shoulder partial RCT, doing PT, no changes.  [] : no [FreeTextEntry1] : left shoulder [FreeTextEntry3] : 07/27/2020 [FreeTextEntry5] : no changes,  [de-identified] : physical therapy

## 2023-02-02 ENCOUNTER — NON-APPOINTMENT (OUTPATIENT)
Age: 78
End: 2023-02-02

## 2023-02-02 ENCOUNTER — APPOINTMENT (OUTPATIENT)
Dept: CARDIOLOGY | Facility: CLINIC | Age: 78
End: 2023-02-02
Payer: MEDICARE

## 2023-02-02 VITALS
BODY MASS INDEX: 48.02 KG/M2 | OXYGEN SATURATION: 96 % | HEIGHT: 63 IN | HEART RATE: 86 BPM | SYSTOLIC BLOOD PRESSURE: 156 MMHG | TEMPERATURE: 98.1 F | DIASTOLIC BLOOD PRESSURE: 68 MMHG | WEIGHT: 271 LBS

## 2023-02-02 PROCEDURE — 99213 OFFICE O/P EST LOW 20 MIN: CPT

## 2023-02-02 PROCEDURE — 93000 ELECTROCARDIOGRAM COMPLETE: CPT

## 2023-02-02 NOTE — HISTORY OF PRESENT ILLNESS
[FreeTextEntry1] : This is a 76 y/o female with a pmhx of HTN HLD DM AI WILLA RI afib, CAD who presents to the office for routine follow up. pt reports feeling well Denies any comlpaints\par Denies any CP sob dizziness n/v/d fever or chills

## 2023-03-16 ENCOUNTER — APPOINTMENT (OUTPATIENT)
Dept: ORTHOPEDIC SURGERY | Facility: CLINIC | Age: 78
End: 2023-03-16
Payer: MEDICARE

## 2023-03-16 VITALS — HEIGHT: 63 IN | WEIGHT: 271 LBS | BODY MASS INDEX: 48.02 KG/M2

## 2023-03-16 PROCEDURE — 99213 OFFICE O/P EST LOW 20 MIN: CPT

## 2023-03-16 NOTE — HISTORY OF PRESENT ILLNESS
[Gradual] : gradual [7] : 7 [Dull/Aching] : dull/aching [Localized] : localized [Sharp] : sharp [Constant] : constant [Injection therapy] : injection therapy [Standing] : standing [Walking] : walking [Stairs] : stairs [de-identified] : 8/25/22: Here for follow up. Continuing PT for left shoulder\par 7/14/22- Continues PT for left shoulder pain\par 6/2/22- follow up left shoulder partial RCT, doing PT, no changes.  [] : no [FreeTextEntry1] : left shoulder [FreeTextEntry3] : 07/27/2020 [FreeTextEntry5] : no changes,  [de-identified] : physical therapy

## 2023-03-16 NOTE — PHYSICAL EXAM
[Left] : left shoulder [4 ___] : forward flexion 4[unfilled]/5 [4___] : external rotation 4[unfilled]/5 [Bilateral] : knee bilaterally [Positive] : positive Wong [AP] : anteroposterior [Lateral] : lateral [advanced tricompartmental OA with medial compartment narrowing and varus alignment] : advanced tricompartmental OA with medial compartment narrowing and varus alignment [TWNoteComboBox6] : internal rotation L5 [de-identified] : external rotation 45 degrees [] : no effusion [TWNoteComboBox7] : flexion 115 degrees [de-identified] : extension 0 degrees

## 2023-03-16 NOTE — DISCUSSION/SUMMARY
[de-identified] : Has h/o cervical stenosis which could be contributing to the arm pain. Also may have frozen shoulder from her diabetes which can be contributing to pain and stiffness. I told her to avoid surgery for the rotator cuff

## 2023-03-16 NOTE — REASON FOR VISIT
[FreeTextEntry2] : 3/16/23- Still getting sharp pains left arm, radiate to neck at times\par 1/26/23- Still gets sharp pain left arm even without activity\par 12/22/22-  Continues PT on left shoulder, gives some relief\par 11/10/22- Doing PT for left shoulder which helps\par 9/29/22- Continues to c/o left shoulder pain, doing PT which gives her symptomatic relief

## 2023-04-24 ENCOUNTER — FORM ENCOUNTER (OUTPATIENT)
Age: 78
End: 2023-04-24

## 2023-04-27 ENCOUNTER — APPOINTMENT (OUTPATIENT)
Dept: ORTHOPEDIC SURGERY | Facility: CLINIC | Age: 78
End: 2023-04-27
Payer: MEDICARE

## 2023-04-27 PROCEDURE — 99213 OFFICE O/P EST LOW 20 MIN: CPT

## 2023-04-27 NOTE — PHYSICAL EXAM
[Left] : left shoulder [4 ___] : forward flexion 4[unfilled]/5 [4___] : external rotation 4[unfilled]/5 [de-identified] : external rotation 45 degrees [TWNoteComboBox6] : internal rotation L5 [] : crepitus about the patella [Positive] : positive Wong [Bilateral] : knee bilaterally [AP] : anteroposterior [Lateral] : lateral [advanced tricompartmental OA with medial compartment narrowing and varus alignment] : advanced tricompartmental OA with medial compartment narrowing and varus alignment [TWNoteComboBox7] : flexion 115 degrees [de-identified] : extension 0 degrees

## 2023-04-27 NOTE — HISTORY OF PRESENT ILLNESS
[de-identified] : 8/25/22: Here for follow up. Continuing PT for left shoulder\par 7/14/22- Continues PT for left shoulder pain\par 6/2/22- follow up left shoulder partial RCT, doing PT, no changes.  [Gradual] : gradual [7] : 7 [Dull/Aching] : dull/aching [Localized] : localized [Sharp] : sharp [Constant] : constant [Injection therapy] : injection therapy [Standing] : standing [Walking] : walking [Stairs] : stairs [] : yes [FreeTextEntry1] : left shoulder [FreeTextEntry3] : 07/27/2020 [FreeTextEntry5] : no changes,  [de-identified] : physical therapy

## 2023-04-27 NOTE — REASON FOR VISIT
[FreeTextEntry2] : 4/27/23- Continues PT for left shoulder which seems to help her\par 3/16/23- Still getting sharp pains left arm, radiate to neck at times\par 1/26/23- Still gets sharp pain left arm even without activity\par 12/22/22-  Continues PT on left shoulder, gives some relief\par 11/10/22- Doing PT for left shoulder which helps\par 9/29/22- Continues to c/o left shoulder pain, doing PT which gives her symptomatic relief

## 2023-05-03 ENCOUNTER — RX RENEWAL (OUTPATIENT)
Age: 78
End: 2023-05-03

## 2023-05-03 RX ORDER — DILTIAZEM HYDROCHLORIDE 360 MG/1
360 CAPSULE, EXTENDED RELEASE ORAL
Qty: 90 | Refills: 1 | Status: ACTIVE | COMMUNITY
Start: 2021-12-15 | End: 1900-01-01

## 2023-05-09 ENCOUNTER — APPOINTMENT (OUTPATIENT)
Dept: ENDOCRINOLOGY | Facility: CLINIC | Age: 78
End: 2023-05-09
Payer: MEDICARE

## 2023-05-09 VITALS
OXYGEN SATURATION: 93 % | SYSTOLIC BLOOD PRESSURE: 150 MMHG | HEART RATE: 83 BPM | BODY MASS INDEX: 48.02 KG/M2 | DIASTOLIC BLOOD PRESSURE: 70 MMHG | WEIGHT: 271 LBS | TEMPERATURE: 98.1 F | HEIGHT: 63 IN

## 2023-05-09 VITALS — DIASTOLIC BLOOD PRESSURE: 78 MMHG | SYSTOLIC BLOOD PRESSURE: 146 MMHG

## 2023-05-09 LAB
GLUCOSE BLDC GLUCOMTR-MCNC: 140
HBA1C MFR BLD HPLC: 6.7

## 2023-05-09 PROCEDURE — 83036 HEMOGLOBIN GLYCOSYLATED A1C: CPT | Mod: QW

## 2023-05-09 PROCEDURE — 82962 GLUCOSE BLOOD TEST: CPT

## 2023-05-09 PROCEDURE — 99214 OFFICE O/P EST MOD 30 MIN: CPT

## 2023-05-25 ENCOUNTER — APPOINTMENT (OUTPATIENT)
Dept: ORTHOPEDIC SURGERY | Facility: CLINIC | Age: 78
End: 2023-05-25

## 2023-05-26 ENCOUNTER — APPOINTMENT (OUTPATIENT)
Dept: ORTHOPEDIC SURGERY | Facility: CLINIC | Age: 78
End: 2023-05-26
Payer: MEDICARE

## 2023-05-26 VITALS — HEIGHT: 63 IN | WEIGHT: 268 LBS | BODY MASS INDEX: 47.48 KG/M2

## 2023-05-26 DIAGNOSIS — S50.11XA CONTUSION OF RIGHT FOREARM, INITIAL ENCOUNTER: ICD-10-CM

## 2023-05-26 PROCEDURE — 99213 OFFICE O/P EST LOW 20 MIN: CPT

## 2023-05-26 PROCEDURE — 73080 X-RAY EXAM OF ELBOW: CPT | Mod: RT

## 2023-05-26 PROCEDURE — 73090 X-RAY EXAM OF FOREARM: CPT | Mod: RT

## 2023-05-26 NOTE — ASSESSMENT
[FreeTextEntry1] : FOREARM CONTUSION WITH ASSOC HEMATOMA (CURRENTLY ON BLOOD THINNERS); XRAYS UNREMARKABLE; PROVIDED REASSURANCE, ICE AND TYLENOL PRN

## 2023-05-26 NOTE — IMAGING
[de-identified] : Small hematoma dorso/ulnar forearm\par NVID\par No ttp over radial head or lateral elbow [Right] : right forearm [There are no fractures, subluxations or dislocations. No significant abnormalities are seen] : There are no fractures, subluxations or dislocations. No significant abnormalities are seen

## 2023-05-26 NOTE — HISTORY OF PRESENT ILLNESS
[Right Arm] : right arm [Sudden] : sudden [8] : 8 [1] : 2 [Intermittent] : intermittent [Household chores] : household chores [Nothing helps with pain getting better] : Nothing helps with pain getting better [] : yes [de-identified] : 05/26/23 pt presents here today with right elbow pain after playing with her grandson and hit the elbow on the car seat  [de-identified] : with activity [de-identified] : nothing

## 2023-05-29 ENCOUNTER — RX RENEWAL (OUTPATIENT)
Age: 78
End: 2023-05-29

## 2023-06-08 ENCOUNTER — APPOINTMENT (OUTPATIENT)
Dept: CARDIOLOGY | Facility: CLINIC | Age: 78
End: 2023-06-08
Payer: MEDICARE

## 2023-06-08 ENCOUNTER — NON-APPOINTMENT (OUTPATIENT)
Age: 78
End: 2023-06-08

## 2023-06-08 VITALS
HEIGHT: 63 IN | TEMPERATURE: 98.2 F | DIASTOLIC BLOOD PRESSURE: 70 MMHG | HEART RATE: 86 BPM | WEIGHT: 271 LBS | SYSTOLIC BLOOD PRESSURE: 150 MMHG | OXYGEN SATURATION: 94 % | BODY MASS INDEX: 48.02 KG/M2

## 2023-06-08 PROCEDURE — 99214 OFFICE O/P EST MOD 30 MIN: CPT

## 2023-06-08 PROCEDURE — 93000 ELECTROCARDIOGRAM COMPLETE: CPT

## 2023-06-08 RX ORDER — DILTIAZEM HYDROCHLORIDE 360 MG/1
360 CAPSULE, EXTENDED RELEASE ORAL DAILY
Qty: 90 | Refills: 1 | Status: ACTIVE | COMMUNITY
Start: 1900-01-01 | End: 1900-01-01

## 2023-06-08 NOTE — HISTORY OF PRESENT ILLNESS
[FreeTextEntry1] : Nel is a 77 y.o female w/MHx of CAD, Afib, Adrenal Nodule, Renal insufficiency, HLD, HTN, DM, WILLA who presents for routine follow up. Saw ortho for L rotator cuff tear and R FA pain. Patient feels well and has no acute concerns or complaints.

## 2023-06-15 ENCOUNTER — APPOINTMENT (OUTPATIENT)
Dept: ORTHOPEDIC SURGERY | Facility: CLINIC | Age: 78
End: 2023-06-15
Payer: MEDICARE

## 2023-06-15 VITALS — HEIGHT: 63 IN | WEIGHT: 271 LBS | BODY MASS INDEX: 48.02 KG/M2

## 2023-06-15 DIAGNOSIS — M79.602 PAIN IN LEFT ARM: ICD-10-CM

## 2023-06-15 PROCEDURE — 99213 OFFICE O/P EST LOW 20 MIN: CPT

## 2023-06-15 NOTE — PHYSICAL EXAM
[Left] : left shoulder [4 ___] : forward flexion 4[unfilled]/5 [4___] : external rotation 4[unfilled]/5 [Positive] : positive Wong [Bilateral] : knee bilaterally [AP] : anteroposterior [Lateral] : lateral [advanced tricompartmental OA with medial compartment narrowing and varus alignment] : advanced tricompartmental OA with medial compartment narrowing and varus alignment [TWNoteComboBox6] : internal rotation L5 [de-identified] : external rotation 45 degrees [] : no effusion [TWNoteComboBox7] : flexion 115 degrees [de-identified] : extension 0 degrees

## 2023-06-15 NOTE — ASSESSMENT
[FreeTextEntry1] : Not a surgical candidate\par The upper arm pain may also be radicular in nature 
0

## 2023-06-15 NOTE — HISTORY OF PRESENT ILLNESS
[Gradual] : gradual [7] : 7 [Dull/Aching] : dull/aching [Localized] : localized [Sharp] : sharp [Constant] : constant [Injection therapy] : injection therapy [Standing] : standing [Walking] : walking [Stairs] : stairs [de-identified] : 8/25/22: Here for follow up. Continuing PT for left shoulder\par 7/14/22- Continues PT for left shoulder pain\par 6/2/22- follow up left shoulder partial RCT, doing PT, no changes.  [] : no [FreeTextEntry1] : left shoulder [FreeTextEntry3] : 07/27/2020 [FreeTextEntry5] : no changes,  [de-identified] : physical therapy

## 2023-06-15 NOTE — REASON FOR VISIT
[FreeTextEntry2] : 6/15/23- Still with random pains left upper arm, the PT helps with the pain temporarily, but pain returns\par 4/27/23- Continues PT for left shoulder which seems to help her\par 3/16/23- Still getting sharp pains left arm, radiate to neck at times\par 1/26/23- Still gets sharp pain left arm even without activity\par 12/22/22-  Continues PT on left shoulder, gives some relief\par 11/10/22- Doing PT for left shoulder which helps\par 9/29/22- Continues to c/o left shoulder pain, doing PT which gives her symptomatic relief

## 2023-06-21 ENCOUNTER — RX RENEWAL (OUTPATIENT)
Age: 78
End: 2023-06-21

## 2023-07-14 ENCOUNTER — NON-APPOINTMENT (OUTPATIENT)
Age: 78
End: 2023-07-14

## 2023-07-14 ENCOUNTER — APPOINTMENT (OUTPATIENT)
Dept: CARDIOLOGY | Facility: CLINIC | Age: 78
End: 2023-07-14
Payer: MEDICARE

## 2023-07-14 VITALS
OXYGEN SATURATION: 83 % | BODY MASS INDEX: 48.2 KG/M2 | HEART RATE: 73 BPM | DIASTOLIC BLOOD PRESSURE: 65 MMHG | HEIGHT: 63 IN | WEIGHT: 272 LBS | TEMPERATURE: 98.1 F | SYSTOLIC BLOOD PRESSURE: 120 MMHG

## 2023-07-14 PROCEDURE — 99214 OFFICE O/P EST MOD 30 MIN: CPT

## 2023-07-14 PROCEDURE — 93000 ELECTROCARDIOGRAM COMPLETE: CPT

## 2023-07-14 PROCEDURE — 93306 TTE W/DOPPLER COMPLETE: CPT

## 2023-07-14 RX ORDER — TRAMADOL HYDROCHLORIDE 50 MG/1
50 TABLET, COATED ORAL
Qty: 60 | Refills: 0 | Status: DISCONTINUED | COMMUNITY
Start: 2022-06-02 | End: 2023-07-14

## 2023-07-14 RX ORDER — TRAMADOL HYDROCHLORIDE 50 MG/1
50 TABLET, COATED ORAL
Qty: 60 | Refills: 0 | Status: DISCONTINUED | COMMUNITY
Start: 2022-12-22 | End: 2023-07-14

## 2023-07-14 RX ORDER — VORTIOXETINE 5 MG/1
5 TABLET, FILM COATED ORAL
Qty: 90 | Refills: 2 | Status: ACTIVE | COMMUNITY
Start: 2023-07-14 | End: 1900-01-01

## 2023-07-14 RX ORDER — TRAMADOL HYDROCHLORIDE 50 MG/1
50 TABLET, COATED ORAL 3 TIMES DAILY
Qty: 60 | Refills: 0 | Status: DISCONTINUED | COMMUNITY
Start: 2022-04-19 | End: 2023-07-14

## 2023-07-14 NOTE — HISTORY OF PRESENT ILLNESS
[FreeTextEntry1] : Nel is a 77 y.o female w/MHx of CAD, Afib, Adrenal Nodule, Renal insufficiency, HLD, HTN, DM, WILLA who presents for blood pressure check. \par Saw ortho for L rotator cuff tear and R FA pain and PT and injections. Patient feels well and has no acute concerns or complaints. The patient denies fever, chills, weight loss, malaise, rash, recent travel, insect bites, alteration bowel habits, headaches, weakness, abdominal  pain, bloating, changes in urination, visual disturbances, shortness of breath, chest pain, dizziness, palpitations.\par  pt with worsening anxiety requests med on daily basis \par

## 2023-07-27 ENCOUNTER — APPOINTMENT (OUTPATIENT)
Dept: ORTHOPEDIC SURGERY | Facility: CLINIC | Age: 78
End: 2023-07-27
Payer: MEDICARE

## 2023-07-27 VITALS — HEIGHT: 63 IN | WEIGHT: 272 LBS | BODY MASS INDEX: 48.2 KG/M2

## 2023-07-27 PROCEDURE — 99213 OFFICE O/P EST LOW 20 MIN: CPT

## 2023-07-27 NOTE — HISTORY OF PRESENT ILLNESS
[Gradual] : gradual [7] : 7 [Dull/Aching] : dull/aching [Localized] : localized [Sharp] : sharp [Constant] : constant [Injection therapy] : injection therapy [Standing] : standing [Walking] : walking [Stairs] : stairs [de-identified] : 8/25/22: Here for follow up. Continuing PT for left shoulder\par 7/14/22- Continues PT for left shoulder pain\par 6/2/22- follow up left shoulder partial RCT, doing PT, no changes.  [] : no [FreeTextEntry1] : left shoulder [FreeTextEntry3] : 07/27/2020 [FreeTextEntry5] : no changes,  [de-identified] : physical therapy

## 2023-07-27 NOTE — PHYSICAL EXAM
[Left] : left shoulder [4 ___] : forward flexion 4[unfilled]/5 [4___] : external rotation 4[unfilled]/5 [TWNoteComboBox6] : internal rotation L5 [de-identified] : external rotation 45 degrees [] : crepitus about the patella [Positive] : positive Wong [Bilateral] : knee bilaterally [AP] : anteroposterior [Lateral] : lateral [advanced tricompartmental OA with medial compartment narrowing and varus alignment] : advanced tricompartmental OA with medial compartment narrowing and varus alignment [TWNoteComboBox7] : flexion 115 degrees [de-identified] : extension 0 degrees

## 2023-07-27 NOTE — REASON FOR VISIT
[FreeTextEntry2] : 7/27/23- No change, still with left shoulder and upper arm pain, PT does give some relief\par 6/15/23- Still with random pains left upper arm, the PT helps with the pain temporarily, but pain returns\par 4/27/23- Continues PT for left shoulder which seems to help her\par 3/16/23- Still getting sharp pains left arm, radiate to neck at times\par 1/26/23- Still gets sharp pain left arm even without activity\par 12/22/22-  Continues PT on left shoulder, gives some relief\par 11/10/22- Doing PT for left shoulder which helps\par 9/29/22- Continues to c/o left shoulder pain, doing PT which gives her symptomatic relief Humira Counseling:  I discussed with the patient the risks of adalimumab including but not limited to myelosuppression, immunosuppression, autoimmune hepatitis, demyelinating diseases, lymphoma, and serious infections.  The patient understands that monitoring is required including a PPD at baseline and must alert us or the primary physician if symptoms of infection or other concerning signs are noted.

## 2023-07-28 ENCOUNTER — NON-APPOINTMENT (OUTPATIENT)
Age: 78
End: 2023-07-28

## 2023-07-28 ENCOUNTER — APPOINTMENT (OUTPATIENT)
Dept: INTERNAL MEDICINE | Facility: CLINIC | Age: 78
End: 2023-07-28
Payer: MEDICARE

## 2023-07-28 VITALS
DIASTOLIC BLOOD PRESSURE: 70 MMHG | HEIGHT: 63 IN | WEIGHT: 272 LBS | BODY MASS INDEX: 48.2 KG/M2 | SYSTOLIC BLOOD PRESSURE: 160 MMHG | HEART RATE: 85 BPM | OXYGEN SATURATION: 92 %

## 2023-07-28 VITALS — DIASTOLIC BLOOD PRESSURE: 70 MMHG | SYSTOLIC BLOOD PRESSURE: 147 MMHG

## 2023-07-28 PROCEDURE — 99213 OFFICE O/P EST LOW 20 MIN: CPT | Mod: 25

## 2023-07-28 PROCEDURE — 93000 ELECTROCARDIOGRAM COMPLETE: CPT

## 2023-07-28 RX ORDER — METHYLPREDNISOLONE 4 MG/1
4 TABLET ORAL
Qty: 1 | Refills: 0 | Status: DISCONTINUED | COMMUNITY
Start: 2020-02-13 | End: 2023-07-28

## 2023-07-28 RX ORDER — FUROSEMIDE 20 MG/1
20 TABLET ORAL
Qty: 30 | Refills: 1 | Status: DISCONTINUED | COMMUNITY
Start: 2021-07-15 | End: 2023-07-28

## 2023-07-28 NOTE — HEALTH RISK ASSESSMENT
[PHQ-2 Negative - No further assessment needed] : PHQ-2 Negative - No further assessment needed [0] : 2) Feeling down, depressed, or hopeless: Not at all (0) [[id number]] : 0 [Never] : Never

## 2023-07-28 NOTE — PHYSICAL EXAM
[No Acute Distress] : no acute distress [Well Nourished] : well nourished [Well Developed] : well developed [Well-Appearing] : well-appearing [Normal Sclera/Conjunctiva] : normal sclera/conjunctiva [PERRL] : pupils equal round and reactive to light [EOMI] : extraocular movements intact [Normal Outer Ear/Nose] : the outer ears and nose were normal in appearance [Normal Oropharynx] : the oropharynx was normal [No JVD] : no jugular venous distention [No Lymphadenopathy] : no lymphadenopathy [Supple] : supple [Thyroid Normal, No Nodules] : the thyroid was normal and there were no nodules present [No Respiratory Distress] : no respiratory distress  [No Accessory Muscle Use] : no accessory muscle use [Clear to Auscultation] : lungs were clear to auscultation bilaterally [Normal Rate] : normal rate  [Regular Rhythm] : with a regular rhythm [Normal S1, S2] : normal S1 and S2 [No Murmur] : no murmur heard [No Carotid Bruits] : no carotid bruits [No Abdominal Bruit] : a ~M bruit was not heard ~T in the abdomen [No Varicosities] : no varicosities [Pedal Pulses Present] : the pedal pulses are present [No Edema] : there was no peripheral edema [No Palpable Aorta] : no palpable aorta [No Extremity Clubbing/Cyanosis] : no extremity clubbing/cyanosis [Soft] : abdomen soft [Non Tender] : non-tender [No Masses] : no abdominal mass palpated [Non-distended] : non-distended [No HSM] : no HSM [Normal Bowel Sounds] : normal bowel sounds [Normal Posterior Cervical Nodes] : no posterior cervical lymphadenopathy [Normal Anterior Cervical Nodes] : no anterior cervical lymphadenopathy [No CVA Tenderness] : no CVA  tenderness [No Spinal Tenderness] : no spinal tenderness [No Joint Swelling] : no joint swelling [No Rash] : no rash [Grossly Normal Strength/Tone] : grossly normal strength/tone [Coordination Grossly Intact] : coordination grossly intact [No Focal Deficits] : no focal deficits [Normal Gait] : normal gait [Normal Affect] : the affect was normal [Alert and Oriented x3] : oriented to person, place, and time

## 2023-07-28 NOTE — HISTORY OF PRESENT ILLNESS
[FreeTextEntry1] : medication f/u [de-identified] : Patient is a 78 year old female with past medical history CAD, Afib, Adrenal Nodule, Renal insufficiency, HLD, HTN, DM, WILLA presents for a medication follow up. Patient was started on Trintellix for anxiety 2 weeks ago and reports no adverse reactions with good results. Anxiety much improved.Patient had EKG done in office and was within normal limits, QTC unchanged.  \par Patient feels well. No complaints. Denies chest pain, sob, day, dizziness, diaphoresis, palpitations, LE swelling, orthopnea, syncope, n/v, headache.\par . Patient's hemoglobin levels were previously slightly elevated at 16.4 up 12 about 1 year ago.

## 2023-08-19 NOTE — PRE-ANESTHESIA EVALUATION ADULT - NSANTHDISPORD_GEN_ALL_CORE
PACU
86 y/o M w/ PMH of HTN, parkinsons, GERD, CVA, syncope, BPH presenting w/ increased secretions. Seen w/ wife and daughter. Reports over the past few days having cough w/ copious amount of secretions. Last night was concerned that he would choke, he didn't, but didn't think he could stay home another night so brought him to the ED today for eval. [facility] and they were told he had some "congestion" in his lungs. Primarily bed bound. Denies fevers, chills, headache, dizziness, blurred vision, chest pain, shortness of breath, abdominal pain, n/v/d/c, urinary symptoms, MSK pain, rash.

## 2023-08-22 NOTE — PRE PROCEDURE NOTE - PRE PROCEDURE EVALUATION
Attending Physician:   Rosemary                         Procedure: EGD    Indication for Procedure: GI Bleed  ________________________________________________________  PAST MEDICAL & SURGICAL HISTORY:  Hypomagnesemia    Anxiety    Knee pain, left    Renal insufficiency    CVA (cerebral vascular accident)    Atrial fibrillation    Diabetes mellitus type 2 in obese    Hyperlipemia    Hypertension    Coronary stent patent    History of cholecystectomy      ALLERGIES:  No Known Allergies    HOME MEDICATIONS:  Cardizem  mg/24 hours oral capsule, extended release: 1 cap(s) orally once a day  doxazosin 1 mg oral tablet: 1 tab(s) orally once a day  Eliquis 5 mg oral tablet: 1 tab(s) orally 2 times a day  Farxiga 5 mg oral tablet: 1 tab(s) orally once a day  lisinopril 40 mg oral tablet: 1 tab(s) orally once a day  LORazepam 0.5 mg oral tablet: 0.5 tab(s) orally 2 times a day  magnesium oxide: 1600 milligram(s) orally once a day ( total dose) pt splits it up into 3x daily   metFORMIN 1000 mg oral tablet: 1 tab(s) orally 2 times a day; DO NOT TAKE UNTIL 1/17  Multaq 400 mg oral tablet: 1 tab(s) orally 2 times a day    AICD/PPM: [ ] yes   [ ] no    PERTINENT LAB DATA:                      PHYSICAL EXAMINATION:    Height (cm): 160  Weight (kg): 113.4  BMI (kg/m2): 44.3  BSA (m2): 2.13T(C): --  HR: --  BP: --  RR: --  SpO2: --    Constitutional: NAD  HEENT: PERRLA, EOMI,    Neck:  No JVD  Respiratory: CTAB/L  Cardiovascular: S1 and S2  Gastrointestinal: BS+, soft, NT/ND  Extremities: No peripheral edema  Neurological: A/O x 3, no focal deficits  Psychiatric: Normal mood, normal affect  Skin: No rashes    ASA Class: I [ ]  II [ ]  III [x ]  IV [ ]    COMMENTS:    The patient is a suitable candidate for the planned procedure unless box checked [ ]  No, explain:    
67YO female with no significant PMHx via [place] transfer presenting with complaints of fall. pt states running to catch the bus tripped over her feet, hitting her head on the floor. pt states LOC for unk amount of time. As per EMS pt has right femur fx, orbital fx and brain bleed. Pt received tylenol and labetalol at [place].  Pt Axox4, respirations even, & non-labored. radial pulses strong and equal bilaterally right arm in a soft sling from EMS/[place]. Skin warm, dry with right eye ecchymosis. Pt denies current HA, SOB, CP, blurred vision, nausea, numbness and tingling. Pt placed in position of comfort. Pt educated on call bell system and provided call bell. Bed in lowest position, wheels locked, appropriate side rails raised. Pt denies needs at this time.

## 2023-09-05 ENCOUNTER — APPOINTMENT (OUTPATIENT)
Dept: ORTHOPEDIC SURGERY | Facility: CLINIC | Age: 78
End: 2023-09-05
Payer: MEDICARE

## 2023-09-05 VITALS — WEIGHT: 272 LBS | HEIGHT: 63 IN | BODY MASS INDEX: 48.2 KG/M2

## 2023-09-05 PROCEDURE — 99213 OFFICE O/P EST LOW 20 MIN: CPT

## 2023-09-05 RX ORDER — TRAMADOL HYDROCHLORIDE 50 MG/1
50 TABLET, COATED ORAL
Qty: 60 | Refills: 0 | Status: ACTIVE | COMMUNITY
Start: 2023-09-05 | End: 1900-01-01

## 2023-09-05 NOTE — HISTORY OF PRESENT ILLNESS
[Gradual] : gradual [7] : 7 [Dull/Aching] : dull/aching [Localized] : localized [Sharp] : sharp [Constant] : constant [Injection therapy] : injection therapy [Standing] : standing [Walking] : walking [Stairs] : stairs [de-identified] : 8/25/22: Here for follow up. Continuing PT for left shoulder\par  7/14/22- Continues PT for left shoulder pain\par  6/2/22- follow up left shoulder partial RCT, doing PT, no changes.  [] : no [FreeTextEntry1] : left shoulder [FreeTextEntry3] : 07/27/2020 [FreeTextEntry5] : no changes,  [de-identified] : physical therapy

## 2023-09-05 NOTE — REASON FOR VISIT
[FreeTextEntry2] : 9/5/23- Left shoulder pain persists, PT does help some 7/27/23- No change, still with left shoulder and upper arm pain, PT does give some relief 6/15/23- Still with random pains left upper arm, the PT helps with the pain temporarily, but pain returns 4/27/23- Continues PT for left shoulder which seems to help her 3/16/23- Still getting sharp pains left arm, radiate to neck at times 1/26/23- Still gets sharp pain left arm even without activity 12/22/22-  Continues PT on left shoulder, gives some relief 11/10/22- Doing PT for left shoulder which helps 9/29/22- Continues to c/o left shoulder pain, doing PT which gives her symptomatic relief

## 2023-09-05 NOTE — PHYSICAL EXAM
[Left] : left shoulder [4 ___] : forward flexion 4[unfilled]/5 [4___] : external rotation 4[unfilled]/5 [TWNoteComboBox6] : internal rotation L5 [de-identified] : external rotation 45 degrees [] : crepitus about the patella [Positive] : positive Wong [Bilateral] : knee bilaterally [AP] : anteroposterior [Lateral] : lateral [advanced tricompartmental OA with medial compartment narrowing and varus alignment] : advanced tricompartmental OA with medial compartment narrowing and varus alignment [TWNoteComboBox7] : flexion 115 degrees [de-identified] : extension 0 degrees

## 2023-09-06 ENCOUNTER — RX RENEWAL (OUTPATIENT)
Age: 78
End: 2023-09-06

## 2023-09-14 ENCOUNTER — APPOINTMENT (OUTPATIENT)
Dept: ENDOCRINOLOGY | Facility: CLINIC | Age: 78
End: 2023-09-14
Payer: MEDICARE

## 2023-09-14 VITALS
DIASTOLIC BLOOD PRESSURE: 82 MMHG | OXYGEN SATURATION: 98 % | SYSTOLIC BLOOD PRESSURE: 123 MMHG | BODY MASS INDEX: 48.24 KG/M2 | TEMPERATURE: 98.4 F | HEART RATE: 83 BPM | WEIGHT: 272.25 LBS | HEIGHT: 63 IN

## 2023-09-14 DIAGNOSIS — R31.29 OTHER MICROSCOPIC HEMATURIA: ICD-10-CM

## 2023-09-14 LAB
GLUCOSE BLDC GLUCOMTR-MCNC: 147
HBA1C MFR BLD HPLC: 6.8

## 2023-09-14 PROCEDURE — 82962 GLUCOSE BLOOD TEST: CPT

## 2023-09-14 PROCEDURE — 83036 HEMOGLOBIN GLYCOSYLATED A1C: CPT | Mod: QW

## 2023-09-14 PROCEDURE — 99214 OFFICE O/P EST MOD 30 MIN: CPT

## 2023-10-03 ENCOUNTER — APPOINTMENT (OUTPATIENT)
Dept: CARDIOLOGY | Facility: CLINIC | Age: 78
End: 2023-10-03
Payer: MEDICARE

## 2023-10-03 VITALS
BODY MASS INDEX: 47.84 KG/M2 | TEMPERATURE: 98.4 F | SYSTOLIC BLOOD PRESSURE: 130 MMHG | HEART RATE: 93 BPM | OXYGEN SATURATION: 97 % | DIASTOLIC BLOOD PRESSURE: 70 MMHG | HEIGHT: 63 IN | WEIGHT: 270 LBS

## 2023-10-03 DIAGNOSIS — R82.90 UNSPECIFIED ABNORMAL FINDINGS IN URINE: ICD-10-CM

## 2023-10-03 DIAGNOSIS — Z71.85 ENCOUNTER FOR IMMUNIZATION SAFETY COUNSELING: ICD-10-CM

## 2023-10-03 PROCEDURE — 99214 OFFICE O/P EST MOD 30 MIN: CPT

## 2023-10-03 PROCEDURE — 93000 ELECTROCARDIOGRAM COMPLETE: CPT

## 2023-10-13 NOTE — PATIENT PROFILE ADULT - HEALTH LITERACY
Patients appt for 10/26 was rescheduled to 10/24. Message sent to Dr Green for ok to keep other appts as scheduled. Will update patient once I receive a response.    no

## 2023-10-17 ENCOUNTER — APPOINTMENT (OUTPATIENT)
Dept: ORTHOPEDIC SURGERY | Facility: CLINIC | Age: 78
End: 2023-10-17
Payer: MEDICARE

## 2023-10-17 VITALS — HEIGHT: 63 IN | WEIGHT: 270 LBS | BODY MASS INDEX: 47.84 KG/M2

## 2023-10-17 PROCEDURE — 99213 OFFICE O/P EST LOW 20 MIN: CPT

## 2023-10-17 RX ORDER — TRAMADOL HYDROCHLORIDE 50 MG/1
50 TABLET, COATED ORAL
Qty: 60 | Refills: 0 | Status: ACTIVE | COMMUNITY
Start: 2023-10-17 | End: 1900-01-01

## 2023-11-07 ENCOUNTER — LABORATORY RESULT (OUTPATIENT)
Age: 78
End: 2023-11-07

## 2023-11-30 ENCOUNTER — APPOINTMENT (OUTPATIENT)
Dept: ORTHOPEDIC SURGERY | Facility: CLINIC | Age: 78
End: 2023-11-30
Payer: MEDICARE

## 2023-11-30 VITALS — WEIGHT: 270 LBS | HEIGHT: 63 IN | BODY MASS INDEX: 47.84 KG/M2

## 2023-11-30 PROCEDURE — 99213 OFFICE O/P EST LOW 20 MIN: CPT

## 2023-11-30 RX ORDER — TRAMADOL HYDROCHLORIDE 50 MG/1
50 TABLET, COATED ORAL
Qty: 60 | Refills: 0 | Status: ACTIVE | COMMUNITY
Start: 2023-11-30 | End: 1900-01-01

## 2024-01-10 ENCOUNTER — RX RENEWAL (OUTPATIENT)
Age: 79
End: 2024-01-10

## 2024-01-10 RX ORDER — ATORVASTATIN CALCIUM 40 MG/1
40 TABLET, FILM COATED ORAL
Qty: 90 | Refills: 1 | Status: ACTIVE | COMMUNITY
Start: 2020-07-11 | End: 1900-01-01

## 2024-01-11 ENCOUNTER — APPOINTMENT (OUTPATIENT)
Dept: ORTHOPEDIC SURGERY | Facility: CLINIC | Age: 79
End: 2024-01-11
Payer: MEDICARE

## 2024-01-11 VITALS — WEIGHT: 270 LBS | HEIGHT: 63 IN | BODY MASS INDEX: 47.84 KG/M2

## 2024-01-11 PROCEDURE — 99213 OFFICE O/P EST LOW 20 MIN: CPT

## 2024-01-11 NOTE — REASON FOR VISIT
[FreeTextEntry2] : 01/11/2024 Stopped PT as wasn't getting any long term benefit 11/30/2023 Still with left shoulder pain, would like to continue PT 10/17/2023 No change in left shoulder pain, the PT gives temporary relief from the pain 9/5/23- Left shoulder pain persists, PT does help some 7/27/23- No change, still with left shoulder and upper arm pain, PT does give some relief 6/15/23- Still with random pains left upper arm, the PT helps with the pain temporarily, but pain returns 4/27/23- Continues PT for left shoulder which seems to help her 3/16/23- Still getting sharp pains left arm, radiate to neck at times 1/26/23- Still gets sharp pain left arm even without activity 12/22/22-  Continues PT on left shoulder, gives some relief 11/10/22- Doing PT for left shoulder which helps 9/29/22- Continues to c/o left shoulder pain, doing PT which gives her symptomatic relief

## 2024-01-11 NOTE — HISTORY OF PRESENT ILLNESS
[Gradual] : gradual [7] : 7 [Dull/Aching] : dull/aching [Localized] : localized [Sharp] : sharp [Constant] : constant [Injection therapy] : injection therapy [Standing] : standing [Walking] : walking [Stairs] : stairs [de-identified] : 8/25/22: Here for follow up. Continuing PT for left shoulder\par  7/14/22- Continues PT for left shoulder pain\par  6/2/22- follow up left shoulder partial RCT, doing PT, no changes.  [] : no [FreeTextEntry1] : left shoulder [FreeTextEntry3] : 07/27/2020 [FreeTextEntry5] : no changes,  [de-identified] : physical therapy

## 2024-01-11 NOTE — PHYSICAL EXAM
[Left] : left shoulder [4 ___] : forward flexion 4[unfilled]/5 [4___] : external rotation 4[unfilled]/5 [Positive] : positive Wong [Bilateral] : knee bilaterally [AP] : anteroposterior [Lateral] : lateral [advanced tricompartmental OA with medial compartment narrowing and varus alignment] : advanced tricompartmental OA with medial compartment narrowing and varus alignment [TWNoteComboBox6] : internal rotation L5 [de-identified] : external rotation 45 degrees [] : no effusion [TWNoteComboBox7] : flexion 115 degrees [de-identified] : extension 0 degrees

## 2024-01-28 ENCOUNTER — NON-APPOINTMENT (OUTPATIENT)
Age: 79
End: 2024-01-28

## 2024-02-07 ENCOUNTER — APPOINTMENT (OUTPATIENT)
Dept: CARDIOLOGY | Facility: CLINIC | Age: 79
End: 2024-02-07

## 2024-02-08 ENCOUNTER — APPOINTMENT (OUTPATIENT)
Dept: INTERNAL MEDICINE | Facility: CLINIC | Age: 79
End: 2024-02-08
Payer: MEDICARE

## 2024-02-08 ENCOUNTER — NON-APPOINTMENT (OUTPATIENT)
Age: 79
End: 2024-02-08

## 2024-02-08 ENCOUNTER — APPOINTMENT (OUTPATIENT)
Dept: PULMONOLOGY | Facility: CLINIC | Age: 79
End: 2024-02-08
Payer: MEDICARE

## 2024-02-08 ENCOUNTER — APPOINTMENT (OUTPATIENT)
Dept: ENDOCRINOLOGY | Facility: CLINIC | Age: 79
End: 2024-02-08
Payer: MEDICARE

## 2024-02-08 VITALS — HEART RATE: 58 BPM | OXYGEN SATURATION: 91 % | DIASTOLIC BLOOD PRESSURE: 65 MMHG | SYSTOLIC BLOOD PRESSURE: 113 MMHG

## 2024-02-08 VITALS
BODY MASS INDEX: 46.78 KG/M2 | TEMPERATURE: 98.6 F | OXYGEN SATURATION: 93 % | HEART RATE: 63 BPM | SYSTOLIC BLOOD PRESSURE: 130 MMHG | WEIGHT: 264 LBS | DIASTOLIC BLOOD PRESSURE: 60 MMHG | HEIGHT: 63 IN

## 2024-02-08 VITALS — OXYGEN SATURATION: 97 % | HEART RATE: 65 BPM

## 2024-02-08 VITALS
HEART RATE: 75 BPM | HEIGHT: 63 IN | OXYGEN SATURATION: 94 % | BODY MASS INDEX: 46.8 KG/M2 | SYSTOLIC BLOOD PRESSURE: 138 MMHG | DIASTOLIC BLOOD PRESSURE: 77 MMHG | WEIGHT: 264.13 LBS

## 2024-02-08 DIAGNOSIS — F41.9 ANXIETY DISORDER, UNSPECIFIED: ICD-10-CM

## 2024-02-08 LAB
GLUCOSE BLDC GLUCOMTR-MCNC: 340
HBA1C MFR BLD HPLC: 8.4

## 2024-02-08 PROCEDURE — 99214 OFFICE O/P EST MOD 30 MIN: CPT | Mod: 25

## 2024-02-08 PROCEDURE — 93000 ELECTROCARDIOGRAM COMPLETE: CPT

## 2024-02-08 PROCEDURE — 82962 GLUCOSE BLOOD TEST: CPT

## 2024-02-08 PROCEDURE — 94729 DIFFUSING CAPACITY: CPT

## 2024-02-08 PROCEDURE — 95012 NITRIC OXIDE EXP GAS DETER: CPT

## 2024-02-08 PROCEDURE — 94727 GAS DIL/WSHOT DETER LNG VOL: CPT

## 2024-02-08 PROCEDURE — 99214 OFFICE O/P EST MOD 30 MIN: CPT

## 2024-02-08 PROCEDURE — 83036 HEMOGLOBIN GLYCOSYLATED A1C: CPT | Mod: QW

## 2024-02-08 PROCEDURE — 99496 TRANSJ CARE MGMT HIGH F2F 7D: CPT

## 2024-02-08 PROCEDURE — 94060 EVALUATION OF WHEEZING: CPT

## 2024-02-08 PROCEDURE — 71046 X-RAY EXAM CHEST 2 VIEWS: CPT

## 2024-02-08 PROCEDURE — ZZZZZ: CPT

## 2024-02-08 RX ORDER — BUDESONIDE AND FORMOTEROL FUMARATE DIHYDRATE 160; 4.5 UG/1; UG/1
160-4.5 AEROSOL RESPIRATORY (INHALATION) TWICE DAILY
Qty: 1 | Refills: 5 | Status: ACTIVE | COMMUNITY
Start: 2024-02-08 | End: 1900-01-01

## 2024-02-08 NOTE — PHYSICAL EXAM
[Clear to Auscultation] : lungs were clear to auscultation bilaterally [No Edema] : there was no peripheral edema [No Acute Distress] : no acute distress [Well Nourished] : well nourished [Well Developed] : well developed [Well-Appearing] : well-appearing [Normal Sclera/Conjunctiva] : normal sclera/conjunctiva [Normal Outer Ear/Nose] : the outer ears and nose were normal in appearance [Normal Oropharynx] : the oropharynx was normal [No JVD] : no jugular venous distention [No Lymphadenopathy] : no lymphadenopathy [Supple] : supple [No Respiratory Distress] : no respiratory distress  [No Accessory Muscle Use] : no accessory muscle use [Normal Rate] : normal rate  [Regular Rhythm] : with a regular rhythm [Normal S1, S2] : normal S1 and S2 [No Murmur] : no murmur heard [No Carotid Bruits] : no carotid bruits [No Varicosities] : no varicosities [Pedal Pulses Present] : the pedal pulses are present [No Extremity Clubbing/Cyanosis] : no extremity clubbing/cyanosis [Soft] : abdomen soft [Non Tender] : non-tender [Non-distended] : non-distended [Normal Bowel Sounds] : normal bowel sounds [Normal Anterior Cervical Nodes] : no anterior cervical lymphadenopathy [No CVA Tenderness] : no CVA  tenderness [No Spinal Tenderness] : no spinal tenderness [No Joint Swelling] : no joint swelling [Grossly Normal Strength/Tone] : grossly normal strength/tone [No Rash] : no rash [Coordination Grossly Intact] : coordination grossly intact [No Focal Deficits] : no focal deficits [Normal Gait] : normal gait [Alert and Oriented x3] : oriented to person, place, and time [de-identified] : diffuse rhonchi  [de-identified] : 1+ pitting edema half-way up the tibia bilaterally

## 2024-02-08 NOTE — HEALTH RISK ASSESSMENT
[0] : 2) Feeling down, depressed, or hopeless: Not at all (0) [PHQ-2 Negative - No further assessment needed] : PHQ-2 Negative - No further assessment needed [Never] : Never [[id number]] : 0

## 2024-02-08 NOTE — HISTORY OF PRESENT ILLNESS
[FreeTextEntry1] : f/u [Post-hospitalization from ___ Hospital] : Post-hospitalization from [unfilled] Hospital [Admitted on: ___] : The patient was admitted on [unfilled] [Discharged on ___] : discharged on [unfilled] [Discharge Summary] : discharge summary [Pertinent Labs] : pertinent labs [Radiology Findings] : radiology findings [Discharge Med List] : discharge medication list [Med Reconciliation] : medication reconciliation has been completed [FreeTextEntry3] : Pt did not bring in discharge summary [FreeTextEntry2] : This is a 77 y/o female with a PMHx of CAD, Anxiety, Afib, Adrenal Nodule, Renal insufficiency, HLD, HTN, DM, WILLA coming in today for HFU.  Pt admitted for acute bronchitis and asthma exacerbation, reported covid/rsv/flu was negative. CXR showed mild interstitial edema. Pt was given prednisone taper, inhaler, nebulizer, and cough medicine. Also had CHF exacerbation and lost 30lbs water weight after being treated with lasix.   Since discharge, she c/o fatigue and pain located at the top of her head but states her URI Sx have improved. Denies fall or trauma to head She still has a cough but denies sob/doef/c.. Says she was not given antibiotic in hospital and is current on prednisone taper. Denies chest pain, SOB, ZAMORA, dizziness, diaphoresis, palpitations, LE swelling, orthopnea, syncope, n/v, headache.

## 2024-02-09 LAB
ALBUMIN SERPL ELPH-MCNC: 4.2 G/DL
ALP BLD-CCNC: 71 U/L
ALT SERPL-CCNC: 65 U/L
ANION GAP SERPL CALC-SCNC: 16 MMOL/L
AST SERPL-CCNC: 33 U/L
BASOPHILS # BLD AUTO: 0.02 K/UL
BASOPHILS NFR BLD AUTO: 0.2 %
BILIRUB SERPL-MCNC: 0.9 MG/DL
BUN SERPL-MCNC: 78 MG/DL
CALCIUM SERPL-MCNC: 10 MG/DL
CHLORIDE SERPL-SCNC: 98 MMOL/L
CHOLEST SERPL-MCNC: 177 MG/DL
CK SERPL-CCNC: 190 U/L
CO2 SERPL-SCNC: 26 MMOL/L
CREAT SERPL-MCNC: 1.92 MG/DL
EGFR: 26 ML/MIN/1.73M2
EOSINOPHIL # BLD AUTO: 0.02 K/UL
EOSINOPHIL NFR BLD AUTO: 0.2 %
ESTIMATED AVERAGE GLUCOSE: 192 MG/DL
GLUCOSE SERPL-MCNC: 340 MG/DL
HBA1C MFR BLD HPLC: 8.3 %
HCT VFR BLD CALC: 45.1 %
HDLC SERPL-MCNC: 58 MG/DL
HGB BLD-MCNC: 14.2 G/DL
IMM GRANULOCYTES NFR BLD AUTO: 1.3 %
LDLC SERPL CALC-MCNC: 87 MG/DL
LYMPHOCYTES # BLD AUTO: 0.92 K/UL
LYMPHOCYTES NFR BLD AUTO: 9.6 %
MAN DIFF?: NORMAL
MCHC RBC-ENTMCNC: 29.6 PG
MCHC RBC-ENTMCNC: 31.5 GM/DL
MCV RBC AUTO: 94 FL
MONOCYTES # BLD AUTO: 0.71 K/UL
MONOCYTES NFR BLD AUTO: 7.4 %
NEUTROPHILS # BLD AUTO: 7.75 K/UL
NEUTROPHILS NFR BLD AUTO: 81.3 %
NONHDLC SERPL-MCNC: 119 MG/DL
NT-PROBNP SERPL-MCNC: 223 PG/ML
PLATELET # BLD AUTO: 135 K/UL
POTASSIUM SERPL-SCNC: 4 MMOL/L
PROT SERPL-MCNC: 6.9 G/DL
RBC # BLD: 4.8 M/UL
RBC # FLD: 16.9 %
SODIUM SERPL-SCNC: 139 MMOL/L
T4 FREE SERPL-MCNC: 1.8 NG/DL
TRIGL SERPL-MCNC: 187 MG/DL
TSH SERPL-ACNC: 0.35 UIU/ML
WBC # FLD AUTO: 9.54 K/UL

## 2024-02-10 NOTE — PHYSICAL EXAM
[No Acute Distress] : no acute distress [Normal Oropharynx] : normal oropharynx [Normal Appearance] : normal appearance [No Neck Mass] : no neck mass [Normal Rate/Rhythm] : normal rate/rhythm [Normal S1, S2] : normal s1, s2 [No Murmurs] : no murmurs [No Resp Distress] : no resp distress [No Abnormalities] : no abnormalities [Benign] : benign [Normal Gait] : normal gait [No Clubbing] : no clubbing [No Cyanosis] : no cyanosis [No Edema] : no edema [FROM] : FROM [Normal Color/ Pigmentation] : normal color/ pigmentation [No Focal Deficits] : no focal deficits [Oriented x3] : oriented x3 [Normal Affect] : normal affect [TextBox_68] : Mild bilateral expiratory wheezes

## 2024-02-10 NOTE — CONSULT LETTER
[Dear  ___] : Dear  [unfilled], [Courtesy Letter:] : I had the pleasure of seeing your patient, [unfilled], in my office today. [Please see my note below.] : Please see my note below. [Consult Closing:] : Thank you very much for allowing me to participate in the care of this patient.  If you have any questions, please do not hesitate to contact me. [Sincerely,] : Sincerely, [FreeTextEntry3] : Dr. Kaleigh Epps

## 2024-02-10 NOTE — REASON FOR VISIT
[Consultation] : a consultation [Cough] : cough [Shortness of Breath] : shortness of breath [TextBox_13] : Dr. Lemuel Damon

## 2024-02-10 NOTE — DISCUSSION/SUMMARY
[FreeTextEntry1] : Nel is a patient with mild cough and wheezes secondary to asthma exacerbation and left lower lobe pneumonia.

## 2024-02-10 NOTE — ASSESSMENT
[FreeTextEntry1] : Continue albuterol via nebulizer 3 times daily.   Finish prednisone taper for asthma exacerbation.   Start doxycycline for 7 days.   Start Symbicort 160/4.5 mcg HFA, 2 puffs twice daily.   Return for pulmonary follow-up and repeat chest x-ray in 2 weeks.   Also advised her to closely follow with you clinically.

## 2024-02-10 NOTE — PROCEDURE
[FreeTextEntry1] : Pulmonary function test performed in my office today: Spirometry shows suggestive evidence of mild restrictive defect with no improvement postbronchodilator; lung volumes within normal limits; diffusion is within normal limits. __________  Exhaled Nitric Oxide             Final  No Documents Attached    	Test  	Result  	Flag	Reference	Goal	Last Verified  	Exhaled Nitric Oxide	<5	 	 		REQUIRED  Ordered by: PADMAJA HOOPER       Collected/Examined: [id number] 03:38PM       Verification Required       Stage: Final       Performed at: In Office       Performed by: PIERRE BELTRE       Resulted: [id number] 03:38PM       Last Updated: [id number] 03:41PM       _________  Xray Chest 2 Views PA/Lat             Final  No Documents Attached    	 Chest x-ray PA and lateral views performed in my office today showed a small left lower lobe infiltrate next to the left heart border, no evidence of pleural effusions.  Ordered by: PADMAJA HOOPER       Collected/Examined: [id number] 04:55PM       Verified by: JURGEN LEONG [id number] 05:00PM       Result Communication: Call patient with results; Stage: Final       Performed at: In Office       Performed by: JURGEN LEONG       Resulted: [id number] 04:55PM       Last Updated: 09Feb2024 05:00PM       Accession: [id number]

## 2024-02-10 NOTE — HISTORY OF PRESENT ILLNESS
[TextBox_4] : Nel is a pleasant 78-year-old female with history of bronchitis, she had URI 2 weeks ago, which led to coughing and wheezing.  She was admitted to [facility] on February 3, 2024, and later discharged after symptomatic improvement. She currently complains of dry cough and mild wheezes, but no chest pain or shortness of breath.  Currently on nebulizer and home O2. She is a non-smoker.

## 2024-02-11 NOTE — ADDENDUM
[FreeTextEntry1] : Blood will be drawn in office today. POCT HbA1c and glucose carried out in office today given diabetes diagnosis.

## 2024-02-11 NOTE — HISTORY OF PRESENT ILLNESS
[FreeTextEntry1] : Ms. HEAD is a 78-year-old female who returns today in follow up with regard to a history of type 2 dm.    She was recently hospitalized 2 weeks ago with a cough that progressed to bronchitis. She was discharged about 1 week ago and continues on po prednisone up to 40 mg daily and is currently weaning off. She will be on prednisone for 6 more days. Still has residual cough. Pending f/u with Dr. Roman.   Current dm medication include Mounjaro 7.5 mg weekly, Glimepiride 1mg 2x daily, Farxiga 10 mg daily.  She has significant constipation on Mounjaro.   HGM of late has shown values to be running in the 300s-400s range since hospitalization. BG was up to 497 in the hospital. Patient reports that prior to hospitalization, she BGs were normal.   POCT A1c returned today at 8.4% ; previously 6.9% in October 2023  POCT glucose returned today at 340 mg/dL   Denies any chest pain, sob, neurologic or ophthalmologic complaints.  Too denies any new podiatric concerns. She is up to date with his ophthalmologic visit  Additional medical history includes that of2 coronary stents placed in January 2023 and then had gastric ulcer requiring iron transfusions Now on Eliquis.    hypertension, hyperlipidemia, and obesity along with with a-fib and vitamin d deficiency Is on D3 2,000 iu daily. Did have 2nd bout of kidney stone-dx as uric acid  and is on potassium citrate.  She was having some swelling to bilateral lower extremities and was started on lasix by .   Saw optho Benita told stable-told of beginning sof macular degen.  Dr Yuen took diuretic and staying on lasix 40 mg

## 2024-02-14 ENCOUNTER — LABORATORY RESULT (OUTPATIENT)
Age: 79
End: 2024-02-14

## 2024-02-22 ENCOUNTER — APPOINTMENT (OUTPATIENT)
Dept: CARDIOLOGY | Facility: CLINIC | Age: 79
End: 2024-02-22
Payer: MEDICARE

## 2024-02-22 ENCOUNTER — APPOINTMENT (OUTPATIENT)
Dept: PULMONOLOGY | Facility: CLINIC | Age: 79
End: 2024-02-22
Payer: MEDICARE

## 2024-02-22 VITALS
BODY MASS INDEX: 47.18 KG/M2 | DIASTOLIC BLOOD PRESSURE: 50 MMHG | TEMPERATURE: 97.7 F | HEART RATE: 83 BPM | OXYGEN SATURATION: 96 % | WEIGHT: 266.25 LBS | SYSTOLIC BLOOD PRESSURE: 114 MMHG | RESPIRATION RATE: 17 BRPM | HEIGHT: 63 IN

## 2024-02-22 VITALS — DIASTOLIC BLOOD PRESSURE: 61 MMHG | SYSTOLIC BLOOD PRESSURE: 121 MMHG | OXYGEN SATURATION: 94 % | HEART RATE: 74 BPM

## 2024-02-22 DIAGNOSIS — D50.9 IRON DEFICIENCY ANEMIA, UNSPECIFIED: ICD-10-CM

## 2024-02-22 DIAGNOSIS — J45.901 UNSPECIFIED ASTHMA WITH (ACUTE) EXACERBATION: ICD-10-CM

## 2024-02-22 DIAGNOSIS — R93.89 ABNORMAL FINDINGS ON DIAGNOSTIC IMAGING OF OTHER SPECIFIED BODY STRUCTURES: ICD-10-CM

## 2024-02-22 PROCEDURE — 93000 ELECTROCARDIOGRAM COMPLETE: CPT

## 2024-02-22 PROCEDURE — 71046 X-RAY EXAM CHEST 2 VIEWS: CPT

## 2024-02-22 PROCEDURE — 99214 OFFICE O/P EST MOD 30 MIN: CPT

## 2024-02-22 NOTE — PHYSICAL EXAM
[Well Developed] : well developed [Well Nourished] : well nourished [Normal Conjunctiva] : normal conjunctiva [No Acute Distress] : no acute distress [No Carotid Bruit] : no carotid bruit [Normal Venous Pressure] : normal venous pressure [Normal S1, S2] : normal S1, S2 [No Murmur] : no murmur [No Rub] : no rub [Clear Lung Fields] : clear lung fields [No Gallop] : no gallop [Good Air Entry] : good air entry [No Respiratory Distress] : no respiratory distress  [Soft] : abdomen soft [No Masses/organomegaly] : no masses/organomegaly [Non Tender] : non-tender [Normal Bowel Sounds] : normal bowel sounds [Normal Gait] : normal gait [No Edema] : no edema [No Clubbing] : no clubbing [No Cyanosis] : no cyanosis [No Rash] : no rash [No Skin Lesions] : no skin lesions [No Varicosities] : no varicosities [Moves all extremities] : moves all extremities [No Focal Deficits] : no focal deficits [Alert and Oriented] : alert and oriented [Normal Speech] : normal speech [Normal memory] : normal memory

## 2024-02-22 NOTE — HISTORY OF PRESENT ILLNESS
[FreeTextEntry1] : This is a 78 year y/o female with a PMHx of CAD, Anxiety, Afib, Adrenal Nodule, Renal insufficiency, HLD, HTN, DM, WILLA coming in today for routine follow up Pt recently admitted for acute bronchitis and asthma exacerbation,. Pt was given prednisone taper, inhaler, nebulizer, and cough medicine. Also had CHF exacerbation and lost 30lbs water weight after being treated with lasix. reports feeling well to see dr beck today. Pt feels well with no complaints today. Pt reports feeling well reports breathing is much better denies any cp or LE swelling. tolerating lasix 40 mg daily

## 2024-02-22 NOTE — PHYSICAL EXAM
[No Acute Distress] : no acute distress [Normal Oropharynx] : normal oropharynx [No Neck Mass] : no neck mass [Normal Appearance] : normal appearance [Normal S1, S2] : normal s1, s2 [Normal Rate/Rhythm] : normal rate/rhythm [No Murmurs] : no murmurs [No Resp Distress] : no resp distress [Benign] : benign [No Abnormalities] : no abnormalities [Clear to Auscultation Bilaterally] : clear to auscultation bilaterally [Normal Gait] : normal gait [No Clubbing] : no clubbing [No Cyanosis] : no cyanosis [FROM] : FROM [No Edema] : no edema [Normal Color/ Pigmentation] : normal color/ pigmentation [No Focal Deficits] : no focal deficits [Oriented x3] : oriented x3 [Normal Affect] : normal affect

## 2024-02-24 PROBLEM — J45.901 ASTHMA EXACERBATION: Status: ACTIVE | Noted: 2024-02-10

## 2024-02-24 PROBLEM — R93.89 ABNORMAL CXR (CHEST X-RAY): Status: ACTIVE | Noted: 2020-08-06

## 2024-02-24 NOTE — DISCUSSION/SUMMARY
Parish, Antionette P Muchard, L Mynor Triage Nurse Msg Pool  Dupixent 300mg pen reauth - Pending Insurance Review     Rx Insurance: [employer] part D     PA submitted via covermymeds.    1/10/24  Shanice Carlson  
Patient is currently in the hospital.       Parish, Antionette P Muchard, L Mynor Triage Nurse Msg Pool  Dupixent Pen 300mg reauth -  Approved - Ready to fill     Authorization Number:[id number]  Valid from 1/10/2024 tountil further notice     Will notify when script is released/sent to pharmacy     Pharmacy Coverage  [employer] part D  Patient's Co-Pay: $761.22     Co pay Assistance Information  Patient already approved for manufacture assistance thru 12/31/2024     Additional Information:  Spoke with patient they are aware of approval. Patient confirmed being enrolled for 2024 assistance.     Shanice Carlson  1/18/24    
[FreeTextEntry1] :  Xray Chest 2 Views PA/Lat             Final  No Documents Attached    	 Chest x-ray PA and lateral views performed in my office today showed improved left lower lobe density Left heart border compared to prior chest x-ray dated February 8, 2024, otherwise clear lungs, no evidence of pleural effusions.  Ordered by: PADMAJA HOOPER       Collected/Examined: [id number] 12:59PM       Verified by: GIULIANA NAZARIO [id number] 04:06PM       Result Communication: No patient communication needed at this time; Stage: Final       Performed at: In Office       Performed by: GIULIANA NAZARIO       Resulted: [id number] 12:59PM       Last Updated: [id number] 04:06PM       Accession: [id number]

## 2024-02-24 NOTE — ASSESSMENT
[FreeTextEntry1] : Cough secondary to recent asthma exacerbation and resolving pneumonia.   Repeat chest x-ray for follow-up in 1 month, to ensure the resolution of the left lower lobe density. Return for pulmonary follow-up in 1 month.

## 2024-02-24 NOTE — HISTORY OF PRESENT ILLNESS
[TextBox_4] : Cough and shortness of breath are better now.  Overall feeling well; no respiratory complaints reported at this time

## 2024-02-26 LAB
ANION GAP SERPL CALC-SCNC: 14 MMOL/L
BASOPHILS # BLD AUTO: 0.03 K/UL
BASOPHILS NFR BLD AUTO: 0.3 %
BUN SERPL-MCNC: 51 MG/DL
CALCIUM SERPL-MCNC: 10.2 MG/DL
CHLORIDE SERPL-SCNC: 100 MMOL/L
CO2 SERPL-SCNC: 27 MMOL/L
CREAT SERPL-MCNC: 1.74 MG/DL
EGFR: 30 ML/MIN/1.73M2
EOSINOPHIL # BLD AUTO: 0.08 K/UL
EOSINOPHIL NFR BLD AUTO: 0.9 %
GLUCOSE SERPL-MCNC: 338 MG/DL
HCT VFR BLD CALC: 36.8 %
HGB BLD-MCNC: 10.9 G/DL
IMM GRANULOCYTES NFR BLD AUTO: 0.3 %
LYMPHOCYTES # BLD AUTO: 3.02 K/UL
LYMPHOCYTES NFR BLD AUTO: 32.5 %
MAN DIFF?: NORMAL
MCHC RBC-ENTMCNC: 27.6 PG
MCHC RBC-ENTMCNC: 29.6 GM/DL
MCV RBC AUTO: 93.2 FL
MONOCYTES # BLD AUTO: 0.75 K/UL
MONOCYTES NFR BLD AUTO: 8.1 %
NEUTROPHILS # BLD AUTO: 5.37 K/UL
NEUTROPHILS NFR BLD AUTO: 57.9 %
NT-PROBNP SERPL-MCNC: 263 PG/ML
PLATELET # BLD AUTO: 266 K/UL
POTASSIUM SERPL-SCNC: 4.2 MMOL/L
RBC # BLD: 3.95 M/UL
RBC # FLD: 14 %
SODIUM SERPL-SCNC: 140 MMOL/L
WBC # FLD AUTO: 9.28 K/UL

## 2024-02-27 ENCOUNTER — APPOINTMENT (OUTPATIENT)
Dept: ENDOCRINOLOGY | Facility: CLINIC | Age: 79
End: 2024-02-27
Payer: MEDICARE

## 2024-02-27 VITALS
HEIGHT: 63 IN | TEMPERATURE: 97.8 F | WEIGHT: 264 LBS | DIASTOLIC BLOOD PRESSURE: 60 MMHG | SYSTOLIC BLOOD PRESSURE: 140 MMHG | BODY MASS INDEX: 46.78 KG/M2 | OXYGEN SATURATION: 95 % | HEART RATE: 97 BPM

## 2024-02-27 LAB — GLUCOSE BLDC GLUCOMTR-MCNC: 241

## 2024-02-27 PROCEDURE — 99214 OFFICE O/P EST MOD 30 MIN: CPT

## 2024-02-27 PROCEDURE — 82962 GLUCOSE BLOOD TEST: CPT

## 2024-02-28 RX ORDER — TIRZEPATIDE 7.5 MG/.5ML
7.5 INJECTION, SOLUTION SUBCUTANEOUS
Qty: 3 | Refills: 1 | Status: DISCONTINUED | COMMUNITY
Start: 2022-10-31 | End: 2024-02-28

## 2024-03-03 NOTE — HISTORY OF PRESENT ILLNESS
I have placed the order again as I said before if she has had it low in the past it will be low most likely and she will need to continue over-the-counter vitamin D for long-term   [FreeTextEntry1] : Ms. HEAD is a 78 year  old  female who returns today in follow up with regard to a history of type 2 dm.   Hx of CKD , follows with Dr. Carmona   Was on Prednisnoine tapering 0now off completely for about  1 week -is going to stary off Is taking Farxiga  10 mg and Glimepiride 1 mg bid Took last Mounjaro yetgeday  signif cosntiaptuon  Cough much better. Bg today 200  FDeeling much btter  Bg's coming down.  No longer on prednisone, she last took it on 2/21/24  Current dm medication include Mounjaro 7.5 mg weekly, Farxiga 10 mg and Glimepiride 1mg bid She reports since starting mounjaro she has been constipated, she has to take dulcolax   She feels that tradjents was helping BG more than mounjaro    HGM of late has shown values to be running in the 200-240 AM fasting  this   There has been no significant hypoglycemia.  Denies any chest pain, sob, neurologic or ophthalmologic complaints. She too denies any new podiatric concerns. She is up to date with his ophthalmologic visit- last week.   Additional medical history includes that of2 coronary stents placed in January 2023 and then had gastric ulcer requiring iron transfusions Now on Eliquis.    hypertension, hyperlipidemia, and obesity along with with a-fib and vitamin d deficiency Is on D3 2,000 iu daily. Did have 2nd bout of kidney stone-dx as uric acid  and is on potassium citrate.  She was having some swelling to bilateral lower extremities and continues on furosemide  40 mg daily  by .   POCT glucose today in office 241 Saw optjose Joy told stable-todl of beginning macular degen.  Dr Yuen

## 2024-03-03 NOTE — PHYSICAL EXAM
[Alert] : alert [Well Nourished] : well nourished [No Acute Distress] : no acute distress [Well Developed] : well developed [Normal Sclera/Conjunctiva] : normal sclera/conjunctiva [EOMI] : extra ocular movement intact [No Proptosis] : no proptosis [Normal Oropharynx] : the oropharynx was normal [Thyroid Not Enlarged] : the thyroid was not enlarged [No Thyroid Nodules] : no palpable thyroid nodules [No Respiratory Distress] : no respiratory distress [No Accessory Muscle Use] : no accessory muscle use [Normal S1, S2] : normal S1 and S2 [Clear to Auscultation] : lungs were clear to auscultation bilaterally [Normal Rate] : heart rate was normal [Regular Rhythm] : with a regular rhythm [No Edema] : no peripheral edema [Pedal Pulses Normal] : the pedal pulses are present [Normal Bowel Sounds] : normal bowel sounds [Not Tender] : non-tender [Not Distended] : not distended [Soft] : abdomen soft [Normal Anterior Cervical Nodes] : no anterior cervical lymphadenopathy [Normal Posterior Cervical Nodes] : no posterior cervical lymphadenopathy [No Spinal Tenderness] : no spinal tenderness [Spine Straight] : spine straight [No Stigmata of Cushings Syndrome] : no stigmata of Cushings Syndrome [Normal Gait] : normal gait [Normal Strength/Tone] : muscle strength and tone were normal [Acanthosis Nigricans] : no acanthosis nigricans [No Rash] : no rash [Normal Reflexes] : deep tendon reflexes were 2+ and symmetric [No Tremors] : no tremors [Oriented x3] : oriented to person, place, and time

## 2024-03-04 ENCOUNTER — APPOINTMENT (OUTPATIENT)
Dept: CARDIOLOGY | Facility: CLINIC | Age: 79
End: 2024-03-04
Payer: MEDICARE

## 2024-03-04 VITALS
BODY MASS INDEX: 47.72 KG/M2 | HEIGHT: 63 IN | WEIGHT: 269.31 LBS | HEART RATE: 89 BPM | SYSTOLIC BLOOD PRESSURE: 130 MMHG | DIASTOLIC BLOOD PRESSURE: 50 MMHG | RESPIRATION RATE: 17 BRPM | OXYGEN SATURATION: 95 % | TEMPERATURE: 98.3 F

## 2024-03-04 DIAGNOSIS — R89.9 UNSPECIFIED ABNORMAL FINDING IN SPECIMENS FROM OTHER ORGANS, SYSTEMS AND TISSUES: ICD-10-CM

## 2024-03-04 PROCEDURE — 99214 OFFICE O/P EST MOD 30 MIN: CPT

## 2024-03-04 NOTE — HISTORY OF PRESENT ILLNESS
[FreeTextEntry1] : This is a 78 year y/o female with a PMHx of CAD, Anxiety, Afib, Adrenal Nodule, Renal insufficiency, HLD, HTN, DM, WILLA last seen on 2/22 s/p admission for acute bronchitis and asthma exacerbation, and CHF exacerbation here for f/u pt still on lasix 40 mg daily. continues to reports  LE swelling R > L denies any SOB diziness n/v/c feve chills or chest pain

## 2024-03-04 NOTE — PHYSICAL EXAM
[Well Developed] : well developed [Well Nourished] : well nourished [No Acute Distress] : no acute distress [Normal Conjunctiva] : normal conjunctiva [Normal Venous Pressure] : normal venous pressure [No Carotid Bruit] : no carotid bruit [Normal S1, S2] : normal S1, S2 [No Rub] : no rub [No Murmur] : no murmur [No Gallop] : no gallop [Clear Lung Fields] : clear lung fields [Good Air Entry] : good air entry [No Respiratory Distress] : no respiratory distress  [Soft] : abdomen soft [Non Tender] : non-tender [No Masses/organomegaly] : no masses/organomegaly [Normal Bowel Sounds] : normal bowel sounds [Normal Gait] : normal gait [No Cyanosis] : no cyanosis [No Varicosities] : no varicosities [No Clubbing] : no clubbing [No Rash] : no rash [No Skin Lesions] : no skin lesions [Moves all extremities] : moves all extremities [No Focal Deficits] : no focal deficits [Normal Speech] : normal speech [Alert and Oriented] : alert and oriented [Normal memory] : normal memory [de-identified] : right greater then left

## 2024-03-05 ENCOUNTER — APPOINTMENT (OUTPATIENT)
Dept: CARDIOLOGY | Facility: CLINIC | Age: 79
End: 2024-03-05
Payer: MEDICARE

## 2024-03-05 PROCEDURE — 93970 EXTREMITY STUDY: CPT

## 2024-03-08 RX ORDER — POTASSIUM CHLORIDE 750 MG/1
10 TABLET, FILM COATED, EXTENDED RELEASE ORAL DAILY
Qty: 180 | Refills: 0 | Status: DISCONTINUED | COMMUNITY
Start: 2019-05-17 | End: 2024-03-08

## 2024-03-09 ENCOUNTER — LABORATORY RESULT (OUTPATIENT)
Age: 79
End: 2024-03-09

## 2024-03-11 ENCOUNTER — LABORATORY RESULT (OUTPATIENT)
Age: 79
End: 2024-03-11

## 2024-03-12 ENCOUNTER — APPOINTMENT (OUTPATIENT)
Dept: INTERNAL MEDICINE | Facility: CLINIC | Age: 79
End: 2024-03-12

## 2024-03-12 NOTE — PHYSICAL EXAM
[No Acute Distress] : no acute distress [Well Nourished] : well nourished [Well Developed] : well developed [Well-Appearing] : well-appearing [Normal Sclera/Conjunctiva] : normal sclera/conjunctiva [Normal Outer Ear/Nose] : the outer ears and nose were normal in appearance [Normal Oropharynx] : the oropharynx was normal [No JVD] : no jugular venous distention [No Lymphadenopathy] : no lymphadenopathy [Supple] : supple [No Respiratory Distress] : no respiratory distress  [Clear to Auscultation] : lungs were clear to auscultation bilaterally [No Accessory Muscle Use] : no accessory muscle use [Normal Rate] : normal rate  [Regular Rhythm] : with a regular rhythm [Normal S1, S2] : normal S1 and S2 [No Murmur] : no murmur heard [No Carotid Bruits] : no carotid bruits [No Varicosities] : no varicosities [Pedal Pulses Present] : the pedal pulses are present [No Edema] : there was no peripheral edema [Soft] : abdomen soft [No Extremity Clubbing/Cyanosis] : no extremity clubbing/cyanosis [Non Tender] : non-tender [Non-distended] : non-distended [Normal Bowel Sounds] : normal bowel sounds [Normal Anterior Cervical Nodes] : no anterior cervical lymphadenopathy [No CVA Tenderness] : no CVA  tenderness [No Spinal Tenderness] : no spinal tenderness [No Joint Swelling] : no joint swelling [Grossly Normal Strength/Tone] : grossly normal strength/tone [No Rash] : no rash [Coordination Grossly Intact] : coordination grossly intact [No Focal Deficits] : no focal deficits [Normal Gait] : normal gait [Alert and Oriented x3] : oriented to person, place, and time

## 2024-03-12 NOTE — HEALTH RISK ASSESSMENT
[0] : 2) Feeling down, depressed, or hopeless: Not at all (0) [PHQ-2 Negative - No further assessment needed] : PHQ-2 Negative - No further assessment needed [[id number]] : 0 [Never] : Never

## 2024-03-15 ENCOUNTER — NON-APPOINTMENT (OUTPATIENT)
Age: 79
End: 2024-03-15

## 2024-03-15 RX ORDER — DRONEDARONE 400 MG/1
400 TABLET, FILM COATED ORAL
Qty: 180 | Refills: 3 | Status: ACTIVE | COMMUNITY
Start: 2020-07-11 | End: 1900-01-01

## 2024-03-18 ENCOUNTER — LABORATORY RESULT (OUTPATIENT)
Age: 79
End: 2024-03-18

## 2024-03-18 ENCOUNTER — NON-APPOINTMENT (OUTPATIENT)
Age: 79
End: 2024-03-18

## 2024-03-22 ENCOUNTER — APPOINTMENT (OUTPATIENT)
Dept: INTERNAL MEDICINE | Facility: CLINIC | Age: 79
End: 2024-03-22
Payer: MEDICARE

## 2024-03-22 VITALS
TEMPERATURE: 97.5 F | SYSTOLIC BLOOD PRESSURE: 130 MMHG | BODY MASS INDEX: 47.13 KG/M2 | OXYGEN SATURATION: 95 % | HEIGHT: 63 IN | HEART RATE: 84 BPM | DIASTOLIC BLOOD PRESSURE: 60 MMHG | WEIGHT: 266 LBS

## 2024-03-22 PROCEDURE — G2211 COMPLEX E/M VISIT ADD ON: CPT

## 2024-03-22 PROCEDURE — 99214 OFFICE O/P EST MOD 30 MIN: CPT

## 2024-03-22 RX ORDER — DOXYCYCLINE HYCLATE 100 MG/1
100 CAPSULE ORAL
Qty: 10 | Refills: 0 | Status: DISCONTINUED | COMMUNITY
Start: 2024-02-08 | End: 2024-03-22

## 2024-03-24 LAB
25(OH)D3 SERPL-MCNC: 28.2 NG/ML
25(OH)D3 SERPL-MCNC: 29.5 NG/ML
ALBUMIN SERPL ELPH-MCNC: 4.6 G/DL
ALBUMIN SERPL ELPH-MCNC: 4.7 G/DL
ALBUMIN SERPL ELPH-MCNC: 4.8 G/DL
ALBUMIN SERPL ELPH-MCNC: 4.9 G/DL
ALBUMIN SERPL ELPH-MCNC: 5 G/DL
ALP BLD-CCNC: 78 U/L
ALP BLD-CCNC: 79 U/L
ALP BLD-CCNC: 80 U/L
ALP BLD-CCNC: 84 U/L
ALP BLD-CCNC: 86 U/L
ALP BLD-CCNC: 89 U/L
ALP BLD-CCNC: 90 U/L
ALT SERPL-CCNC: 30 U/L
ALT SERPL-CCNC: 31 U/L
ALT SERPL-CCNC: 33 U/L
ALT SERPL-CCNC: 43 U/L
ALT SERPL-CCNC: 53 U/L
ALT SERPL-CCNC: 62 U/L
ALT SERPL-CCNC: 64 U/L
ANION GAP SERPL CALC-SCNC: 13 MMOL/L
ANION GAP SERPL CALC-SCNC: 15 MMOL/L
ANION GAP SERPL CALC-SCNC: 15 MMOL/L
ANION GAP SERPL CALC-SCNC: 16 MMOL/L
ANION GAP SERPL CALC-SCNC: 17 MMOL/L
ANION GAP SERPL CALC-SCNC: 18 MMOL/L
APPEARANCE: CLEAR
AST SERPL-CCNC: 26 U/L
AST SERPL-CCNC: 27 U/L
AST SERPL-CCNC: 28 U/L
AST SERPL-CCNC: 32 U/L
AST SERPL-CCNC: 35 U/L
AST SERPL-CCNC: 41 U/L
AST SERPL-CCNC: 42 U/L
BACTERIA UR CULT: NORMAL
BACTERIA: NEGATIVE /HPF
BASOPHILS # BLD AUTO: 0.02 K/UL
BASOPHILS # BLD AUTO: 0.03 K/UL
BASOPHILS # BLD AUTO: 0.04 K/UL
BASOPHILS # BLD AUTO: 0.04 K/UL
BASOPHILS # BLD AUTO: 0.05 K/UL
BASOPHILS NFR BLD AUTO: 0.3 %
BASOPHILS NFR BLD AUTO: 0.4 %
BASOPHILS NFR BLD AUTO: 0.6 %
BILIRUB DIRECT SERPL-MCNC: 0.2 MG/DL
BILIRUB INDIRECT SERPL-MCNC: 0.3 MG/DL
BILIRUB INDIRECT SERPL-MCNC: 0.4 MG/DL
BILIRUB INDIRECT SERPL-MCNC: 0.4 MG/DL
BILIRUB INDIRECT SERPL-MCNC: 0.5 MG/DL
BILIRUB INDIRECT SERPL-MCNC: 0.5 MG/DL
BILIRUB SERPL-MCNC: 0.5 MG/DL
BILIRUB SERPL-MCNC: 0.6 MG/DL
BILIRUB SERPL-MCNC: 0.7 MG/DL
BILIRUB SERPL-MCNC: 0.7 MG/DL
BILIRUBIN URINE: NEGATIVE
BLOOD URINE: ABNORMAL
BUN SERPL-MCNC: 31 MG/DL
BUN SERPL-MCNC: 32 MG/DL
BUN SERPL-MCNC: 34 MG/DL
BUN SERPL-MCNC: 35 MG/DL
BUN SERPL-MCNC: 36 MG/DL
BUN SERPL-MCNC: 40 MG/DL
BUN SERPL-MCNC: 44 MG/DL
BUN SERPL-MCNC: 45 MG/DL
CALCIUM SERPL-MCNC: 10 MG/DL
CALCIUM SERPL-MCNC: 10.1 MG/DL
CALCIUM SERPL-MCNC: 10.2 MG/DL
CALCIUM SERPL-MCNC: 10.2 MG/DL
CALCIUM SERPL-MCNC: 10.3 MG/DL
CALCIUM SERPL-MCNC: 10.4 MG/DL
CALCIUM SERPL-MCNC: 9.8 MG/DL
CALCIUM SERPL-MCNC: 9.8 MG/DL
CAST: 0 /LPF
CHLORIDE SERPL-SCNC: 101 MMOL/L
CHLORIDE SERPL-SCNC: 101 MMOL/L
CHLORIDE SERPL-SCNC: 102 MMOL/L
CHLORIDE SERPL-SCNC: 102 MMOL/L
CHLORIDE SERPL-SCNC: 103 MMOL/L
CHLORIDE SERPL-SCNC: 103 MMOL/L
CHLORIDE SERPL-SCNC: 105 MMOL/L
CHLORIDE SERPL-SCNC: 105 MMOL/L
CHOLEST SERPL-MCNC: 174 MG/DL
CHOLEST SERPL-MCNC: 175 MG/DL
CHOLEST SERPL-MCNC: 181 MG/DL
CHOLEST SERPL-MCNC: 195 MG/DL
CHOLEST SERPL-MCNC: 219 MG/DL
CK SERPL-CCNC: 144 U/L
CK SERPL-CCNC: 145 U/L
CK SERPL-CCNC: 185 U/L
CK SERPL-CCNC: 191 U/L
CK SERPL-CCNC: 197 U/L
CO2 SERPL-SCNC: 23 MMOL/L
CO2 SERPL-SCNC: 24 MMOL/L
CO2 SERPL-SCNC: 25 MMOL/L
CO2 SERPL-SCNC: 26 MMOL/L
COLOR: YELLOW
CREAT SERPL-MCNC: 1.49 MG/DL
CREAT SERPL-MCNC: 1.5 MG/DL
CREAT SERPL-MCNC: 1.54 MG/DL
CREAT SERPL-MCNC: 1.55 MG/DL
CREAT SERPL-MCNC: 1.6 MG/DL
CREAT SERPL-MCNC: 1.61 MG/DL
CREAT SERPL-MCNC: 1.63 MG/DL
CREAT SERPL-MCNC: 1.69 MG/DL
CREAT SPEC-SCNC: 45 MG/DL
EGFR: 31 ML/MIN/1.73M2
EGFR: 32 ML/MIN/1.73M2
EGFR: 33 ML/MIN/1.73M2
EGFR: 34 ML/MIN/1.73M2
EGFR: 36 ML/MIN/1.73M2
EOSINOPHIL # BLD AUTO: 0.04 K/UL
EOSINOPHIL # BLD AUTO: 0.05 K/UL
EOSINOPHIL # BLD AUTO: 0.08 K/UL
EOSINOPHIL NFR BLD AUTO: 0.5 %
EOSINOPHIL NFR BLD AUTO: 0.6 %
EOSINOPHIL NFR BLD AUTO: 0.6 %
EOSINOPHIL NFR BLD AUTO: 0.7 %
EOSINOPHIL NFR BLD AUTO: 1.2 %
EPITHELIAL CELLS: 5 /HPF
ESTIMATED AVERAGE GLUCOSE: 148 MG/DL
ESTIMATED AVERAGE GLUCOSE: 151 MG/DL
ESTIMATED AVERAGE GLUCOSE: 171 MG/DL
ESTIMATED AVERAGE GLUCOSE: 189 MG/DL
FERRITIN SERPL-MCNC: 120 NG/ML
FERRITIN SERPL-MCNC: 18 NG/ML
FERRITIN SERPL-MCNC: 27 NG/ML
FERRITIN SERPL-MCNC: 46 NG/ML
FERRITIN SERPL-MCNC: 47 NG/ML
FOLATE SERPL-MCNC: 12.5 NG/ML
FOLATE SERPL-MCNC: 14.5 NG/ML
GLUCOSE QUALITATIVE U: >=1000 MG/DL
GLUCOSE SERPL-MCNC: 106 MG/DL
GLUCOSE SERPL-MCNC: 128 MG/DL
GLUCOSE SERPL-MCNC: 139 MG/DL
GLUCOSE SERPL-MCNC: 139 MG/DL
GLUCOSE SERPL-MCNC: 140 MG/DL
GLUCOSE SERPL-MCNC: 168 MG/DL
GLUCOSE SERPL-MCNC: 234 MG/DL
GLUCOSE SERPL-MCNC: 253 MG/DL
HAPTOGLOB SERPL-MCNC: 97 MG/DL
HBA1C MFR BLD HPLC: 6.8 %
HBA1C MFR BLD HPLC: 6.9 %
HBA1C MFR BLD HPLC: 7.6 %
HBA1C MFR BLD HPLC: 8.2 %
HCT VFR BLD CALC: 38 %
HCT VFR BLD CALC: 41.2 %
HCT VFR BLD CALC: 46.3 %
HCT VFR BLD CALC: 47.4 %
HCT VFR BLD CALC: 50.5 %
HDLC SERPL-MCNC: 58 MG/DL
HDLC SERPL-MCNC: 64 MG/DL
HDLC SERPL-MCNC: 67 MG/DL
HDLC SERPL-MCNC: 70 MG/DL
HDLC SERPL-MCNC: 71 MG/DL
HGB BLD-MCNC: 12.1 G/DL
HGB BLD-MCNC: 12.3 G/DL
HGB BLD-MCNC: 14.1 G/DL
HGB BLD-MCNC: 15.7 G/DL
HGB BLD-MCNC: 16.1 G/DL
IMM GRANULOCYTES NFR BLD AUTO: 0.3 %
IMM GRANULOCYTES NFR BLD AUTO: 1.3 %
IRON SATN MFR SERPL: 10 %
IRON SATN MFR SERPL: 16 %
IRON SATN MFR SERPL: 22 %
IRON SATN MFR SERPL: 26 %
IRON SERPL-MCNC: 38 UG/DL
IRON SERPL-MCNC: 43 UG/DL
IRON SERPL-MCNC: 61 UG/DL
IRON SERPL-MCNC: 79 UG/DL
IRON SERPL-MCNC: 91 UG/DL
KETONES URINE: NEGATIVE MG/DL
LDH SERPL-CCNC: 243 U/L
LDLC SERPL CALC-MCNC: 101 MG/DL
LDLC SERPL CALC-MCNC: 105 MG/DL
LDLC SERPL CALC-MCNC: 127 MG/DL
LDLC SERPL CALC-MCNC: 85 MG/DL
LDLC SERPL CALC-MCNC: 88 MG/DL
LDLC SERPL DIRECT ASSAY-MCNC: 104 MG/DL
LEUKOCYTE ESTERASE URINE: ABNORMAL
LYMPHOCYTES # BLD AUTO: 1.34 K/UL
LYMPHOCYTES # BLD AUTO: 1.44 K/UL
LYMPHOCYTES # BLD AUTO: 1.67 K/UL
LYMPHOCYTES # BLD AUTO: 2.05 K/UL
LYMPHOCYTES # BLD AUTO: 2.18 K/UL
LYMPHOCYTES NFR BLD AUTO: 20.1 %
LYMPHOCYTES NFR BLD AUTO: 21 %
LYMPHOCYTES NFR BLD AUTO: 23.3 %
LYMPHOCYTES NFR BLD AUTO: 30.1 %
LYMPHOCYTES NFR BLD AUTO: 31.6 %
MAGNESIUM SERPL-MCNC: 1.9 MG/DL
MAGNESIUM SERPL-MCNC: 2.2 MG/DL
MAGNESIUM SERPL-MCNC: 2.3 MG/DL
MAN DIFF?: NORMAL
MCHC RBC-ENTMCNC: 26.5 PG
MCHC RBC-ENTMCNC: 27.5 PG
MCHC RBC-ENTMCNC: 29.4 GM/DL
MCHC RBC-ENTMCNC: 29.4 PG
MCHC RBC-ENTMCNC: 30.4 PG
MCHC RBC-ENTMCNC: 30.5 GM/DL
MCHC RBC-ENTMCNC: 31.3 PG
MCHC RBC-ENTMCNC: 31.9 GM/DL
MCHC RBC-ENTMCNC: 32.4 GM/DL
MCHC RBC-ENTMCNC: 33.1 GM/DL
MCV RBC AUTO: 90.2 FL
MCV RBC AUTO: 90.4 FL
MCV RBC AUTO: 92.3 FL
MCV RBC AUTO: 94.1 FL
MCV RBC AUTO: 94.4 FL
MICROALBUMIN 24H UR DL<=1MG/L-MCNC: 44.4 MG/DL
MICROALBUMIN/CREAT 24H UR-RTO: 984 MG/G
MICROSCOPIC-UA: NORMAL
MONOCYTES # BLD AUTO: 0.46 K/UL
MONOCYTES # BLD AUTO: 0.47 K/UL
MONOCYTES # BLD AUTO: 0.61 K/UL
MONOCYTES # BLD AUTO: 0.65 K/UL
MONOCYTES # BLD AUTO: 0.72 K/UL
MONOCYTES NFR BLD AUTO: 7.4 %
MONOCYTES NFR BLD AUTO: 7.4 %
MONOCYTES NFR BLD AUTO: 8.7 %
MONOCYTES NFR BLD AUTO: 8.9 %
MONOCYTES NFR BLD AUTO: 9.4 %
NEUTROPHILS # BLD AUTO: 3.92 K/UL
NEUTROPHILS # BLD AUTO: 4.06 K/UL
NEUTROPHILS # BLD AUTO: 4.19 K/UL
NEUTROPHILS # BLD AUTO: 4.5 K/UL
NEUTROPHILS # BLD AUTO: 5.73 K/UL
NEUTROPHILS NFR BLD AUTO: 56.9 %
NEUTROPHILS NFR BLD AUTO: 59.6 %
NEUTROPHILS NFR BLD AUTO: 67.8 %
NEUTROPHILS NFR BLD AUTO: 68.8 %
NEUTROPHILS NFR BLD AUTO: 70.4 %
NITRITE URINE: NEGATIVE
NONHDLC SERPL-MCNC: 105 MG/DL
NONHDLC SERPL-MCNC: 111 MG/DL
NONHDLC SERPL-MCNC: 123 MG/DL
NONHDLC SERPL-MCNC: 127 MG/DL
NONHDLC SERPL-MCNC: 148 MG/DL
NT-PROBNP SERPL-MCNC: 178 PG/ML
NT-PROBNP SERPL-MCNC: 435 PG/ML
PH URINE: 6
PHOSPHATE SERPL-MCNC: 3.5 MG/DL
PLATELET # BLD AUTO: 156 K/UL
PLATELET # BLD AUTO: 165 K/UL
PLATELET # BLD AUTO: 174 K/UL
PLATELET # BLD AUTO: 183 K/UL
PLATELET # BLD AUTO: 215 K/UL
POTASSIUM SERPL-SCNC: 3.4 MMOL/L
POTASSIUM SERPL-SCNC: 3.6 MMOL/L
POTASSIUM SERPL-SCNC: 3.7 MMOL/L
POTASSIUM SERPL-SCNC: 4 MMOL/L
POTASSIUM SERPL-SCNC: 4.1 MMOL/L
POTASSIUM SERPL-SCNC: 4.2 MMOL/L
POTASSIUM SERPL-SCNC: 4.2 MMOL/L
POTASSIUM SERPL-SCNC: 4.4 MMOL/L
PROT SERPL-MCNC: 7.4 G/DL
PROT SERPL-MCNC: 7.5 G/DL
PROT SERPL-MCNC: 7.5 G/DL
PROT SERPL-MCNC: 7.6 G/DL
PROT SERPL-MCNC: 7.8 G/DL
PROT SERPL-MCNC: 7.8 G/DL
PROT SERPL-MCNC: 8 G/DL
PROTEIN URINE: 100 MG/DL
RBC # BLD: 4.04 M/UL
RBC # BLD: 4.57 M/UL
RBC # BLD: 5.02 M/UL
RBC # BLD: 5.12 M/UL
RBC # BLD: 5.47 M/UL
RBC # FLD: 13.8 %
RBC # FLD: 15.5 %
RBC # FLD: 15.9 %
RBC # FLD: 17.2 %
RBC # FLD: 19.9 %
RED BLOOD CELLS URINE: 1 /HPF
SODIUM SERPL-SCNC: 141 MMOL/L
SODIUM SERPL-SCNC: 142 MMOL/L
SODIUM SERPL-SCNC: 143 MMOL/L
SODIUM SERPL-SCNC: 144 MMOL/L
SODIUM SERPL-SCNC: 145 MMOL/L
SODIUM SERPL-SCNC: 146 MMOL/L
SPECIFIC GRAVITY URINE: 1.02
T3FREE SERPL-MCNC: 3.13 PG/ML
T4 FREE SERPL-MCNC: 1.5 NG/DL
T4 FREE SERPL-MCNC: 1.6 NG/DL
T4 FREE SERPL-MCNC: 1.7 NG/DL
T4 SERPL-MCNC: 8.5 UG/DL
TIBC SERPL-MCNC: 350 UG/DL
TIBC SERPL-MCNC: 366 UG/DL
TIBC SERPL-MCNC: 382 UG/DL
TIBC SERPL-MCNC: 421 UG/DL
TRANSFERRIN SERPL-MCNC: 307 MG/DL
TRANSFERRIN SERPL-MCNC: 346 MG/DL
TRIGL SERPL-MCNC: 106 MG/DL
TRIGL SERPL-MCNC: 111 MG/DL
TRIGL SERPL-MCNC: 127 MG/DL
TRIGL SERPL-MCNC: 128 MG/DL
TRIGL SERPL-MCNC: 83 MG/DL
TSH SERPL-ACNC: 1.47 UIU/ML
TSH SERPL-ACNC: 1.66 UIU/ML
TSH SERPL-ACNC: 1.78 UIU/ML
TSH SERPL-ACNC: 2.32 UIU/ML
UIBC SERPL-MCNC: 260 UG/DL
UIBC SERPL-MCNC: 287 UG/DL
UIBC SERPL-MCNC: 321 UG/DL
UIBC SERPL-MCNC: 378 UG/DL
URATE SERPL-MCNC: 6.6 MG/DL
UROBILINOGEN URINE: 0.2 MG/DL
VIT B12 SERPL-MCNC: 551 PG/ML
VIT B12 SERPL-MCNC: 564 PG/ML
WBC # FLD AUTO: 6.19 K/UL
WBC # FLD AUTO: 6.39 K/UL
WBC # FLD AUTO: 6.82 K/UL
WBC # FLD AUTO: 6.89 K/UL
WBC # FLD AUTO: 8.32 K/UL
WHITE BLOOD CELLS URINE: 14 /HPF

## 2024-03-24 NOTE — HEALTH RISK ASSESSMENT
[0] : 2) Feeling down, depressed, or hopeless: Not at all (0) [PHQ-2 Negative - No further assessment needed] : PHQ-2 Negative - No further assessment needed [Never] : Never [[id number]] : 0

## 2024-03-24 NOTE — PHYSICAL EXAM
[No Acute Distress] : no acute distress [Well Nourished] : well nourished [Well Developed] : well developed [Well-Appearing] : well-appearing [Normal Sclera/Conjunctiva] : normal sclera/conjunctiva [Normal Outer Ear/Nose] : the outer ears and nose were normal in appearance [Normal Oropharynx] : the oropharynx was normal [No JVD] : no jugular venous distention [No Lymphadenopathy] : no lymphadenopathy [Supple] : supple [No Respiratory Distress] : no respiratory distress  [No Accessory Muscle Use] : no accessory muscle use [Clear to Auscultation] : lungs were clear to auscultation bilaterally [Normal Rate] : normal rate  [Regular Rhythm] : with a regular rhythm [Normal S1, S2] : normal S1 and S2 [No Murmur] : no murmur heard [No Carotid Bruits] : no carotid bruits [No Varicosities] : no varicosities [Pedal Pulses Present] : the pedal pulses are present [No Extremity Clubbing/Cyanosis] : no extremity clubbing/cyanosis [Soft] : abdomen soft [Non Tender] : non-tender [Non-distended] : non-distended [Normal Bowel Sounds] : normal bowel sounds [Normal Anterior Cervical Nodes] : no anterior cervical lymphadenopathy [No CVA Tenderness] : no CVA  tenderness [No Spinal Tenderness] : no spinal tenderness [No Joint Swelling] : no joint swelling [Grossly Normal Strength/Tone] : grossly normal strength/tone [No Rash] : no rash [Coordination Grossly Intact] : coordination grossly intact [No Focal Deficits] : no focal deficits [Normal Gait] : normal gait [Alert and Oriented x3] : oriented to person, place, and time [de-identified] : 1+ edema prison up bilaterally

## 2024-03-24 NOTE — HISTORY OF PRESENT ILLNESS
[Family Member] : family member [FreeTextEntry1] : f/u chf [de-identified] : This is a 77 y/o female with a PMHx of CAD, Anxiety, Afib, Adrenal Nodule, Renal insufficiency, HLD, HTN, DM, WILLA last seen on 2/22 s/p admission for acute bronchitis and asthma exacerbation, and CHF exacerbation here for f/u. She saw Dr. Boss on 3/4 and he eventually increased her lasix to 60mg daily from 40mg daily. Cr was 2.0 on 3/18, near baseline. Probnp went down to 230 from 430. Since then, Her breathing and LE swelling have improved and she lost 3lbs. Also finished doxycycline given by Dr. Epps for an asthma exacerbation. Denies chest pain, SOB, ZAMORA, dizziness, diaphoresis, palpitations, orthopnea, syncope, n/v, headache.

## 2024-03-25 ENCOUNTER — LABORATORY RESULT (OUTPATIENT)
Age: 79
End: 2024-03-25

## 2024-04-04 ENCOUNTER — APPOINTMENT (OUTPATIENT)
Dept: ORTHOPEDIC SURGERY | Facility: CLINIC | Age: 79
End: 2024-04-04
Payer: MEDICARE

## 2024-04-04 VITALS — HEIGHT: 63 IN | WEIGHT: 266 LBS | BODY MASS INDEX: 47.13 KG/M2

## 2024-04-04 DIAGNOSIS — M75.100 UNSPECIFIED ROTATOR CUFF TEAR OR RUPTURE OF UNSPECIFIED SHOULDER, NOT SPECIFIED AS TRAUMATIC: ICD-10-CM

## 2024-04-04 DIAGNOSIS — S46.012D STRAIN OF MUSCLE(S) AND TENDON(S) OF THE ROTATOR CUFF OF LEFT SHOULDER, SUBSEQUENT ENCOUNTER: ICD-10-CM

## 2024-04-04 DIAGNOSIS — S43.422D SPRAIN OF LEFT ROTATOR CUFF CAPSULE, SUBSEQUENT ENCOUNTER: ICD-10-CM

## 2024-04-04 PROCEDURE — 99213 OFFICE O/P EST LOW 20 MIN: CPT

## 2024-04-04 NOTE — REASON FOR VISIT
[FreeTextEntry2] :  04/04/2024: Left shoulder pain persists  01/11/2024 Stopped PT as wasn't getting any long term benefit 11/30/2023 Still with left shoulder pain, would like to continue PT 10/17/2023 No change in left shoulder pain, the PT gives temporary relief from the pain 9/5/23- Left shoulder pain persists, PT does help some 7/27/23- No change, still with left shoulder and upper arm pain, PT does give some relief 6/15/23- Still with random pains left upper arm, the PT helps with the pain temporarily, but pain returns 4/27/23- Continues PT for left shoulder which seems to help her 3/16/23- Still getting sharp pains left arm, radiate to neck at times 1/26/23- Still gets sharp pain left arm even without activity 12/22/22-  Continues PT on left shoulder, gives some relief 11/10/22- Doing PT for left shoulder which helps 9/29/22- Continues to c/o left shoulder pain, doing PT which gives her symptomatic relief

## 2024-04-04 NOTE — PHYSICAL EXAM
[Left] : left shoulder [4 ___] : forward flexion 4[unfilled]/5 [4___] : external rotation 4[unfilled]/5 [Positive] : positive Wong [Bilateral] : knee bilaterally [AP] : anteroposterior [Lateral] : lateral [advanced tricompartmental OA with medial compartment narrowing and varus alignment] : advanced tricompartmental OA with medial compartment narrowing and varus alignment [TWNoteComboBox6] : internal rotation L5 [de-identified] : external rotation 45 degrees [] : no effusion [TWNoteComboBox7] : flexion 115 degrees [de-identified] : extension 0 degrees

## 2024-04-04 NOTE — HISTORY OF PRESENT ILLNESS
[Gradual] : gradual [7] : 7 [Dull/Aching] : dull/aching [Localized] : localized [Sharp] : sharp [Constant] : constant [Injection therapy] : injection therapy [Standing] : standing [Walking] : walking [Stairs] : stairs [de-identified] : 8/25/22: Here for follow up. Continuing PT for left shoulder\par  7/14/22- Continues PT for left shoulder pain\par  6/2/22- follow up left shoulder partial RCT, doing PT, no changes.  [] : no [FreeTextEntry1] : left shoulder [FreeTextEntry3] : 07/27/2020 [FreeTextEntry5] : no changes,  [de-identified] : physical therapy

## 2024-04-09 ENCOUNTER — APPOINTMENT (OUTPATIENT)
Dept: INTERNAL MEDICINE | Facility: CLINIC | Age: 79
End: 2024-04-09
Payer: MEDICARE

## 2024-04-09 ENCOUNTER — APPOINTMENT (OUTPATIENT)
Dept: PULMONOLOGY | Facility: CLINIC | Age: 79
End: 2024-04-09
Payer: MEDICARE

## 2024-04-09 VITALS
OXYGEN SATURATION: 95 % | HEIGHT: 63 IN | TEMPERATURE: 97.6 F | DIASTOLIC BLOOD PRESSURE: 70 MMHG | BODY MASS INDEX: 47.13 KG/M2 | HEART RATE: 81 BPM | SYSTOLIC BLOOD PRESSURE: 120 MMHG | WEIGHT: 266 LBS

## 2024-04-09 VITALS — SYSTOLIC BLOOD PRESSURE: 147 MMHG | OXYGEN SATURATION: 97 % | DIASTOLIC BLOOD PRESSURE: 73 MMHG | HEART RATE: 87 BPM

## 2024-04-09 DIAGNOSIS — R05.9 COUGH, UNSPECIFIED: ICD-10-CM

## 2024-04-09 DIAGNOSIS — R06.2 WHEEZING: ICD-10-CM

## 2024-04-09 DIAGNOSIS — R06.02 SHORTNESS OF BREATH: ICD-10-CM

## 2024-04-09 PROCEDURE — 71046 X-RAY EXAM CHEST 2 VIEWS: CPT

## 2024-04-09 PROCEDURE — 99213 OFFICE O/P EST LOW 20 MIN: CPT

## 2024-04-09 PROCEDURE — 99214 OFFICE O/P EST MOD 30 MIN: CPT | Mod: 25

## 2024-04-09 PROCEDURE — 94729 DIFFUSING CAPACITY: CPT

## 2024-04-09 PROCEDURE — ZZZZZ: CPT

## 2024-04-09 PROCEDURE — 94727 GAS DIL/WSHOT DETER LNG VOL: CPT

## 2024-04-09 PROCEDURE — 95012 NITRIC OXIDE EXP GAS DETER: CPT

## 2024-04-09 PROCEDURE — G2211 COMPLEX E/M VISIT ADD ON: CPT

## 2024-04-09 PROCEDURE — 94060 EVALUATION OF WHEEZING: CPT

## 2024-04-09 NOTE — PROCEDURE
[FreeTextEntry1] : Pulmonary Function Test obtained in office today which revealed: Spirometry: Within normal limits with significant improvement post bronchodilator, consistent with asthma, Lung Volume: Within normal limits, Diffusion: Moderate impairment. ___  Xray Chest 2 Views PA/Lat             Final  No Documents Attached    	 Chest x-ray PA and lateral views performed in my office today showed clear lungs, no evidence of infiltrates or pleural effusions.  Ordered by: PADMAJA HOOPER       Collected/Examined: 09Apr2024 11:18AM       Verification Required       Stage: Final       Performed at: In Office       Performed by: PADMAJA HOOPER       Resulted: 09Apr2024 11:18AM       Last Updated: 09Apr2024 11:19AM    ___  Exhaled Nitric Oxide             Final  No Documents Attached    	Test  	Result  	Flag	Reference	Goal	Last Verified  	Exhaled Nitric Oxide	8	 	 		REQUIRED  Ordered by: PADMAJA HOOPER       Collected/Examined: 09Apr2024 10:47AM       Verification Required       Stage: Final       Performed at: In Office       Performed by: PADMAJA HOOPER       Resulted: 09Apr2024 10:47AM       Last Updated: 09Apr2024 10:48AM

## 2024-04-09 NOTE — ADDENDUM
[FreeTextEntry1] : I, Kira Interianobel, acted solely as a scribe for Dr. Kaleigh Epps D.O., on this date 04/09/2024.   All medical record entries made by the Scribe were at my, Dr. Kaleigh Epps D.O., direction and personally dictated by me on 04/09/2024. I have reviewed the chart and agree that the record accurately reflects my personal performance of the history, physical exam, assessment and plan. I have also personally directed, reviewed, and agreed with the chart.

## 2024-04-09 NOTE — ASSESSMENT
[FreeTextEntry1] : Obtained and reviewed pulmonary function test, NIOX, chest x-ray results with patient today.  Use Symbicort on an as needed basis. Return for pulmonary follow up in 2 months.

## 2024-04-09 NOTE — HISTORY OF PRESENT ILLNESS
[Family Member] : family member [FreeTextEntry1] : LE swelling f/u [de-identified] : This is a 77 y/o female with a PMHx of CAD, Anxiety, Afib, Adrenal Nodule, Renal insufficiency, HLD, HTN, DM, WILLA last seen on 2/22 s/p admission for acute bronchitis and asthma exacerbation, and CHF exacerbation here for f/u. Since last visit, Pt has been on lasix 60mg and reports her LE swelling has been much improved.  Pt saw Dr. Epps today and had a clear CXR. She feels well and has no acute complaints today. Denies chest pain, SOB, ZAMORA, dizziness, diaphoresis, palpitations, orthopnea, syncope, n/v, headache.

## 2024-04-09 NOTE — HISTORY OF PRESENT ILLNESS
[TextBox_4] : ERNESTINA HEAD is a 78 year old female, with history of asthma, atrial fibrillation, who presents to the office for follow up evaluation. Patient reports that she is feeling well overall with no respiratory complaints. She denies of having any symptoms of chest tightness, dyspnea, or wheezing.

## 2024-04-09 NOTE — DISCUSSION/SUMMARY
[FreeTextEntry1] : Ms. ERNESTINA HEAD is an 78 year old female, history of asthma, atrial fibrillation. Significant response post-bronchodilator based on PFT today although patient is asymptomatic.

## 2024-04-09 NOTE — PHYSICAL EXAM
[No Acute Distress] : no acute distress [Well Nourished] : well nourished [Well Developed] : well developed [Well-Appearing] : well-appearing [Normal Sclera/Conjunctiva] : normal sclera/conjunctiva [Normal Outer Ear/Nose] : the outer ears and nose were normal in appearance [Normal Oropharynx] : the oropharynx was normal [No JVD] : no jugular venous distention [No Lymphadenopathy] : no lymphadenopathy [Supple] : supple [No Respiratory Distress] : no respiratory distress  [No Accessory Muscle Use] : no accessory muscle use [Clear to Auscultation] : lungs were clear to auscultation bilaterally [Normal Rate] : normal rate  [Regular Rhythm] : with a regular rhythm [Normal S1, S2] : normal S1 and S2 [No Murmur] : no murmur heard [No Carotid Bruits] : no carotid bruits [No Varicosities] : no varicosities [Pedal Pulses Present] : the pedal pulses are present [No Extremity Clubbing/Cyanosis] : no extremity clubbing/cyanosis [Soft] : abdomen soft [Non Tender] : non-tender [Non-distended] : non-distended [Normal Bowel Sounds] : normal bowel sounds [Normal Anterior Cervical Nodes] : no anterior cervical lymphadenopathy [No CVA Tenderness] : no CVA  tenderness [No Spinal Tenderness] : no spinal tenderness [No Joint Swelling] : no joint swelling [Grossly Normal Strength/Tone] : grossly normal strength/tone [No Rash] : no rash [Coordination Grossly Intact] : coordination grossly intact [No Focal Deficits] : no focal deficits [Normal Gait] : normal gait [Alert and Oriented x3] : oriented to person, place, and time [de-identified] : bilateral 1+ ankle edema, improved from prior, with skin pruning proximally

## 2024-04-09 NOTE — HEALTH RISK ASSESSMENT
[0] : 2) Feeling down, depressed, or hopeless: Not at all (0) [PHQ-2 Negative - No further assessment needed] : PHQ-2 Negative - No further assessment needed [Never] : Never [[id number]] : 0

## 2024-04-10 LAB
ANION GAP SERPL CALC-SCNC: 16 MMOL/L
BUN SERPL-MCNC: 48 MG/DL
CALCIUM SERPL-MCNC: 10.2 MG/DL
CHLORIDE SERPL-SCNC: 102 MMOL/L
CO2 SERPL-SCNC: 24 MMOL/L
CREAT SERPL-MCNC: 1.78 MG/DL
EGFR: 29 ML/MIN/1.73M2
GLUCOSE SERPL-MCNC: 297 MG/DL
NT-PROBNP SERPL-MCNC: 213 PG/ML
POTASSIUM SERPL-SCNC: 3.9 MMOL/L
SODIUM SERPL-SCNC: 143 MMOL/L

## 2024-04-23 ENCOUNTER — APPOINTMENT (OUTPATIENT)
Dept: ENDOCRINOLOGY | Facility: CLINIC | Age: 79
End: 2024-04-23
Payer: MEDICARE

## 2024-04-23 VITALS
WEIGHT: 270.13 LBS | BODY MASS INDEX: 47.86 KG/M2 | HEART RATE: 90 BPM | OXYGEN SATURATION: 96 % | DIASTOLIC BLOOD PRESSURE: 82 MMHG | SYSTOLIC BLOOD PRESSURE: 124 MMHG | HEIGHT: 63 IN

## 2024-04-23 DIAGNOSIS — E66.01 MORBID (SEVERE) OBESITY DUE TO EXCESS CALORIES: ICD-10-CM

## 2024-04-23 LAB — GLUCOSE BLDC GLUCOMTR-MCNC: 250

## 2024-04-23 PROCEDURE — 99214 OFFICE O/P EST MOD 30 MIN: CPT

## 2024-04-23 PROCEDURE — 82962 GLUCOSE BLOOD TEST: CPT

## 2024-04-23 RX ORDER — LINAGLIPTIN 5 MG/1
5 TABLET, FILM COATED ORAL
Qty: 90 | Refills: 3 | Status: ACTIVE | COMMUNITY
Start: 2024-02-28 | End: 1900-01-01

## 2024-04-23 RX ORDER — DAPAGLIFLOZIN 10 MG/1
10 TABLET, FILM COATED ORAL
Qty: 90 | Refills: 3 | Status: ACTIVE | COMMUNITY
Start: 2021-12-16 | End: 1900-01-01

## 2024-04-23 NOTE — ADDENDUM
[FreeTextEntry1] : This note was written by Reva Mckenna on 04/23/2024 acting as scribe for Dr. Nito Nuñez . I, Dr. Nito Nuñez, have read and attest that all the information, medical decision making and discharge instructions within are true and accurate.   POCT glucose carried out in office today given diabetes diagnosis.  Blood will be drawn in office today.

## 2024-04-23 NOTE — HISTORY OF PRESENT ILLNESS
[FreeTextEntry1] : Ms. HEAD is a 78 year old female who returns today in follow up with regard to a history of type 2 dm.    Current dm medications include Farxiga 10 mg and Glimepiride 1mg two tabs BID (total of 4 mg) and Tradjenta 5 mg. Was on Mounjaro.   HGM of late has shown values to be running in the 200s range. Lowest value she has seen of late was in the 170s in the afternoon however she did not eat lunch that day.  There has been no significant hypoglycemia.  Denies any chest pain, sob, neurologic or ophthalmologic complaints. She too denies any new podiatric concerns. She is up to date with his ophthalmologic visit- last week.   Additional medical history includes that of 2 coronary stents placed in January 2023 and then had gastric ulcer requiring iron transfusions Now on Eliquis. Too  has a history of hypertension, hyperlipidemia, and obesity along with with a-fib and vitamin d deficiency. Is on D3 2,000 iu daily. Did have 2nd bout of kidney stone-dx as uric acid and is on potassium citrate.  She was having some swelling to bilateral lower extremities and continues on lasix by .   POCT A1C today in office Saw servando Benita told stable-todl of beginning macular degen.  Dr Yuen

## 2024-04-24 RX ORDER — PEN NEEDLE, DIABETIC 29 G X1/2"
32G X 4 MM NEEDLE, DISPOSABLE MISCELLANEOUS
Qty: 1 | Refills: 3 | Status: ACTIVE | COMMUNITY
Start: 2024-04-24 | End: 1900-01-01

## 2024-04-24 RX ORDER — GLIMEPIRIDE 4 MG/1
4 TABLET ORAL TWICE DAILY
Qty: 180 | Refills: 2 | Status: ACTIVE | COMMUNITY
Start: 2021-03-18 | End: 1900-01-01

## 2024-04-24 NOTE — PATIENT PROFILE ADULT - MEDICATIONS/VISITS
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  Discussed with RN events overnight.       Objective:     VITALS:   Last 24hrs VS reviewed since prior progress note. Most recent are:  Patient Vitals for the past 24 hrs:   BP Temp Temp src Pulse Resp SpO2   24 133/87 98.2 °F (36.8 °C) Oral 85 17 99 %   24 1726 (!) 142/83 -- -- 86 -- --   24 0730 (!) 175/92 97.4 °F (36.3 °C) Oral 79 19 100 %         No intake or output data in the 24 hours ending 24 8082     I had a face to face encounter and independently examined this patient on 2024, as outlined below:  PHYSICAL EXAM:  General: Alert, cooperative  EENT:  EOMI. Anicteric sclerae.  Resp:  CTA bilaterally, no wheezing or rales.  No accessory muscle use  CV:  Regular  rhythm,  No edema  GI:  Soft, Non distended, Non tender.  +Bowel sounds  Neurologic:  Alert and oriented X 3, normal speech,   Psych:   Good insight. Not anxious nor  no

## 2024-05-02 RX ORDER — INSULIN GLARGINE 100 [IU]/ML
100 INJECTION, SOLUTION SUBCUTANEOUS
Qty: 2 | Refills: 1 | Status: ACTIVE | COMMUNITY
Start: 2024-04-24 | End: 1900-01-01

## 2024-05-17 RX ORDER — APIXABAN 5 MG/1
5 TABLET, FILM COATED ORAL
Qty: 180 | Refills: 1 | Status: ACTIVE | COMMUNITY
Start: 2020-10-24 | End: 1900-01-01

## 2024-05-17 RX ORDER — LISINOPRIL 40 MG/1
40 TABLET ORAL
Qty: 90 | Refills: 1 | Status: ACTIVE | COMMUNITY
Start: 2020-07-11 | End: 1900-01-01

## 2024-05-18 ENCOUNTER — RX RENEWAL (OUTPATIENT)
Age: 79
End: 2024-05-18

## 2024-05-21 ENCOUNTER — RX RENEWAL (OUTPATIENT)
Age: 79
End: 2024-05-21

## 2024-05-21 RX ORDER — FUROSEMIDE 40 MG/1
40 TABLET ORAL
Qty: 30 | Refills: 1 | Status: ACTIVE | COMMUNITY
Start: 2021-09-26 | End: 1900-01-01

## 2024-05-30 ENCOUNTER — APPOINTMENT (OUTPATIENT)
Dept: CARDIOLOGY | Facility: CLINIC | Age: 79
End: 2024-05-30
Payer: MEDICARE

## 2024-05-30 VITALS
TEMPERATURE: 97.7 F | OXYGEN SATURATION: 96 % | WEIGHT: 269 LBS | BODY MASS INDEX: 47.66 KG/M2 | HEART RATE: 77 BPM | DIASTOLIC BLOOD PRESSURE: 70 MMHG | HEIGHT: 63 IN | SYSTOLIC BLOOD PRESSURE: 122 MMHG

## 2024-05-30 DIAGNOSIS — E78.5 HYPERLIPIDEMIA, UNSPECIFIED: ICD-10-CM

## 2024-05-30 DIAGNOSIS — E55.9 VITAMIN D DEFICIENCY, UNSPECIFIED: ICD-10-CM

## 2024-05-30 DIAGNOSIS — M79.89 OTHER SPECIFIED SOFT TISSUE DISORDERS: ICD-10-CM

## 2024-05-30 DIAGNOSIS — N28.9 DISORDER OF KIDNEY AND URETER, UNSPECIFIED: ICD-10-CM

## 2024-05-30 DIAGNOSIS — I50.9 HEART FAILURE, UNSPECIFIED: ICD-10-CM

## 2024-05-30 DIAGNOSIS — Z13.228 ENCOUNTER FOR SCREENING FOR OTHER METABOLIC DISORDERS: ICD-10-CM

## 2024-05-30 DIAGNOSIS — I48.91 UNSPECIFIED ATRIAL FIBRILLATION: ICD-10-CM

## 2024-05-30 DIAGNOSIS — E27.8 OTHER SPECIFIED DISORDERS OF ADRENAL GLAND: ICD-10-CM

## 2024-05-30 DIAGNOSIS — I25.10 ATHEROSCLEROTIC HEART DISEASE OF NATIVE CORONARY ARTERY W/OUT ANGINA PECTORIS: ICD-10-CM

## 2024-05-30 DIAGNOSIS — E11.9 TYPE 2 DIABETES MELLITUS W/OUT COMPLICATIONS: ICD-10-CM

## 2024-05-30 DIAGNOSIS — J45.909 UNSPECIFIED ASTHMA, UNCOMPLICATED: ICD-10-CM

## 2024-05-30 DIAGNOSIS — I10 ESSENTIAL (PRIMARY) HYPERTENSION: ICD-10-CM

## 2024-05-30 PROCEDURE — G2211 COMPLEX E/M VISIT ADD ON: CPT

## 2024-05-30 PROCEDURE — 99214 OFFICE O/P EST MOD 30 MIN: CPT

## 2024-05-30 PROCEDURE — 93000 ELECTROCARDIOGRAM COMPLETE: CPT

## 2024-05-30 NOTE — HISTORY OF PRESENT ILLNESS
[FreeTextEntry1] : This is a 77 y/o female with a pmhx of CAD, Anxiety, Afib, Adrenal Nodule, Renal insufficiency, HLD, HTN, DM, WILLA here today for a follow up. Patient feels well and has no acute concerns or complaints. She states her LE edema is significantly improved on lasix 60 mg po qd.   Patient denies chest pain, dyspnea, palpitations, dizziness, syncope, changes in bowel/bladder habits or appetite.

## 2024-05-31 ENCOUNTER — RX RENEWAL (OUTPATIENT)
Age: 79
End: 2024-05-31

## 2024-05-31 RX ORDER — DOXAZOSIN 2 MG/1
2 TABLET ORAL
Qty: 90 | Refills: 1 | Status: ACTIVE | COMMUNITY
Start: 2021-09-05 | End: 1900-01-01

## 2024-06-01 ENCOUNTER — NON-APPOINTMENT (OUTPATIENT)
Age: 79
End: 2024-06-01

## 2024-06-02 ENCOUNTER — RX RENEWAL (OUTPATIENT)
Age: 79
End: 2024-06-02

## 2024-06-02 LAB
ALBUMIN SERPL ELPH-MCNC: 4.7 G/DL
ALP BLD-CCNC: 75 U/L
ALT SERPL-CCNC: 39 U/L
ANION GAP SERPL CALC-SCNC: 20 MMOL/L
AST SERPL-CCNC: 28 U/L
BASOPHILS # BLD AUTO: 0.02 K/UL
BASOPHILS NFR BLD AUTO: 0.3 %
BILIRUB DIRECT SERPL-MCNC: 0.2 MG/DL
BILIRUB INDIRECT SERPL-MCNC: 0.3 MG/DL
BILIRUB SERPL-MCNC: 0.5 MG/DL
BUN SERPL-MCNC: 55 MG/DL
CALCIUM SERPL-MCNC: 9.5 MG/DL
CHLORIDE SERPL-SCNC: 104 MMOL/L
CHOLEST SERPL-MCNC: 168 MG/DL
CK SERPL-CCNC: 193 U/L
CO2 SERPL-SCNC: 20 MMOL/L
CREAT SERPL-MCNC: 1.76 MG/DL
EGFR: 29 ML/MIN/1.73M2
EOSINOPHIL # BLD AUTO: 0.05 K/UL
EOSINOPHIL NFR BLD AUTO: 0.9 %
ESTIMATED AVERAGE GLUCOSE: 169 MG/DL
GLUCOSE SERPL-MCNC: 166 MG/DL
HBA1C MFR BLD HPLC: 7.5 %
HCT VFR BLD CALC: 42.8 %
HDLC SERPL-MCNC: 52 MG/DL
HGB BLD-MCNC: 13.3 G/DL
IMM GRANULOCYTES NFR BLD AUTO: 0.3 %
LDLC SERPL CALC-MCNC: 88 MG/DL
LYMPHOCYTES # BLD AUTO: 1.41 K/UL
LYMPHOCYTES NFR BLD AUTO: 24.6 %
MAGNESIUM SERPL-MCNC: 2.2 MG/DL
MAN DIFF?: NORMAL
MCHC RBC-ENTMCNC: 30.2 PG
MCHC RBC-ENTMCNC: 31.1 GM/DL
MCV RBC AUTO: 97.3 FL
MONOCYTES # BLD AUTO: 0.46 K/UL
MONOCYTES NFR BLD AUTO: 8 %
NEUTROPHILS # BLD AUTO: 3.78 K/UL
NEUTROPHILS NFR BLD AUTO: 65.9 %
NONHDLC SERPL-MCNC: 116 MG/DL
NT-PROBNP SERPL-MCNC: 198 PG/ML
PLATELET # BLD AUTO: 144 K/UL
POTASSIUM SERPL-SCNC: 3.8 MMOL/L
PROT SERPL-MCNC: 7.1 G/DL
RBC # BLD: 4.4 M/UL
RBC # FLD: 17.6 %
SODIUM SERPL-SCNC: 144 MMOL/L
T4 FREE SERPL-MCNC: 1.4 NG/DL
TRIGL SERPL-MCNC: 163 MG/DL
TSH SERPL-ACNC: 1.65 UIU/ML
WBC # FLD AUTO: 5.74 K/UL

## 2024-06-02 RX ORDER — POTASSIUM CHLORIDE 1500 MG/1
20 TABLET, FILM COATED, EXTENDED RELEASE ORAL
Qty: 180 | Refills: 0 | Status: ACTIVE | COMMUNITY
Start: 2021-12-03 | End: 1900-01-01

## 2024-06-13 ENCOUNTER — APPOINTMENT (OUTPATIENT)
Dept: PULMONOLOGY | Facility: CLINIC | Age: 79
End: 2024-06-13

## 2024-06-14 ENCOUNTER — NON-APPOINTMENT (OUTPATIENT)
Age: 79
End: 2024-06-14

## 2024-06-14 LAB
CREAT SPEC-SCNC: 64 MG/DL
MICROALBUMIN 24H UR DL<=1MG/L-MCNC: 5.9 MG/DL
MICROALBUMIN/CREAT 24H UR-RTO: 91 MG/G

## 2024-06-21 RX ORDER — LORAZEPAM 0.5 MG/1
0.5 TABLET ORAL DAILY
Qty: 30 | Refills: 0 | Status: ACTIVE | COMMUNITY
Start: 2019-04-30 | End: 1900-01-01

## 2024-06-28 ENCOUNTER — APPOINTMENT (OUTPATIENT)
Dept: ENDOCRINOLOGY | Facility: CLINIC | Age: 79
End: 2024-06-28

## 2024-07-04 ENCOUNTER — RX RENEWAL (OUTPATIENT)
Age: 79
End: 2024-07-04

## 2024-07-08 NOTE — ASU PREOP CHECKLIST - AS BP NONINV METHOD
UNABLE TO REACH .OK PER DR SILVA FOR VERBAL ORDERS  OK FOR THE HUB TO RELAY MESSAGE IF NEEDED    electronic

## 2024-07-16 ENCOUNTER — APPOINTMENT (OUTPATIENT)
Dept: ORTHOPEDIC SURGERY | Facility: CLINIC | Age: 79
End: 2024-07-16
Payer: MEDICARE

## 2024-07-16 VITALS — HEIGHT: 63 IN | BODY MASS INDEX: 47.66 KG/M2 | WEIGHT: 269 LBS

## 2024-07-16 DIAGNOSIS — S46.012D STRAIN OF MUSCLE(S) AND TENDON(S) OF THE ROTATOR CUFF OF LEFT SHOULDER, SUBSEQUENT ENCOUNTER: ICD-10-CM

## 2024-07-16 DIAGNOSIS — S43.422D SPRAIN OF LEFT ROTATOR CUFF CAPSULE, SUBSEQUENT ENCOUNTER: ICD-10-CM

## 2024-07-16 PROCEDURE — 99213 OFFICE O/P EST LOW 20 MIN: CPT

## 2024-07-23 ENCOUNTER — APPOINTMENT (OUTPATIENT)
Dept: ENDOCRINOLOGY | Facility: CLINIC | Age: 79
End: 2024-07-23

## 2024-08-29 ENCOUNTER — APPOINTMENT (OUTPATIENT)
Dept: ENDOCRINOLOGY | Facility: CLINIC | Age: 79
End: 2024-08-29
Payer: MEDICARE

## 2024-08-29 VITALS
BODY MASS INDEX: 48.55 KG/M2 | OXYGEN SATURATION: 96 % | HEIGHT: 63 IN | WEIGHT: 274 LBS | DIASTOLIC BLOOD PRESSURE: 60 MMHG | SYSTOLIC BLOOD PRESSURE: 120 MMHG | HEART RATE: 74 BPM | TEMPERATURE: 98.7 F

## 2024-08-29 DIAGNOSIS — E66.01 MORBID (SEVERE) OBESITY DUE TO EXCESS CALORIES: ICD-10-CM

## 2024-08-29 DIAGNOSIS — E27.9 DISORDER OF ADRENAL GLAND, UNSPECIFIED: ICD-10-CM

## 2024-08-29 DIAGNOSIS — I10 ESSENTIAL (PRIMARY) HYPERTENSION: ICD-10-CM

## 2024-08-29 DIAGNOSIS — E55.9 VITAMIN D DEFICIENCY, UNSPECIFIED: ICD-10-CM

## 2024-08-29 DIAGNOSIS — E78.5 HYPERLIPIDEMIA, UNSPECIFIED: ICD-10-CM

## 2024-08-29 DIAGNOSIS — E11.9 TYPE 2 DIABETES MELLITUS W/OUT COMPLICATIONS: ICD-10-CM

## 2024-08-29 LAB
GLUCOSE BLDC GLUCOMTR-MCNC: 186
HBA1C MFR BLD HPLC: 7.7

## 2024-08-29 PROCEDURE — G2211 COMPLEX E/M VISIT ADD ON: CPT

## 2024-08-29 PROCEDURE — 99214 OFFICE O/P EST MOD 30 MIN: CPT

## 2024-08-29 PROCEDURE — 83036 HEMOGLOBIN GLYCOSYLATED A1C: CPT | Mod: QW

## 2024-08-29 PROCEDURE — 95250 CONT GLUC MNTR PHYS/QHP EQP: CPT

## 2024-08-29 RX ORDER — BLOOD-GLUCOSE SENSOR
EACH MISCELLANEOUS
Qty: 6 | Refills: 3 | Status: ACTIVE | COMMUNITY
Start: 2024-08-29 | End: 1900-01-01

## 2024-08-29 NOTE — HISTORY OF PRESENT ILLNESS
[FreeTextEntry1] : Ms. HEAD is a 79-year-old female who returns today in follow up with regard to a history of type 2 diabetes mellitus. There is no known history of retinopathy, or nephropathy. With regard to neuropathy, she denies any neurologic s/s.  Current DM medications include Lantus insulin 16 units HS (increase on her own from 14 units due to higher BGs), Glimepiride 8mg daily, Farxiga 10 mg and Tradjenta 5 mg daily. - was on Mounjaro in the past however stopped it due to constipation   She noted sugars have been more elevated since being sick in Jan 2024 and being placed on prednisone.  Home glucose monitoring has shown values of late to be -170s can be in 200s depending on what she ate for night before and in afternoon 140-150s and before dinner 150-165  She does deny any significant hypoglycemic signs and symptoms of late.  POCT A1C returned today at 7.7%, previously 7.5% in Feb 2024.  POCT glucose today at 186 mg/dL.  Denies any chest pain, sob, neurologic or ophthalmologic complaints. She too denies any new podiatric concerns. She is up to date with his ophthalmologic visit- no diabetic changes noted but told of beginning macular degen; follows with Dr Yuen. due for follow up   ____________________________________________________________________________________________________ Additional medical history includes that of 2 coronary stents placed in January 2023 and then had gastric ulcer requiring iron transfusions. Now on Eliquis. Also, hx of hypertension, hyperlipidemia, obesity, A-F-fib and vitamin d deficiency  Did have 2nd bout of kidney stone- dx as uric acid and is on potassium citrate.  She was having some swelling to bilateral lower extremities and continues on Lasix by Dr. Boss.   Additional Medications: ASA 81mg, Lipitor 40mg, Doxazosin, Cardizem, Eliquis, Furosemide 40mg, Lisinopril 40mg, Lorazepam, magnesium, potassium 20 BID  Supplements: vitamin D3 2,000 iu intermittently.

## 2024-08-29 NOTE — ADDENDUM
[FreeTextEntry1] : POCT glucose testing and Hemoglobin A1c was carried out today given diabetes diagnosis.  This note was written by Tono Eldridge on 08/29/2024 acting as medical scribe for Dr. Nito Nuñez. I, Dr. Nito Nuñez, have read and attest that all the information, medical decision making and discharge instructions within are true and accurate.

## 2024-08-30 RX ORDER — BLOOD-GLUCOSE METER
W/DEVICE KIT MISCELLANEOUS
Qty: 1 | Refills: 0 | Status: ACTIVE | COMMUNITY
Start: 2024-08-30 | End: 1900-01-01

## 2024-08-30 RX ORDER — LANCETS 33 GAUGE
EACH MISCELLANEOUS
Qty: 3 | Refills: 3 | Status: ACTIVE | COMMUNITY
Start: 2024-08-30 | End: 1900-01-01

## 2024-08-30 RX ORDER — BLOOD-GLUCOSE METER
KIT MISCELLANEOUS
Qty: 3 | Refills: 3 | Status: ACTIVE | COMMUNITY
Start: 2024-08-30 | End: 1900-01-01

## 2024-09-05 ENCOUNTER — RX RENEWAL (OUTPATIENT)
Age: 79
End: 2024-09-05

## 2024-09-26 ENCOUNTER — APPOINTMENT (OUTPATIENT)
Dept: CARDIOLOGY | Facility: CLINIC | Age: 79
End: 2024-09-26
Payer: MEDICARE

## 2024-09-26 ENCOUNTER — LABORATORY RESULT (OUTPATIENT)
Age: 79
End: 2024-09-26

## 2024-09-26 VITALS
BODY MASS INDEX: 48.55 KG/M2 | WEIGHT: 274 LBS | TEMPERATURE: 97.7 F | HEIGHT: 63 IN | DIASTOLIC BLOOD PRESSURE: 70 MMHG | SYSTOLIC BLOOD PRESSURE: 140 MMHG | HEART RATE: 92 BPM | OXYGEN SATURATION: 97 %

## 2024-09-26 DIAGNOSIS — E27.9 DISORDER OF ADRENAL GLAND, UNSPECIFIED: ICD-10-CM

## 2024-09-26 DIAGNOSIS — Z13.228 ENCOUNTER FOR SCREENING FOR OTHER METABOLIC DISORDERS: ICD-10-CM

## 2024-09-26 DIAGNOSIS — E55.9 VITAMIN D DEFICIENCY, UNSPECIFIED: ICD-10-CM

## 2024-09-26 DIAGNOSIS — M79.89 OTHER SPECIFIED SOFT TISSUE DISORDERS: ICD-10-CM

## 2024-09-26 DIAGNOSIS — I25.10 ATHEROSCLEROTIC HEART DISEASE OF NATIVE CORONARY ARTERY W/OUT ANGINA PECTORIS: ICD-10-CM

## 2024-09-26 DIAGNOSIS — D64.9 ANEMIA, UNSPECIFIED: ICD-10-CM

## 2024-09-26 DIAGNOSIS — I10 ESSENTIAL (PRIMARY) HYPERTENSION: ICD-10-CM

## 2024-09-26 DIAGNOSIS — I48.91 UNSPECIFIED ATRIAL FIBRILLATION: ICD-10-CM

## 2024-09-26 DIAGNOSIS — I50.9 HEART FAILURE, UNSPECIFIED: ICD-10-CM

## 2024-09-26 DIAGNOSIS — J45.909 UNSPECIFIED ASTHMA, UNCOMPLICATED: ICD-10-CM

## 2024-09-26 DIAGNOSIS — E11.9 TYPE 2 DIABETES MELLITUS W/OUT COMPLICATIONS: ICD-10-CM

## 2024-09-26 DIAGNOSIS — E78.5 HYPERLIPIDEMIA, UNSPECIFIED: ICD-10-CM

## 2024-09-26 DIAGNOSIS — N28.9 DISORDER OF KIDNEY AND URETER, UNSPECIFIED: ICD-10-CM

## 2024-09-26 DIAGNOSIS — Z71.85 ENCOUNTER FOR IMMUNIZATION SAFETY COUNSELING: ICD-10-CM

## 2024-09-26 PROCEDURE — G2211 COMPLEX E/M VISIT ADD ON: CPT

## 2024-09-26 PROCEDURE — G0008: CPT

## 2024-09-26 PROCEDURE — 93000 ELECTROCARDIOGRAM COMPLETE: CPT

## 2024-09-26 PROCEDURE — 99214 OFFICE O/P EST MOD 30 MIN: CPT

## 2024-09-26 PROCEDURE — 90662 IIV NO PRSV INCREASED AG IM: CPT

## 2024-09-26 NOTE — HISTORY OF PRESENT ILLNESS
[FreeTextEntry1] : This is a 80 y/o female with a pmhx of CAD, Anxiety, Afib, Adrenal Nodule, Renal insufficiency, HLD, HTN, DM, WILLA here today for a follow up.  Patient follows with stacy, Dr. Paige, noted with low iron s/p 2 iron infusions.  Patient denies chest pain, dyspnea, palpitations, dizziness, syncope, changes in bowel/bladder habits or appetite.

## 2024-10-03 LAB
25(OH)D3 SERPL-MCNC: 23 NG/ML
ALBUMIN SERPL ELPH-MCNC: 4.6 G/DL
ALP BLD-CCNC: 79 U/L
ALT SERPL-CCNC: 38 U/L
ANION GAP SERPL CALC-SCNC: 17 MMOL/L
AST SERPL-CCNC: 28 U/L
BASOPHILS # BLD AUTO: 0.05 K/UL
BASOPHILS NFR BLD AUTO: 0.9 %
BILIRUB DIRECT SERPL-MCNC: 0.1 MG/DL
BILIRUB INDIRECT SERPL-MCNC: 0.3 MG/DL
BILIRUB SERPL-MCNC: 0.4 MG/DL
BUN SERPL-MCNC: 65 MG/DL
CALCIUM SERPL-MCNC: 10.2 MG/DL
CHLORIDE SERPL-SCNC: 106 MMOL/L
CHOLEST SERPL-MCNC: 159 MG/DL
CK SERPL-CCNC: 183 U/L
CO2 SERPL-SCNC: 21 MMOL/L
CREAT SERPL-MCNC: 1.99 MG/DL
EGFR: 25 ML/MIN/1.73M2
EOSINOPHIL # BLD AUTO: 0.1 K/UL
EOSINOPHIL NFR BLD AUTO: 1.9 %
ESTIMATED AVERAGE GLUCOSE: 146 MG/DL
FERRITIN SERPL-MCNC: 320 NG/ML
GLUCOSE SERPL-MCNC: 140 MG/DL
HBA1C MFR BLD HPLC: 6.7 %
HCT VFR BLD CALC: 34.4 %
HDLC SERPL-MCNC: 56 MG/DL
HGB BLD-MCNC: 9.7 G/DL
IRON SATN MFR SERPL: 88 %
IRON SERPL-MCNC: 597 UG/DL
LDLC SERPL CALC-MCNC: 84 MG/DL
LYMPHOCYTES # BLD AUTO: 1.19 K/UL
LYMPHOCYTES NFR BLD AUTO: 22.6 %
MAGNESIUM SERPL-MCNC: 2.3 MG/DL
MAN DIFF?: NORMAL
MCHC RBC-ENTMCNC: 25.3 PG
MCHC RBC-ENTMCNC: 28.2 GM/DL
MCV RBC AUTO: 89.6 FL
MONOCYTES # BLD AUTO: 0.4 K/UL
MONOCYTES NFR BLD AUTO: 7.6 %
NEUTROPHILS # BLD AUTO: 3.54 K/UL
NEUTROPHILS NFR BLD AUTO: 67 %
NONHDLC SERPL-MCNC: 103 MG/DL
NT-PROBNP SERPL-MCNC: 328 PG/ML
PLATELET # BLD AUTO: 212 K/UL
POTASSIUM SERPL-SCNC: 4 MMOL/L
PROT SERPL-MCNC: 7.5 G/DL
RBC # BLD: 3.84 M/UL
RBC # FLD: 27.1 %
SODIUM SERPL-SCNC: 144 MMOL/L
T4 FREE SERPL-MCNC: 1.6 NG/DL
TIBC SERPL-MCNC: 681 UG/DL
TRIGL SERPL-MCNC: 107 MG/DL
TSH SERPL-ACNC: 2.14 UIU/ML
UIBC SERPL-MCNC: 84 UG/DL
WBC # FLD AUTO: 5.28 K/UL

## 2024-10-29 ENCOUNTER — APPOINTMENT (OUTPATIENT)
Dept: CARDIOLOGY | Facility: CLINIC | Age: 79
End: 2024-10-29
Payer: MEDICARE

## 2024-10-29 PROCEDURE — 93306 TTE W/DOPPLER COMPLETE: CPT

## 2024-10-30 ENCOUNTER — NON-APPOINTMENT (OUTPATIENT)
Age: 79
End: 2024-10-30

## 2024-11-02 ENCOUNTER — NON-APPOINTMENT (OUTPATIENT)
Age: 79
End: 2024-11-02

## 2024-11-10 NOTE — ED PROVIDER NOTE - DISCHARGE DATE
Lab result shows no significant changes. Please feel free to reach out to me with any questions or concerns.   Regards  
12-Jan-2018

## 2024-11-26 ENCOUNTER — RX RENEWAL (OUTPATIENT)
Age: 79
End: 2024-11-26

## 2024-11-26 ENCOUNTER — APPOINTMENT (OUTPATIENT)
Dept: ORTHOPEDIC SURGERY | Facility: CLINIC | Age: 79
End: 2024-11-26
Payer: MEDICARE

## 2024-11-26 DIAGNOSIS — S46.012D STRAIN OF MUSCLE(S) AND TENDON(S) OF THE ROTATOR CUFF OF LEFT SHOULDER, SUBSEQUENT ENCOUNTER: ICD-10-CM

## 2024-11-26 DIAGNOSIS — S43.422D SPRAIN OF LEFT ROTATOR CUFF CAPSULE, SUBSEQUENT ENCOUNTER: ICD-10-CM

## 2024-11-26 PROCEDURE — 99213 OFFICE O/P EST LOW 20 MIN: CPT

## 2024-12-24 NOTE — PATIENT PROFILE ADULT - NSPROMEDSADMININFO_GEN_A_NUR
Medication: prozac 20 mg passed protocol.   Last office visit date: 11-19-24  Next appointment: none- MD scheduled blocked  Number of refills given: 2       no concerns

## 2025-01-01 ENCOUNTER — RX RENEWAL (OUTPATIENT)
Age: 80
End: 2025-01-01

## 2025-01-14 NOTE — PATIENT PROFILE ADULT - BRADEN NUTRITION
"Subarachnoid hemorrhage noted on initial CT on 1/4 and stable on 1/5   CTH 1/14: \" Interval evolution of recent subdural and subarachnoid hemorrhage as detailed above. No definitive new or enlarging intracranial hemorrhage.\"   Continue same management for the subarachnoid hemorrhage as for the subdural hematoma, please see that separate section for overall management plans  " (3) adequate

## 2025-02-03 ENCOUNTER — APPOINTMENT (OUTPATIENT)
Dept: ORTHOPEDIC SURGERY | Facility: CLINIC | Age: 80
End: 2025-02-03
Payer: MEDICARE

## 2025-02-03 VITALS — HEIGHT: 63 IN | WEIGHT: 274 LBS | BODY MASS INDEX: 48.55 KG/M2

## 2025-02-03 DIAGNOSIS — R60.9 EDEMA, UNSPECIFIED: ICD-10-CM

## 2025-02-03 DIAGNOSIS — M76.822 POSTERIOR TIBIAL TENDINITIS, LEFT LEG: ICD-10-CM

## 2025-02-03 DIAGNOSIS — M21.42 FLAT FOOT [PES PLANUS] (ACQUIRED), RIGHT FOOT: ICD-10-CM

## 2025-02-03 DIAGNOSIS — M21.41 FLAT FOOT [PES PLANUS] (ACQUIRED), RIGHT FOOT: ICD-10-CM

## 2025-02-03 PROCEDURE — 99214 OFFICE O/P EST MOD 30 MIN: CPT

## 2025-02-03 PROCEDURE — 73610 X-RAY EXAM OF ANKLE: CPT | Mod: LT

## 2025-02-04 ENCOUNTER — APPOINTMENT (OUTPATIENT)
Dept: ULTRASOUND IMAGING | Facility: IMAGING CENTER | Age: 80
End: 2025-02-04

## 2025-02-04 ENCOUNTER — APPOINTMENT (OUTPATIENT)
Dept: CARDIOLOGY | Facility: CLINIC | Age: 80
End: 2025-02-04
Payer: MEDICARE

## 2025-02-04 VITALS
WEIGHT: 282 LBS | OXYGEN SATURATION: 96 % | BODY MASS INDEX: 49.96 KG/M2 | DIASTOLIC BLOOD PRESSURE: 70 MMHG | TEMPERATURE: 97.9 F | HEIGHT: 63 IN | HEART RATE: 92 BPM | SYSTOLIC BLOOD PRESSURE: 140 MMHG

## 2025-02-04 DIAGNOSIS — E11.9 TYPE 2 DIABETES MELLITUS W/OUT COMPLICATIONS: ICD-10-CM

## 2025-02-04 DIAGNOSIS — Z13.228 ENCOUNTER FOR SCREENING FOR OTHER METABOLIC DISORDERS: ICD-10-CM

## 2025-02-04 DIAGNOSIS — J45.909 UNSPECIFIED ASTHMA, UNCOMPLICATED: ICD-10-CM

## 2025-02-04 PROCEDURE — 93000 ELECTROCARDIOGRAM COMPLETE: CPT

## 2025-02-04 PROCEDURE — 99214 OFFICE O/P EST MOD 30 MIN: CPT

## 2025-02-04 PROCEDURE — G2211 COMPLEX E/M VISIT ADD ON: CPT

## 2025-02-04 RX ORDER — BUMETANIDE 1 MG/1
1 TABLET ORAL
Qty: 60 | Refills: 1 | Status: ACTIVE | COMMUNITY
Start: 2025-02-04 | End: 1900-01-01

## 2025-02-06 LAB
25(OH)D3 SERPL-MCNC: 16.9 NG/ML
ALBUMIN SERPL ELPH-MCNC: 4.6 G/DL
ALP BLD-CCNC: 92 U/L
ALT SERPL-CCNC: 34 U/L
ANION GAP SERPL CALC-SCNC: 16 MMOL/L
AST SERPL-CCNC: 31 U/L
BASOPHILS # BLD AUTO: 0.03 K/UL
BASOPHILS NFR BLD AUTO: 0.6 %
BILIRUB DIRECT SERPL-MCNC: 0.2 MG/DL
BILIRUB INDIRECT SERPL-MCNC: 0.4 MG/DL
BILIRUB SERPL-MCNC: 0.6 MG/DL
BUN SERPL-MCNC: 46 MG/DL
CALCIUM SERPL-MCNC: 10 MG/DL
CHLORIDE SERPL-SCNC: 104 MMOL/L
CHOLEST SERPL-MCNC: 156 MG/DL
CK SERPL-CCNC: 327 U/L
CO2 SERPL-SCNC: 25 MMOL/L
CREAT SERPL-MCNC: 1.75 MG/DL
EGFR: 29 ML/MIN/1.73M2
EOSINOPHIL # BLD AUTO: 0.09 K/UL
EOSINOPHIL NFR BLD AUTO: 1.7 %
ESTIMATED AVERAGE GLUCOSE: 166 MG/DL
FERRITIN SERPL-MCNC: 29 NG/ML
FOLATE SERPL-MCNC: 11.4 NG/ML
GLUCOSE SERPL-MCNC: 86 MG/DL
HBA1C MFR BLD HPLC: 7.4 %
HCT VFR BLD CALC: 43 %
HDLC SERPL-MCNC: 58 MG/DL
HGB BLD-MCNC: 13 G/DL
IMM GRANULOCYTES NFR BLD AUTO: 0.4 %
IRON SATN MFR SERPL: 11 %
IRON SERPL-MCNC: 42 UG/DL
LDLC SERPL CALC-MCNC: 80 MG/DL
LYMPHOCYTES # BLD AUTO: 1.55 K/UL
LYMPHOCYTES NFR BLD AUTO: 29.2 %
MAGNESIUM SERPL-MCNC: 2 MG/DL
MAN DIFF?: NORMAL
MCHC RBC-ENTMCNC: 27.7 PG
MCHC RBC-ENTMCNC: 30.2 G/DL
MCV RBC AUTO: 91.7 FL
MONOCYTES # BLD AUTO: 0.52 K/UL
MONOCYTES NFR BLD AUTO: 9.8 %
NEUTROPHILS # BLD AUTO: 3.1 K/UL
NEUTROPHILS NFR BLD AUTO: 58.3 %
NONHDLC SERPL-MCNC: 98 MG/DL
NT-PROBNP SERPL-MCNC: 252 PG/ML
PLATELET # BLD AUTO: 153 K/UL
POTASSIUM SERPL-SCNC: 3.8 MMOL/L
PROT SERPL-MCNC: 7.4 G/DL
RBC # BLD: 4.69 M/UL
RBC # FLD: 16.4 %
SODIUM SERPL-SCNC: 146 MMOL/L
T4 FREE SERPL-MCNC: 1.6 NG/DL
TIBC SERPL-MCNC: 380 UG/DL
TRIGL SERPL-MCNC: 97 MG/DL
TSH SERPL-ACNC: 1.69 UIU/ML
UIBC SERPL-MCNC: 338 UG/DL
VIT B12 SERPL-MCNC: 524 PG/ML
WBC # FLD AUTO: 5.31 K/UL

## 2025-02-13 ENCOUNTER — APPOINTMENT (OUTPATIENT)
Dept: CARDIOLOGY | Facility: CLINIC | Age: 80
End: 2025-02-13
Payer: MEDICARE

## 2025-02-13 VITALS
HEART RATE: 85 BPM | RESPIRATION RATE: 20 BRPM | WEIGHT: 274 LBS | BODY MASS INDEX: 48.55 KG/M2 | TEMPERATURE: 98.3 F | HEIGHT: 63 IN | OXYGEN SATURATION: 94 % | SYSTOLIC BLOOD PRESSURE: 140 MMHG | DIASTOLIC BLOOD PRESSURE: 60 MMHG

## 2025-02-13 DIAGNOSIS — D64.9 ANEMIA, UNSPECIFIED: ICD-10-CM

## 2025-02-13 DIAGNOSIS — I25.10 ATHEROSCLEROTIC HEART DISEASE OF NATIVE CORONARY ARTERY W/OUT ANGINA PECTORIS: ICD-10-CM

## 2025-02-13 DIAGNOSIS — I10 ESSENTIAL (PRIMARY) HYPERTENSION: ICD-10-CM

## 2025-02-13 DIAGNOSIS — M25.569 PAIN IN UNSPECIFIED KNEE: ICD-10-CM

## 2025-02-13 DIAGNOSIS — I48.91 UNSPECIFIED ATRIAL FIBRILLATION: ICD-10-CM

## 2025-02-13 DIAGNOSIS — E78.5 HYPERLIPIDEMIA, UNSPECIFIED: ICD-10-CM

## 2025-02-13 DIAGNOSIS — E27.9 DISORDER OF ADRENAL GLAND, UNSPECIFIED: ICD-10-CM

## 2025-02-13 DIAGNOSIS — E55.9 VITAMIN D DEFICIENCY, UNSPECIFIED: ICD-10-CM

## 2025-02-13 DIAGNOSIS — I50.9 HEART FAILURE, UNSPECIFIED: ICD-10-CM

## 2025-02-13 DIAGNOSIS — F41.9 ANXIETY DISORDER, UNSPECIFIED: ICD-10-CM

## 2025-02-13 DIAGNOSIS — N28.9 DISORDER OF KIDNEY AND URETER, UNSPECIFIED: ICD-10-CM

## 2025-02-13 DIAGNOSIS — M79.89 OTHER SPECIFIED SOFT TISSUE DISORDERS: ICD-10-CM

## 2025-02-13 DIAGNOSIS — R89.9 UNSPECIFIED ABNORMAL FINDING IN SPECIMENS FROM OTHER ORGANS, SYSTEMS AND TISSUES: ICD-10-CM

## 2025-02-13 LAB
ANION GAP SERPL CALC-SCNC: 19 MMOL/L
BUN SERPL-MCNC: 47 MG/DL
CALCIUM SERPL-MCNC: 10.1 MG/DL
CHLORIDE SERPL-SCNC: 102 MMOL/L
CK SERPL-CCNC: 333 U/L
CO2 SERPL-SCNC: 25 MMOL/L
CREAT SERPL-MCNC: 1.75 MG/DL
EGFR: 29 ML/MIN/1.73M2
GLUCOSE SERPL-MCNC: 159 MG/DL
POTASSIUM SERPL-SCNC: 3.7 MMOL/L
SODIUM SERPL-SCNC: 146 MMOL/L

## 2025-02-13 PROCEDURE — 93970 EXTREMITY STUDY: CPT

## 2025-02-13 PROCEDURE — G2211 COMPLEX E/M VISIT ADD ON: CPT

## 2025-02-13 PROCEDURE — 99214 OFFICE O/P EST MOD 30 MIN: CPT

## 2025-02-13 RX ORDER — ERGOCALCIFEROL 1.25 MG/1
1.25 MG CAPSULE ORAL
Qty: 6 | Refills: 0 | Status: ACTIVE | COMMUNITY
Start: 2025-02-13 | End: 1900-01-01

## 2025-02-14 LAB
ANION GAP SERPL CALC-SCNC: 16 MMOL/L
APPEARANCE: CLEAR
BACTERIA: ABNORMAL /HPF
BILIRUBIN URINE: NEGATIVE
BLOOD URINE: NEGATIVE
BUN SERPL-MCNC: 44 MG/DL
CALCIUM SERPL-MCNC: 9.9 MG/DL
CAST: 0 /LPF
CHLORIDE SERPL-SCNC: 105 MMOL/L
CK SERPL-CCNC: 311 U/L
CO2 SERPL-SCNC: 26 MMOL/L
COLOR: YELLOW
CREAT SERPL-MCNC: 1.74 MG/DL
CREAT SPEC-SCNC: 87 MG/DL
EGFR: 29 ML/MIN/1.73M2
EPITHELIAL CELLS: 7 /HPF
GLUCOSE QUALITATIVE U: 500 MG/DL
GLUCOSE SERPL-MCNC: 115 MG/DL
KETONES URINE: NEGATIVE MG/DL
LEUKOCYTE ESTERASE URINE: ABNORMAL
MAGNESIUM SERPL-MCNC: 2.1 MG/DL
MICROALBUMIN 24H UR DL<=1MG/L-MCNC: 6.1 MG/DL
MICROALBUMIN/CREAT 24H UR-RTO: 69 MG/G
MICROSCOPIC-UA: NORMAL
NITRITE URINE: NEGATIVE
NT-PROBNP SERPL-MCNC: 328 PG/ML
PH URINE: 6
POTASSIUM SERPL-SCNC: 3.7 MMOL/L
PROTEIN URINE: 30 MG/DL
RED BLOOD CELLS URINE: 0 /HPF
SODIUM SERPL-SCNC: 148 MMOL/L
SPECIFIC GRAVITY URINE: 1.02
UROBILINOGEN URINE: 0.2 MG/DL
WHITE BLOOD CELLS URINE: 5 /HPF

## 2025-02-16 LAB — BACTERIA UR CULT: NORMAL

## 2025-02-17 ENCOUNTER — APPOINTMENT (OUTPATIENT)
Dept: ORTHOPEDIC SURGERY | Facility: CLINIC | Age: 80
End: 2025-02-17
Payer: OTHER MISCELLANEOUS

## 2025-02-17 VITALS — BODY MASS INDEX: 48.55 KG/M2 | HEIGHT: 63 IN | WEIGHT: 274 LBS

## 2025-02-17 DIAGNOSIS — M18.11 UNILATERAL PRIMARY OSTEOARTHRITIS OF FIRST CARPOMETACARPAL JOINT, RIGHT HAND: ICD-10-CM

## 2025-02-17 DIAGNOSIS — M67.431 GANGLION, RIGHT WRIST: ICD-10-CM

## 2025-02-17 PROCEDURE — 73110 X-RAY EXAM OF WRIST: CPT | Mod: RT

## 2025-02-17 PROCEDURE — 99213 OFFICE O/P EST LOW 20 MIN: CPT

## 2025-02-17 PROCEDURE — 99203 OFFICE O/P NEW LOW 30 MIN: CPT

## 2025-02-21 ENCOUNTER — INPATIENT (INPATIENT)
Facility: HOSPITAL | Age: 80
LOS: 16 days | Discharge: HOME HEALTH | DRG: 193 | End: 2025-03-10
Attending: STUDENT IN AN ORGANIZED HEALTH CARE EDUCATION/TRAINING PROGRAM | Admitting: HOSPITALIST
Payer: MEDICARE

## 2025-02-21 VITALS
WEIGHT: 270.07 LBS | TEMPERATURE: 98 F | OXYGEN SATURATION: 94 % | DIASTOLIC BLOOD PRESSURE: 83 MMHG | HEIGHT: 63 IN | HEART RATE: 85 BPM | RESPIRATION RATE: 18 BRPM | SYSTOLIC BLOOD PRESSURE: 171 MMHG

## 2025-02-21 DIAGNOSIS — J10.1 INFLUENZA DUE TO OTHER IDENTIFIED INFLUENZA VIRUS WITH OTHER RESPIRATORY MANIFESTATIONS: ICD-10-CM

## 2025-02-21 DIAGNOSIS — Z90.49 ACQUIRED ABSENCE OF OTHER SPECIFIED PARTS OF DIGESTIVE TRACT: Chronic | ICD-10-CM

## 2025-02-21 DIAGNOSIS — J45.909 UNSPECIFIED ASTHMA, UNCOMPLICATED: ICD-10-CM

## 2025-02-21 DIAGNOSIS — I50.32 CHRONIC DIASTOLIC (CONGESTIVE) HEART FAILURE: ICD-10-CM

## 2025-02-21 DIAGNOSIS — R60.0 LOCALIZED EDEMA: ICD-10-CM

## 2025-02-21 DIAGNOSIS — E11.9 TYPE 2 DIABETES MELLITUS WITHOUT COMPLICATIONS: ICD-10-CM

## 2025-02-21 DIAGNOSIS — Z95.5 PRESENCE OF CORONARY ANGIOPLASTY IMPLANT AND GRAFT: Chronic | ICD-10-CM

## 2025-02-21 DIAGNOSIS — I48.20 CHRONIC ATRIAL FIBRILLATION, UNSPECIFIED: ICD-10-CM

## 2025-02-21 LAB
ALBUMIN SERPL ELPH-MCNC: 4.6 G/DL — SIGNIFICANT CHANGE UP (ref 3.3–5)
ALP SERPL-CCNC: 92 U/L — SIGNIFICANT CHANGE UP (ref 40–120)
ALT FLD-CCNC: 37 U/L — SIGNIFICANT CHANGE UP (ref 10–45)
ANION GAP SERPL CALC-SCNC: 15 MMOL/L — SIGNIFICANT CHANGE UP (ref 5–17)
APTT BLD: 31.5 SEC — SIGNIFICANT CHANGE UP (ref 24.5–35.6)
AST SERPL-CCNC: 35 U/L — SIGNIFICANT CHANGE UP (ref 10–40)
BASOPHILS # BLD AUTO: 0.01 K/UL — SIGNIFICANT CHANGE UP (ref 0–0.2)
BASOPHILS NFR BLD AUTO: 0.2 % — SIGNIFICANT CHANGE UP (ref 0–2)
BILIRUB SERPL-MCNC: 0.5 MG/DL — SIGNIFICANT CHANGE UP (ref 0.2–1.2)
BUN SERPL-MCNC: 47 MG/DL — HIGH (ref 7–23)
CALCIUM SERPL-MCNC: 10.3 MG/DL — SIGNIFICANT CHANGE UP (ref 8.4–10.5)
CHLORIDE SERPL-SCNC: 101 MMOL/L — SIGNIFICANT CHANGE UP (ref 96–108)
CO2 SERPL-SCNC: 26 MMOL/L — SIGNIFICANT CHANGE UP (ref 22–31)
CREAT SERPL-MCNC: 1.71 MG/DL — HIGH (ref 0.5–1.3)
EGFR: 30 ML/MIN/1.73M2 — LOW
EGFR: 30 ML/MIN/1.73M2 — LOW
EOSINOPHIL # BLD AUTO: 0.04 K/UL — SIGNIFICANT CHANGE UP (ref 0–0.5)
EOSINOPHIL NFR BLD AUTO: 0.9 % — SIGNIFICANT CHANGE UP (ref 0–6)
FLUAV AG NPH QL: DETECTED
FLUBV AG NPH QL: SIGNIFICANT CHANGE UP
GAS PNL BLDV: SIGNIFICANT CHANGE UP
GLUCOSE BLDC GLUCOMTR-MCNC: 132 MG/DL — HIGH (ref 70–99)
GLUCOSE BLDC GLUCOMTR-MCNC: 160 MG/DL — HIGH (ref 70–99)
GLUCOSE SERPL-MCNC: 119 MG/DL — HIGH (ref 70–99)
HCT VFR BLD CALC: 43.4 % — SIGNIFICANT CHANGE UP (ref 34.5–45)
HGB BLD-MCNC: 13.8 G/DL — SIGNIFICANT CHANGE UP (ref 11.5–15.5)
IMM GRANULOCYTES NFR BLD AUTO: 0.5 % — SIGNIFICANT CHANGE UP (ref 0–0.9)
INR BLD: 1.42 RATIO — HIGH (ref 0.85–1.16)
LYMPHOCYTES # BLD AUTO: 0.8 K/UL — LOW (ref 1–3.3)
LYMPHOCYTES # BLD AUTO: 18.6 % — SIGNIFICANT CHANGE UP (ref 13–44)
MCHC RBC-ENTMCNC: 27.1 PG — SIGNIFICANT CHANGE UP (ref 27–34)
MCHC RBC-ENTMCNC: 31.8 G/DL — LOW (ref 32–36)
MCV RBC AUTO: 85.1 FL — SIGNIFICANT CHANGE UP (ref 80–100)
MONOCYTES # BLD AUTO: 0.6 K/UL — SIGNIFICANT CHANGE UP (ref 0–0.9)
MONOCYTES NFR BLD AUTO: 13.9 % — SIGNIFICANT CHANGE UP (ref 2–14)
NEUTROPHILS # BLD AUTO: 2.84 K/UL — SIGNIFICANT CHANGE UP (ref 1.8–7.4)
NEUTROPHILS NFR BLD AUTO: 65.9 % — SIGNIFICANT CHANGE UP (ref 43–77)
NRBC BLD AUTO-RTO: 0 /100 WBCS — SIGNIFICANT CHANGE UP (ref 0–0)
NT-PROBNP SERPL-SCNC: 277 PG/ML — SIGNIFICANT CHANGE UP (ref 0–300)
PLATELET # BLD AUTO: 141 K/UL — LOW (ref 150–400)
POTASSIUM SERPL-MCNC: 3.9 MMOL/L — SIGNIFICANT CHANGE UP (ref 3.5–5.3)
POTASSIUM SERPL-SCNC: 3.9 MMOL/L — SIGNIFICANT CHANGE UP (ref 3.5–5.3)
PROT SERPL-MCNC: 7.9 G/DL — SIGNIFICANT CHANGE UP (ref 6–8.3)
PROTHROM AB SERPL-ACNC: 16.3 SEC — HIGH (ref 9.9–13.4)
RBC # BLD: 5.1 M/UL — SIGNIFICANT CHANGE UP (ref 3.8–5.2)
RBC # FLD: 16.2 % — HIGH (ref 10.3–14.5)
RSV RNA NPH QL NAA+NON-PROBE: SIGNIFICANT CHANGE UP
SARS-COV-2 RNA SPEC QL NAA+PROBE: SIGNIFICANT CHANGE UP
SODIUM SERPL-SCNC: 142 MMOL/L — SIGNIFICANT CHANGE UP (ref 135–145)
TROPONIN T, HIGH SENSITIVITY RESULT: 47 NG/L — SIGNIFICANT CHANGE UP (ref 0–51)
TROPONIN T, HIGH SENSITIVITY RESULT: 48 NG/L — SIGNIFICANT CHANGE UP (ref 0–51)
WBC # BLD: 4.31 K/UL — SIGNIFICANT CHANGE UP (ref 3.8–10.5)
WBC # FLD AUTO: 4.31 K/UL — SIGNIFICANT CHANGE UP (ref 3.8–10.5)

## 2025-02-21 PROCEDURE — 99223 1ST HOSP IP/OBS HIGH 75: CPT

## 2025-02-21 PROCEDURE — 71045 X-RAY EXAM CHEST 1 VIEW: CPT | Mod: 26

## 2025-02-21 PROCEDURE — 99285 EMERGENCY DEPT VISIT HI MDM: CPT | Mod: GC

## 2025-02-21 PROCEDURE — 93970 EXTREMITY STUDY: CPT | Mod: 26

## 2025-02-21 PROCEDURE — 93010 ELECTROCARDIOGRAM REPORT: CPT

## 2025-02-21 RX ORDER — BENZONATATE 100 MG
100 CAPSULE ORAL THREE TIMES A DAY
Refills: 0 | Status: DISCONTINUED | OUTPATIENT
Start: 2025-02-21 | End: 2025-03-03

## 2025-02-21 RX ORDER — DEXTROMETHORPHAN HBR, GUAIFENESIN 200 MG/10ML
100 LIQUID ORAL EVERY 6 HOURS
Refills: 0 | Status: DISCONTINUED | OUTPATIENT
Start: 2025-02-21 | End: 2025-03-10

## 2025-02-21 RX ORDER — DOXAZOSIN MESYLATE 8 MG/1
2 TABLET ORAL DAILY
Refills: 0 | Status: DISCONTINUED | OUTPATIENT
Start: 2025-02-22 | End: 2025-03-10

## 2025-02-21 RX ORDER — OSELTAMIVIR PHOSPHATE 75 MG/1
CAPSULE ORAL
Refills: 0 | Status: COMPLETED | OUTPATIENT
Start: 2025-02-21 | End: 2025-02-26

## 2025-02-21 RX ORDER — SODIUM CHLORIDE 9 G/1000ML
1000 INJECTION, SOLUTION INTRAVENOUS
Refills: 0 | Status: DISCONTINUED | OUTPATIENT
Start: 2025-02-21 | End: 2025-03-10

## 2025-02-21 RX ORDER — BUMETANIDE 1 MG/1
1 TABLET ORAL
Refills: 0 | DISCHARGE

## 2025-02-21 RX ORDER — LORAZEPAM 4 MG/ML
0.5 VIAL (ML) INJECTION AT BEDTIME
Refills: 0 | Status: DISCONTINUED | OUTPATIENT
Start: 2025-02-21 | End: 2025-02-28

## 2025-02-21 RX ORDER — LORAZEPAM 4 MG/ML
1 VIAL (ML) INJECTION
Refills: 0 | DISCHARGE

## 2025-02-21 RX ORDER — GLIMEPIRIDE 4 MG/1
1 TABLET ORAL
Refills: 0 | DISCHARGE

## 2025-02-21 RX ORDER — OSELTAMIVIR PHOSPHATE 75 MG/1
30 CAPSULE ORAL EVERY 12 HOURS
Refills: 0 | Status: COMPLETED | OUTPATIENT
Start: 2025-02-22 | End: 2025-02-26

## 2025-02-21 RX ORDER — TRAMADOL HYDROCHLORIDE 50 MG/1
50 TABLET, FILM COATED ORAL ONCE
Refills: 0 | Status: DISCONTINUED | OUTPATIENT
Start: 2025-02-21 | End: 2025-02-21

## 2025-02-21 RX ORDER — CEFAZOLIN SODIUM IN 0.9 % NACL 3 G/100 ML
1000 INTRAVENOUS SOLUTION, PIGGYBACK (ML) INTRAVENOUS ONCE
Refills: 0 | Status: COMPLETED | OUTPATIENT
Start: 2025-02-21 | End: 2025-02-21

## 2025-02-21 RX ORDER — DAPAGLIFLOZIN 5 MG/1
1 TABLET, FILM COATED ORAL
Refills: 0 | DISCHARGE

## 2025-02-21 RX ORDER — INSULIN LISPRO 100 U/ML
INJECTION, SOLUTION INTRAVENOUS; SUBCUTANEOUS
Refills: 0 | Status: DISCONTINUED | OUTPATIENT
Start: 2025-02-21 | End: 2025-03-10

## 2025-02-21 RX ORDER — INSULIN GLARGINE-YFGN 100 [IU]/ML
10 INJECTION, SOLUTION SUBCUTANEOUS AT BEDTIME
Refills: 0 | Status: DISCONTINUED | OUTPATIENT
Start: 2025-02-21 | End: 2025-03-07

## 2025-02-21 RX ORDER — OSELTAMIVIR PHOSPHATE 75 MG/1
75 CAPSULE ORAL ONCE
Refills: 0 | Status: COMPLETED | OUTPATIENT
Start: 2025-02-21 | End: 2025-02-21

## 2025-02-21 RX ORDER — DEXTROSE 50 % IN WATER 50 %
25 SYRINGE (ML) INTRAVENOUS ONCE
Refills: 0 | Status: DISCONTINUED | OUTPATIENT
Start: 2025-02-21 | End: 2025-03-10

## 2025-02-21 RX ORDER — APIXABAN 2.5 MG/1
5 TABLET, FILM COATED ORAL EVERY 12 HOURS
Refills: 0 | Status: DISCONTINUED | OUTPATIENT
Start: 2025-02-21 | End: 2025-03-10

## 2025-02-21 RX ORDER — CEFAZOLIN SODIUM IN 0.9 % NACL 3 G/100 ML
INTRAVENOUS SOLUTION, PIGGYBACK (ML) INTRAVENOUS
Refills: 0 | Status: DISCONTINUED | OUTPATIENT
Start: 2025-02-21 | End: 2025-02-21

## 2025-02-21 RX ORDER — DRONEDARONE 400 MG/1
400 TABLET, FILM COATED ORAL
Refills: 0 | Status: DISCONTINUED | OUTPATIENT
Start: 2025-02-21 | End: 2025-03-10

## 2025-02-21 RX ORDER — IPRATROPIUM BROMIDE AND ALBUTEROL SULFATE .5; 2.5 MG/3ML; MG/3ML
3 SOLUTION RESPIRATORY (INHALATION) ONCE
Refills: 0 | Status: COMPLETED | OUTPATIENT
Start: 2025-02-21 | End: 2025-02-21

## 2025-02-21 RX ORDER — DILTIAZEM HYDROCHLORIDE 240 MG/1
360 TABLET, EXTENDED RELEASE ORAL DAILY
Refills: 0 | Status: DISCONTINUED | OUTPATIENT
Start: 2025-02-21 | End: 2025-03-10

## 2025-02-21 RX ORDER — ATORVASTATIN CALCIUM 80 MG/1
40 TABLET, FILM COATED ORAL AT BEDTIME
Refills: 0 | Status: DISCONTINUED | OUTPATIENT
Start: 2025-02-21 | End: 2025-03-10

## 2025-02-21 RX ORDER — INSULIN LISPRO 100 U/ML
INJECTION, SOLUTION INTRAVENOUS; SUBCUTANEOUS AT BEDTIME
Refills: 0 | Status: DISCONTINUED | OUTPATIENT
Start: 2025-02-21 | End: 2025-03-10

## 2025-02-21 RX ORDER — ASPIRIN 325 MG
1 TABLET ORAL
Refills: 0 | DISCHARGE

## 2025-02-21 RX ORDER — ACETAMINOPHEN 500 MG/5ML
1000 LIQUID (ML) ORAL ONCE
Refills: 0 | Status: COMPLETED | OUTPATIENT
Start: 2025-02-21 | End: 2025-02-21

## 2025-02-21 RX ORDER — OSELTAMIVIR PHOSPHATE 75 MG/1
75 CAPSULE ORAL
Refills: 0 | Status: DISCONTINUED | OUTPATIENT
Start: 2025-02-21 | End: 2025-02-21

## 2025-02-21 RX ORDER — ASPIRIN 325 MG
81 TABLET ORAL DAILY
Refills: 0 | Status: DISCONTINUED | OUTPATIENT
Start: 2025-02-22 | End: 2025-03-10

## 2025-02-21 RX ORDER — GLUCAGON 3 MG/1
1 POWDER NASAL ONCE
Refills: 0 | Status: DISCONTINUED | OUTPATIENT
Start: 2025-02-21 | End: 2025-03-10

## 2025-02-21 RX ORDER — BUMETANIDE 1 MG/1
2 TABLET ORAL
Refills: 0 | Status: DISCONTINUED | OUTPATIENT
Start: 2025-02-21 | End: 2025-02-21

## 2025-02-21 RX ORDER — DEXTROSE 50 % IN WATER 50 %
12.5 SYRINGE (ML) INTRAVENOUS ONCE
Refills: 0 | Status: DISCONTINUED | OUTPATIENT
Start: 2025-02-21 | End: 2025-03-10

## 2025-02-21 RX ORDER — INSULIN GLARGINE-YFGN 100 [IU]/ML
5 INJECTION, SOLUTION SUBCUTANEOUS EVERY MORNING
Refills: 0 | Status: DISCONTINUED | OUTPATIENT
Start: 2025-02-22 | End: 2025-03-07

## 2025-02-21 RX ORDER — BUMETANIDE 1 MG/1
1 TABLET ORAL ONCE
Refills: 0 | Status: COMPLETED | OUTPATIENT
Start: 2025-02-21 | End: 2025-02-21

## 2025-02-21 RX ORDER — BUMETANIDE 1 MG/1
2 TABLET ORAL
Refills: 0 | Status: DISCONTINUED | OUTPATIENT
Start: 2025-02-21 | End: 2025-02-22

## 2025-02-21 RX ORDER — LISINOPRIL 5 MG/1
40 TABLET ORAL DAILY
Refills: 0 | Status: DISCONTINUED | OUTPATIENT
Start: 2025-02-22 | End: 2025-02-24

## 2025-02-21 RX ORDER — DOXAZOSIN MESYLATE 8 MG/1
1 TABLET ORAL
Refills: 0 | DISCHARGE

## 2025-02-21 RX ORDER — DEXTROSE 50 % IN WATER 50 %
15 SYRINGE (ML) INTRAVENOUS ONCE
Refills: 0 | Status: DISCONTINUED | OUTPATIENT
Start: 2025-02-21 | End: 2025-03-10

## 2025-02-21 RX ADMIN — TRAMADOL HYDROCHLORIDE 50 MILLIGRAM(S): 50 TABLET, FILM COATED ORAL at 20:16

## 2025-02-21 RX ADMIN — ATORVASTATIN CALCIUM 40 MILLIGRAM(S): 80 TABLET, FILM COATED ORAL at 22:13

## 2025-02-21 RX ADMIN — INSULIN GLARGINE-YFGN 10 UNIT(S): 100 INJECTION, SOLUTION SUBCUTANEOUS at 22:13

## 2025-02-21 RX ADMIN — APIXABAN 5 MILLIGRAM(S): 2.5 TABLET, FILM COATED ORAL at 22:13

## 2025-02-21 RX ADMIN — Medication 100 MILLIGRAM(S): at 12:42

## 2025-02-21 RX ADMIN — TRAMADOL HYDROCHLORIDE 50 MILLIGRAM(S): 50 TABLET, FILM COATED ORAL at 19:56

## 2025-02-21 RX ADMIN — OSELTAMIVIR PHOSPHATE 75 MILLIGRAM(S): 75 CAPSULE ORAL at 13:13

## 2025-02-21 RX ADMIN — IPRATROPIUM BROMIDE AND ALBUTEROL SULFATE 3 MILLILITER(S): .5; 2.5 SOLUTION RESPIRATORY (INHALATION) at 23:46

## 2025-02-21 RX ADMIN — Medication 100 MILLIGRAM(S): at 19:01

## 2025-02-21 RX ADMIN — TRAMADOL HYDROCHLORIDE 50 MILLIGRAM(S): 50 TABLET, FILM COATED ORAL at 11:36

## 2025-02-21 RX ADMIN — DEXTROMETHORPHAN HBR, GUAIFENESIN 100 MILLIGRAM(S): 200 LIQUID ORAL at 22:17

## 2025-02-21 RX ADMIN — DRONEDARONE 400 MILLIGRAM(S): 400 TABLET, FILM COATED ORAL at 22:13

## 2025-02-21 RX ADMIN — BUMETANIDE 1 MILLIGRAM(S): 1 TABLET ORAL at 14:25

## 2025-02-21 RX ADMIN — OSELTAMIVIR PHOSPHATE 75 MILLIGRAM(S): 75 CAPSULE ORAL at 22:17

## 2025-02-21 RX ADMIN — Medication 400 MILLIGRAM(S): at 14:25

## 2025-02-21 RX ADMIN — Medication 1000 MILLIGRAM(S): at 14:59

## 2025-02-21 RX ADMIN — Medication 4 MILLILITER(S): at 23:46

## 2025-02-21 NOTE — H&P ADULT - PROBLEM SELECTOR PLAN 1
- continue Tamiflu dosed per GFR  - patient currently remains on RA -- if patient becomes hypoxic, monitor for wheezing and consider initiation of nebulizer treatments and monitoring of peak flows given history of asthma as below  - incentive spirometry

## 2025-02-21 NOTE — H&P ADULT - PROBLEM SELECTOR PLAN 2
- unclear etiology as LE Dopplers remain negative, ?could be related to hypervolemia iso infection as above  - will hold off on further Abx at this time and continue to closely monitor  - continue increased dose of diuretics as initiated as O/P  - will appreciate cardiology's further evaluation and recommendations

## 2025-02-21 NOTE — H&P ADULT - NSHPLABSRESULTS_GEN_ALL_CORE
Labs and imaging obtained personally reviewed:                        13.8   4.31  )-----------( 141      ( 21 Feb 2025 11:54 )             43.4     02-21  142  |  101  |  47[H]  ----------------------------<  119[H]  3.9   |  26  |  1.71[H]    Ca    10.3      21 Feb 2025 11:54    TPro  7.9  /  Alb  4.6  /  TBili  0.5  /  DBili  x   /  AST  35  /  ALT  37  /  AlkPhos  92  02-21    PT/INR - ( 21 Feb 2025 11:54 )   PT: 16.3 sec;   INR: 1.42 ratio    PTT - ( 21 Feb 2025 11:54 )  PTT:31.5 sec    hsTrop = 48 --> 47  pBNP = 277    RVP positive for Influenza A    B/L LE Dopplers as reported: No evidence of deep venous thrombosis in either lower extremity. The evaluation is however limited due to extreme pain during compression and the body habitus of the patient.    CXR as reported: Small left pleural effusion.

## 2025-02-21 NOTE — PATIENT PROFILE ADULT - FALL HARM RISK - HARM RISK INTERVENTIONS

## 2025-02-21 NOTE — ED ADULT NURSE NOTE - NSICDXFAMILYHX_GEN_ALL_CORE_FT
SW/CM Discharge Plan  Informed patient is ready for discharge. Patient’s discharge destination is home with wife. Patient to be picked up by WIFE.  Patient/interested person has been counseled for post hospitalization care.  Patient understands post -op care and restrictions. Neurosurgical PA went over follow-up with patient and use of cervical collar.   Patient to f/u with Dr. Gomes in 3 weeks. At that time will discuss physical therapy.  No other questions. No discharge needs.     Discharge plan communicated to RN, wife and patient.       FAMILY HISTORY:  Family history of myocardial infarction  FH: heart disease, mother, father

## 2025-02-21 NOTE — ED PROVIDER NOTE - CLINICAL SUMMARY MEDICAL DECISION MAKING FREE TEXT BOX
Attending note.  Patient was seen in room #15 to the right.  Patient complaining of severe swelling in the lower extremities for the last 2 weeks with right greater than left.  Patient was seen by her PCP Dr. Aj Boss who placed patient on Bumex 1 mg 2 tabs once daily and patient showed initial improvement but now swelling got worse.  Patient also had Dopplers performed of the lower extremities which were unremarkable per the patient.  Patient has recent cough and wheeze.  Also has a history of asthma and bronchitis and symptoms consistent with her previous symptoms.  She denies any fevers, chest pain, palpitations.  She has a past medical history of atrial fibrillation but has been in sinus rhythm.  She also has a history of diabetes, CAD with 2 stents, hypertension, hyperlipidemia, previous TIA/CVA, chronic back pain for 10 years and bilateral knee arthritis.  She has no allergies to medication.  Medications currently include Cardizem, Eliquis, insulin, glimepiride, lisinopril, Farxiga, Zocor, tramadol for her knee and back pain and Ativan for anxiety.  She is a non-smoker and does not drink alcohol.  Patient was advised to go to the emergency department for further evaluation by her PCP.     ROS-as above, otherwise negative.  PE-patient is alert no acute distress.  Lungs are clear and equal bilaterally.  Heart is regular rate and rhythm.  Abdomen is morbidly obese, soft and nontender.  There is no CVA tenderness.  Patient has 2+ pitting edema of the lower extremities bilaterally with slight erythema on the left medial distal thigh and proximal calf.  Distal pulses are intact.  Patient has tenderness about the entire right lower leg.  Neurologic exam is intact and nonfocal.      A/P-patient with pitting leg edema bilaterally for the last 2 weeks which initially improved with Bumex but then worsened.  Patient also call complaining of pain.  She has no history of DVT/PE, recent travel, history of renal or kidney disease per the patient.  Labs, renal function, hepatic function, proBNP, Doppler ultrasound, analgesia and reassess.

## 2025-02-21 NOTE — H&P ADULT - PROBLEM SELECTOR PLAN 3
- continue home ASA, statin, ACE-I  - monitor fluid status, hemodynamics, renal function, and electrolytes closely

## 2025-02-21 NOTE — H&P ADULT - ASSESSMENT
79yoF w/ PMHx of s/p CVA, CAD, HFpEF, IDDM, CKD, asthma (hospitalization per PCP note in 2024 for acute bronchitis and asthma exacerbation), anxiety, and obesity who presents w/ complaints of severe LE swelling over the past two weeks, right > left, along with new cough and wheezing, found to be positive for Influenza A, w/ LE Dopplers negative for acute DVT (though exam limited by body habitus and pain).  79yoF w/ PMHx of s/p CVA, CAD, HFpEF, Afib on Eliquis, IDDM, CKD, asthma (hospitalization per PCP note in 2024 for acute bronchitis and asthma exacerbation), anxiety, and obesity who presents w/ complaints of severe LE swelling over the past two weeks, right > left, along with new cough and wheezing, found to be positive for Influenza A, w/ LE Dopplers negative for acute DVT (though exam limited by body habitus and pain).

## 2025-02-21 NOTE — ED ADULT NURSE NOTE - OBJECTIVE STATEMENT
Pt is a 79y F A&O3 PMHx afib with stent in normal sinus on cardiac monitor, CVA, HTN, herniated discs in back, DM presenting to the ED with bilateral lower extremity edema. Pt reports getting prescribed a water pill from her PCP but reports no improvement. Pt also reports pain with swelling. Pt denies shortness of breath, chest pain, headache. LLE edema worse than RLE on assessment.

## 2025-02-21 NOTE — H&P ADULT - PROBLEM SELECTOR PLAN 5
- at home patient reportedly on Lantus 18units QHS and 9units in AM  - will continue w/ Lantus 10units QHS and 5units in AM for now, w/ mISS and serial FS monitoring

## 2025-02-21 NOTE — H&P ADULT - HISTORY OF PRESENT ILLNESS
79yoF w/ PMHx of s/p CVA, CAD, HFpEF, IDDM, CKD, asthma (hospitalization per PCP note in 2024 for acute bronchitis and asthma exacerbation), anxiety, and obesity who presents w/ complaints of severe LE swelling over the past two weens, right > left; patient was recently seen by her cardiologist, Dr. Boss, since symptom onset, and her diuretic was changed from Lasix (which had improved patient's previous history of severe LE swelling in the past, at the time, in April 2024, left > right per PCP note) to Bumex 2mg daily -- patient states symptoms had improved at first but has since progressed. LE Dopplers performed on 2/13/25 were negative for DVT. Patient also complains of a recent nonproductive cough and wheezing; she denies, however, fever/chills, known sick contacts, myalgia, rhinorrhea, CP, palpitations, SOB/ZAMORA, abd pain, n/v/d/c, or have urinary complaints.     ED Presenting Vitals: 98.1F, 85bpm, 175/83, 18br/m, 94% on RA  ED Course: s/p IV Acetaminophen 1g x1, IV Bumex 1g x1, IV Cefazolin 1g x1, PO Tamiflu 75mg x1, PO Tramadol 50mg x1 79yoF w/ PMHx of s/p CVA, CAD, HFpEF, Afib on Eliquis, IDDM, CKD, asthma (hospitalization per PCP note in 2024 for acute bronchitis and asthma exacerbation), anxiety, and obesity who presents w/ complaints of severe LE swelling over the past two weens, right > left; patient was recently seen by her cardiologist, Dr. Boss, since symptom onset, and her diuretic was changed from Lasix (which had improved patient's previous history of severe LE swelling in the past, at the time, in April 2024, left > right per PCP note) to Bumex 2mg daily -- patient states symptoms had improved at first but has since progressed. LE Dopplers performed on 2/13/25 were negative for DVT. Patient also complains of a recent nonproductive cough and wheezing; she denies, however, fever/chills, known sick contacts, myalgia, rhinorrhea, CP, palpitations, SOB/ZAMORA, abd pain, n/v/d/c, or have urinary complaints.     ED Presenting Vitals: 98.1F, 85bpm, 175/83, 18br/m, 94% on RA  ED Course: s/p IV Acetaminophen 1g x1, IV Bumex 1g x1, IV Cefazolin 1g x1, PO Tamiflu 75mg x1, PO Tramadol 50mg x1

## 2025-02-21 NOTE — ED ADULT NURSE NOTE - NSFALLHARMRISKINTERV_ED_ALL_ED

## 2025-02-21 NOTE — H&P ADULT - NSHPPHYSICALEXAM_GEN_ALL_CORE
Vital Signs Last 24 Hrs  T(F): 100 (21 Feb 2025 15:05), Max: 100 (21 Feb 2025 15:05)  HR: 67 (21 Feb 2025 15:05) (67 - 85)  BP: 146/71 (21 Feb 2025 15:05) (146/71 - 171/83)  RR: 18 (21 Feb 2025 15:07) (18 - 18)  SpO2: 95% (21 Feb 2025 15:07) (92% - 95%)  Parameters below as of 21 Feb 2025 15:07  Patient On (Oxygen Delivery Method): nasal cannula  O2 Flow (L/min): 2 Vital Signs Last 24 Hrs  T(F): 100 (21 Feb 2025 15:05), Max: 100 (21 Feb 2025 15:05)  HR: 67 (21 Feb 2025 15:05) (67 - 85)  BP: 146/71 (21 Feb 2025 15:05) (146/71 - 171/83)  RR: 18 (21 Feb 2025 15:07) (18 - 18)  SpO2: 95% (21 Feb 2025 15:07) (92% - 95%)  Parameters below as of 21 Feb 2025 15:07  Patient On (Oxygen Delivery Method): nasal cannula  O2 Flow (L/min): 2    GEN: elderly woman, laying in stretcher in NAD  PSYCH: A&Ox3, mood and affect appear appropriate   NEURO: no focal neurologic deficits appreciated  EYES: PERRL, anicteric  RESPI: no accessory muscle use, B/L air entry, CTAB   CARDIO: regular rate/rhythm, +LE edema B/L  ABD: soft, NT, ND  EXT: patient able to move all extremities spontaneously, RLE TTP  VASC: peripheral pulses palpated

## 2025-02-21 NOTE — H&P ADULT - PSYCHIATRIC
89yof with HTN and HLD presented to outpatient doctor with 1 week of itching, orange urine, pale stools, and intermittent RUQ abdominal pain sent in by PCP for concern for malignancy. Patient reports the abdominal pain was more like a soreness, intermittent, 3/10 in severity. 1 episode of NBNB vomiting, patient attributed to reflux. Patient did report dark stool about 3 weeks prior to admission, denies PO iron use. Does report to be anemic, but denies dizziness, weakness, lightheadedness. Denies any history of kidney disease. Does report decreased appetite over past 4 days, decreased hydration as well. Patient had colonoscopy she thinks 4-5y ago, she believes also had endoscopy then, reports everything was normal. Did receive appropriate screening mammograms. Of note she did have muscles in a restaurant 10d ago, which is 3d prior to symptom onset. Denies fevers, but endorses chills. Denies hematemesis, hematuria, dysuria, urinary frequency.     ED Course: Afebrile, vitals wnl and stable.   Received 1L bolus as well as protonox 40 IV x1.  negative

## 2025-02-21 NOTE — ED ADULT TRIAGE NOTE - PAIN: PRESENCE, MLM
I will STOP taking the medications listed below when I get home from the hospital:  None complains of pain/discomfort

## 2025-02-22 LAB
A1C WITH ESTIMATED AVERAGE GLUCOSE RESULT: 7.5 % — HIGH (ref 4–5.6)
ANION GAP SERPL CALC-SCNC: 13 MMOL/L — SIGNIFICANT CHANGE UP (ref 5–17)
ANION GAP SERPL CALC-SCNC: 14 MMOL/L — SIGNIFICANT CHANGE UP (ref 5–17)
ANION GAP SERPL CALC-SCNC: 15 MMOL/L — SIGNIFICANT CHANGE UP (ref 5–17)
APPEARANCE UR: ABNORMAL
BACTERIA # UR AUTO: NEGATIVE /HPF — SIGNIFICANT CHANGE UP
BILIRUB UR-MCNC: NEGATIVE — SIGNIFICANT CHANGE UP
BUN SERPL-MCNC: 43 MG/DL — HIGH (ref 7–23)
BUN SERPL-MCNC: 44 MG/DL — HIGH (ref 7–23)
BUN SERPL-MCNC: 48 MG/DL — HIGH (ref 7–23)
CALCIUM SERPL-MCNC: 9.2 MG/DL — SIGNIFICANT CHANGE UP (ref 8.4–10.5)
CALCIUM SERPL-MCNC: 9.3 MG/DL — SIGNIFICANT CHANGE UP (ref 8.4–10.5)
CALCIUM SERPL-MCNC: 9.5 MG/DL — SIGNIFICANT CHANGE UP (ref 8.4–10.5)
CAST: 0 /LPF — SIGNIFICANT CHANGE UP (ref 0–4)
CHLORIDE SERPL-SCNC: 101 MMOL/L — SIGNIFICANT CHANGE UP (ref 96–108)
CHLORIDE SERPL-SCNC: 102 MMOL/L — SIGNIFICANT CHANGE UP (ref 96–108)
CHLORIDE SERPL-SCNC: 102 MMOL/L — SIGNIFICANT CHANGE UP (ref 96–108)
CO2 SERPL-SCNC: 26 MMOL/L — SIGNIFICANT CHANGE UP (ref 22–31)
CO2 SERPL-SCNC: 26 MMOL/L — SIGNIFICANT CHANGE UP (ref 22–31)
CO2 SERPL-SCNC: 27 MMOL/L — SIGNIFICANT CHANGE UP (ref 22–31)
COLOR SPEC: YELLOW — SIGNIFICANT CHANGE UP
CREAT ?TM UR-MCNC: 90 MG/DL — SIGNIFICANT CHANGE UP
CREAT SERPL-MCNC: 1.87 MG/DL — HIGH (ref 0.5–1.3)
CREAT SERPL-MCNC: 1.92 MG/DL — HIGH (ref 0.5–1.3)
CREAT SERPL-MCNC: 2.3 MG/DL — HIGH (ref 0.5–1.3)
DIFF PNL FLD: NEGATIVE — SIGNIFICANT CHANGE UP
EGFR: 21 ML/MIN/1.73M2 — LOW
EGFR: 21 ML/MIN/1.73M2 — LOW
EGFR: 26 ML/MIN/1.73M2 — LOW
EGFR: 26 ML/MIN/1.73M2 — LOW
EGFR: 27 ML/MIN/1.73M2 — LOW
EGFR: 27 ML/MIN/1.73M2 — LOW
ESTIMATED AVERAGE GLUCOSE: 169 MG/DL — HIGH (ref 68–114)
GLUCOSE BLDC GLUCOMTR-MCNC: 102 MG/DL — HIGH (ref 70–99)
GLUCOSE BLDC GLUCOMTR-MCNC: 126 MG/DL — HIGH (ref 70–99)
GLUCOSE BLDC GLUCOMTR-MCNC: 129 MG/DL — HIGH (ref 70–99)
GLUCOSE BLDC GLUCOMTR-MCNC: 133 MG/DL — HIGH (ref 70–99)
GLUCOSE SERPL-MCNC: 142 MG/DL — HIGH (ref 70–99)
GLUCOSE SERPL-MCNC: 76 MG/DL — SIGNIFICANT CHANGE UP (ref 70–99)
GLUCOSE SERPL-MCNC: 88 MG/DL — SIGNIFICANT CHANGE UP (ref 70–99)
GLUCOSE UR QL: 500 MG/DL
HCT VFR BLD CALC: 39.9 % — SIGNIFICANT CHANGE UP (ref 34.5–45)
HCT VFR BLD CALC: 40.2 % — SIGNIFICANT CHANGE UP (ref 34.5–45)
HGB BLD-MCNC: 12.2 G/DL — SIGNIFICANT CHANGE UP (ref 11.5–15.5)
HGB BLD-MCNC: 12.3 G/DL — SIGNIFICANT CHANGE UP (ref 11.5–15.5)
KETONES UR-MCNC: NEGATIVE MG/DL — SIGNIFICANT CHANGE UP
LEUKOCYTE ESTERASE UR-ACNC: NEGATIVE — SIGNIFICANT CHANGE UP
MAGNESIUM SERPL-MCNC: 2 MG/DL — SIGNIFICANT CHANGE UP (ref 1.6–2.6)
MAGNESIUM SERPL-MCNC: 2.1 MG/DL — SIGNIFICANT CHANGE UP (ref 1.6–2.6)
MCHC RBC-ENTMCNC: 26.3 PG — LOW (ref 27–34)
MCHC RBC-ENTMCNC: 26.9 PG — LOW (ref 27–34)
MCHC RBC-ENTMCNC: 30.6 G/DL — LOW (ref 32–36)
MCHC RBC-ENTMCNC: 30.6 G/DL — LOW (ref 32–36)
MCV RBC AUTO: 86 FL — SIGNIFICANT CHANGE UP (ref 80–100)
MCV RBC AUTO: 87.8 FL — SIGNIFICANT CHANGE UP (ref 80–100)
NITRITE UR-MCNC: NEGATIVE — SIGNIFICANT CHANGE UP
NRBC BLD AUTO-RTO: 0 /100 WBCS — SIGNIFICANT CHANGE UP (ref 0–0)
NRBC BLD AUTO-RTO: 0 /100 WBCS — SIGNIFICANT CHANGE UP (ref 0–0)
OSMOLALITY UR: 506 MOS/KG — SIGNIFICANT CHANGE UP (ref 300–900)
PH UR: 5.5 — SIGNIFICANT CHANGE UP (ref 5–8)
PHOSPHATE SERPL-MCNC: 4.2 MG/DL — SIGNIFICANT CHANGE UP (ref 2.5–4.5)
PHOSPHATE SERPL-MCNC: 4.4 MG/DL — SIGNIFICANT CHANGE UP (ref 2.5–4.5)
PLATELET # BLD AUTO: 120 K/UL — LOW (ref 150–400)
PLATELET # BLD AUTO: 130 K/UL — LOW (ref 150–400)
POTASSIUM SERPL-MCNC: 3.3 MMOL/L — LOW (ref 3.5–5.3)
POTASSIUM SERPL-MCNC: 3.4 MMOL/L — LOW (ref 3.5–5.3)
POTASSIUM SERPL-MCNC: 3.7 MMOL/L — SIGNIFICANT CHANGE UP (ref 3.5–5.3)
POTASSIUM SERPL-SCNC: 3.3 MMOL/L — LOW (ref 3.5–5.3)
POTASSIUM SERPL-SCNC: 3.4 MMOL/L — LOW (ref 3.5–5.3)
POTASSIUM SERPL-SCNC: 3.7 MMOL/L — SIGNIFICANT CHANGE UP (ref 3.5–5.3)
PROT UR-MCNC: SIGNIFICANT CHANGE UP MG/DL
RBC # BLD: 4.58 M/UL — SIGNIFICANT CHANGE UP (ref 3.8–5.2)
RBC # BLD: 4.64 M/UL — SIGNIFICANT CHANGE UP (ref 3.8–5.2)
RBC # FLD: 16.3 % — HIGH (ref 10.3–14.5)
RBC # FLD: 16.5 % — HIGH (ref 10.3–14.5)
RBC CASTS # UR COMP ASSIST: 70 /HPF — HIGH (ref 0–4)
REVIEW: SIGNIFICANT CHANGE UP
SODIUM SERPL-SCNC: 141 MMOL/L — SIGNIFICANT CHANGE UP (ref 135–145)
SODIUM SERPL-SCNC: 142 MMOL/L — SIGNIFICANT CHANGE UP (ref 135–145)
SODIUM SERPL-SCNC: 143 MMOL/L — SIGNIFICANT CHANGE UP (ref 135–145)
SODIUM UR-SCNC: 63 MMOL/L — SIGNIFICANT CHANGE UP
SP GR SPEC: 1.02 — SIGNIFICANT CHANGE UP (ref 1–1.03)
SQUAMOUS # UR AUTO: 1 /HPF — SIGNIFICANT CHANGE UP (ref 0–5)
UROBILINOGEN FLD QL: 0.2 MG/DL — SIGNIFICANT CHANGE UP (ref 0.2–1)
UUN UR-MCNC: 730 MG/DL — SIGNIFICANT CHANGE UP
WBC # BLD: 3.72 K/UL — LOW (ref 3.8–10.5)
WBC # BLD: 4.2 K/UL — SIGNIFICANT CHANGE UP (ref 3.8–10.5)
WBC # FLD AUTO: 3.72 K/UL — LOW (ref 3.8–10.5)
WBC # FLD AUTO: 4.2 K/UL — SIGNIFICANT CHANGE UP (ref 3.8–10.5)
WBC UR QL: 0 /HPF — SIGNIFICANT CHANGE UP (ref 0–5)

## 2025-02-22 PROCEDURE — 99233 SBSQ HOSP IP/OBS HIGH 50: CPT

## 2025-02-22 PROCEDURE — 93010 ELECTROCARDIOGRAM REPORT: CPT

## 2025-02-22 RX ADMIN — DILTIAZEM HYDROCHLORIDE 360 MILLIGRAM(S): 240 TABLET, EXTENDED RELEASE ORAL at 06:12

## 2025-02-22 RX ADMIN — APIXABAN 5 MILLIGRAM(S): 2.5 TABLET, FILM COATED ORAL at 16:57

## 2025-02-22 RX ADMIN — ATORVASTATIN CALCIUM 40 MILLIGRAM(S): 80 TABLET, FILM COATED ORAL at 23:26

## 2025-02-22 RX ADMIN — DRONEDARONE 400 MILLIGRAM(S): 400 TABLET, FILM COATED ORAL at 16:57

## 2025-02-22 RX ADMIN — Medication 100 MILLIGRAM(S): at 13:07

## 2025-02-22 RX ADMIN — Medication 100 MILLIGRAM(S): at 06:11

## 2025-02-22 RX ADMIN — LISINOPRIL 40 MILLIGRAM(S): 5 TABLET ORAL at 06:11

## 2025-02-22 RX ADMIN — DEXTROMETHORPHAN HBR, GUAIFENESIN 100 MILLIGRAM(S): 200 LIQUID ORAL at 22:00

## 2025-02-22 RX ADMIN — BUMETANIDE 2 MILLIGRAM(S): 1 TABLET ORAL at 13:07

## 2025-02-22 RX ADMIN — DRONEDARONE 400 MILLIGRAM(S): 400 TABLET, FILM COATED ORAL at 06:13

## 2025-02-22 RX ADMIN — OSELTAMIVIR PHOSPHATE 30 MILLIGRAM(S): 75 CAPSULE ORAL at 16:57

## 2025-02-22 RX ADMIN — DOXAZOSIN MESYLATE 2 MILLIGRAM(S): 8 TABLET ORAL at 06:13

## 2025-02-22 RX ADMIN — Medication 40 MILLIEQUIVALENT(S): at 08:29

## 2025-02-22 RX ADMIN — OSELTAMIVIR PHOSPHATE 30 MILLIGRAM(S): 75 CAPSULE ORAL at 06:11

## 2025-02-22 RX ADMIN — DEXTROMETHORPHAN HBR, GUAIFENESIN 100 MILLIGRAM(S): 200 LIQUID ORAL at 06:11

## 2025-02-22 RX ADMIN — BUMETANIDE 2 MILLIGRAM(S): 1 TABLET ORAL at 06:10

## 2025-02-22 RX ADMIN — Medication 0.5 MILLIGRAM(S): at 06:11

## 2025-02-22 RX ADMIN — INSULIN GLARGINE-YFGN 5 UNIT(S): 100 INJECTION, SOLUTION SUBCUTANEOUS at 08:31

## 2025-02-22 RX ADMIN — Medication 100 MILLIGRAM(S): at 23:26

## 2025-02-22 RX ADMIN — Medication 20 MILLIEQUIVALENT(S): at 06:11

## 2025-02-22 RX ADMIN — Medication 81 MILLIGRAM(S): at 11:11

## 2025-02-22 RX ADMIN — APIXABAN 5 MILLIGRAM(S): 2.5 TABLET, FILM COATED ORAL at 06:11

## 2025-02-22 RX ADMIN — INSULIN GLARGINE-YFGN 10 UNIT(S): 100 INJECTION, SOLUTION SUBCUTANEOUS at 23:26

## 2025-02-22 NOTE — PROGRESS NOTE ADULT - PROBLEM SELECTOR PLAN 3
- continue home ASA, statin, ACE-I  - diuretics as above  - monitor fluid status, hemodynamics, renal function, and electrolytes closely

## 2025-02-22 NOTE — PROGRESS NOTE ADULT - PROBLEM SELECTOR PLAN 1
- continue Tamiflu dosed per GFR  - patient currently remains on RA -- if patient becomes hypoxic, monitor for wheezing and consider initiation of nebulizer treatments and monitoring of peak flows given history of asthma as below  - incentive spirometry  - antitussive supportive care

## 2025-02-22 NOTE — PROGRESS NOTE ADULT - SUBJECTIVE AND OBJECTIVE BOX
DATE OF SERVICE: 02-22-25 @ 15:14    Patient is a 79y old  Female who presents with a chief complaint of Influenza A (22 Feb 2025 09:57)      INTERVAL HISTORY: NAD     REVIEW OF SYSTEMS:  CONSTITUTIONAL: No weakness  EYES/ENT: No visual changes;  No throat pain   NECK: No pain or stiffness  RESPIRATORY: No cough, wheezing; No shortness of breath  CARDIOVASCULAR: No chest pain or palpitations  GASTROINTESTINAL: No abdominal  pain. No nausea, vomiting, or hematemesis  GENITOURINARY: No dysuria, frequency or hematuria  NEUROLOGICAL: No stroke like symptoms  SKIN: No rashes     	  MEDICATIONS:  buMETAnide Injectable 2 milliGRAM(s) IV Push two times a day  diltiazem    milliGRAM(s) Oral daily  doxazosin 2 milliGRAM(s) Oral daily  dronedarone 400 milliGRAM(s) Oral two times a day  lisinopril 40 milliGRAM(s) Oral daily        PHYSICAL EXAM:  T(C): 36.9 (02-22-25 @ 10:14), Max: 37.4 (02-21-25 @ 17:33)  HR: 64 (02-22-25 @ 10:14) (64 - 75)  BP: 108/65 (02-22-25 @ 10:14) (108/65 - 157/81)  RR: 18 (02-22-25 @ 10:14) (17 - 18)  SpO2: 95% (02-22-25 @ 10:14) (94% - 96%)  Wt(kg): --  I&O's Summary    21 Feb 2025 07:01  -  22 Feb 2025 07:00  --------------------------------------------------------  IN: 0 mL / OUT: 350 mL / NET: -350 mL          Appearance: In no distress	  HEENT:    PERRL, EOMI	  Cardiovascular:  S1 S2, No JVD  Respiratory: Lungs clear to auscultation	  Gastrointestinal:  Soft, Non-tender, + BS	  Vascularature:  No edema of LE  Psychiatric: Appropriate affect   Neuro: no acute focal deficits                               12.2   3.72  )-----------( 130      ( 22 Feb 2025 07:21 )             39.9     02-22    143  |  101  |  43[H]  ----------------------------<  88  3.3[L]   |  27  |  1.87[H]    Ca    9.3      22 Feb 2025 07:21  Phos  4.2     02-22  Mg     2.1     02-22    TPro  7.9  /  Alb  4.6  /  TBili  0.5  /  DBili  x   /  AST  35  /  ALT  37  /  AlkPhos  92  02-21        Labs personally reviewed      ASSESSMENT/PLAN: 	    79yoF w/ PMHx of s/p CVA, CAD, HFpEF, Afib on Eliquis, IDDM, CKD, asthma (hospitalization per PCP note in 2024 for acute bronchitis and asthma exacerbation), anxiety, and obesity who presents w/ complaints of severe LE swelling over the past two weeks, right > left, along with new cough and wheezing, found to be positive for Influenza A, w/ LE Dopplers negative for acute DVT (though exam limited by body habitus and pain).       Problem/Plan - 1:  ·  Problem: Acute diastolic HF  ·  Plan: On Bumex 2mg PO BID as OP  - Switch to Bumex 2mg IV BID  - TTE given new LE edema  - Recent OP TTE with preserved EF and grade 1 DD    Problem/Plan - 2:  ·  Problem: Chronic atrial fibrillation.   ·  Plan: - continue home Cardizem, and Dronedarone  - continue home AC w/ Eliquis BID.    Problem/Plan - 3:  ·  Problem: Diabetes mellitus.   ·  Plan: - at home patient reportedly on Lantus 18units QHS and 9units in AM  - will continue w/ Lantus 10units QHS and 5units in AM for now, w/ mISS and serial FS monitoring.    Problem/Plan - 4:  ·  Problem:  DVT PPX  ·  Plan: ANGY Michael DO [facility]  Cardiovascular Medicine  [address], [address]  Office: [phone]  Available via call/text on [employer] Teams  DATE OF SERVICE: 02-22-25 @ 15:14    Patient is a 79y old  Female who presents with a chief complaint of Influenza A (22 Feb 2025 09:57)      INTERVAL HISTORY: NAD     REVIEW OF SYSTEMS:  CONSTITUTIONAL: No weakness  EYES/ENT: No visual changes;  No throat pain   NECK: No pain or stiffness  RESPIRATORY: No cough, wheezing; No shortness of breath  CARDIOVASCULAR: No chest pain or palpitations  GASTROINTESTINAL: No abdominal  pain. No nausea, vomiting, or hematemesis  GENITOURINARY: No dysuria, frequency or hematuria  NEUROLOGICAL: No stroke like symptoms  SKIN: No rashes     	  MEDICATIONS:  buMETAnide Injectable 2 milliGRAM(s) IV Push two times a day  diltiazem    milliGRAM(s) Oral daily  doxazosin 2 milliGRAM(s) Oral daily  dronedarone 400 milliGRAM(s) Oral two times a day  lisinopril 40 milliGRAM(s) Oral daily        PHYSICAL EXAM:  T(C): 36.9 (02-22-25 @ 10:14), Max: 37.4 (02-21-25 @ 17:33)  HR: 64 (02-22-25 @ 10:14) (64 - 75)  BP: 108/65 (02-22-25 @ 10:14) (108/65 - 157/81)  RR: 18 (02-22-25 @ 10:14) (17 - 18)  SpO2: 95% (02-22-25 @ 10:14) (94% - 96%)  Wt(kg): --  I&O's Summary    21 Feb 2025 07:01  -  22 Feb 2025 07:00  --------------------------------------------------------  IN: 0 mL / OUT: 350 mL / NET: -350 mL          Appearance: In no distress	  HEENT:    PERRL, EOMI	  Cardiovascular:  S1 S2, No JVD  Respiratory: Lungs clear to auscultation	  Gastrointestinal:  Soft, Non-tender, + BS	  Vascularature:  No edema of LE  Psychiatric: Appropriate affect   Neuro: no acute focal deficits                               12.2   3.72  )-----------( 130      ( 22 Feb 2025 07:21 )             39.9     02-22    143  |  101  |  43[H]  ----------------------------<  88  3.3[L]   |  27  |  1.87[H]    Ca    9.3      22 Feb 2025 07:21  Phos  4.2     02-22  Mg     2.1     02-22    TPro  7.9  /  Alb  4.6  /  TBili  0.5  /  DBili  x   /  AST  35  /  ALT  37  /  AlkPhos  92  02-21        Labs personally reviewed      ASSESSMENT/PLAN: 	    79yoF w/ PMHx of s/p CVA, CAD, HFpEF, Afib on Eliquis, IDDM, CKD, asthma (hospitalization per PCP note in 2024 for acute bronchitis and asthma exacerbation), anxiety, and obesity who presents w/ complaints of severe LE swelling over the past two weeks, right > left, along with new cough and wheezing, found to be positive for Influenza A, w/ LE Dopplers negative for acute DVT (though exam limited by body habitus and pain).       Problem/Plan - 1:  ·  Problem: Acute diastolic HF  ·  Plan: On Bumex 2mg PO BID as OP  - Switch to Bumex 2mg IV BID and trend Cr  - TTE given new LE edema  - Recent OP TTE with preserved EF and grade 1 DD    Problem/Plan - 2:  ·  Problem: Chronic atrial fibrillation.   ·  Plan: - continue home Cardizem, and Dronedarone  - continue home AC w/ Eliquis BID.    Problem/Plan - 3:  ·  Problem: Diabetes mellitus.   ·  Plan: - at home patient reportedly on Lantus 18units QHS and 9units in AM  - will continue w/ Lantus 10units QHS and 5units in AM for now, w/ mISS and serial FS monitoring.    Problem/Plan - 4:  ·  Problem:  DVT PPX  ·  Plan: ANGY Michael,  [facility]  Cardiovascular Medicine  [address], [address]  Office: [phone]  Available via call/text on [employer] Teams

## 2025-02-22 NOTE — PROGRESS NOTE ADULT - SUBJECTIVE AND OBJECTIVE BOX
INCOMPLETE NOTE    Maricarmen Paredes M.D.  Division of Hospital Medicine  Available on [employer] TEAMS    SUBJECTIVE / ACUTE INTERVAL EVENTS: Patient seen and examined. No overnight events. No subjective complaints.     OBJECTIVE:   Vital Signs Last 24 Hrs  T(F): 98.8 (2025 05:57), Max: 100 (2025 15:05)  HR: 70 (2025 05:57) (67 - 85)  BP: 157/81 (2025 05:57) (132/67 - 171/83)  RR: 18 (2025 05:57) (17 - 18)  SpO2: 94% (2025 05:57) (92% - 96%)  Parameters below as of 2025 05:57  Patient On (Oxygen Delivery Method): nasal cannula  O2 Flow (L/min): 2    I&O's Summary  2025 07:01  -  2025 07:00  --------------------------------------------------------  IN: 0 mL / OUT: 350 mL / NET: -350 mL    Physical Examination:  GEN: elderly woman, laying in stretcher in NAD  PSYCH: A&Ox3, mood and affect appear appropriate   NEURO: no focal neurologic deficits appreciated  EYES: PERRL, anicteric  RESPI: no accessory muscle use, B/L air entry, CTAB   CARDIO: regular rate/rhythm, +LE edema B/L  ABD: soft, NT, ND  EXT: patient able to move all extremities spontaneously, RLE TTP  VASC: peripheral pulses palpated    Labs:  CAPILLARY BLOOD GLUCOSE  POCT Blood Glucose.: 102 mg/dL (2025 08:25)  POCT Blood Glucose.: 160 mg/dL (2025 21:34)  POCT Blood Glucose.: 132 mg/dL (2025 18:35)                        12.2   3.72  )-----------( 130      ( 2025 07:21 )             39.9     02-22  143  |  101  |  43[H]  ----------------------------<  88  3.3[L]   |  27  |  1.87[H]    Ca    9.3      2025 07:21  Phos  4.2       Mg     2.1         TPro  7.9  /  Alb  4.6  /  TBili  0.5  /  DBili  x   /  AST  35  /  ALT  37  /  AlkPhos  92      PT/INR - ( 2025 11:54 )   PT: 16.3 sec;   INR: 1.42 ratio    PTT - ( 2025 11:54 )  PTT:31.5 sec    Urinalysis Basic - ( 2025 07:42 )  Color: Yellow / Appearance: Cloudy / S.019 / pH: x  Gluc: x / Ketone: Negative mg/dL  / Bili: Negative / Urobili: 0.2 mg/dL   Blood: x / Protein: Trace mg/dL / Nitrite: Negative   Leuk Esterase: Negative / RBC: 70 /HPF / WBC 0 /HPF   Sq Epi: x / Non Sq Epi: 1 /HPF / Bacteria: Negative /HPF    Consultant(s) Notes Reviewed: Cardiology    Care Discussed with Consultants/Other Providers: RACHEL Verduzco    MEDICATIONS  (STANDING):  apixaban 5 milliGRAM(s) Oral every 12 hours  aspirin enteric coated 81 milliGRAM(s) Oral daily  atorvastatin 40 milliGRAM(s) Oral at bedtime  benzonatate 100 milliGRAM(s) Oral three times a day  buMETAnide Injectable 2 milliGRAM(s) IV Push two times a day  dextrose 5%. 1000 milliLiter(s) (100 mL/Hr) IV Continuous <Continuous>  dextrose 5%. 1000 milliLiter(s) (50 mL/Hr) IV Continuous <Continuous>  dextrose 50% Injectable 25 Gram(s) IV Push once  dextrose 50% Injectable 12.5 Gram(s) IV Push once  dextrose 50% Injectable 25 Gram(s) IV Push once  diltiazem    milliGRAM(s) Oral daily  doxazosin 2 milliGRAM(s) Oral daily  dronedarone 400 milliGRAM(s) Oral two times a day  glucagon  Injectable 1 milliGRAM(s) IntraMuscular once  insulin glargine Injectable (LANTUS) 5 Unit(s) SubCutaneous every morning  insulin glargine Injectable (LANTUS) 10 Unit(s) SubCutaneous at bedtime  insulin lispro (ADMELOG) corrective regimen sliding scale   SubCutaneous three times a day before meals  insulin lispro (ADMELOG) corrective regimen sliding scale   SubCutaneous at bedtime  lisinopril 40 milliGRAM(s) Oral daily  LORazepam     Tablet 0.5 milliGRAM(s) Oral at bedtime  oseltamivir      oseltamivir 30 milliGRAM(s) Oral every 12 hours    MEDICATIONS  (PRN):  dextrose Oral Gel 15 Gram(s) Oral once PRN Blood Glucose LESS THAN 70 milliGRAM(s)/deciliter  guaiFENesin Oral Liquid (Sugar-Free) 100 milliGRAM(s) Oral every 6 hours PRN Cough   INCOMPLETE NOTE    Maricarmen Paredes M.D.  Division of Hospital Medicine  Available on [employer] TEAMS    SUBJECTIVE / ACUTE INTERVAL EVENTS: Patient seen and examined. No overnight events. No subjective complaints. Appreciate Cardiology's plan for IV diuresis -- close monitoring of electrolytes and repletion PRN.      OBJECTIVE:   Vital Signs Last 24 Hrs  T(F): 98.8 (2025 05:57), Max: 100 (2025 15:05)  HR: 70 (2025 05:57) (67 - 85)  BP: 157/81 (2025 05:57) (132/67 - 171/83)  RR: 18 (2025 05:57) (17 - 18)  SpO2: 94% (2025 05:57) (92% - 96%)  Parameters below as of 2025 05:57  Patient On (Oxygen Delivery Method): nasal cannula  O2 Flow (L/min): 2    I&O's Summary  2025 07:01  -  2025 07:00  --------------------------------------------------------  IN: 0 mL / OUT: 350 mL / NET: -350 mL    Physical Examination:  GEN: elderly woman, laying in stretcher in NAD  PSYCH: A&Ox3, mood and affect appear appropriate   NEURO: no focal neurologic deficits appreciated  EYES: PERRL, anicteric  RESPI: no accessory muscle use, B/L air entry, CTAB   CARDIO: regular rate/rhythm, +LE edema B/L  ABD: soft, NT, ND  EXT: patient able to move all extremities spontaneously, RLE TTP  VASC: peripheral pulses palpated    Labs:  CAPILLARY BLOOD GLUCOSE  POCT Blood Glucose.: 102 mg/dL (2025 08:25)  POCT Blood Glucose.: 160 mg/dL (2025 21:34)  POCT Blood Glucose.: 132 mg/dL (2025 18:35)                        12.2   3.72  )-----------( 130      ( 2025 07:21 )             39.9     02-22  143  |  101  |  43[H]  ----------------------------<  88  3.3[L]   |  27  |  1.87[H]    Ca    9.3      2025 07:21  Phos  4.2       Mg     2.1         TPro  7.9  /  Alb  4.6  /  TBili  0.5  /  DBili  x   /  AST  35  /  ALT  37  /  AlkPhos  92      PT/INR - ( 2025 11:54 )   PT: 16.3 sec;   INR: 1.42 ratio    PTT - ( 2025 11:54 )  PTT:31.5 sec    Urinalysis Basic - ( 2025 07:42 )  Color: Yellow / Appearance: Cloudy / S.019 / pH: x  Gluc: x / Ketone: Negative mg/dL  / Bili: Negative / Urobili: 0.2 mg/dL   Blood: x / Protein: Trace mg/dL / Nitrite: Negative   Leuk Esterase: Negative / RBC: 70 /HPF / WBC 0 /HPF   Sq Epi: x / Non Sq Epi: 1 /HPF / Bacteria: Negative /HPF    Consultant(s) Notes Reviewed: Cardiology    Care Discussed with Consultants/Other Providers: RACHEL Verduzco    MEDICATIONS  (STANDING):  apixaban 5 milliGRAM(s) Oral every 12 hours  aspirin enteric coated 81 milliGRAM(s) Oral daily  atorvastatin 40 milliGRAM(s) Oral at bedtime  benzonatate 100 milliGRAM(s) Oral three times a day  buMETAnide Injectable 2 milliGRAM(s) IV Push two times a day  dextrose 5%. 1000 milliLiter(s) (100 mL/Hr) IV Continuous <Continuous>  dextrose 5%. 1000 milliLiter(s) (50 mL/Hr) IV Continuous <Continuous>  dextrose 50% Injectable 25 Gram(s) IV Push once  dextrose 50% Injectable 12.5 Gram(s) IV Push once  dextrose 50% Injectable 25 Gram(s) IV Push once  diltiazem    milliGRAM(s) Oral daily  doxazosin 2 milliGRAM(s) Oral daily  dronedarone 400 milliGRAM(s) Oral two times a day  glucagon  Injectable 1 milliGRAM(s) IntraMuscular once  insulin glargine Injectable (LANTUS) 5 Unit(s) SubCutaneous every morning  insulin glargine Injectable (LANTUS) 10 Unit(s) SubCutaneous at bedtime  insulin lispro (ADMELOG) corrective regimen sliding scale   SubCutaneous three times a day before meals  insulin lispro (ADMELOG) corrective regimen sliding scale   SubCutaneous at bedtime  lisinopril 40 milliGRAM(s) Oral daily  LORazepam     Tablet 0.5 milliGRAM(s) Oral at bedtime  oseltamivir      oseltamivir 30 milliGRAM(s) Oral every 12 hours    MEDICATIONS  (PRN):  dextrose Oral Gel 15 Gram(s) Oral once PRN Blood Glucose LESS THAN 70 milliGRAM(s)/deciliter  guaiFENesin Oral Liquid (Sugar-Free) 100 milliGRAM(s) Oral every 6 hours PRN Cough Maricarmen Paredes M.D.  Division of Hospital Medicine  Available on [employer] TEAMS    SUBJECTIVE / ACUTE INTERVAL EVENTS: Patient seen and examined. No overnight events. Appreciate Cardiology's plan for IV diuresis -- close monitoring of electrolytes and repletion PRN. Patient with ongoing cough causing her distress -- will start Hycodan for antitussive support.     OBJECTIVE:   Vital Signs Last 24 Hrs  T(F): 98.8 (2025 05:57), Max: 100 (2025 15:05)  HR: 70 (2025 05:57) (67 - 85)  BP: 157/81 (2025 05:57) (132/67 - 171/83)  RR: 18 (2025 05:57) (17 - 18)  SpO2: 94% (2025 05:57) (92% - 96%)  Parameters below as of 2025 05:57  Patient On (Oxygen Delivery Method): nasal cannula  O2 Flow (L/min): 2    I&O's Summary  2025 07:01  -  2025 07:00  --------------------------------------------------------  IN: 0 mL / OUT: 350 mL / NET: -350 mL    Physical Examination:  GEN: elderly woman, laying in bed in NAD  PSYCH: A&Ox3, mood and affect appear appropriate   NEURO: no focal neurologic deficits appreciated  EYES: PERRL, anicteric  RESPI: no accessory muscle use, B/L air entry, CTAB   CARDIO: regular rate/rhythm, +LE edema B/L  ABD: soft, NT, ND  EXT: patient able to move all extremities spontaneously, RLE TTP  VASC: peripheral pulses palpated    Labs:  CAPILLARY BLOOD GLUCOSE  POCT Blood Glucose.: 102 mg/dL (2025 08:25)  POCT Blood Glucose.: 160 mg/dL (2025 21:34)  POCT Blood Glucose.: 132 mg/dL (2025 18:35)                        12.2   3.72  )-----------( 130      ( 2025 07:21 )             39.9       143  |  101  |  43[H]  ----------------------------<  88  3.3[L]   |  27  |  1.87[H]    Ca    9.3      2025 07:21  Phos  4.2       Mg     2.1         TPro  7.9  /  Alb  4.6  /  TBili  0.5  /  DBili  x   /  AST  35  /  ALT  37  /  AlkPhos  92      PT/INR - ( 2025 11:54 )   PT: 16.3 sec;   INR: 1.42 ratio    PTT - ( 2025 11:54 )  PTT:31.5 sec    Urinalysis Basic - ( 2025 07:42 )  Color: Yellow / Appearance: Cloudy / S.019 / pH: x  Gluc: x / Ketone: Negative mg/dL  / Bili: Negative / Urobili: 0.2 mg/dL   Blood: x / Protein: Trace mg/dL / Nitrite: Negative   Leuk Esterase: Negative / RBC: 70 /HPF / WBC 0 /HPF   Sq Epi: x / Non Sq Epi: 1 /HPF / Bacteria: Negative /HPF    Consultant(s) Notes Reviewed: Cardiology    Care Discussed with Consultants/Other Providers: RACHEL Verduzco    MEDICATIONS  (STANDING):  apixaban 5 milliGRAM(s) Oral every 12 hours  aspirin enteric coated 81 milliGRAM(s) Oral daily  atorvastatin 40 milliGRAM(s) Oral at bedtime  benzonatate 100 milliGRAM(s) Oral three times a day  buMETAnide Injectable 2 milliGRAM(s) IV Push two times a day  dextrose 5%. 1000 milliLiter(s) (100 mL/Hr) IV Continuous <Continuous>  dextrose 5%. 1000 milliLiter(s) (50 mL/Hr) IV Continuous <Continuous>  dextrose 50% Injectable 25 Gram(s) IV Push once  dextrose 50% Injectable 12.5 Gram(s) IV Push once  dextrose 50% Injectable 25 Gram(s) IV Push once  diltiazem    milliGRAM(s) Oral daily  doxazosin 2 milliGRAM(s) Oral daily  dronedarone 400 milliGRAM(s) Oral two times a day  glucagon  Injectable 1 milliGRAM(s) IntraMuscular once  insulin glargine Injectable (LANTUS) 5 Unit(s) SubCutaneous every morning  insulin glargine Injectable (LANTUS) 10 Unit(s) SubCutaneous at bedtime  insulin lispro (ADMELOG) corrective regimen sliding scale   SubCutaneous three times a day before meals  insulin lispro (ADMELOG) corrective regimen sliding scale   SubCutaneous at bedtime  lisinopril 40 milliGRAM(s) Oral daily  LORazepam     Tablet 0.5 milliGRAM(s) Oral at bedtime  oseltamivir      oseltamivir 30 milliGRAM(s) Oral every 12 hours    MEDICATIONS  (PRN):  dextrose Oral Gel 15 Gram(s) Oral once PRN Blood Glucose LESS THAN 70 milliGRAM(s)/deciliter  guaiFENesin Oral Liquid (Sugar-Free) 100 milliGRAM(s) Oral every 6 hours PRN Cough

## 2025-02-22 NOTE — PROGRESS NOTE ADULT - PROBLEM SELECTOR PLAN 2
- unclear etiology as LE Dopplers remain negative, ?could be related to hypervolemia iso infection as above  - will hold off on further Abx at this time and continue to closely monitor  - appreciate cardiology's evaluation and recommendations -- continue IV Bumex 2mg BID, monitoring electrolytes, renal function, hemodynamics, and fluid status closely

## 2025-02-23 LAB
ANION GAP SERPL CALC-SCNC: 15 MMOL/L — SIGNIFICANT CHANGE UP (ref 5–17)
BUN SERPL-MCNC: 48 MG/DL — HIGH (ref 7–23)
CALCIUM SERPL-MCNC: 9.1 MG/DL — SIGNIFICANT CHANGE UP (ref 8.4–10.5)
CHLORIDE SERPL-SCNC: 100 MMOL/L — SIGNIFICANT CHANGE UP (ref 96–108)
CO2 SERPL-SCNC: 26 MMOL/L — SIGNIFICANT CHANGE UP (ref 22–31)
CREAT SERPL-MCNC: 2.15 MG/DL — HIGH (ref 0.5–1.3)
EGFR: 23 ML/MIN/1.73M2 — LOW
EGFR: 23 ML/MIN/1.73M2 — LOW
GLUCOSE BLDC GLUCOMTR-MCNC: 129 MG/DL — HIGH (ref 70–99)
GLUCOSE BLDC GLUCOMTR-MCNC: 150 MG/DL — HIGH (ref 70–99)
GLUCOSE BLDC GLUCOMTR-MCNC: 150 MG/DL — HIGH (ref 70–99)
GLUCOSE BLDC GLUCOMTR-MCNC: 152 MG/DL — HIGH (ref 70–99)
GLUCOSE SERPL-MCNC: 124 MG/DL — HIGH (ref 70–99)
HCT VFR BLD CALC: 41.1 % — SIGNIFICANT CHANGE UP (ref 34.5–45)
HGB BLD-MCNC: 12.4 G/DL — SIGNIFICANT CHANGE UP (ref 11.5–15.5)
MAGNESIUM SERPL-MCNC: 2 MG/DL — SIGNIFICANT CHANGE UP (ref 1.6–2.6)
MCHC RBC-ENTMCNC: 26.6 PG — LOW (ref 27–34)
MCHC RBC-ENTMCNC: 30.2 G/DL — LOW (ref 32–36)
MCV RBC AUTO: 88 FL — SIGNIFICANT CHANGE UP (ref 80–100)
NRBC BLD AUTO-RTO: 0 /100 WBCS — SIGNIFICANT CHANGE UP (ref 0–0)
PLATELET # BLD AUTO: 120 K/UL — LOW (ref 150–400)
POTASSIUM SERPL-MCNC: 3.7 MMOL/L — SIGNIFICANT CHANGE UP (ref 3.5–5.3)
POTASSIUM SERPL-SCNC: 3.7 MMOL/L — SIGNIFICANT CHANGE UP (ref 3.5–5.3)
RBC # BLD: 4.67 M/UL — SIGNIFICANT CHANGE UP (ref 3.8–5.2)
RBC # FLD: 16.6 % — HIGH (ref 10.3–14.5)
SODIUM SERPL-SCNC: 141 MMOL/L — SIGNIFICANT CHANGE UP (ref 135–145)
WBC # BLD: 4.99 K/UL — SIGNIFICANT CHANGE UP (ref 3.8–10.5)
WBC # FLD AUTO: 4.99 K/UL — SIGNIFICANT CHANGE UP (ref 3.8–10.5)

## 2025-02-23 PROCEDURE — 99233 SBSQ HOSP IP/OBS HIGH 50: CPT

## 2025-02-23 RX ORDER — IPRATROPIUM BROMIDE AND ALBUTEROL SULFATE .5; 2.5 MG/3ML; MG/3ML
3 SOLUTION RESPIRATORY (INHALATION) EVERY 6 HOURS
Refills: 0 | Status: DISCONTINUED | OUTPATIENT
Start: 2025-02-23 | End: 2025-03-10

## 2025-02-23 RX ORDER — SODIUM CHLORIDE 0.65 %
1 AEROSOL, SPRAY (ML) NASAL THREE TIMES A DAY
Refills: 0 | Status: DISCONTINUED | OUTPATIENT
Start: 2025-02-23 | End: 2025-02-24

## 2025-02-23 RX ADMIN — APIXABAN 5 MILLIGRAM(S): 2.5 TABLET, FILM COATED ORAL at 05:14

## 2025-02-23 RX ADMIN — DRONEDARONE 400 MILLIGRAM(S): 400 TABLET, FILM COATED ORAL at 18:33

## 2025-02-23 RX ADMIN — OSELTAMIVIR PHOSPHATE 30 MILLIGRAM(S): 75 CAPSULE ORAL at 05:14

## 2025-02-23 RX ADMIN — Medication 81 MILLIGRAM(S): at 12:01

## 2025-02-23 RX ADMIN — APIXABAN 5 MILLIGRAM(S): 2.5 TABLET, FILM COATED ORAL at 18:33

## 2025-02-23 RX ADMIN — Medication 100 MILLIGRAM(S): at 13:19

## 2025-02-23 RX ADMIN — DOXAZOSIN MESYLATE 2 MILLIGRAM(S): 8 TABLET ORAL at 05:14

## 2025-02-23 RX ADMIN — LISINOPRIL 40 MILLIGRAM(S): 5 TABLET ORAL at 05:14

## 2025-02-23 RX ADMIN — INSULIN GLARGINE-YFGN 5 UNIT(S): 100 INJECTION, SOLUTION SUBCUTANEOUS at 08:17

## 2025-02-23 RX ADMIN — INSULIN GLARGINE-YFGN 10 UNIT(S): 100 INJECTION, SOLUTION SUBCUTANEOUS at 22:08

## 2025-02-23 RX ADMIN — ATORVASTATIN CALCIUM 40 MILLIGRAM(S): 80 TABLET, FILM COATED ORAL at 22:08

## 2025-02-23 RX ADMIN — DRONEDARONE 400 MILLIGRAM(S): 400 TABLET, FILM COATED ORAL at 05:14

## 2025-02-23 RX ADMIN — Medication 0.5 MILLIGRAM(S): at 05:14

## 2025-02-23 RX ADMIN — Medication 100 MILLIGRAM(S): at 22:08

## 2025-02-23 RX ADMIN — DEXTROMETHORPHAN HBR, GUAIFENESIN 100 MILLIGRAM(S): 200 LIQUID ORAL at 09:54

## 2025-02-23 RX ADMIN — OSELTAMIVIR PHOSPHATE 30 MILLIGRAM(S): 75 CAPSULE ORAL at 18:33

## 2025-02-23 RX ADMIN — DEXTROMETHORPHAN HBR, GUAIFENESIN 100 MILLIGRAM(S): 200 LIQUID ORAL at 05:14

## 2025-02-23 RX ADMIN — IPRATROPIUM BROMIDE AND ALBUTEROL SULFATE 3 MILLILITER(S): .5; 2.5 SOLUTION RESPIRATORY (INHALATION) at 18:33

## 2025-02-23 RX ADMIN — DILTIAZEM HYDROCHLORIDE 360 MILLIGRAM(S): 240 TABLET, EXTENDED RELEASE ORAL at 05:13

## 2025-02-23 RX ADMIN — IPRATROPIUM BROMIDE AND ALBUTEROL SULFATE 3 MILLILITER(S): .5; 2.5 SOLUTION RESPIRATORY (INHALATION) at 12:02

## 2025-02-23 RX ADMIN — INSULIN LISPRO 2: 100 INJECTION, SOLUTION INTRAVENOUS; SUBCUTANEOUS at 12:02

## 2025-02-23 RX ADMIN — Medication 100 MILLIGRAM(S): at 05:14

## 2025-02-23 NOTE — DIETITIAN NUTRITION RISK NOTIFICATION - TREATMENT: THE FOLLOWING DIET HAS BEEN RECOMMENDED
Diet, DASH/TLC:   Sodium & Cholesterol Restricted  Consistent Carbohydrate {No Snacks} (CSTCHO) (02-22-25 @ 01:27) [Active]       Contact/Droplet

## 2025-02-23 NOTE — DIETITIAN INITIAL EVALUATION ADULT - ENERGY INTAKE
Adequate (%) Pt reports decreased appetite/PO intake in-house in setting of cough and shortness of breath. Reports consumes <75% of her meals, patient likely meeting <75% of estimated nutritional needs. Pt declined Glucerna/Ensure plus at this time. Pt made aware RD remains available and will follow up for any additional questions/concerns and per protocol.

## 2025-02-23 NOTE — PROGRESS NOTE ADULT - SUBJECTIVE AND OBJECTIVE BOX
INCOMPLETE NOTE    Maricarmen Paredes M.D.  Division of Hospital Medicine  Available on [employer] TEAMS    SUBJECTIVE / ACUTE INTERVAL EVENTS: Patient seen and examined. Cr uptrending yesterday -- discussed w/ cardiology and held diuresis this AM. Will appreciate today's recommendations for further diuresis. Patient continues to complain of severe cough -- increased Hycodan to TID ATC for now, and added Duonebs PRN -- given asthma history and hospitalization last year for bronchitis and asthma exacerbation, will order peak flows and monitor for hypoxia closely. If patient required O2 supplementation today, will send ABG.     OBJECTIVE:   Vital Signs Last 24 Hrs  T(F): 98.6 (23 Feb 2025 04:50), Max: 99.2 (22 Feb 2025 21:11)  HR: 77 (23 Feb 2025 04:50) (65 - 77)  BP: 138/55 (23 Feb 2025 04:50) (130/60 - 138/55)  RR: 18 (23 Feb 2025 04:50) (18 - 18)  SpO2: 91% (23 Feb 2025 04:50) (91% - 92%)  Parameters below as of 23 Feb 2025 04:50  Patient On (Oxygen Delivery Method): room air    Physical Examination:  GEN: elderly woman, laying in bed in NAD  PSYCH: A&Ox3, mood and affect appear appropriate   NEURO: no focal neurologic deficits appreciated  EYES: PERRL, anicteric  RESPI: no accessory muscle use, B/L air entry, CTAB   CARDIO: regular rate/rhythm, +LE edema B/L  ABD: soft, NT, ND  EXT: patient able to move all extremities spontaneously, RLE TTP  VASC: peripheral pulses palpated    Labs:  CAPILLARY BLOOD GLUCOSE  POCT Blood Glucose.: 150 mg/dL (23 Feb 2025 08:06)  POCT Blood Glucose.: 133 mg/dL (22 Feb 2025 21:15)  POCT Blood Glucose.: 129 mg/dL (22 Feb 2025 17:06)  POCT Blood Glucose.: 126 mg/dL (22 Feb 2025 12:00)                        12.4   4.99  )-----------( 120      ( 23 Feb 2025 06:45 )             41.1     02-23  141  |  100  |  48[H]  ----------------------------<  124[H]  3.7   |  26  |  2.15[H]    Ca    9.1      23 Feb 2025 06:45  Phos  4.2     02-22  Mg     2.0     02-23    TPro  7.9  /  Alb  4.6  /  TBili  0.5  /  DBili  x   /  AST  35  /  ALT  37  /  AlkPhos  92  02-21    PT/INR - ( 21 Feb 2025 11:54 )   PT: 16.3 sec;   INR: 1.42 ratio    PTT - ( 21 Feb 2025 11:54 )  PTT:31.5 sec    Urinalysis Basic - ( 23 Feb 2025 06:45 )  Color: x / Appearance: x / SG: x / pH: x  Gluc: 124 mg/dL / Ketone: x  / Bili: x / Urobili: x   Blood: x / Protein: x / Nitrite: x   Leuk Esterase: x / RBC: x / WBC x   Sq Epi: x / Non Sq Epi: x / Bacteria: x    Consultant(s) Notes Reviewed: Cardiology   Care Discussed with Consultants/Other Providers: RACHEL Verduzco    MEDICATIONS  (STANDING):  albuterol/ipratropium for Nebulization 3 milliLiter(s) Nebulizer every 6 hours  apixaban 5 milliGRAM(s) Oral every 12 hours  aspirin enteric coated 81 milliGRAM(s) Oral daily  atorvastatin 40 milliGRAM(s) Oral at bedtime  benzonatate 100 milliGRAM(s) Oral three times a day  dextrose 5%. 1000 milliLiter(s) (100 mL/Hr) IV Continuous <Continuous>  dextrose 5%. 1000 milliLiter(s) (50 mL/Hr) IV Continuous <Continuous>  dextrose 50% Injectable 25 Gram(s) IV Push once  dextrose 50% Injectable 12.5 Gram(s) IV Push once  dextrose 50% Injectable 25 Gram(s) IV Push once  diltiazem    milliGRAM(s) Oral daily  doxazosin 2 milliGRAM(s) Oral daily  dronedarone 400 milliGRAM(s) Oral two times a day  glucagon  Injectable 1 milliGRAM(s) IntraMuscular once  hydrocodone/homatropine Syrup 5 milliLiter(s) Oral three times a day  insulin glargine Injectable (LANTUS) 5 Unit(s) SubCutaneous every morning  insulin glargine Injectable (LANTUS) 10 Unit(s) SubCutaneous at bedtime  insulin lispro (ADMELOG) corrective regimen sliding scale   SubCutaneous three times a day before meals  insulin lispro (ADMELOG) corrective regimen sliding scale   SubCutaneous at bedtime  lisinopril 40 milliGRAM(s) Oral daily  LORazepam     Tablet 0.5 milliGRAM(s) Oral at bedtime  oseltamivir      oseltamivir 30 milliGRAM(s) Oral every 12 hours    MEDICATIONS  (PRN):  dextrose Oral Gel 15 Gram(s) Oral once PRN Blood Glucose LESS THAN 70 milliGRAM(s)/deciliter  guaiFENesin Oral Liquid (Sugar-Free) 100 milliGRAM(s) Oral every 6 hours PRN Cough Maricarmen Paredes M.D.  Division of Hospital Medicine  Available on [employer] TEAMS    SUBJECTIVE / ACUTE INTERVAL EVENTS: Patient seen and examined. Cr uptrending yesterday -- discussed w/ cardiology and held diuresis this AM. Will appreciate today's recommendations for further diuresis. Patient continues to complain of severe cough -- increased Hycodan to TID ATC for now, and added Duonebs PRN -- given asthma history and hospitalization last year for bronchitis and asthma exacerbation, will order peak flows and monitor for hypoxia closely. If patient required O2 supplementation today, will send ABG.     OBJECTIVE:   Vital Signs Last 24 Hrs  T(F): 98.6 (23 Feb 2025 04:50), Max: 99.2 (22 Feb 2025 21:11)  HR: 77 (23 Feb 2025 04:50) (65 - 77)  BP: 138/55 (23 Feb 2025 04:50) (130/60 - 138/55)  RR: 18 (23 Feb 2025 04:50) (18 - 18)  SpO2: 91% (23 Feb 2025 04:50) (91% - 92%)  Parameters below as of 23 Feb 2025 04:50  Patient On (Oxygen Delivery Method): room air    Physical Examination:  GEN: elderly woman, laying in bed in NAD  PSYCH: A&Ox3, mood and affect appear appropriate   NEURO: no focal neurologic deficits appreciated  EYES: PERRL, anicteric  RESPI: no accessory muscle use, B/L air entry, CTAB   CARDIO: regular rate/rhythm, +LE edema B/L  ABD: soft, NT, ND  EXT: patient able to move all extremities spontaneously, RLE TTP  VASC: peripheral pulses palpated    Labs:  CAPILLARY BLOOD GLUCOSE  POCT Blood Glucose.: 150 mg/dL (23 Feb 2025 08:06)  POCT Blood Glucose.: 133 mg/dL (22 Feb 2025 21:15)  POCT Blood Glucose.: 129 mg/dL (22 Feb 2025 17:06)  POCT Blood Glucose.: 126 mg/dL (22 Feb 2025 12:00)                        12.4   4.99  )-----------( 120      ( 23 Feb 2025 06:45 )             41.1     02-23  141  |  100  |  48[H]  ----------------------------<  124[H]  3.7   |  26  |  2.15[H]    Ca    9.1      23 Feb 2025 06:45  Phos  4.2     02-22  Mg     2.0     02-23    TPro  7.9  /  Alb  4.6  /  TBili  0.5  /  DBili  x   /  AST  35  /  ALT  37  /  AlkPhos  92  02-21    PT/INR - ( 21 Feb 2025 11:54 )   PT: 16.3 sec;   INR: 1.42 ratio    PTT - ( 21 Feb 2025 11:54 )  PTT:31.5 sec    Urinalysis Basic - ( 23 Feb 2025 06:45 )  Color: x / Appearance: x / SG: x / pH: x  Gluc: 124 mg/dL / Ketone: x  / Bili: x / Urobili: x   Blood: x / Protein: x / Nitrite: x   Leuk Esterase: x / RBC: x / WBC x   Sq Epi: x / Non Sq Epi: x / Bacteria: x    Consultant(s) Notes Reviewed: Cardiology   Care Discussed with Consultants/Other Providers: RACHEL Verduzco    MEDICATIONS  (STANDING):  albuterol/ipratropium for Nebulization 3 milliLiter(s) Nebulizer every 6 hours  apixaban 5 milliGRAM(s) Oral every 12 hours  aspirin enteric coated 81 milliGRAM(s) Oral daily  atorvastatin 40 milliGRAM(s) Oral at bedtime  benzonatate 100 milliGRAM(s) Oral three times a day  dextrose 5%. 1000 milliLiter(s) (100 mL/Hr) IV Continuous <Continuous>  dextrose 5%. 1000 milliLiter(s) (50 mL/Hr) IV Continuous <Continuous>  dextrose 50% Injectable 25 Gram(s) IV Push once  dextrose 50% Injectable 12.5 Gram(s) IV Push once  dextrose 50% Injectable 25 Gram(s) IV Push once  diltiazem    milliGRAM(s) Oral daily  doxazosin 2 milliGRAM(s) Oral daily  dronedarone 400 milliGRAM(s) Oral two times a day  glucagon  Injectable 1 milliGRAM(s) IntraMuscular once  hydrocodone/homatropine Syrup 5 milliLiter(s) Oral three times a day  insulin glargine Injectable (LANTUS) 5 Unit(s) SubCutaneous every morning  insulin glargine Injectable (LANTUS) 10 Unit(s) SubCutaneous at bedtime  insulin lispro (ADMELOG) corrective regimen sliding scale   SubCutaneous three times a day before meals  insulin lispro (ADMELOG) corrective regimen sliding scale   SubCutaneous at bedtime  lisinopril 40 milliGRAM(s) Oral daily  LORazepam     Tablet 0.5 milliGRAM(s) Oral at bedtime  oseltamivir      oseltamivir 30 milliGRAM(s) Oral every 12 hours    MEDICATIONS  (PRN):  dextrose Oral Gel 15 Gram(s) Oral once PRN Blood Glucose LESS THAN 70 milliGRAM(s)/deciliter  guaiFENesin Oral Liquid (Sugar-Free) 100 milliGRAM(s) Oral every 6 hours PRN Cough

## 2025-02-23 NOTE — DIETITIAN INITIAL EVALUATION ADULT - PERTINENT MEDS FT
MEDICATIONS  (STANDING):  albuterol/ipratropium for Nebulization 3 milliLiter(s) Nebulizer every 6 hours  apixaban 5 milliGRAM(s) Oral every 12 hours  aspirin enteric coated 81 milliGRAM(s) Oral daily  atorvastatin 40 milliGRAM(s) Oral at bedtime  benzonatate 100 milliGRAM(s) Oral three times a day  dextrose 5%. 1000 milliLiter(s) (100 mL/Hr) IV Continuous <Continuous>  dextrose 5%. 1000 milliLiter(s) (50 mL/Hr) IV Continuous <Continuous>  dextrose 50% Injectable 25 Gram(s) IV Push once  dextrose 50% Injectable 12.5 Gram(s) IV Push once  dextrose 50% Injectable 25 Gram(s) IV Push once  diltiazem    milliGRAM(s) Oral daily  doxazosin 2 milliGRAM(s) Oral daily  dronedarone 400 milliGRAM(s) Oral two times a day  glucagon  Injectable 1 milliGRAM(s) IntraMuscular once  hydrocodone/homatropine Syrup 5 milliLiter(s) Oral three times a day  insulin glargine Injectable (LANTUS) 5 Unit(s) SubCutaneous every morning  insulin glargine Injectable (LANTUS) 10 Unit(s) SubCutaneous at bedtime  insulin lispro (ADMELOG) corrective regimen sliding scale   SubCutaneous three times a day before meals  insulin lispro (ADMELOG) corrective regimen sliding scale   SubCutaneous at bedtime  lisinopril 40 milliGRAM(s) Oral daily  LORazepam     Tablet 0.5 milliGRAM(s) Oral at bedtime  oseltamivir      oseltamivir 30 milliGRAM(s) Oral every 12 hours    MEDICATIONS  (PRN):  dextrose Oral Gel 15 Gram(s) Oral once PRN Blood Glucose LESS THAN 70 milliGRAM(s)/deciliter  guaiFENesin Oral Liquid (Sugar-Free) 100 milliGRAM(s) Oral every 6 hours PRN Cough

## 2025-02-23 NOTE — DIETITIAN INITIAL EVALUATION ADULT - OTHER INFO
- Endo: hx of insulin dependent diabetes, A1C: 7.5%, Finger stick x 24hrs 102-150mg/dL, ordered for insulin for glycemic control

## 2025-02-23 NOTE — DIETITIAN INITIAL EVALUATION ADULT - PHYSCIAL ASSESSMENT
Nutrition focused physical exam not warranted at this time. No overt sign of muscle/fat depletion noted. Per patient, UBW~270lbs,  no recent weight loss reported.    Per [facility] weight history: (9/2024)274lbs; (4/2024)266lbs; (1/2024)270lbs;    Adjusted IBW: 154lbs (adjusted for BMI>30)  Adjusted IBW%: 175%

## 2025-02-23 NOTE — DIETITIAN INITIAL EVALUATION ADULT - ORAL INTAKE PTA/DIET HISTORY
Writer met patient at the bedside. Patient reports good appetite/PO intake PTA. Usually consumes 3 meals daily. Follows a Regular diet, and a low sodium dietary restrictions. Confirms no known food allergies. Denies micronutrient supplement uses, denies liquid oral supplement uses. Denies chewing/swallowing difficulties. No nausea/vomiting reported. States having regular bowel movements daily at home.

## 2025-02-23 NOTE — DIETITIAN INITIAL EVALUATION ADULT - PERTINENT LABORATORY DATA
02-23    141  |  100  |  48[H]  ----------------------------<  124[H]  3.7   |  26  |  2.15[H]    Ca    9.1      23 Feb 2025 06:45  Phos  4.2     02-22  Mg     2.0     02-23    TPro  7.9  /  Alb  4.6  /  TBili  0.5  /  DBili  x   /  AST  35  /  ALT  37  /  AlkPhos  92  02-21  POCT Blood Glucose.: 150 mg/dL (02-23-25 @ 08:06)  A1C with Estimated Average Glucose Result: 7.5 % (02-22-25 @ 07:21)

## 2025-02-23 NOTE — PROGRESS NOTE ADULT - SUBJECTIVE AND OBJECTIVE BOX
DATE OF SERVICE: 02-23-25 @ 13:14    Patient is a 79y old  Female who presents with a chief complaint of Influenza A (23 Feb 2025 11:35)      INTERVAL HISTORY: NAD    REVIEW OF SYSTEMS:  CONSTITUTIONAL: No weakness  EYES/ENT: No visual changes;  No throat pain   NECK: No pain or stiffness  RESPIRATORY: No cough, wheezing; No shortness of breath  CARDIOVASCULAR: No chest pain or palpitations  GASTROINTESTINAL: No abdominal  pain. No nausea, vomiting, or hematemesis  GENITOURINARY: No dysuria, frequency or hematuria  NEUROLOGICAL: No stroke like symptoms  SKIN: No rashes      	  MEDICATIONS:  diltiazem    milliGRAM(s) Oral daily  doxazosin 2 milliGRAM(s) Oral daily  dronedarone 400 milliGRAM(s) Oral two times a day  lisinopril 40 milliGRAM(s) Oral daily        PHYSICAL EXAM:  T(C): 37.1 (02-23-25 @ 12:01), Max: 37.3 (02-22-25 @ 21:11)  HR: 71 (02-23-25 @ 12:01) (65 - 77)  BP: 112/56 (02-23-25 @ 12:01) (112/56 - 138/55)  RR: 18 (02-23-25 @ 12:01) (18 - 18)  SpO2: 91% (02-23-25 @ 12:01) (91% - 92%)  Wt(kg): --  I&O's Summary        Appearance: In no distress	  HEENT:    PERRL, EOMI	  Cardiovascular:  S1 S2, No JVD  Respiratory: Lungs clear to auscultation	  Gastrointestinal:  Soft, Non-tender, + BS	  Vascularature:  No edema of LE  Psychiatric: Appropriate affect   Neuro: no acute focal deficits                               12.4   4.99  )-----------( 120      ( 23 Feb 2025 06:45 )             41.1     02-23    141  |  100  |  48[H]  ----------------------------<  124[H]  3.7   |  26  |  2.15[H]    Ca    9.1      23 Feb 2025 06:45  Phos  4.2     02-22  Mg     2.0     02-23          Labs personally reviewed      ASSESSMENT/PLAN: 	      79yoF w/ PMHx of s/p CVA, CAD, HFpEF, Afib on Eliquis, IDDM, CKD, asthma (hospitalization per PCP note in 2024 for acute bronchitis and asthma exacerbation), anxiety, and obesity who presents w/ complaints of severe LE swelling over the past two weeks, right > left, along with new cough and wheezing, found to be positive for Influenza A, w/ LE Dopplers negative for acute DVT (though exam limited by body habitus and pain).       Problem/Plan - 1:  ·  Problem: Acute diastolic HF  ·  Plan: On Bumex 2mg PO BID as OP  - Switch to Bumex 2mg IV BID  - TTE given new LE edema  - Recent OP TTE with preserved EF and grade 1 DD  - 2/23 Diuresis held today due to Cr elevation. Restart IV diuresis tomorrow. If Cr cont to significantly remain elev, consult renal     Problem/Plan - 2:  ·  Problem: Chronic atrial fibrillation.   ·  Plan: - continue home Cardizem, and Dronedarone  - continue home AC w/ Eliquis BID.    Problem/Plan - 3:  ·  Problem: Diabetes mellitus.   ·  Plan: - at home patient reportedly on Lantus 18units QHS and 9units in AM  - will continue w/ Lantus 10units QHS and 5units in AM for now, w/ mISS and serial FS monitoring.    Problem/Plan - 4:  ·  Problem:  DVT PPX  ·  Plan: ANGY Michael DO [facility]  Cardiovascular Medicine  [address], [address]  Office: [phone]  Available via call/text on [employer] Teams  DATE OF SERVICE: 02-23-25 @ 13:14    Patient is a 79y old  Female who presents with a chief complaint of Influenza A (23 Feb 2025 11:35)      INTERVAL HISTORY: NAD    REVIEW OF SYSTEMS:  CONSTITUTIONAL: No weakness  EYES/ENT: No visual changes;  No throat pain   NECK: No pain or stiffness  RESPIRATORY: No cough, wheezing; No shortness of breath  CARDIOVASCULAR: No chest pain or palpitations  GASTROINTESTINAL: No abdominal  pain. No nausea, vomiting, or hematemesis  GENITOURINARY: No dysuria, frequency or hematuria  NEUROLOGICAL: No stroke like symptoms  SKIN: No rashes      	  MEDICATIONS:  diltiazem    milliGRAM(s) Oral daily  doxazosin 2 milliGRAM(s) Oral daily  dronedarone 400 milliGRAM(s) Oral two times a day  lisinopril 40 milliGRAM(s) Oral daily        PHYSICAL EXAM:  T(C): 37.1 (02-23-25 @ 12:01), Max: 37.3 (02-22-25 @ 21:11)  HR: 71 (02-23-25 @ 12:01) (65 - 77)  BP: 112/56 (02-23-25 @ 12:01) (112/56 - 138/55)  RR: 18 (02-23-25 @ 12:01) (18 - 18)  SpO2: 91% (02-23-25 @ 12:01) (91% - 92%)  Wt(kg): --  I&O's Summary        Appearance: In no distress	  HEENT:    PERRL, EOMI	  Cardiovascular:  S1 S2, No JVD  Respiratory: Lungs clear to auscultation	  Gastrointestinal:  Soft, Non-tender, + BS	  Vascularature:  No edema of LE  Psychiatric: Appropriate affect   Neuro: no acute focal deficits                               12.4   4.99  )-----------( 120      ( 23 Feb 2025 06:45 )             41.1     02-23    141  |  100  |  48[H]  ----------------------------<  124[H]  3.7   |  26  |  2.15[H]    Ca    9.1      23 Feb 2025 06:45  Phos  4.2     02-22  Mg     2.0     02-23          Labs personally reviewed      ASSESSMENT/PLAN: 	    79yoF w/ PMHx of s/p CVA, CAD, HFpEF, Afib on Eliquis, IDDM, CKD, asthma (hospitalization per PCP note in 2024 for acute bronchitis and asthma exacerbation), anxiety, and obesity who presents w/ complaints of severe LE swelling over the past two weeks, right > left, along with new cough and wheezing, found to be positive for Influenza A, w/ LE Dopplers negative for acute DVT (though exam limited by body habitus and pain).       Problem/Plan - 1:  ·  Problem: Acute diastolic HF  ·  Plan: On Bumex 2mg PO BID as OP  - Switch to Bumex 2mg IV BID  - TTE given new LE edema  - Recent OP TTE with preserved EF and grade 1 DD  - 2/23 Diuresis held today due to ROYAL. Will restart diuretics pending TTE to assess filling pressures    Problem/Plan - 2:  ·  Problem: Chronic atrial fibrillation.   ·  Plan: - continue home Cardizem, and Dronedarone  - continue home AC w/ Eliquis BID.    Problem/Plan - 3:  ·  Problem: Diabetes mellitus.   ·  Plan: - at home patient reportedly on Lantus 18units QHS and 9units in AM  - will continue w/ Lantus 10units QHS and 5units in AM for now, w/ mISS and serial FS monitoring.    Problem/Plan - 4:  ·  Problem:  DVT PPX  ·  Plan: ANGY Michael DO [facility]  Cardiovascular Medicine  [address] [place] [address], [address]  Office: [phone]  Available via call/text on [employer] Teams

## 2025-02-23 NOTE — DIETITIAN INITIAL EVALUATION ADULT - REASON INDICATOR FOR ASSESSMENT
Patient seen for "Consult for Decompensated heart failure, salt and fluid restriction. ",Source: Pt, Electronic Medical Record. Chart reviewed, events noted.

## 2025-02-23 NOTE — DIETITIAN INITIAL EVALUATION ADULT - ETIOLOGY
related to lack of exposure to previous diet education/ incomplete exposure to diet education predicted energy intake exceeding energy expenditure

## 2025-02-23 NOTE — PROGRESS NOTE ADULT - PROBLEM SELECTOR PLAN 2
- unclear etiology as LE Dopplers remain negative, ?could be related to hypervolemia iso infection as above  - will hold off on further Abx at this time and continue to closely monitor  - appreciate cardiology's evaluation and recommendations -- held IV Bumex 2mg BID that was initiated d/t ROYAL on 2/23 -- monitoring electrolytes, renal function, hemodynamics, and fluid status closely and f/u cardiology for plan for diuretic resumption

## 2025-02-23 NOTE — PROGRESS NOTE ADULT - PROBLEM SELECTOR PLAN 1
- continue Tamiflu dosed per GFR  - patient currently remains on RA  - incentive spirometry, Duonebs, monitor peak flows given hx of asthma and hospitalization for asthma exacerbation in the past year  - antitussive supportive care

## 2025-02-23 NOTE — DIETITIAN INITIAL EVALUATION ADULT - PERSON TAUGHT/METHOD
Discussed DASH diet education. Reviewed foods high in Na and cholesterol to avoid. Reviewed ways to decrease Na in diet, discussed meal and snack options, tips for eating out, low cholesterol options, and shopping/label reading tips. Good comprehension noted. Emphasized the importance of adequate kcal and protein intake; recommend to optimize nutritional intake in case of decreased appetite; recommended small frequent meals by ordering nutrient-dense snacks and leaving non-perishable food away from tray for later consumption during the day or between meals; Pt made aware RD remains available./verbal instruction/patient instructed

## 2025-02-23 NOTE — DIETITIAN INITIAL EVALUATION ADULT - ADD RECOMMEND
1. Recommend to continue current DASH/ Carbohydrate Consistent Diet with no snacks as ordered  2. Monitor need for alternative oral supplement, patient declined Ensure plus/Glucerna at this time  3. Encourage PO intake via small frequent and nutrient dense meals   4. Continue to monitor PO intake, weight trend, electrolytes, blood glucose, labs, BMs in-house   5. BMI >40 notification placed in chart.

## 2025-02-24 ENCOUNTER — RESULT REVIEW (OUTPATIENT)
Age: 80
End: 2025-02-24

## 2025-02-24 DIAGNOSIS — N17.9 ACUTE KIDNEY FAILURE, UNSPECIFIED: ICD-10-CM

## 2025-02-24 LAB
ANION GAP SERPL CALC-SCNC: 17 MMOL/L — SIGNIFICANT CHANGE UP (ref 5–17)
BUN SERPL-MCNC: 74 MG/DL — HIGH (ref 7–23)
CALCIUM SERPL-MCNC: 9 MG/DL — SIGNIFICANT CHANGE UP (ref 8.4–10.5)
CHLORIDE SERPL-SCNC: 99 MMOL/L — SIGNIFICANT CHANGE UP (ref 96–108)
CO2 SERPL-SCNC: 23 MMOL/L — SIGNIFICANT CHANGE UP (ref 22–31)
CREAT SERPL-MCNC: 3.01 MG/DL — HIGH (ref 0.5–1.3)
EGFR: 15 ML/MIN/1.73M2 — LOW
EGFR: 15 ML/MIN/1.73M2 — LOW
FLUAV H3 RNA SPEC QL NAA+PROBE: DETECTED
GLUCOSE BLDC GLUCOMTR-MCNC: 147 MG/DL — HIGH (ref 70–99)
GLUCOSE BLDC GLUCOMTR-MCNC: 151 MG/DL — HIGH (ref 70–99)
GLUCOSE BLDC GLUCOMTR-MCNC: 152 MG/DL — HIGH (ref 70–99)
GLUCOSE BLDC GLUCOMTR-MCNC: 163 MG/DL — HIGH (ref 70–99)
GLUCOSE BLDC GLUCOMTR-MCNC: 176 MG/DL — HIGH (ref 70–99)
GLUCOSE SERPL-MCNC: 197 MG/DL — HIGH (ref 70–99)
MAGNESIUM SERPL-MCNC: 2 MG/DL — SIGNIFICANT CHANGE UP (ref 1.6–2.6)
POTASSIUM SERPL-MCNC: 3.7 MMOL/L — SIGNIFICANT CHANGE UP (ref 3.5–5.3)
POTASSIUM SERPL-SCNC: 3.7 MMOL/L — SIGNIFICANT CHANGE UP (ref 3.5–5.3)
RAPID RVP RESULT: DETECTED
SARS-COV-2 RNA SPEC QL NAA+PROBE: SIGNIFICANT CHANGE UP
SODIUM SERPL-SCNC: 139 MMOL/L — SIGNIFICANT CHANGE UP (ref 135–145)

## 2025-02-24 PROCEDURE — 93306 TTE W/DOPPLER COMPLETE: CPT | Mod: 26

## 2025-02-24 PROCEDURE — 99233 SBSQ HOSP IP/OBS HIGH 50: CPT

## 2025-02-24 RX ORDER — SODIUM CHLORIDE 0.65 %
1 AEROSOL, SPRAY (ML) NASAL THREE TIMES A DAY
Refills: 0 | Status: DISCONTINUED | OUTPATIENT
Start: 2025-02-24 | End: 2025-03-10

## 2025-02-24 RX ADMIN — IPRATROPIUM BROMIDE AND ALBUTEROL SULFATE 3 MILLILITER(S): .5; 2.5 SOLUTION RESPIRATORY (INHALATION) at 18:35

## 2025-02-24 RX ADMIN — Medication 81 MILLIGRAM(S): at 12:09

## 2025-02-24 RX ADMIN — IPRATROPIUM BROMIDE AND ALBUTEROL SULFATE 3 MILLILITER(S): .5; 2.5 SOLUTION RESPIRATORY (INHALATION) at 00:00

## 2025-02-24 RX ADMIN — DRONEDARONE 400 MILLIGRAM(S): 400 TABLET, FILM COATED ORAL at 18:35

## 2025-02-24 RX ADMIN — IPRATROPIUM BROMIDE AND ALBUTEROL SULFATE 3 MILLILITER(S): .5; 2.5 SOLUTION RESPIRATORY (INHALATION) at 12:09

## 2025-02-24 RX ADMIN — ATORVASTATIN CALCIUM 40 MILLIGRAM(S): 80 TABLET, FILM COATED ORAL at 22:49

## 2025-02-24 RX ADMIN — LISINOPRIL 40 MILLIGRAM(S): 5 TABLET ORAL at 05:57

## 2025-02-24 RX ADMIN — OSELTAMIVIR PHOSPHATE 30 MILLIGRAM(S): 75 CAPSULE ORAL at 05:56

## 2025-02-24 RX ADMIN — OSELTAMIVIR PHOSPHATE 30 MILLIGRAM(S): 75 CAPSULE ORAL at 18:35

## 2025-02-24 RX ADMIN — IPRATROPIUM BROMIDE AND ALBUTEROL SULFATE 3 MILLILITER(S): .5; 2.5 SOLUTION RESPIRATORY (INHALATION) at 05:56

## 2025-02-24 RX ADMIN — INSULIN GLARGINE-YFGN 10 UNIT(S): 100 INJECTION, SOLUTION SUBCUTANEOUS at 22:50

## 2025-02-24 RX ADMIN — DOXAZOSIN MESYLATE 2 MILLIGRAM(S): 8 TABLET ORAL at 05:57

## 2025-02-24 RX ADMIN — APIXABAN 5 MILLIGRAM(S): 2.5 TABLET, FILM COATED ORAL at 05:57

## 2025-02-24 RX ADMIN — Medication 0.5 MILLIGRAM(S): at 05:57

## 2025-02-24 RX ADMIN — INSULIN LISPRO 2: 100 INJECTION, SOLUTION INTRAVENOUS; SUBCUTANEOUS at 08:31

## 2025-02-24 RX ADMIN — Medication 1 SPRAY(S): at 14:22

## 2025-02-24 RX ADMIN — Medication 100 MILLIGRAM(S): at 05:56

## 2025-02-24 RX ADMIN — Medication 100 MILLIGRAM(S): at 22:49

## 2025-02-24 RX ADMIN — Medication 1 SPRAY(S): at 22:49

## 2025-02-24 RX ADMIN — APIXABAN 5 MILLIGRAM(S): 2.5 TABLET, FILM COATED ORAL at 18:35

## 2025-02-24 RX ADMIN — INSULIN LISPRO 2: 100 INJECTION, SOLUTION INTRAVENOUS; SUBCUTANEOUS at 12:09

## 2025-02-24 RX ADMIN — INSULIN GLARGINE-YFGN 5 UNIT(S): 100 INJECTION, SOLUTION SUBCUTANEOUS at 08:31

## 2025-02-24 RX ADMIN — DILTIAZEM HYDROCHLORIDE 360 MILLIGRAM(S): 240 TABLET, EXTENDED RELEASE ORAL at 05:57

## 2025-02-24 RX ADMIN — Medication 100 MILLIGRAM(S): at 14:22

## 2025-02-24 RX ADMIN — DRONEDARONE 400 MILLIGRAM(S): 400 TABLET, FILM COATED ORAL at 05:57

## 2025-02-24 RX ADMIN — Medication 1 SPRAY(S): at 12:10

## 2025-02-24 RX ADMIN — IPRATROPIUM BROMIDE AND ALBUTEROL SULFATE 3 MILLILITER(S): .5; 2.5 SOLUTION RESPIRATORY (INHALATION) at 23:23

## 2025-02-24 NOTE — PHYSICAL THERAPY INITIAL EVALUATION ADULT - PLANNED THERAPY INTERVENTIONS, PT EVAL
GOAL: Pt will negotiate up/down 2 steps with + handrail ascending using no device independently in 2 weeks/balance training/bed mobility training/gait training/strengthening/transfer training

## 2025-02-24 NOTE — PROGRESS NOTE ADULT - PROBLEM SELECTOR PLAN 2
On outpatient labs, Cre 1.7-1.9   Cre uptrending, possible ATN in setting of infection, also possible prerenal given infection/poor oral intake and concomitant diuretic use.   Encourage oral intake  Hold diuretics and lisinopril for now  Continue to monitor

## 2025-02-24 NOTE — PROGRESS NOTE ADULT - NSPROGADDITIONALINFOA_GEN_ALL_CORE
Time-based billing (NON-critical care).     51 minutes spent on total encounter. The necessity of the time spent during the encounter on this date of service was due to:     - Reviewing, and interpreting labs, testing, and imaging.  - Independently obtaining a review of systems and performing a physical exam  - Reviewing prior records and where necessary, outpatient records.  - Counselling and educating patient regarding interpretation of aforementioned items and plan of care.      d/w ACP

## 2025-02-24 NOTE — PROGRESS NOTE ADULT - PROBLEM SELECTOR PLAN 1
- Continue Tamiflu dosed per GFR  - Patient currently on 2L NC - wean as tolerated  - Incentive spirometry, Duonebs, monitor peak flows given hx of asthma and hospitalization for asthma exacerbation in the past year  - Antitussive supportive care

## 2025-02-24 NOTE — PROGRESS NOTE ADULT - SUBJECTIVE AND OBJECTIVE BOX
[facility] Division of Hospital Medicine  Angie Ross DO MS Teams PREFERRED      SUBJECTIVE / OVERNIGHT EVENTS: Patient denies dyspnea, though still with nasal congestion and cough.   ADDITIONAL REVIEW OF SYSTEMS:    MEDICATIONS  (STANDING):  albuterol/ipratropium for Nebulization 3 milliLiter(s) Nebulizer every 6 hours  apixaban 5 milliGRAM(s) Oral every 12 hours  aspirin enteric coated 81 milliGRAM(s) Oral daily  atorvastatin 40 milliGRAM(s) Oral at bedtime  benzonatate 100 milliGRAM(s) Oral three times a day  dextrose 5%. 1000 milliLiter(s) (100 mL/Hr) IV Continuous <Continuous>  dextrose 5%. 1000 milliLiter(s) (50 mL/Hr) IV Continuous <Continuous>  dextrose 50% Injectable 25 Gram(s) IV Push once  dextrose 50% Injectable 12.5 Gram(s) IV Push once  dextrose 50% Injectable 25 Gram(s) IV Push once  diltiazem    milliGRAM(s) Oral daily  doxazosin 2 milliGRAM(s) Oral daily  dronedarone 400 milliGRAM(s) Oral two times a day  glucagon  Injectable 1 milliGRAM(s) IntraMuscular once  hydrocodone/homatropine Syrup 5 milliLiter(s) Oral three times a day  insulin glargine Injectable (LANTUS) 5 Unit(s) SubCutaneous every morning  insulin glargine Injectable (LANTUS) 10 Unit(s) SubCutaneous at bedtime  insulin lispro (ADMELOG) corrective regimen sliding scale   SubCutaneous three times a day before meals  insulin lispro (ADMELOG) corrective regimen sliding scale   SubCutaneous at bedtime  lisinopril 40 milliGRAM(s) Oral daily  LORazepam     Tablet 0.5 milliGRAM(s) Oral at bedtime  oseltamivir      oseltamivir 30 milliGRAM(s) Oral every 12 hours  sodium chloride 0.65% Nasal 1 Spray(s) Both Nostrils three times a day    MEDICATIONS  (PRN):  dextrose Oral Gel 15 Gram(s) Oral once PRN Blood Glucose LESS THAN 70 milliGRAM(s)/deciliter  guaiFENesin Oral Liquid (Sugar-Free) 100 milliGRAM(s) Oral every 6 hours PRN Cough      I&O's Summary    23 Feb 2025 07:01  -  24 Feb 2025 07:00  --------------------------------------------------------  IN: 480 mL / OUT: 500 mL / NET: -20 mL        PHYSICAL EXAM:  Vital Signs Last 24 Hrs  T(C): 36.7 (24 Feb 2025 13:00), Max: 37.3 (23 Feb 2025 21:01)  T(F): 98 (24 Feb 2025 13:00), Max: 99.1 (23 Feb 2025 21:01)  HR: 75 (24 Feb 2025 13:00) (60 - 78)  BP: 135/70 (24 Feb 2025 13:00) (100/57 - 138/69)  BP(mean): --  RR: 18 (24 Feb 2025 13:00) (17 - 18)  SpO2: 95% (24 Feb 2025 13:00) (86% - 96%)    Parameters below as of 24 Feb 2025 13:00  Patient On (Oxygen Delivery Method): nasal cannula  O2 Flow (L/min): 2    CONSTITUTIONAL: NAD   EYES: Conjunctiva and sclera clear  ENMT: Moist oral mucosa, no pharyngeal injection or exudates   NECK: Supple, midline  RESPIRATORY: Normal respiratory effort; lungs are clear to auscultation bilaterally  CARDIOVASCULAR: Regular rate and rhythm, normal S1 and S2.   ABDOMEN: Nontender to palpation, no rebound/guarding  PSYCH: A+O to person, place, and time; affect appropriate      LABS:                        12.4   4.99  )-----------( 120      ( 23 Feb 2025 06:45 )             41.1     02-24    139  |  99  |  74[H]  ----------------------------<  197[H]  3.7   |  23  |  3.01[H]    Ca    9.0      24 Feb 2025 14:23  Mg     2.0     02-24            Urinalysis Basic - ( 24 Feb 2025 14:23 )    Color: x / Appearance: x / SG: x / pH: x  Gluc: 197 mg/dL / Ketone: x  / Bili: x / Urobili: x   Blood: x / Protein: x / Nitrite: x   Leuk Esterase: x / RBC: x / WBC x   Sq Epi: x / Non Sq Epi: x / Bacteria: x          RADIOLOGY & ADDITIONAL TESTS:  Results Reviewed:   Imaging Personally Reviewed:  Electrocardiogram Personally Reviewed:    COORDINATION OF CARE:  Care Discussed with Consultants/Other Providers [Y/N]: Y  Prior or Outpatient Records Reviewed [Y/N]: Y

## 2025-02-24 NOTE — PHYSICAL THERAPY INITIAL EVALUATION ADULT - STRENGTHENING, PT EVAL
A message was left for this pt. To inform her of normal results.    GOAL: Patient will demonstrate a 1/3 increase in strength where deficient to assist with performing functional mobility and ADLs.

## 2025-02-24 NOTE — PROGRESS NOTE ADULT - PROBLEM SELECTOR PLAN 4
- Continue home ASA, statin  - Hold lisinopril due to ROYAL  - Diuretics as above  - Monitor fluid status, hemodynamics, renal function, and electrolytes closely

## 2025-02-24 NOTE — PHYSICAL THERAPY INITIAL EVALUATION ADULT - LEVEL OF INDEPENDENCE: GAIT, REHAB EVAL
Call returned to the patient.  She remains afebrile.  Minimal pain and denies any erythema.  Does have a small area of old drainage noted on her CHAZ dressing.  The spot is unchanged in size.  The patient and the nurse were concerned because the drainage on the dressing appears to be somewhat greenish in color.  I have advised her that she has a special dressing underneath the chaz dressing that is black in color which does react with normal wound drainage and does appear to be green in color when it dries.  I have advised her to continue to watch the area as long as she remains afebrile and has no redness or increased pain sure that nothing further needs to be done.  Have left it open to call if she has any other questions or concerns   supervision

## 2025-02-24 NOTE — PHYSICAL THERAPY INITIAL EVALUATION ADULT - ADDITIONAL COMMENTS
Pt resides with her daughter in a private home with +2 steps to enter, first floor setup. PTA, pt ambulated with a RW and was independent with ADLs. Denies hx of falls

## 2025-02-24 NOTE — PHYSICAL THERAPY INITIAL EVALUATION ADULT - PERTINENT HX OF CURRENT PROBLEM, REHAB EVAL
79yoF w/ PMHx of s/p CVA, CAD, HFpEF, Afib on Eliquis, IDDM, CKD, asthma (hospitalization per PCP note in 2024 for acute bronchitis and asthma exacerbation), anxiety, and obesity who presents w/ complaints of severe LE swelling over the past two weeks, right > left, along with new cough and wheezing, found to be positive for Influenza A, w/ LE Dopplers negative for acute DVT (though exam limited by body habitus and pain). Hosp course: VA duplex BLE (2/21) No evidence of deep venous thrombosis in either lower extremity. XR chest (2/21) Small left pleural effusion.

## 2025-02-24 NOTE — PROGRESS NOTE ADULT - SUBJECTIVE AND OBJECTIVE BOX
DATE OF SERVICE: 02-24-25 @ 13:28    Patient is a 79y old  Female who presents with a chief complaint of Influenza A (23 Feb 2025 13:14)      INTERVAL HISTORY: feels okay, c/o ongoing cough    REVIEW OF SYSTEMS:  CONSTITUTIONAL: No weakness  EYES/ENT: No visual changes;  No throat pain   NECK: No pain or stiffness  RESPIRATORY: No cough, wheezing; No shortness of breath  CARDIOVASCULAR: No chest pain or palpitations  GASTROINTESTINAL: No abdominal  pain. No nausea, vomiting, or hematemesis  GENITOURINARY: No dysuria, frequency or hematuria  NEUROLOGICAL: No stroke like symptoms  SKIN: No rashes      	  MEDICATIONS:  diltiazem    milliGRAM(s) Oral daily  doxazosin 2 milliGRAM(s) Oral daily  dronedarone 400 milliGRAM(s) Oral two times a day  lisinopril 40 milliGRAM(s) Oral daily        PHYSICAL EXAM:  T(C): 36.9 (02-24-25 @ 05:12), Max: 37.3 (02-23-25 @ 21:01)  HR: 78 (02-24-25 @ 05:12) (60 - 78)  BP: 138/69 (02-24-25 @ 05:12) (100/57 - 138/69)  RR: 18 (02-24-25 @ 12:45) (17 - 18)  SpO2: 86% (02-24-25 @ 12:45) (86% - 96%)  Wt(kg): --  I&O's Summary    23 Feb 2025 07:01  -  24 Feb 2025 07:00  --------------------------------------------------------  IN: 480 mL / OUT: 500 mL / NET: -20 mL          Appearance: In no distress	  HEENT:    PERRL, EOMI	  Cardiovascular:  S1 S2, No JVD  Respiratory: Lungs clear to auscultation	  Gastrointestinal:  Soft, Non-tender, + BS	  Vascularature:  No edema of LE  Psychiatric: Appropriate affect   Neuro: no acute focal deficits                               12.4   4.99  )-----------( 120      ( 23 Feb 2025 06:45 )             41.1     02-23    141  |  100  |  48[H]  ----------------------------<  124[H]  3.7   |  26  |  2.15[H]    Ca    9.1      23 Feb 2025 06:45  Mg     2.0     02-23          Labs personally reviewed      ASSESSMENT/PLAN: 	    79yoF w/ PMHx of s/p CVA, CAD, HFpEF, Afib on Eliquis, IDDM, CKD, asthma (hospitalization per PCP note in 2024 for acute bronchitis and asthma exacerbation), anxiety, and obesity who presents w/ complaints of severe LE swelling over the past two weeks, right > left, along with new cough and wheezing, found to be positive for Influenza A, w/ LE Dopplers negative for acute DVT (though exam limited by body habitus and pain).       Problem/Plan - 1:  ·  Problem: Acute diastolic HF  ·  Plan: On Bumex 2mg PO BID as OP  - Switch to Bumex 2mg IV BID  - TTE given new LE edema  - Recent OP TTE with preserved EF and grade 1 DD  - 2/23 Diuresis held today due to ROYAL  - TTE unremarkable.  Euvolemic  - rec resume home diuretics    Problem/Plan - 2:  ·  Problem: Chronic atrial fibrillation.   ·  Plan: - continue home Cardizem, and Dronedarone  - continue home AC w/ Eliquis BID.    Problem/Plan - 3:  ·  Problem: Diabetes mellitus.   ·  Plan: - at home patient reportedly on Lantus 18units QHS and 9units in AM  - will continue w/ Lantus 10units QHS and 5units in AM for now, w/ mISS and serial FS monitoring.    Problem/Plan - 4:  ·  Problem:  DVT PPX  ·  Plan: Eliquis Iolani Behrbom, AG-JAXON Christopher DO [facility]  Cardiovascular Medicine  [address] [place] [address], [address]  Available through call or text on [employer] TEAMs  Office: [phone]   DATE OF SERVICE: 02-24-25 @ 13:28    Patient is a 79y old  Female who presents with a chief complaint of Influenza A (23 Feb 2025 13:14)      INTERVAL HISTORY: feels okay, c/o ongoing cough    REVIEW OF SYSTEMS:  CONSTITUTIONAL: No weakness  EYES/ENT: No visual changes;  No throat pain   NECK: No pain or stiffness  RESPIRATORY: No cough, wheezing; No shortness of breath  CARDIOVASCULAR: No chest pain or palpitations  GASTROINTESTINAL: No abdominal  pain. No nausea, vomiting, or hematemesis  GENITOURINARY: No dysuria, frequency or hematuria  NEUROLOGICAL: No stroke like symptoms  SKIN: No rashes      	  MEDICATIONS:  diltiazem    milliGRAM(s) Oral daily  doxazosin 2 milliGRAM(s) Oral daily  dronedarone 400 milliGRAM(s) Oral two times a day  lisinopril 40 milliGRAM(s) Oral daily        PHYSICAL EXAM:  T(C): 36.9 (02-24-25 @ 05:12), Max: 37.3 (02-23-25 @ 21:01)  HR: 78 (02-24-25 @ 05:12) (60 - 78)  BP: 138/69 (02-24-25 @ 05:12) (100/57 - 138/69)  RR: 18 (02-24-25 @ 12:45) (17 - 18)  SpO2: 86% (02-24-25 @ 12:45) (86% - 96%)  Wt(kg): --  I&O's Summary    23 Feb 2025 07:01  -  24 Feb 2025 07:00  --------------------------------------------------------  IN: 480 mL / OUT: 500 mL / NET: -20 mL          Appearance: In no distress	  HEENT:    PERRL, EOMI	  Cardiovascular:  S1 S2, No JVD  Respiratory: Lungs clear to auscultation	  Gastrointestinal:  Soft, Non-tender, + BS	  Vascularature:  No edema of LE  Psychiatric: Appropriate affect   Neuro: no acute focal deficits                               12.4   4.99  )-----------( 120      ( 23 Feb 2025 06:45 )             41.1     02-23    141  |  100  |  48[H]  ----------------------------<  124[H]  3.7   |  26  |  2.15[H]    Ca    9.1      23 Feb 2025 06:45  Mg     2.0     02-23          Labs personally reviewed      ASSESSMENT/PLAN: 	    79yoF w/ PMHx of s/p CVA, CAD, HFpEF, Afib on Eliquis, IDDM, CKD, asthma (hospitalization per PCP note in 2024 for acute bronchitis and asthma exacerbation), anxiety, and obesity who presents w/ complaints of severe LE swelling over the past two weeks, right > left, along with new cough and wheezing, found to be positive for Influenza A, w/ LE Dopplers negative for acute DVT (though exam limited by body habitus and pain).       Problem/Plan - 1:  ·  Problem: Acute diastolic HF  ·  Plan: On Bumex 2mg PO BID as OP  - Recent OP TTE with preserved EF and grade 1 DD  - TTE unremarkable with normal filling pressures  - rec resume home diuretics    Problem/Plan - 2:  ·  Problem: Chronic atrial fibrillation.   ·  Plan: - continue home Cardizem, and Dronedarone  - continue home AC w/ Eliquis BID.    Problem/Plan - 3:  ·  Problem: Diabetes mellitus.   ·  Plan: - at home patient reportedly on Lantus 18units QHS and 9units in AM  - will continue w/ Lantus 10units QHS and 5units in AM for now, w/ mISS and serial FS monitoring.    Problem/Plan - 4:  ·  Problem:  DVT PPX  ·  Plan: Eliquis Iolani Behrbom, ASHLEY-NP   Cole Christopher DO [facility]  Cardiovascular Medicine  [address], [address]  Available through call or text on [employer] TEAMs  Office: [phone]

## 2025-02-25 ENCOUNTER — APPOINTMENT (OUTPATIENT)
Dept: ORTHOPEDIC SURGERY | Facility: CLINIC | Age: 80
End: 2025-02-25

## 2025-02-25 LAB
ANION GAP SERPL CALC-SCNC: 15 MMOL/L — SIGNIFICANT CHANGE UP (ref 5–17)
ANION GAP SERPL CALC-SCNC: 9 MMOL/L — SIGNIFICANT CHANGE UP (ref 5–17)
BUN SERPL-MCNC: 49 MG/DL — HIGH (ref 7–23)
BUN SERPL-MCNC: 86 MG/DL — HIGH (ref 7–23)
CALCIUM SERPL-MCNC: 8.3 MG/DL — LOW (ref 8.4–10.5)
CALCIUM SERPL-MCNC: 8.9 MG/DL — SIGNIFICANT CHANGE UP (ref 8.4–10.5)
CHLORIDE SERPL-SCNC: 102 MMOL/L — SIGNIFICANT CHANGE UP (ref 96–108)
CHLORIDE SERPL-SCNC: 99 MMOL/L — SIGNIFICANT CHANGE UP (ref 96–108)
CO2 SERPL-SCNC: 22 MMOL/L — SIGNIFICANT CHANGE UP (ref 22–31)
CO2 SERPL-SCNC: 24 MMOL/L — SIGNIFICANT CHANGE UP (ref 22–31)
CREAT SERPL-MCNC: 0.58 MG/DL — SIGNIFICANT CHANGE UP (ref 0.5–1.3)
CREAT SERPL-MCNC: 2.58 MG/DL — HIGH (ref 0.5–1.3)
EGFR: 18 ML/MIN/1.73M2 — LOW
EGFR: 18 ML/MIN/1.73M2 — LOW
EGFR: 92 ML/MIN/1.73M2 — SIGNIFICANT CHANGE UP
EGFR: 92 ML/MIN/1.73M2 — SIGNIFICANT CHANGE UP
GLUCOSE BLDC GLUCOMTR-MCNC: 132 MG/DL — HIGH (ref 70–99)
GLUCOSE BLDC GLUCOMTR-MCNC: 158 MG/DL — HIGH (ref 70–99)
GLUCOSE BLDC GLUCOMTR-MCNC: 160 MG/DL — HIGH (ref 70–99)
GLUCOSE BLDC GLUCOMTR-MCNC: 163 MG/DL — HIGH (ref 70–99)
GLUCOSE BLDC GLUCOMTR-MCNC: 165 MG/DL — HIGH (ref 70–99)
GLUCOSE SERPL-MCNC: 107 MG/DL — HIGH (ref 70–99)
GLUCOSE SERPL-MCNC: 152 MG/DL — HIGH (ref 70–99)
HCT VFR BLD CALC: 27.4 % — LOW (ref 34.5–45)
HCT VFR BLD CALC: 38.3 % — SIGNIFICANT CHANGE UP (ref 34.5–45)
HGB BLD-MCNC: 11.9 G/DL — SIGNIFICANT CHANGE UP (ref 11.5–15.5)
HGB BLD-MCNC: 8.9 G/DL — LOW (ref 11.5–15.5)
MCHC RBC-ENTMCNC: 26.9 PG — LOW (ref 27–34)
MCHC RBC-ENTMCNC: 29 PG — SIGNIFICANT CHANGE UP (ref 27–34)
MCHC RBC-ENTMCNC: 31.1 G/DL — LOW (ref 32–36)
MCHC RBC-ENTMCNC: 32.5 G/DL — SIGNIFICANT CHANGE UP (ref 32–36)
MCV RBC AUTO: 86.7 FL — SIGNIFICANT CHANGE UP (ref 80–100)
MCV RBC AUTO: 89.3 FL — SIGNIFICANT CHANGE UP (ref 80–100)
NRBC BLD AUTO-RTO: 0 /100 WBCS — SIGNIFICANT CHANGE UP (ref 0–0)
NRBC BLD AUTO-RTO: 0 /100 WBCS — SIGNIFICANT CHANGE UP (ref 0–0)
PLATELET # BLD AUTO: 114 K/UL — LOW (ref 150–400)
PLATELET # BLD AUTO: 163 K/UL — SIGNIFICANT CHANGE UP (ref 150–400)
POTASSIUM SERPL-MCNC: 3.5 MMOL/L — SIGNIFICANT CHANGE UP (ref 3.5–5.3)
POTASSIUM SERPL-MCNC: 4.1 MMOL/L — SIGNIFICANT CHANGE UP (ref 3.5–5.3)
POTASSIUM SERPL-SCNC: 3.5 MMOL/L — SIGNIFICANT CHANGE UP (ref 3.5–5.3)
POTASSIUM SERPL-SCNC: 4.1 MMOL/L — SIGNIFICANT CHANGE UP (ref 3.5–5.3)
RBC # BLD: 3.07 M/UL — LOW (ref 3.8–5.2)
RBC # BLD: 4.42 M/UL — SIGNIFICANT CHANGE UP (ref 3.8–5.2)
RBC # FLD: 15.1 % — HIGH (ref 10.3–14.5)
RBC # FLD: 16.6 % — HIGH (ref 10.3–14.5)
SODIUM SERPL-SCNC: 133 MMOL/L — LOW (ref 135–145)
SODIUM SERPL-SCNC: 138 MMOL/L — SIGNIFICANT CHANGE UP (ref 135–145)
WBC # BLD: 4.95 K/UL — SIGNIFICANT CHANGE UP (ref 3.8–10.5)
WBC # BLD: 8.34 K/UL — SIGNIFICANT CHANGE UP (ref 3.8–10.5)
WBC # FLD AUTO: 4.95 K/UL — SIGNIFICANT CHANGE UP (ref 3.8–10.5)
WBC # FLD AUTO: 8.34 K/UL — SIGNIFICANT CHANGE UP (ref 3.8–10.5)

## 2025-02-25 PROCEDURE — 99497 ADVNCD CARE PLAN 30 MIN: CPT | Mod: 25

## 2025-02-25 PROCEDURE — 99233 SBSQ HOSP IP/OBS HIGH 50: CPT

## 2025-02-25 RX ORDER — TRAMADOL HYDROCHLORIDE 50 MG/1
25 TABLET, FILM COATED ORAL ONCE
Refills: 0 | Status: DISCONTINUED | OUTPATIENT
Start: 2025-02-25 | End: 2025-02-25

## 2025-02-25 RX ORDER — BUMETANIDE 1 MG/1
2 TABLET ORAL
Refills: 0 | Status: DISCONTINUED | OUTPATIENT
Start: 2025-02-25 | End: 2025-02-25

## 2025-02-25 RX ORDER — CEFTRIAXONE 500 MG/1
1000 INJECTION, POWDER, FOR SOLUTION INTRAMUSCULAR; INTRAVENOUS EVERY 24 HOURS
Refills: 0 | Status: COMPLETED | OUTPATIENT
Start: 2025-02-25 | End: 2025-03-01

## 2025-02-25 RX ADMIN — TRAMADOL HYDROCHLORIDE 25 MILLIGRAM(S): 50 TABLET, FILM COATED ORAL at 16:38

## 2025-02-25 RX ADMIN — OSELTAMIVIR PHOSPHATE 30 MILLIGRAM(S): 75 CAPSULE ORAL at 05:53

## 2025-02-25 RX ADMIN — INSULIN GLARGINE-YFGN 5 UNIT(S): 100 INJECTION, SOLUTION SUBCUTANEOUS at 08:21

## 2025-02-25 RX ADMIN — IPRATROPIUM BROMIDE AND ALBUTEROL SULFATE 3 MILLILITER(S): .5; 2.5 SOLUTION RESPIRATORY (INHALATION) at 17:31

## 2025-02-25 RX ADMIN — Medication 81 MILLIGRAM(S): at 12:05

## 2025-02-25 RX ADMIN — INSULIN LISPRO 2: 100 INJECTION, SOLUTION INTRAVENOUS; SUBCUTANEOUS at 08:20

## 2025-02-25 RX ADMIN — Medication 1 SPRAY(S): at 12:06

## 2025-02-25 RX ADMIN — Medication 100 MILLIGRAM(S): at 21:54

## 2025-02-25 RX ADMIN — DRONEDARONE 400 MILLIGRAM(S): 400 TABLET, FILM COATED ORAL at 17:30

## 2025-02-25 RX ADMIN — Medication 100 MILLIGRAM(S): at 12:05

## 2025-02-25 RX ADMIN — INSULIN GLARGINE-YFGN 10 UNIT(S): 100 INJECTION, SOLUTION SUBCUTANEOUS at 22:55

## 2025-02-25 RX ADMIN — CEFTRIAXONE 100 MILLIGRAM(S): 500 INJECTION, POWDER, FOR SOLUTION INTRAMUSCULAR; INTRAVENOUS at 13:18

## 2025-02-25 RX ADMIN — Medication 1 SPRAY(S): at 21:54

## 2025-02-25 RX ADMIN — APIXABAN 5 MILLIGRAM(S): 2.5 TABLET, FILM COATED ORAL at 17:31

## 2025-02-25 RX ADMIN — ATORVASTATIN CALCIUM 40 MILLIGRAM(S): 80 TABLET, FILM COATED ORAL at 21:54

## 2025-02-25 RX ADMIN — Medication 100 MILLIGRAM(S): at 05:53

## 2025-02-25 RX ADMIN — Medication 1 SPRAY(S): at 05:53

## 2025-02-25 RX ADMIN — IPRATROPIUM BROMIDE AND ALBUTEROL SULFATE 3 MILLILITER(S): .5; 2.5 SOLUTION RESPIRATORY (INHALATION) at 12:05

## 2025-02-25 RX ADMIN — OSELTAMIVIR PHOSPHATE 30 MILLIGRAM(S): 75 CAPSULE ORAL at 17:31

## 2025-02-25 RX ADMIN — INSULIN LISPRO 2: 100 INJECTION, SOLUTION INTRAVENOUS; SUBCUTANEOUS at 17:31

## 2025-02-25 RX ADMIN — DEXTROMETHORPHAN HBR, GUAIFENESIN 100 MILLIGRAM(S): 200 LIQUID ORAL at 17:30

## 2025-02-25 RX ADMIN — APIXABAN 5 MILLIGRAM(S): 2.5 TABLET, FILM COATED ORAL at 05:53

## 2025-02-25 RX ADMIN — DRONEDARONE 400 MILLIGRAM(S): 400 TABLET, FILM COATED ORAL at 05:53

## 2025-02-25 RX ADMIN — TRAMADOL HYDROCHLORIDE 25 MILLIGRAM(S): 50 TABLET, FILM COATED ORAL at 17:05

## 2025-02-25 RX ADMIN — Medication 0.5 MILLIGRAM(S): at 05:53

## 2025-02-25 RX ADMIN — IPRATROPIUM BROMIDE AND ALBUTEROL SULFATE 3 MILLILITER(S): .5; 2.5 SOLUTION RESPIRATORY (INHALATION) at 05:52

## 2025-02-25 RX ADMIN — DILTIAZEM HYDROCHLORIDE 360 MILLIGRAM(S): 240 TABLET, EXTENDED RELEASE ORAL at 05:53

## 2025-02-25 RX ADMIN — DOXAZOSIN MESYLATE 2 MILLIGRAM(S): 8 TABLET ORAL at 05:53

## 2025-02-25 RX ADMIN — BUMETANIDE 2 MILLIGRAM(S): 1 TABLET ORAL at 13:48

## 2025-02-25 NOTE — PROGRESS NOTE ADULT - PROBLEM SELECTOR PLAN 1
- Continue Tamiflu dosed per GFR  - Patient currently on NC - wean as tolerated  - Incentive spirometry, Duonebs, monitor peak flows given hx of asthma and hospitalization for asthma exacerbation in the past year  - Antitussive supportive care  - Start Ceftriaxone (2/25-) for possible superimposed PNA

## 2025-02-25 NOTE — PROGRESS NOTE ADULT - PROBLEM SELECTOR PLAN 3
- Unclear etiology as LE Dopplers remain negative, ?could be related to hypervolemia iso infection as above  - Will hold off on further Abx at this time and continue to closely monitor  - Appreciate cardiology's evaluation and recommendations -- held IV Bumex 2mg BID that was initiated d/t ROYAL on 2/23 -- monitoring electrolytes, renal function, hemodynamics, and fluid status closely   Resume Bumex 2/25 as above - Unclear etiology as LE Dopplers remain negative, ?could be related to hypervolemia iso infection as above  - Will hold off on further Abx at this time and continue to closely monitor  - Appreciate cardiology's evaluation and recommendations -- held IV Bumex 2mg BID that was initiated d/t ROYAL on 2/23 -- monitoring electrolytes, renal function, hemodynamics, and fluid status closely and f/u cardiology for plan for diuretic resumption.

## 2025-02-25 NOTE — PROGRESS NOTE ADULT - NSPROGADDITIONALINFOA_GEN_ALL_CORE
Time-based billing (NON-critical care).     52 minutes spent on total encounter. The necessity of the time spent during the encounter on this date of service was due to:     - Reviewing, and interpreting labs, testing, and imaging.  - Independently obtaining a review of systems and performing a physical exam  - Reviewing prior records and where necessary, outpatient records.  - Counselling and educating patient regarding interpretation of aforementioned items and plan of care.      d/w ACP

## 2025-02-25 NOTE — PROGRESS NOTE ADULT - SUBJECTIVE AND OBJECTIVE BOX
[facility] Division of Hospital Medicine  Angie Ross DO MS Teams PREFERRED      SUBJECTIVE / OVERNIGHT EVENTS: No acute events overnight. Patient reports breathing and cough are improved compared to admission.   ADDITIONAL REVIEW OF SYSTEMS:    MEDICATIONS  (STANDING):  albuterol/ipratropium for Nebulization 3 milliLiter(s) Nebulizer every 6 hours  apixaban 5 milliGRAM(s) Oral every 12 hours  aspirin enteric coated 81 milliGRAM(s) Oral daily  atorvastatin 40 milliGRAM(s) Oral at bedtime  benzonatate 100 milliGRAM(s) Oral three times a day  buMETAnide 2 milliGRAM(s) Oral two times a day  cefTRIAXone   IVPB 1000 milliGRAM(s) IV Intermittent every 24 hours  dextrose 5%. 1000 milliLiter(s) (100 mL/Hr) IV Continuous <Continuous>  dextrose 5%. 1000 milliLiter(s) (50 mL/Hr) IV Continuous <Continuous>  dextrose 50% Injectable 25 Gram(s) IV Push once  dextrose 50% Injectable 12.5 Gram(s) IV Push once  dextrose 50% Injectable 25 Gram(s) IV Push once  diltiazem    milliGRAM(s) Oral daily  doxazosin 2 milliGRAM(s) Oral daily  dronedarone 400 milliGRAM(s) Oral two times a day  glucagon  Injectable 1 milliGRAM(s) IntraMuscular once  hydrocodone/homatropine Syrup 5 milliLiter(s) Oral three times a day  insulin glargine Injectable (LANTUS) 5 Unit(s) SubCutaneous every morning  insulin glargine Injectable (LANTUS) 10 Unit(s) SubCutaneous at bedtime  insulin lispro (ADMELOG) corrective regimen sliding scale   SubCutaneous three times a day before meals  insulin lispro (ADMELOG) corrective regimen sliding scale   SubCutaneous at bedtime  LORazepam     Tablet 0.5 milliGRAM(s) Oral at bedtime  oseltamivir      oseltamivir 30 milliGRAM(s) Oral every 12 hours  sodium chloride 0.65% Nasal 1 Spray(s) Both Nostrils three times a day    MEDICATIONS  (PRN):  dextrose Oral Gel 15 Gram(s) Oral once PRN Blood Glucose LESS THAN 70 milliGRAM(s)/deciliter  guaiFENesin Oral Liquid (Sugar-Free) 100 milliGRAM(s) Oral every 6 hours PRN Cough      I&O's Summary    24 Feb 2025 07:01  -  25 Feb 2025 07:00  --------------------------------------------------------  IN: 0 mL / OUT: 1100 mL / NET: -1100 mL    25 Feb 2025 07:01  -  25 Feb 2025 15:10  --------------------------------------------------------  IN: 370 mL / OUT: 450 mL / NET: -80 mL        PHYSICAL EXAM:  Vital Signs Last 24 Hrs  T(C): 36.7 (25 Feb 2025 12:06), Max: 36.9 (25 Feb 2025 04:57)  T(F): 98 (25 Feb 2025 12:06), Max: 98.4 (25 Feb 2025 04:57)  HR: 75 (25 Feb 2025 12:06) (68 - 77)  BP: 126/79 (25 Feb 2025 12:06) (126/79 - 131/58)  BP(mean): --  RR: 18 (25 Feb 2025 12:06) (18 - 18)  SpO2: 93% (25 Feb 2025 12:06) (90% - 95%)    Parameters below as of 25 Feb 2025 12:06  Patient On (Oxygen Delivery Method): nasal cannula  O2 Flow (L/min): 2    CONSTITUTIONAL: NAD   EYES: Conjunctiva and sclera clear  ENMT: Moist oral mucosa, no pharyngeal injection or exudates   NECK: Supple, midline  RESPIRATORY: Normal respiratory effort; coarse breath sounds diffusely  CARDIOVASCULAR: Regular rate and rhythm, normal S1 and S2.   ABDOMEN: Nontender to palpation, no rebound/guarding  PSYCH: A+O to person, place, and time; affect appropriate        LABS:                        11.9   8.34  )-----------( 114      ( 25 Feb 2025 12:02 )             38.3     02-25    138  |  99  |  86[H]  ----------------------------<  152[H]  3.5   |  24  |  2.58[H]    Ca    8.9      25 Feb 2025 12:02  Mg     2.0     02-24            Urinalysis Basic - ( 25 Feb 2025 12:02 )    Color: x / Appearance: x / SG: x / pH: x  Gluc: 152 mg/dL / Ketone: x  / Bili: x / Urobili: x   Blood: x / Protein: x / Nitrite: x   Leuk Esterase: x / RBC: x / WBC x   Sq Epi: x / Non Sq Epi: x / Bacteria: x          RADIOLOGY & ADDITIONAL TESTS:  Results Reviewed:   Imaging Personally Reviewed:  Electrocardiogram Personally Reviewed:    COORDINATION OF CARE:  Care Discussed with Consultants/Other Providers [Y/N]: Y  Prior or Outpatient Records Reviewed [Y/N]: Y

## 2025-02-25 NOTE — PROGRESS NOTE ADULT - SUBJECTIVE AND OBJECTIVE BOX
DATE OF SERVICE: 02-25-25 @ 12:59    Patient is a 79y old  Female who presents with a chief complaint of Influenza A (24 Feb 2025 16:46)      INTERVAL HISTORY: Feels ok.     REVIEW OF SYSTEMS:  CONSTITUTIONAL: No weakness  EYES/ENT: No visual changes;  No throat pain   NECK: No pain or stiffness  RESPIRATORY: + cough, wheezing; No shortness of breath  CARDIOVASCULAR: No chest pain or palpitations  GASTROINTESTINAL: No abdominal  pain. No nausea, vomiting, or hematemesis  GENITOURINARY: No dysuria, frequency or hematuria  NEUROLOGICAL: No stroke like symptoms  SKIN: No rashes    	  MEDICATIONS:  buMETAnide 2 milliGRAM(s) Oral two times a day  diltiazem    milliGRAM(s) Oral daily  doxazosin 2 milliGRAM(s) Oral daily  dronedarone 400 milliGRAM(s) Oral two times a day        PHYSICAL EXAM:  T(C): 36.7 (02-25-25 @ 12:06), Max: 36.9 (02-25-25 @ 04:57)  HR: 75 (02-25-25 @ 12:06) (68 - 77)  BP: 126/79 (02-25-25 @ 12:06) (126/79 - 135/70)  RR: 18 (02-25-25 @ 12:06) (18 - 18)  SpO2: 93% (02-25-25 @ 12:06) (90% - 95%)  Wt(kg): --  I&O's Summary    24 Feb 2025 07:01  -  25 Feb 2025 07:00  --------------------------------------------------------  IN: 0 mL / OUT: 1100 mL / NET: -1100 mL          Appearance: In no distress	  HEENT:    PERRL, EOMI	  Cardiovascular:  S1 S2, No JVD  Respiratory: Lungs clear to auscultation	  Gastrointestinal:  Soft, Non-tender, + BS	  Vascularature:  No edema of LE  Psychiatric: Appropriate affect   Neuro: no acute focal deficits                               11.9   8.34  )-----------( 114      ( 25 Feb 2025 12:02 )             38.3     02-25    138  |  99  |  86[H]  ----------------------------<  152[H]  3.5   |  24  |  2.58[H]    Ca    8.9      25 Feb 2025 12:02  Mg     2.0     02-24          Labs personally reviewed      ASSESSMENT/PLAN: 	      79yoF w/ PMHx of s/p CVA, CAD, HFpEF, Afib on Eliquis, IDDM, CKD, asthma (hospitalization per PCP note in 2024 for acute bronchitis and asthma exacerbation), anxiety, and obesity who presents w/ complaints of severe LE swelling over the past two weeks, right > left, along with new cough and wheezing, found to be positive for Influenza A, w/ LE Dopplers negative for acute DVT (though exam limited by body habitus and pain).       Problem/Plan - 1:  ·  Problem: Acute diastolic HF  ·  Plan: On Bumex 2mg PO BID as OP  - Recent OP TTE with preserved EF and grade 1 DD  - TTE unremarkable with normal filling pressures  - rec resume home diuretics    Problem/Plan - 2:  ·  Problem: Chronic atrial fibrillation.   ·  Plan: - continue home Cardizem, and Dronedarone  - continue home AC w/ Eliquis BID.    Problem/Plan - 3:  ·  Problem: Diabetes mellitus.   ·  Plan: - at home patient reportedly on Lantus 18units QHS and 9units in AM  - will continue w/ Lantus 10units QHS and 5units in AM for now, w/ mISS and serial FS monitoring.    Problem/Plan - 4:  ·  Problem:  DVT PPX  ·  Plan: Nomi De Leon, AG-NP   Cole Christopher,  [facility]  Cardiovascular Medicine  [address] [facility] [address] [facility] [address], [address]  Available through call or text on [employer] TE[employer]  Office: [phone]   DATE OF SERVICE: 02-25-25 @ 12:59    Patient is a 79y old  Female who presents with a chief complaint of Influenza A (24 Feb 2025 16:46)      INTERVAL HISTORY: Feels ok.     REVIEW OF SYSTEMS:  CONSTITUTIONAL: No weakness  EYES/ENT: No visual changes;  No throat pain   NECK: No pain or stiffness  RESPIRATORY: + cough, wheezing; No shortness of breath  CARDIOVASCULAR: No chest pain or palpitations  GASTROINTESTINAL: No abdominal  pain. No nausea, vomiting, or hematemesis  GENITOURINARY: No dysuria, frequency or hematuria  NEUROLOGICAL: No stroke like symptoms  SKIN: No rashes    	  MEDICATIONS:  buMETAnide 2 milliGRAM(s) Oral two times a day  diltiazem    milliGRAM(s) Oral daily  doxazosin 2 milliGRAM(s) Oral daily  dronedarone 400 milliGRAM(s) Oral two times a day        PHYSICAL EXAM:  T(C): 36.7 (02-25-25 @ 12:06), Max: 36.9 (02-25-25 @ 04:57)  HR: 75 (02-25-25 @ 12:06) (68 - 77)  BP: 126/79 (02-25-25 @ 12:06) (126/79 - 135/70)  RR: 18 (02-25-25 @ 12:06) (18 - 18)  SpO2: 93% (02-25-25 @ 12:06) (90% - 95%)  Wt(kg): --  I&O's Summary    24 Feb 2025 07:01  -  25 Feb 2025 07:00  --------------------------------------------------------  IN: 0 mL / OUT: 1100 mL / NET: -1100 mL          Appearance: In no distress	  HEENT:    PERRL, EOMI	  Cardiovascular:  S1 S2, No JVD  Respiratory: Lungs clear to auscultation	  Gastrointestinal:  Soft, Non-tender, + BS	  Vascularature:  No edema of LE  Psychiatric: Appropriate affect   Neuro: no acute focal deficits                               11.9   8.34  )-----------( 114      ( 25 Feb 2025 12:02 )             38.3     02-25    138  |  99  |  86[H]  ----------------------------<  152[H]  3.5   |  24  |  2.58[H]    Ca    8.9      25 Feb 2025 12:02  Mg     2.0     02-24          Labs personally reviewed      ASSESSMENT/PLAN: 	      79yoF w/ PMHx of s/p CVA, CAD, HFpEF, Afib on Eliquis, IDDM, CKD, asthma (hospitalization per PCP note in 2024 for acute bronchitis and asthma exacerbation), anxiety, and obesity who presents w/ complaints of severe LE swelling over the past two weeks, right > left, along with new cough and wheezing, found to be positive for Influenza A, w/ LE Dopplers negative for acute DVT (though exam limited by body habitus and pain).       Problem/Plan - 1:  ·  Problem: Acute diastolic HF  ·  Plan: On Bumex 2mg PO BID as OP  - Recent OP TTE with preserved EF and grade 1 DD  - TTE unremarkable with normal filling pressures  - Hold diuretics given ROYAL on CKD. Will resume PO Bumex pending am Cr.     Problem/Plan - 2:  ·  Problem: Chronic atrial fibrillation.   ·  Plan: - continue home Cardizem, and Dronedarone  - continue home AC w/ Eliquis BID.    Problem/Plan - 3:  ·  Problem: Diabetes mellitus.   ·  Plan: - at home patient reportedly on Lantus 18units QHS and 9units in AM  - will continue w/ Lantus 10units QHS and 5units in AM for now, w/ mISS and serial FS monitoring.    Problem/Plan - 4:  ·  Problem:  DVT PPX  ·  Plan: Nomi De Leon, ASHLEY-NP   Cole Christopher DO [facility]  Cardiovascular Medicine  [address] [facility], [address]  Available through call or text on [employer] TE[employer]  Office: [phone]   DATE OF SERVICE: 02-25-25 @ 12:59    Patient is a 79y old  Female who presents with a chief complaint of Influenza A (24 Feb 2025 16:46)      INTERVAL HISTORY: Feels ok.     REVIEW OF SYSTEMS:  CONSTITUTIONAL: No weakness  EYES/ENT: No visual changes;  No throat pain   NECK: No pain or stiffness  RESPIRATORY: + cough, wheezing; No shortness of breath  CARDIOVASCULAR: No chest pain or palpitations  GASTROINTESTINAL: No abdominal  pain. No nausea, vomiting, or hematemesis  GENITOURINARY: No dysuria, frequency or hematuria  NEUROLOGICAL: No stroke like symptoms  SKIN: No rashes    	  MEDICATIONS:  buMETAnide 2 milliGRAM(s) Oral two times a day  diltiazem    milliGRAM(s) Oral daily  doxazosin 2 milliGRAM(s) Oral daily  dronedarone 400 milliGRAM(s) Oral two times a day        PHYSICAL EXAM:  T(C): 36.7 (02-25-25 @ 12:06), Max: 36.9 (02-25-25 @ 04:57)  HR: 75 (02-25-25 @ 12:06) (68 - 77)  BP: 126/79 (02-25-25 @ 12:06) (126/79 - 135/70)  RR: 18 (02-25-25 @ 12:06) (18 - 18)  SpO2: 93% (02-25-25 @ 12:06) (90% - 95%)  Wt(kg): --  I&O's Summary    24 Feb 2025 07:01  -  25 Feb 2025 07:00  --------------------------------------------------------  IN: 0 mL / OUT: 1100 mL / NET: -1100 mL          Appearance: In no distress	  HEENT:    PERRL, EOMI	  Cardiovascular:  S1 S2, No JVD  Respiratory: Lungs clear to auscultation	  Gastrointestinal:  Soft, Non-tender, + BS	  Vascularature:  No edema of LE  Psychiatric: Appropriate affect   Neuro: no acute focal deficits                               11.9   8.34  )-----------( 114      ( 25 Feb 2025 12:02 )             38.3     02-25    138  |  99  |  86[H]  ----------------------------<  152[H]  3.5   |  24  |  2.58[H]    Ca    8.9      25 Feb 2025 12:02  Mg     2.0     02-24          Labs personally reviewed      ASSESSMENT/PLAN: 	    79yoF w/ PMHx of s/p CVA, CAD, HFpEF, Afib on Eliquis, IDDM, CKD, asthma (hospitalization per PCP note in 2024 for acute bronchitis and asthma exacerbation), anxiety, and obesity who presents w/ complaints of severe LE swelling over the past two weeks, right > left, along with new cough and wheezing, found to be positive for Influenza A, w/ LE Dopplers negative for acute DVT (though exam limited by body habitus and pain).       Problem/Plan - 1:  ·  Problem: Acute diastolic HF  ·  Plan: On Bumex 2mg PO BID as OP  - Recent OP TTE with preserved EF and grade 1 DD  - TTE unremarkable with normal filling pressures  - Hold diuretics given ROYAL on CKD. Will resume PO Bumex pending am Cr.     Problem/Plan - 2:  ·  Problem: Chronic atrial fibrillation.   ·  Plan: - continue home Cardizem, and Dronedarone  - continue home AC w/ Eliquis BID.    Problem/Plan - 3:  ·  Problem: Diabetes mellitus.   ·  Plan: - at home patient reportedly on Lantus 18units QHS and 9units in AM  - will continue w/ Lantus 10units QHS and 5units in AM for now, w/ mISS and serial FS monitoring.    Problem/Plan - 4:  ·  Problem:  DVT PPX  ·  Plan: Nomi De Leon, ASHLEY-NP   Cole Christopher DO [facility]  Cardiovascular Medicine  [address] [facility] [address] [facility], [address]  Available through call or text on [employer] TE[employer]  Office: [phone]

## 2025-02-25 NOTE — PROGRESS NOTE ADULT - PROBLEM SELECTOR PLAN 2
On outpatient labs, Cre 1.7-1.9   Cre uptrending, possible ATN in setting of infection, also possible prerenal given infection/poor oral intake and concomitant diuretic use.   Encourage oral intake  Hold home lisinopril for now  Resume home Bumex per cardiology  Continue to monitor On outpatient labs, Cre 1.7-1.9   Cre uptrending, possible ATN in setting of infection, also possible prerenal given infection/poor oral intake and concomitant diuretic use.   Encourage oral intake  Hold home Bumex and lisinopril for now given ROYAL  Continue to monitor

## 2025-02-26 LAB
ADD ON TEST-SPECIMEN IN LAB: SIGNIFICANT CHANGE UP
ADD ON TEST-SPECIMEN IN LAB: SIGNIFICANT CHANGE UP
ANION GAP SERPL CALC-SCNC: 15 MMOL/L — SIGNIFICANT CHANGE UP (ref 5–17)
BUN SERPL-MCNC: 79 MG/DL — HIGH (ref 7–23)
CALCIUM SERPL-MCNC: 9 MG/DL — SIGNIFICANT CHANGE UP (ref 8.4–10.5)
CHLORIDE SERPL-SCNC: 101 MMOL/L — SIGNIFICANT CHANGE UP (ref 96–108)
CO2 SERPL-SCNC: 24 MMOL/L — SIGNIFICANT CHANGE UP (ref 22–31)
CREAT SERPL-MCNC: 2.07 MG/DL — HIGH (ref 0.5–1.3)
EGFR: 24 ML/MIN/1.73M2 — LOW
EGFR: 24 ML/MIN/1.73M2 — LOW
GLUCOSE BLDC GLUCOMTR-MCNC: 145 MG/DL — HIGH (ref 70–99)
GLUCOSE BLDC GLUCOMTR-MCNC: 145 MG/DL — HIGH (ref 70–99)
GLUCOSE BLDC GLUCOMTR-MCNC: 166 MG/DL — HIGH (ref 70–99)
GLUCOSE BLDC GLUCOMTR-MCNC: 168 MG/DL — HIGH (ref 70–99)
GLUCOSE SERPL-MCNC: 170 MG/DL — HIGH (ref 70–99)
HCT VFR BLD CALC: 38.4 % — SIGNIFICANT CHANGE UP (ref 34.5–45)
HGB BLD-MCNC: 12 G/DL — SIGNIFICANT CHANGE UP (ref 11.5–15.5)
MCHC RBC-ENTMCNC: 26.8 PG — LOW (ref 27–34)
MCHC RBC-ENTMCNC: 31.3 G/DL — LOW (ref 32–36)
MCV RBC AUTO: 85.9 FL — SIGNIFICANT CHANGE UP (ref 80–100)
NRBC BLD AUTO-RTO: 0 /100 WBCS — SIGNIFICANT CHANGE UP (ref 0–0)
PLATELET # BLD AUTO: 113 K/UL — LOW (ref 150–400)
POTASSIUM SERPL-MCNC: 3.6 MMOL/L — SIGNIFICANT CHANGE UP (ref 3.5–5.3)
POTASSIUM SERPL-SCNC: 3.6 MMOL/L — SIGNIFICANT CHANGE UP (ref 3.5–5.3)
RBC # BLD: 4.47 M/UL — SIGNIFICANT CHANGE UP (ref 3.8–5.2)
RBC # FLD: 16.7 % — HIGH (ref 10.3–14.5)
SODIUM SERPL-SCNC: 140 MMOL/L — SIGNIFICANT CHANGE UP (ref 135–145)
URATE SERPL-MCNC: 13.4 MG/DL — HIGH (ref 2.5–7)
WBC # BLD: 8.93 K/UL — SIGNIFICANT CHANGE UP (ref 3.8–10.5)
WBC # FLD AUTO: 8.93 K/UL — SIGNIFICANT CHANGE UP (ref 3.8–10.5)

## 2025-02-26 PROCEDURE — 73620 X-RAY EXAM OF FOOT: CPT | Mod: 26,RT

## 2025-02-26 PROCEDURE — 99233 SBSQ HOSP IP/OBS HIGH 50: CPT

## 2025-02-26 RX ORDER — ACETAMINOPHEN 500 MG/5ML
650 LIQUID (ML) ORAL ONCE
Refills: 0 | Status: COMPLETED | OUTPATIENT
Start: 2025-02-26 | End: 2025-02-26

## 2025-02-26 RX ORDER — COLCHICINE 0.6 MG/1
0.6 TABLET, FILM COATED ORAL ONCE
Refills: 0 | Status: COMPLETED | OUTPATIENT
Start: 2025-02-26 | End: 2025-02-26

## 2025-02-26 RX ORDER — COLCHICINE 0.6 MG/1
1.2 TABLET, FILM COATED ORAL ONCE
Refills: 0 | Status: COMPLETED | OUTPATIENT
Start: 2025-02-26 | End: 2025-02-26

## 2025-02-26 RX ADMIN — APIXABAN 5 MILLIGRAM(S): 2.5 TABLET, FILM COATED ORAL at 05:33

## 2025-02-26 RX ADMIN — COLCHICINE 0.6 MILLIGRAM(S): 0.6 TABLET, FILM COATED ORAL at 13:43

## 2025-02-26 RX ADMIN — IPRATROPIUM BROMIDE AND ALBUTEROL SULFATE 3 MILLILITER(S): .5; 2.5 SOLUTION RESPIRATORY (INHALATION) at 18:15

## 2025-02-26 RX ADMIN — INSULIN LISPRO 2: 100 INJECTION, SOLUTION INTRAVENOUS; SUBCUTANEOUS at 13:00

## 2025-02-26 RX ADMIN — INSULIN GLARGINE-YFGN 5 UNIT(S): 100 INJECTION, SOLUTION SUBCUTANEOUS at 08:29

## 2025-02-26 RX ADMIN — Medication 650 MILLIGRAM(S): at 21:29

## 2025-02-26 RX ADMIN — Medication 100 MILLIGRAM(S): at 21:29

## 2025-02-26 RX ADMIN — Medication 81 MILLIGRAM(S): at 12:31

## 2025-02-26 RX ADMIN — Medication 1 SPRAY(S): at 21:28

## 2025-02-26 RX ADMIN — IPRATROPIUM BROMIDE AND ALBUTEROL SULFATE 3 MILLILITER(S): .5; 2.5 SOLUTION RESPIRATORY (INHALATION) at 06:00

## 2025-02-26 RX ADMIN — DOXAZOSIN MESYLATE 2 MILLIGRAM(S): 8 TABLET ORAL at 05:32

## 2025-02-26 RX ADMIN — OSELTAMIVIR PHOSPHATE 30 MILLIGRAM(S): 75 CAPSULE ORAL at 05:32

## 2025-02-26 RX ADMIN — DILTIAZEM HYDROCHLORIDE 360 MILLIGRAM(S): 240 TABLET, EXTENDED RELEASE ORAL at 05:31

## 2025-02-26 RX ADMIN — CEFTRIAXONE 100 MILLIGRAM(S): 500 INJECTION, POWDER, FOR SOLUTION INTRAMUSCULAR; INTRAVENOUS at 12:32

## 2025-02-26 RX ADMIN — ATORVASTATIN CALCIUM 40 MILLIGRAM(S): 80 TABLET, FILM COATED ORAL at 21:29

## 2025-02-26 RX ADMIN — IPRATROPIUM BROMIDE AND ALBUTEROL SULFATE 3 MILLILITER(S): .5; 2.5 SOLUTION RESPIRATORY (INHALATION) at 12:32

## 2025-02-26 RX ADMIN — DEXTROMETHORPHAN HBR, GUAIFENESIN 100 MILLIGRAM(S): 200 LIQUID ORAL at 12:32

## 2025-02-26 RX ADMIN — IPRATROPIUM BROMIDE AND ALBUTEROL SULFATE 3 MILLILITER(S): .5; 2.5 SOLUTION RESPIRATORY (INHALATION) at 00:19

## 2025-02-26 RX ADMIN — DRONEDARONE 400 MILLIGRAM(S): 400 TABLET, FILM COATED ORAL at 18:15

## 2025-02-26 RX ADMIN — Medication 650 MILLIGRAM(S): at 22:20

## 2025-02-26 RX ADMIN — Medication 100 MILLIGRAM(S): at 12:31

## 2025-02-26 RX ADMIN — Medication 100 MILLIGRAM(S): at 05:32

## 2025-02-26 RX ADMIN — Medication 1 SPRAY(S): at 12:32

## 2025-02-26 RX ADMIN — DEXTROMETHORPHAN HBR, GUAIFENESIN 100 MILLIGRAM(S): 200 LIQUID ORAL at 18:15

## 2025-02-26 RX ADMIN — Medication 1 SPRAY(S): at 05:33

## 2025-02-26 RX ADMIN — Medication 0.5 MILLIGRAM(S): at 05:32

## 2025-02-26 RX ADMIN — DRONEDARONE 400 MILLIGRAM(S): 400 TABLET, FILM COATED ORAL at 05:32

## 2025-02-26 RX ADMIN — APIXABAN 5 MILLIGRAM(S): 2.5 TABLET, FILM COATED ORAL at 18:15

## 2025-02-26 RX ADMIN — INSULIN GLARGINE-YFGN 10 UNIT(S): 100 INJECTION, SOLUTION SUBCUTANEOUS at 21:28

## 2025-02-26 RX ADMIN — COLCHICINE 1.2 MILLIGRAM(S): 0.6 TABLET, FILM COATED ORAL at 12:31

## 2025-02-26 RX ADMIN — INSULIN LISPRO 2: 100 INJECTION, SOLUTION INTRAVENOUS; SUBCUTANEOUS at 08:29

## 2025-02-26 NOTE — PROVIDER CONTACT NOTE (OTHER) - ASSESSMENT
Pt alert & oriented x 4. Denies chest pain, sob, palpitations.  All VSS. Pt alert & oriented x 4. Denies chest pain, sob, palpitations.  All VSS. Right foot seems red and swollen.

## 2025-02-26 NOTE — PROGRESS NOTE ADULT - NSPROGADDITIONALINFOA_GEN_ALL_CORE
Time-based billing (NON-critical care).     52 minutes spent on total encounter. The necessity of the time spent during the encounter on this date of service was due to:     - Reviewing, and interpreting labs, testing, and imaging.  - Independently obtaining a review of systems and performing a physical exam  - Reviewing prior records and where necessary, outpatient records.  - Counselling and educating patient regarding interpretation of aforementioned items and plan of care.      d/w ACP Time-based billing (NON-critical care).     55 minutes spent on total encounter. The necessity of the time spent during the encounter on this date of service was due to:     - Reviewing, and interpreting labs, testing, and imaging.  - Independently obtaining a review of systems and performing a physical exam  - Reviewing prior records and where necessary, outpatient records.  - Counselling and educating patient regarding interpretation of aforementioned items and plan of care.      d/w ACP

## 2025-02-26 NOTE — PROGRESS NOTE ADULT - PROBLEM SELECTOR PLAN 7
- As above, monitor respiratory status closely given ongoing Influenza infection - At home patient reportedly on Lantus 18units QHS and 9units in AM  - Will continue w/ Lantus 10units QHS and 5units in AM for now, w/ mISS and serial FS monitoring

## 2025-02-26 NOTE — PROGRESS NOTE ADULT - SUBJECTIVE AND OBJECTIVE BOX
[facility] Division of Hospital Medicine  Angie Ross DO  MS Teams PREFERRED      SUBJECTIVE / OVERNIGHT EVENTS: Patient frustrated that she is not feeling better - still with cough, nasal congestion. And now notes right foot pain.   ADDITIONAL REVIEW OF SYSTEMS:    MEDICATIONS  (STANDING):  albuterol/ipratropium for Nebulization 3 milliLiter(s) Nebulizer every 6 hours  apixaban 5 milliGRAM(s) Oral every 12 hours  aspirin enteric coated 81 milliGRAM(s) Oral daily  atorvastatin 40 milliGRAM(s) Oral at bedtime  benzonatate 100 milliGRAM(s) Oral three times a day  cefTRIAXone   IVPB 1000 milliGRAM(s) IV Intermittent every 24 hours  dextrose 5%. 1000 milliLiter(s) (100 mL/Hr) IV Continuous <Continuous>  dextrose 5%. 1000 milliLiter(s) (50 mL/Hr) IV Continuous <Continuous>  dextrose 50% Injectable 25 Gram(s) IV Push once  dextrose 50% Injectable 12.5 Gram(s) IV Push once  dextrose 50% Injectable 25 Gram(s) IV Push once  diltiazem    milliGRAM(s) Oral daily  doxazosin 2 milliGRAM(s) Oral daily  dronedarone 400 milliGRAM(s) Oral two times a day  glucagon  Injectable 1 milliGRAM(s) IntraMuscular once  insulin glargine Injectable (LANTUS) 5 Unit(s) SubCutaneous every morning  insulin glargine Injectable (LANTUS) 10 Unit(s) SubCutaneous at bedtime  insulin lispro (ADMELOG) corrective regimen sliding scale   SubCutaneous three times a day before meals  insulin lispro (ADMELOG) corrective regimen sliding scale   SubCutaneous at bedtime  LORazepam     Tablet 0.5 milliGRAM(s) Oral at bedtime  sodium chloride 0.65% Nasal 1 Spray(s) Both Nostrils three times a day    MEDICATIONS  (PRN):  dextrose Oral Gel 15 Gram(s) Oral once PRN Blood Glucose LESS THAN 70 milliGRAM(s)/deciliter  guaiFENesin Oral Liquid (Sugar-Free) 100 milliGRAM(s) Oral every 6 hours PRN Cough      I&O's Summary    25 Feb 2025 07:01  -  26 Feb 2025 07:00  --------------------------------------------------------  IN: 590 mL / OUT: 1400 mL / NET: -810 mL    26 Feb 2025 07:01  -  26 Feb 2025 15:00  --------------------------------------------------------  IN: 0 mL / OUT: 850 mL / NET: -850 mL        PHYSICAL EXAM:  Vital Signs Last 24 Hrs  T(C): 36.9 (26 Feb 2025 13:12), Max: 37.4 (25 Feb 2025 20:29)  T(F): 98.4 (26 Feb 2025 13:12), Max: 99.3 (25 Feb 2025 20:29)  HR: 87 (26 Feb 2025 13:12) (80 - 87)  BP: 137/69 (26 Feb 2025 13:12) (115/58 - 168/66)  BP(mean): --  RR: 18 (26 Feb 2025 13:12) (18 - 18)  SpO2: 94% (26 Feb 2025 13:12) (93% - 94%)    Parameters below as of 26 Feb 2025 13:12  Patient On (Oxygen Delivery Method): nasal cannula    CONSTITUTIONAL: NAD   EYES: Conjunctiva and sclera clear  ENMT: Moist oral mucosa, no pharyngeal injection or exudates   NECK: Supple, midline  RESPIRATORY: Normal respiratory effort; coarse breath sounds diffusely  CARDIOVASCULAR: Regular rate and rhythm, normal S1 and S2.   ABDOMEN: Nontender to palpation, no rebound/guarding  PSYCH: A+O to person, place, and time; affect appropriate  MSK: Right first metatarsal joint erythematous and tender.       LABS:                        12.0   8.93  )-----------( 113      ( 26 Feb 2025 07:08 )             38.4     02-26    140  |  101  |  79[H]  ----------------------------<  170[H]  3.6   |  24  |  2.07[H]    Ca    9.0      26 Feb 2025 07:05            Urinalysis Basic - ( 26 Feb 2025 07:05 )    Color: x / Appearance: x / SG: x / pH: x  Gluc: 170 mg/dL / Ketone: x  / Bili: x / Urobili: x   Blood: x / Protein: x / Nitrite: x   Leuk Esterase: x / RBC: x / WBC x   Sq Epi: x / Non Sq Epi: x / Bacteria: x          RADIOLOGY & ADDITIONAL TESTS:  Results Reviewed:   Imaging Personally Reviewed:  Electrocardiogram Personally Reviewed:    COORDINATION OF CARE:  Care Discussed with Consultants/Other Providers [Y/N]: Y  Prior or Outpatient Records Reviewed [Y/N]: Y

## 2025-02-26 NOTE — PROGRESS NOTE ADULT - PROBLEM SELECTOR PLAN 1
- Continue Tamiflu dosed per GFR  - Patient currently on NC - wean as tolerated  - Incentive spirometry, Duonebs, monitor peak flows given hx of asthma and hospitalization for asthma exacerbation in the past year  - Antitussive supportive care  - Start Ceftriaxone (2/25-) for possible superimposed PNA Patient with pain in right foot- 1st metatarsal joint erythematous and tender on examination  XR Right Foot:  No acute osseous abnormalities.  Elevated uric acid levels   Suspect acute gout flare  Start Colchicine 1.2mg x 1 and 0.6mg x1 an hour later. Continue colchicine 0.6 mg once or twice daily until flare resolves. Crcl >30 - No renal adjustment needed  Avoid NSAIDs given risks in older adults and steroids in setting of infection.

## 2025-02-26 NOTE — PROVIDER CONTACT NOTE (OTHER) - SITUATION
Patient tried to ambulate with a walker and 2 Assist from the bed to the walker and could not bear weight on her right foot. Patient tried to ambulate with a walker and 2 Assist from the bed to the chair and could not bear weight on her right foot.

## 2025-02-26 NOTE — PROGRESS NOTE ADULT - PROBLEM SELECTOR PLAN 6
- At home patient reportedly on Lantus 18units QHS and 9units in AM  - Will continue w/ Lantus 10units QHS and 5units in AM for now, w/ mISS and serial FS monitoring - Continue home Cardura, Cardizem, and Dronedarone  - Continue home AC w/ Eliquis BID

## 2025-02-26 NOTE — PROGRESS NOTE ADULT - PROBLEM SELECTOR PLAN 5
- Continue home Cardura, Cardizem, and Dronedarone  - Continue home AC w/ Eliquis BID - Continue home ASA, statin  - Hold lisinopril due to ROYAL  - Diuretics as above  - Monitor fluid status, hemodynamics, renal function, and electrolytes closely

## 2025-02-26 NOTE — PROGRESS NOTE ADULT - PROBLEM SELECTOR PLAN 2
On outpatient labs, Cre 1.7-1.9   Cre uptrending, possible ATN in setting of infection, also possible prerenal given infection/poor oral intake and concomitant diuretic use.   Encourage oral intake  Hold home Bumex and lisinopril for now given ROYAL  Continue to monitor - Continue Tamiflu dosed per GFR  - Patient currently on NC - wean as tolerated  - Incentive spirometry, Duonebs, monitor peak flows given hx of asthma and hospitalization for asthma exacerbation in the past year  - Antitussive supportive care  - Continue Ceftriaxone (2/25-) for possible superimposed PNA

## 2025-02-26 NOTE — PROGRESS NOTE ADULT - PROBLEM SELECTOR PLAN 3
- Unclear etiology as LE Dopplers remain negative, ?could be related to hypervolemia iso infection as above  - Will hold off on further Abx at this time and continue to closely monitor  - Appreciate cardiology's evaluation and recommendations -- held IV Bumex 2mg BID that was initiated d/t ROYAL on 2/23 -- monitoring electrolytes, renal function, hemodynamics, and fluid status closely and f/u cardiology for plan for diuretic resumption. On outpatient labs, Cre 1.7-1.9   Cre elevated, possible ATN in setting of infection, also possible prerenal given infection/poor oral intake and concomitant diuretic use.   Encourage oral intake  Hold home Bumex and lisinopril for now given ROYAL  Continue to monitor

## 2025-02-26 NOTE — PROGRESS NOTE ADULT - PROBLEM SELECTOR PLAN 4
- Continue home ASA, statin  - Hold lisinopril due to ROYAL  - Diuretics as above  - Monitor fluid status, hemodynamics, renal function, and electrolytes closely - Unclear etiology as LE Dopplers remain negative, ?could be related to hypervolemia iso infection as above  - Will hold off on further Abx at this time and continue to closely monitor  - Appreciate cardiology's evaluation and recommendations -- held IV Bumex 2mg BID that was initiated d/t ROYAL on 2/23 -- monitoring electrolytes, renal function, hemodynamics, and fluid status closely and f/u cardiology for plan for diuretic resumption.

## 2025-02-26 NOTE — PROGRESS NOTE ADULT - SUBJECTIVE AND OBJECTIVE BOX
DATE OF SERVICE: 02-26-25 @ 14:26    Patient is a 79y old  Female who presents with a chief complaint of Influenza A (25 Feb 2025 15:10)      INTERVAL HISTORY: Feels ok. Has some toe discomfort, likely gout. Still with significant congestion and coughing.     REVIEW OF SYSTEMS:  CONSTITUTIONAL: No weakness  EYES/ENT: No visual changes;  No throat pain   NECK: No pain or stiffness  RESPIRATORY: + cough, + wheezing; No shortness of breath  CARDIOVASCULAR: No chest pain or palpitations  GASTROINTESTINAL: No abdominal  pain. No nausea, vomiting, or hematemesis  GENITOURINARY: No dysuria, frequency or hematuria  NEUROLOGICAL: No stroke like symptoms  SKIN: No rashes    	  MEDICATIONS:  diltiazem    milliGRAM(s) Oral daily  doxazosin 2 milliGRAM(s) Oral daily  dronedarone 400 milliGRAM(s) Oral two times a day        PHYSICAL EXAM:  T(C): 36.9 (02-26-25 @ 13:12), Max: 37.4 (02-25-25 @ 20:29)  HR: 87 (02-26-25 @ 13:12) (80 - 87)  BP: 137/69 (02-26-25 @ 13:12) (115/58 - 168/66)  RR: 18 (02-26-25 @ 13:12) (18 - 18)  SpO2: 94% (02-26-25 @ 13:12) (93% - 94%)  Wt(kg): --  I&O's Summary    25 Feb 2025 07:01  -  26 Feb 2025 07:00  --------------------------------------------------------  IN: 590 mL / OUT: 1400 mL / NET: -810 mL    26 Feb 2025 07:01  -  26 Feb 2025 14:26  --------------------------------------------------------  IN: 0 mL / OUT: 850 mL / NET: -850 mL          Appearance: In no distress	  HEENT:    PERRL, EOMI	  Cardiovascular:  S1 S2, No JVD  Respiratory: Lungs clear to auscultation	  Gastrointestinal:  Soft, Non-tender, + BS	  Vascularature:  No edema of LE  Psychiatric: Appropriate affect   Neuro: no acute focal deficits                               12.0   8.93  )-----------( 113      ( 26 Feb 2025 07:08 )             38.4     02-26    140  |  101  |  79[H]  ----------------------------<  170[H]  3.6   |  24  |  2.07[H]    Ca    9.0      26 Feb 2025 07:05          Labs personally reviewed      ASSESSMENT/PLAN: 	      79yoF w/ PMHx of s/p CVA, CAD, HFpEF, Afib on Eliquis, IDDM, CKD, asthma (hospitalization per PCP note in 2024 for acute bronchitis and asthma exacerbation), anxiety, and obesity who presents w/ complaints of severe LE swelling over the past two weeks, right > left, along with new cough and wheezing, found to be positive for Influenza A, w/ LE Dopplers negative for acute DVT (though exam limited by body habitus and pain).       Problem/Plan - 1:  ·  Problem: Acute diastolic HF  ·  Plan: On Bumex 2mg PO BID as OP  - Recent OP TTE with preserved EF and grade 1 DD  - TTE unremarkable with normal filling pressures  - Hold diuretics given ROYAL on CKD. Will resume PO Bumex likely 2/27    Problem/Plan - 2:  ·  Problem: Chronic atrial fibrillation.   ·  Plan: - continue home Cardizem, and Dronedarone  - continue home AC w/ Eliquis BID.    Problem/Plan - 3:  ·  Problem: Diabetes mellitus.   ·  Plan: - at home patient reportedly on Lantus 18units QHS and 9units in AM  - will continue w/ Lantus 10units QHS and 5units in AM for now, w/ mISS and serial FS monitoring.    Problem/Plan - 4:  ·  Problem:  DVT PPX  ·  Plan: Nomi De Leon, AG-NP   Cole Christopher DO [facility]  Cardiovascular Medicine  [address], [address]  Available through call or text on [employer] TE[employer]  Office: [phone]

## 2025-02-27 ENCOUNTER — APPOINTMENT (OUTPATIENT)
Dept: INTERNAL MEDICINE | Facility: CLINIC | Age: 80
End: 2025-02-27

## 2025-02-27 LAB
ANION GAP SERPL CALC-SCNC: 14 MMOL/L — SIGNIFICANT CHANGE UP (ref 5–17)
BUN SERPL-MCNC: 58 MG/DL — HIGH (ref 7–23)
CALCIUM SERPL-MCNC: 9.4 MG/DL — SIGNIFICANT CHANGE UP (ref 8.4–10.5)
CHLORIDE SERPL-SCNC: 106 MMOL/L — SIGNIFICANT CHANGE UP (ref 96–108)
CO2 SERPL-SCNC: 24 MMOL/L — SIGNIFICANT CHANGE UP (ref 22–31)
CREAT SERPL-MCNC: 1.58 MG/DL — HIGH (ref 0.5–1.3)
EGFR: 33 ML/MIN/1.73M2 — LOW
EGFR: 33 ML/MIN/1.73M2 — LOW
GI PCR PANEL: SIGNIFICANT CHANGE UP
GLUCOSE BLDC GLUCOMTR-MCNC: 129 MG/DL — HIGH (ref 70–99)
GLUCOSE BLDC GLUCOMTR-MCNC: 144 MG/DL — HIGH (ref 70–99)
GLUCOSE BLDC GLUCOMTR-MCNC: 150 MG/DL — HIGH (ref 70–99)
GLUCOSE BLDC GLUCOMTR-MCNC: 159 MG/DL — HIGH (ref 70–99)
GLUCOSE SERPL-MCNC: 139 MG/DL — HIGH (ref 70–99)
POTASSIUM SERPL-MCNC: 3.6 MMOL/L — SIGNIFICANT CHANGE UP (ref 3.5–5.3)
POTASSIUM SERPL-SCNC: 3.6 MMOL/L — SIGNIFICANT CHANGE UP (ref 3.5–5.3)
SODIUM SERPL-SCNC: 144 MMOL/L — SIGNIFICANT CHANGE UP (ref 135–145)

## 2025-02-27 PROCEDURE — 99233 SBSQ HOSP IP/OBS HIGH 50: CPT

## 2025-02-27 RX ORDER — COLCHICINE 0.6 MG/1
0.6 TABLET, FILM COATED ORAL DAILY
Refills: 0 | Status: DISCONTINUED | OUTPATIENT
Start: 2025-02-27 | End: 2025-02-27

## 2025-02-27 RX ADMIN — DILTIAZEM HYDROCHLORIDE 360 MILLIGRAM(S): 240 TABLET, EXTENDED RELEASE ORAL at 05:26

## 2025-02-27 RX ADMIN — IPRATROPIUM BROMIDE AND ALBUTEROL SULFATE 3 MILLILITER(S): .5; 2.5 SOLUTION RESPIRATORY (INHALATION) at 12:35

## 2025-02-27 RX ADMIN — Medication 100 MILLIGRAM(S): at 05:27

## 2025-02-27 RX ADMIN — DEXTROMETHORPHAN HBR, GUAIFENESIN 100 MILLIGRAM(S): 200 LIQUID ORAL at 17:48

## 2025-02-27 RX ADMIN — APIXABAN 5 MILLIGRAM(S): 2.5 TABLET, FILM COATED ORAL at 17:48

## 2025-02-27 RX ADMIN — INSULIN GLARGINE-YFGN 10 UNIT(S): 100 INJECTION, SOLUTION SUBCUTANEOUS at 22:18

## 2025-02-27 RX ADMIN — Medication 1 SPRAY(S): at 22:18

## 2025-02-27 RX ADMIN — Medication 100 MILLIGRAM(S): at 22:17

## 2025-02-27 RX ADMIN — DRONEDARONE 400 MILLIGRAM(S): 400 TABLET, FILM COATED ORAL at 17:48

## 2025-02-27 RX ADMIN — Medication 81 MILLIGRAM(S): at 12:35

## 2025-02-27 RX ADMIN — IPRATROPIUM BROMIDE AND ALBUTEROL SULFATE 3 MILLILITER(S): .5; 2.5 SOLUTION RESPIRATORY (INHALATION) at 17:47

## 2025-02-27 RX ADMIN — IPRATROPIUM BROMIDE AND ALBUTEROL SULFATE 3 MILLILITER(S): .5; 2.5 SOLUTION RESPIRATORY (INHALATION) at 00:48

## 2025-02-27 RX ADMIN — DOXAZOSIN MESYLATE 2 MILLIGRAM(S): 8 TABLET ORAL at 05:26

## 2025-02-27 RX ADMIN — Medication 1 SPRAY(S): at 05:29

## 2025-02-27 RX ADMIN — INSULIN GLARGINE-YFGN 5 UNIT(S): 100 INJECTION, SOLUTION SUBCUTANEOUS at 08:34

## 2025-02-27 RX ADMIN — Medication 1 SPRAY(S): at 15:08

## 2025-02-27 RX ADMIN — INSULIN LISPRO 2: 100 INJECTION, SOLUTION INTRAVENOUS; SUBCUTANEOUS at 08:31

## 2025-02-27 RX ADMIN — IPRATROPIUM BROMIDE AND ALBUTEROL SULFATE 3 MILLILITER(S): .5; 2.5 SOLUTION RESPIRATORY (INHALATION) at 05:26

## 2025-02-27 RX ADMIN — Medication 0.5 MILLIGRAM(S): at 05:27

## 2025-02-27 RX ADMIN — CEFTRIAXONE 100 MILLIGRAM(S): 500 INJECTION, POWDER, FOR SOLUTION INTRAMUSCULAR; INTRAVENOUS at 12:35

## 2025-02-27 RX ADMIN — DRONEDARONE 400 MILLIGRAM(S): 400 TABLET, FILM COATED ORAL at 05:26

## 2025-02-27 RX ADMIN — APIXABAN 5 MILLIGRAM(S): 2.5 TABLET, FILM COATED ORAL at 05:27

## 2025-02-27 RX ADMIN — DEXTROMETHORPHAN HBR, GUAIFENESIN 100 MILLIGRAM(S): 200 LIQUID ORAL at 08:37

## 2025-02-27 NOTE — PROGRESS NOTE ADULT - SUBJECTIVE AND OBJECTIVE BOX
DATE OF SERVICE: 02-27-25 @ 14:07    Patient is a 79y old  Female who presents with a chief complaint of Influenza A (26 Feb 2025 14:59)      INTERVAL HISTORY: Feels slightly better today.      REVIEW OF SYSTEMS:  CONSTITUTIONAL: No weakness  EYES/ENT: No visual changes;  No throat pain   NECK: No pain or stiffness  RESPIRATORY: + cough, wheezing; No shortness of breath  CARDIOVASCULAR: No chest pain or palpitations  GASTROINTESTINAL: No abdominal  pain. No nausea, vomiting, or hematemesis  GENITOURINARY: No dysuria, frequency or hematuria  NEUROLOGICAL: No stroke like symptoms  SKIN: No rashes    	  MEDICATIONS:  diltiazem    milliGRAM(s) Oral daily  doxazosin 2 milliGRAM(s) Oral daily  dronedarone 400 milliGRAM(s) Oral two times a day        PHYSICAL EXAM:  T(C): 37.1 (02-27-25 @ 13:00), Max: 37.1 (02-26-25 @ 21:29)  HR: 68 (02-27-25 @ 13:00) (68 - 76)  BP: 164/82 (02-27-25 @ 13:00) (139/77 - 164/82)  RR: 18 (02-27-25 @ 13:00) (18 - 18)  SpO2: 98% (02-27-25 @ 13:00) (91% - 98%)  Wt(kg): --  I&O's Summary    26 Feb 2025 07:01  -  27 Feb 2025 07:00  --------------------------------------------------------  IN: 0 mL / OUT: 1450 mL / NET: -1450 mL          Appearance: In no distress	  HEENT:    PERRL, EOMI	  Cardiovascular:  S1 S2, No JVD  Respiratory: diffuse wheezing	  Gastrointestinal:  Soft, Non-tender, + BS	  Vascularature:  No edema of LE  Psychiatric: Appropriate affect   Neuro: no acute focal deficits                               12.0   8.93  )-----------( 113      ( 26 Feb 2025 07:08 )             38.4     02-27    144  |  106  |  58[H]  ----------------------------<  139[H]  3.6   |  24  |  1.58[H]    Ca    9.4      27 Feb 2025 07:06          Labs personally reviewed      ASSESSMENT/PLAN: 	      79yoF w/ PMHx of s/p CVA, CAD, HFpEF, Afib on Eliquis, IDDM, CKD, asthma (hospitalization per PCP note in 2024 for acute bronchitis and asthma exacerbation), anxiety, and obesity who presents w/ complaints of severe LE swelling over the past two weeks, right > left, along with new cough and wheezing, found to be positive for Influenza A, w/ LE Dopplers negative for acute DVT (though exam limited by body habitus and pain).       Problem/Plan - 1:  ·  Problem: Acute diastolic HF  ·  Plan: On Bumex 2mg PO BID as OP  - Recent OP TTE with preserved EF and grade 1 DD  - TTE unremarkable with normal filling pressures  - Hold diuretics given ROYAL on CKD. Will resume PO Bumex likely 2/28    Problem/Plan - 2:  ·  Problem: Chronic atrial fibrillation.   ·  Plan: - continue home Cardizem, and Dronedarone  - continue home AC w/ Eliquis BID.    Problem/Plan - 3:  ·  Problem: Diabetes mellitus.   ·  Plan: - at home patient reportedly on Lantus 18units QHS and 9units in AM  - will continue w/ Lantus 10units QHS and 5units in AM for now, w/ mISS and serial FS monitoring.    Problem/Plan - 4:  ·  Problem:  DVT PPX  ·  Plan: Nomi De Leon, ASHLEY-JAXON Christopher,  [facility]  Cardiovascular Medicine  [address], [address]  Available through call or text on [employer]  Office: [phone]

## 2025-02-27 NOTE — PROGRESS NOTE ADULT - PROBLEM SELECTOR PLAN 2
- Continue Tamiflu dosed per GFR  - Patient currently on NC - wean as tolerated  - Incentive spirometry, Duonebs, monitor peak flows given hx of asthma and hospitalization for asthma exacerbation in the past year  - Antitussive supportive care  - Continue Ceftriaxone (2/25-) for possible superimposed PNA x 5 days

## 2025-02-27 NOTE — PROGRESS NOTE ADULT - SUBJECTIVE AND OBJECTIVE BOX
[facility] Division of Hospital Medicine  Angie Ross DO  [employer] Teams PREFERRED      SUBJECTIVE / OVERNIGHT EVENTS: Patient reports cough, breathing, and pain in right toe improved. She notes diarrhea - she normally has one BM/day (loose still admission) but today, 3 episodes this morning. Stool studies were collected.  ADDITIONAL REVIEW OF SYSTEMS:    MEDICATIONS  (STANDING):  albuterol/ipratropium for Nebulization 3 milliLiter(s) Nebulizer every 6 hours  apixaban 5 milliGRAM(s) Oral every 12 hours  aspirin enteric coated 81 milliGRAM(s) Oral daily  atorvastatin 40 milliGRAM(s) Oral at bedtime  benzonatate 100 milliGRAM(s) Oral three times a day  cefTRIAXone   IVPB 1000 milliGRAM(s) IV Intermittent every 24 hours  colchicine 0.6 milliGRAM(s) Oral daily  dextrose 5%. 1000 milliLiter(s) (100 mL/Hr) IV Continuous <Continuous>  dextrose 5%. 1000 milliLiter(s) (50 mL/Hr) IV Continuous <Continuous>  dextrose 50% Injectable 25 Gram(s) IV Push once  dextrose 50% Injectable 12.5 Gram(s) IV Push once  dextrose 50% Injectable 25 Gram(s) IV Push once  diltiazem    milliGRAM(s) Oral daily  doxazosin 2 milliGRAM(s) Oral daily  dronedarone 400 milliGRAM(s) Oral two times a day  glucagon  Injectable 1 milliGRAM(s) IntraMuscular once  insulin glargine Injectable (LANTUS) 5 Unit(s) SubCutaneous every morning  insulin glargine Injectable (LANTUS) 10 Unit(s) SubCutaneous at bedtime  insulin lispro (ADMELOG) corrective regimen sliding scale   SubCutaneous three times a day before meals  insulin lispro (ADMELOG) corrective regimen sliding scale   SubCutaneous at bedtime  LORazepam     Tablet 0.5 milliGRAM(s) Oral at bedtime  sodium chloride 0.65% Nasal 1 Spray(s) Both Nostrils three times a day    MEDICATIONS  (PRN):  dextrose Oral Gel 15 Gram(s) Oral once PRN Blood Glucose LESS THAN 70 milliGRAM(s)/deciliter  guaiFENesin Oral Liquid (Sugar-Free) 100 milliGRAM(s) Oral every 6 hours PRN Cough      I&O's Summary    26 Feb 2025 07:01  -  27 Feb 2025 07:00  --------------------------------------------------------  IN: 0 mL / OUT: 1450 mL / NET: -1450 mL    27 Feb 2025 07:01  -  27 Feb 2025 17:24  --------------------------------------------------------  IN: 0 mL / OUT: 450 mL / NET: -450 mL        PHYSICAL EXAM:  Vital Signs Last 24 Hrs  T(C): 37.1 (27 Feb 2025 13:00), Max: 37.1 (26 Feb 2025 21:29)  T(F): 98.7 (27 Feb 2025 13:00), Max: 98.8 (26 Feb 2025 21:29)  HR: 68 (27 Feb 2025 13:00) (68 - 76)  BP: 164/82 (27 Feb 2025 13:00) (139/77 - 164/82)  BP(mean): --  RR: 18 (27 Feb 2025 13:00) (18 - 18)  SpO2: 98% (27 Feb 2025 13:00) (91% - 98%)    Parameters below as of 27 Feb 2025 13:00  Patient On (Oxygen Delivery Method): nasal cannula    CONSTITUTIONAL: NAD   EYES: Conjunctiva and sclera clear  ENMT: Moist oral mucosa, no pharyngeal injection or exudates   NECK: Supple, midline  RESPIRATORY: Normal respiratory effort; coarse breath sounds diffusely  CARDIOVASCULAR: Regular rate and rhythm, normal S1 and S2.   ABDOMEN: Nontender to palpation, no rebound/guarding  PSYCH: A+O to person, place, and time; affect appropriate  MSK: Right first metatarsal joint erythematous and tender.         LABS:                        12.0   8.93  )-----------( 113      ( 26 Feb 2025 07:08 )             38.4     02-27    144  |  106  |  58[H]  ----------------------------<  139[H]  3.6   |  24  |  1.58[H]    Ca    9.4      27 Feb 2025 07:06            Urinalysis Basic - ( 27 Feb 2025 07:06 )    Color: x / Appearance: x / SG: x / pH: x  Gluc: 139 mg/dL / Ketone: x  / Bili: x / Urobili: x   Blood: x / Protein: x / Nitrite: x   Leuk Esterase: x / RBC: x / WBC x   Sq Epi: x / Non Sq Epi: x / Bacteria: x          RADIOLOGY & ADDITIONAL TESTS:  Results Reviewed:   Imaging Personally Reviewed:  Electrocardiogram Personally Reviewed:    COORDINATION OF CARE:  Care Discussed with Consultants/Other Providers [Y/N]: Y  Prior or Outpatient Records Reviewed [Y/N]: Y

## 2025-02-27 NOTE — PROGRESS NOTE ADULT - PROBLEM SELECTOR PLAN 1
Patient with pain in right foot- 1st metatarsal joint erythematous and tender on examination  XR Right Foot:  No acute osseous abnormalities.  Elevated uric acid levels   Suspect acute gout flare  s/p Colchicine 1.2mg x 1 and 0.6mg x1 an hour later. Continue colchicine 0.6 mg once daily until flare resolves. Crcl >30 - No renal adjustment needed. Likely discontinue tomorrow as it may also be worsening diarrhea.  Avoid NSAIDs given risks in older adults and steroids in setting of infection.

## 2025-02-27 NOTE — PROGRESS NOTE ADULT - PROBLEM SELECTOR PLAN 4
- Unclear etiology as LE Dopplers remain negative, ?could be related to hypervolemia iso infection as above  - Will hold off on further Abx at this time and continue to closely monitor  - Appreciate cardiology's evaluation and recommendations -- held IV Bumex 2mg BID that was initiated d/t ROYAL on 2/23 -- monitoring electrolytes, renal function, hemodynamics, and fluid status closely and f/u cardiology for plan for diuretic resumption.

## 2025-02-27 NOTE — CHART NOTE - NSCHARTNOTEFT_GEN_A_CORE
Confidential Drug Utilization Report  Search Terms: Nel Cabrera, 1945Search Date: 02/21/2025 19:10:39 PM  The Drug Utilization Report below displays all of the controlled substance prescriptions, if any, that your patient has filled in the last twelve months. The information displayed on this report is compiled from pharmacy submissions to the Department, and accurately reflects the information as submitted by the pharmacies.    This report was requested by: Maricarmen Paredes | Reference #: [id number]    Practitioner Count: 2  Pharmacy Count: 2  Current Opioid Prescriptions: 0  Current Benzodiazepine Prescriptions: 1  Current Stimulant Prescriptions: 0      Patient Demographic Information (PDI)       PDI	First Name	Last Name	Birth Date	Gender	Street Address	[facility]	Zip Code  [place]	Nel Cabrera	1945	Female	[address] [address]    Prescription Information      PDI Filter:    PDI	Current Rx	Drug Type	Rx Written	Rx Dispensed	Drug	Quantity	Days Supply	Prescriber Name	Prescriber MOSES #	Payment Method	Dispenser  A	Y	B	02/11/2025	02/13/2025	lorazepam 0.5 mg tablet	30	30	Jo Ann Treadwell	[id number]	Medicare	[id number]  A	N	B	01/06/2025	01/08/2025	lorazepam 0.5 mg tablet	30	30	Jo Ann Treadwell	[id number]	Medicare	[id number]  A	N	B	12/05/2024	12/06/2024	lorazepam 0.5 mg tablet	30	30	Liam Bai NP	[id number]	Medicare	[id number]  A	N	B	11/05/2024	11/06/2024	lorazepam 0.5 mg tablet	30	30	Tonya Larson	[id number]	Medicare	[id number]  A	N	B	10/02/2024	10/07/2024	lorazepam 0.5 mg tablet	30	30	Jo Ann Treadwell	[id number]	Medicare	[id number]  A	N	B	08/29/2024	09/03/2024	lorazepam 0.5 mg tablet	30	30	Tonya Larson	[id number]	Medicare	[id number]  A	N	B	07/24/2024	07/28/2024	lorazepam 0.5 mg tablet	30	30	Tonya Larson	[id number]	Medicare	[id number]  A	N	O	07/19/2024	07/21/2024	tramadol hcl 50 mg tablet	60	20	JoshRossy taylor	[id number]	[id number]  A	N	B	06/21/2024	06/24/2024	lorazepam 0.5 mg tablet	30	30	Tonya Larson	[id number]	Medicare	[id number]  A	N	B	05/17/2024	05/22/2024	lorazepam 0.5 mg tablet	30	30	Tonya Larson	[id number]	Medicare	[id number]  A	N	B	03/15/2024	03/20/2024	lorazepam 0.5 mg tablet	30	30	Reba Dale (ANGY)	[id number]	Medicare	[id number]
Labs reviewed and CReatine noted 2.30(1.87) on Bumex 2mg IVP BID. D/w Dr Christopher and Dr Paredes nd agreed to stop Bumex. Follow up repeat BMP in am
Notified by RN that patient had 3 liquid stools so far this morning. Patient states prior to this morning, she was having 1 loose/liquid stool once per day since admission, was not having loose/liquid stools prior to admission. D/w attending, GI PCR, O&P, stool culture ordered. Currently does not meet testing for C.diff, afebrile, no leukocytosis.    Vital Signs Last 24 Hrs  T(C): 37.1 (27 Feb 2025 13:00), Max: 37.1 (26 Feb 2025 21:29)  T(F): 98.7 (27 Feb 2025 13:00), Max: 98.8 (26 Feb 2025 21:29)  HR: 68 (27 Feb 2025 13:00) (68 - 87)  BP: 164/82 (27 Feb 2025 13:00) (137/69 - 164/82)  BP(mean): --  RR: 18 (27 Feb 2025 13:00) (18 - 18)  SpO2: 98% (27 Feb 2025 13:00) (91% - 98%)    Parameters below as of 27 Feb 2025 13:00  Patient On (Oxygen Delivery Method): nasal cannula      Diarrhea  -stool count  -GI PCR, Stool cx, stool Ova& parasite

## 2025-02-27 NOTE — PROGRESS NOTE ADULT - PROBLEM SELECTOR PLAN 3
On outpatient labs, Cre 1.7-1.9   Cre elevated, possible ATN in setting of infection, also possible prerenal given infection/poor oral intake and concomitant diuretic use.   Encourage oral intake  Hold home Bumex and lisinopril for now given ROYAL  Continue to monitor  Cre improving

## 2025-02-28 LAB
ANION GAP SERPL CALC-SCNC: 14 MMOL/L — SIGNIFICANT CHANGE UP (ref 5–17)
BUN SERPL-MCNC: 42 MG/DL — HIGH (ref 7–23)
CALCIUM SERPL-MCNC: 9.3 MG/DL — SIGNIFICANT CHANGE UP (ref 8.4–10.5)
CHLORIDE SERPL-SCNC: 106 MMOL/L — SIGNIFICANT CHANGE UP (ref 96–108)
CO2 SERPL-SCNC: 24 MMOL/L — SIGNIFICANT CHANGE UP (ref 22–31)
CREAT SERPL-MCNC: 1.3 MG/DL — SIGNIFICANT CHANGE UP (ref 0.5–1.3)
CULTURE RESULTS: SIGNIFICANT CHANGE UP
EGFR: 42 ML/MIN/1.73M2 — LOW
EGFR: 42 ML/MIN/1.73M2 — LOW
GLUCOSE BLDC GLUCOMTR-MCNC: 138 MG/DL — HIGH (ref 70–99)
GLUCOSE BLDC GLUCOMTR-MCNC: 139 MG/DL — HIGH (ref 70–99)
GLUCOSE BLDC GLUCOMTR-MCNC: 139 MG/DL — HIGH (ref 70–99)
GLUCOSE BLDC GLUCOMTR-MCNC: 155 MG/DL — HIGH (ref 70–99)
GLUCOSE SERPL-MCNC: 150 MG/DL — HIGH (ref 70–99)
POTASSIUM SERPL-MCNC: 3.6 MMOL/L — SIGNIFICANT CHANGE UP (ref 3.5–5.3)
POTASSIUM SERPL-SCNC: 3.6 MMOL/L — SIGNIFICANT CHANGE UP (ref 3.5–5.3)
SODIUM SERPL-SCNC: 144 MMOL/L — SIGNIFICANT CHANGE UP (ref 135–145)
SPECIMEN SOURCE: SIGNIFICANT CHANGE UP

## 2025-02-28 PROCEDURE — 99233 SBSQ HOSP IP/OBS HIGH 50: CPT

## 2025-02-28 RX ORDER — BUMETANIDE 1 MG/1
2 TABLET ORAL
Refills: 0 | Status: DISCONTINUED | OUTPATIENT
Start: 2025-02-28 | End: 2025-03-02

## 2025-02-28 RX ORDER — LORAZEPAM 4 MG/ML
0.5 VIAL (ML) INJECTION AT BEDTIME
Refills: 0 | Status: DISCONTINUED | OUTPATIENT
Start: 2025-02-28 | End: 2025-03-07

## 2025-02-28 RX ORDER — HYPROMELLOSE 0.4 %
1 DROPS OPHTHALMIC (EYE)
Refills: 0 | Status: DISCONTINUED | OUTPATIENT
Start: 2025-02-28 | End: 2025-03-10

## 2025-02-28 RX ADMIN — APIXABAN 5 MILLIGRAM(S): 2.5 TABLET, FILM COATED ORAL at 19:12

## 2025-02-28 RX ADMIN — ATORVASTATIN CALCIUM 40 MILLIGRAM(S): 80 TABLET, FILM COATED ORAL at 00:02

## 2025-02-28 RX ADMIN — Medication 1 SPRAY(S): at 15:04

## 2025-02-28 RX ADMIN — Medication 81 MILLIGRAM(S): at 12:20

## 2025-02-28 RX ADMIN — IPRATROPIUM BROMIDE AND ALBUTEROL SULFATE 3 MILLILITER(S): .5; 2.5 SOLUTION RESPIRATORY (INHALATION) at 01:37

## 2025-02-28 RX ADMIN — Medication 1 SPRAY(S): at 21:35

## 2025-02-28 RX ADMIN — DEXTROMETHORPHAN HBR, GUAIFENESIN 100 MILLIGRAM(S): 200 LIQUID ORAL at 00:02

## 2025-02-28 RX ADMIN — DRONEDARONE 400 MILLIGRAM(S): 400 TABLET, FILM COATED ORAL at 19:12

## 2025-02-28 RX ADMIN — CEFTRIAXONE 100 MILLIGRAM(S): 500 INJECTION, POWDER, FOR SOLUTION INTRAMUSCULAR; INTRAVENOUS at 12:20

## 2025-02-28 RX ADMIN — APIXABAN 5 MILLIGRAM(S): 2.5 TABLET, FILM COATED ORAL at 05:16

## 2025-02-28 RX ADMIN — Medication 100 MILLIGRAM(S): at 05:16

## 2025-02-28 RX ADMIN — Medication 0.5 MILLIGRAM(S): at 21:35

## 2025-02-28 RX ADMIN — Medication 0.5 MILLIGRAM(S): at 05:16

## 2025-02-28 RX ADMIN — IPRATROPIUM BROMIDE AND ALBUTEROL SULFATE 3 MILLILITER(S): .5; 2.5 SOLUTION RESPIRATORY (INHALATION) at 12:20

## 2025-02-28 RX ADMIN — DOXAZOSIN MESYLATE 2 MILLIGRAM(S): 8 TABLET ORAL at 05:16

## 2025-02-28 RX ADMIN — Medication 100 MILLIGRAM(S): at 15:03

## 2025-02-28 RX ADMIN — Medication 100 MILLIGRAM(S): at 21:35

## 2025-02-28 RX ADMIN — INSULIN GLARGINE-YFGN 10 UNIT(S): 100 INJECTION, SOLUTION SUBCUTANEOUS at 21:34

## 2025-02-28 RX ADMIN — DEXTROMETHORPHAN HBR, GUAIFENESIN 100 MILLIGRAM(S): 200 LIQUID ORAL at 15:03

## 2025-02-28 RX ADMIN — DEXTROMETHORPHAN HBR, GUAIFENESIN 100 MILLIGRAM(S): 200 LIQUID ORAL at 22:38

## 2025-02-28 RX ADMIN — DEXTROMETHORPHAN HBR, GUAIFENESIN 100 MILLIGRAM(S): 200 LIQUID ORAL at 08:56

## 2025-02-28 RX ADMIN — DILTIAZEM HYDROCHLORIDE 360 MILLIGRAM(S): 240 TABLET, EXTENDED RELEASE ORAL at 05:16

## 2025-02-28 RX ADMIN — INSULIN GLARGINE-YFGN 5 UNIT(S): 100 INJECTION, SOLUTION SUBCUTANEOUS at 08:52

## 2025-02-28 RX ADMIN — INSULIN LISPRO 2: 100 INJECTION, SOLUTION INTRAVENOUS; SUBCUTANEOUS at 08:52

## 2025-02-28 RX ADMIN — ATORVASTATIN CALCIUM 40 MILLIGRAM(S): 80 TABLET, FILM COATED ORAL at 21:35

## 2025-02-28 RX ADMIN — IPRATROPIUM BROMIDE AND ALBUTEROL SULFATE 3 MILLILITER(S): .5; 2.5 SOLUTION RESPIRATORY (INHALATION) at 05:15

## 2025-02-28 RX ADMIN — DRONEDARONE 400 MILLIGRAM(S): 400 TABLET, FILM COATED ORAL at 05:15

## 2025-02-28 RX ADMIN — BUMETANIDE 2 MILLIGRAM(S): 1 TABLET ORAL at 15:03

## 2025-02-28 RX ADMIN — IPRATROPIUM BROMIDE AND ALBUTEROL SULFATE 3 MILLILITER(S): .5; 2.5 SOLUTION RESPIRATORY (INHALATION) at 19:12

## 2025-02-28 RX ADMIN — Medication 1 DROP(S): at 20:34

## 2025-02-28 RX ADMIN — Medication 1 SPRAY(S): at 05:15

## 2025-02-28 NOTE — DISCHARGE NOTE PROVIDER - ATTENDING DISCHARGE PHYSICAL EXAMINATION:
Vital Signs Last 24 Hrs  T(C): 36.7 (10 Mar 2025 05:12), Max: 36.7 (09 Mar 2025 20:29)  T(F): 98 (10 Mar 2025 05:12), Max: 98 (09 Mar 2025 20:29)  HR: 68 (10 Mar 2025 05:12) (68 - 81)  BP: 131/72 (10 Mar 2025 05:12) (118/67 - 131/72)  BP(mean): --  RR: 18 (10 Mar 2025 05:12) (17 - 18)  SpO2: 92% (10 Mar 2025 11:17) (92% - 93%)    Parameters below as of 10 Mar 2025 11:17  Patient On (Oxygen Delivery Method): room air        CONSTITUTIONAL: Well-groomed, in no apparent distress  EYES: No conjunctival or scleral injection, non-icteric;   ENMT: No external nasal lesions; MMM  NECK: Trachea midline   RESPIRATORY: Breathing comfortably; lungs CTA without wheeze/rhonchi/rales  CARDIOVASCULAR: +S1S2, RRR, no lower extremity edema  GASTROINTESTINAL: No palpable masses or tenderness, no rebound/guarding  SKIN: Warm, dry  PSYCHIATRIC: A+O x 3; mood and affect appropriate; appropriate insight and judgment

## 2025-02-28 NOTE — PROGRESS NOTE ADULT - PROBLEM SELECTOR PLAN 1
Patient with pain in right foot- 1st metatarsal joint erythematous and tender on examination  XR Right Foot:  No acute osseous abnormalities.  Elevated uric acid levels   Suspect acute gout flare  s/p Colchicine 1.2mg x 1 and 0.6mg x1 an hour later. Holding further doses of colchicine as patient had diarrhea   Avoid NSAIDs given risks in older adults and steroids in setting of infection.  Encouraged oral hydration.

## 2025-02-28 NOTE — PROGRESS NOTE ADULT - PROBLEM SELECTOR PLAN 2
- s/p Tamiflu dosed per GFR x 5 days   - Patient currently on NC - wean as tolerated  - Incentive spirometry, Duonebs, monitor peak flows given hx of asthma and hospitalization for asthma exacerbation in the past year  - Antitussive supportive care  - Continue Ceftriaxone (2/25-) for possible superimposed PNA x 5 days

## 2025-02-28 NOTE — DISCHARGE NOTE PROVIDER - NSDCMRMEDTOKEN_GEN_ALL_CORE_FT
aspirin 81 mg oral delayed release tablet: 1 tab(s) orally once a day  atorvastatin 40 mg oral tablet: 1 tab(s) orally once a day (at bedtime)  bumetanide 2 mg oral tablet: 1 tab(s) orally once a day  Cardizem  mg/24 hours oral capsule, extended release: 1 cap(s) orally once a day  doxazosin 2 mg oral tablet: 1 tab(s) orally once a day  Eliquis 5 mg oral tablet: 1 tab(s) orally 2 times a day  Farxiga 10 mg oral tablet: 1 tab(s) orally once a day  glimepiride 2 mg oral tablet: 1 tab(s) orally 2 times a day  Lantus 100 units/mL subcutaneous solution: subcutaneous 8 units in the morning and 18 units at night  lisinopril 40 mg oral tablet: 1 tab(s) orally once a day  LORazepam 0.5 mg oral tablet: 1 tab(s) orally once a day  Multaq 400 mg oral tablet: 1 tab(s) orally 2 times a day  Potassium Chloride (Eqv-Klor-Con 10) 10 mEq oral tablet, extended release: 1 tab(s) orally 2 times a day   aspirin 81 mg oral delayed release tablet: 1 tab(s) orally once a day  atorvastatin 40 mg oral tablet: 1 tab(s) orally once a day (at bedtime)  bumetanide 2 mg oral tablet: 1 tab(s) orally once a day  Cardizem  mg/24 hours oral capsule, extended release: 1 cap(s) orally once a day  doxazosin 2 mg oral tablet: 1 tab(s) orally once a day  Eliquis 5 mg oral tablet: 1 tab(s) orally 2 times a day  Farxiga 10 mg oral tablet: 1 tab(s) orally once a day  glimepiride 2 mg oral tablet: 1 tab(s) orally 2 times a day  guaiFENesin 100 mg/5 mL oral liquid: 5 milliliter(s) orally every 6 hours as needed for Cough  Lantus 100 units/mL subcutaneous solution: 8 unit(s) subcutaneous once a day 8 units in the morning and 18 units at night  lisinopril 40 mg oral tablet: 1 tab(s) orally once a day  LORazepam 0.5 mg oral tablet: 1 tab(s) orally once a day  Multaq 400 mg oral tablet: 1 tab(s) orally 2 times a day  Potassium Chloride (Eqv-Klor-Con 10) 10 mEq oral tablet, extended release: 1 tab(s) orally 2 times a day

## 2025-02-28 NOTE — PROGRESS NOTE ADULT - PROBLEM SELECTOR PLAN 4
- Unclear etiology as LE Dopplers remain negative, ?could be related to hypervolemia iso infection as above  - Will hold off on further Abx at this time and continue to closely monitor  - Appreciate cardiology's evaluation and recommendations -- held IV Bumex 2mg BID that was initiated d/t ROYAL on 2/23 -- monitoring electrolytes, renal function, hemodynamics, and fluid status closely  Resume Bumex as above

## 2025-02-28 NOTE — DISCHARGE NOTE PROVIDER - HOSPITAL COURSE
HPI:  79yoF w/ PMHx of s/p CVA, CAD, HFpEF, Afib on Eliquis, IDDM, CKD, asthma (hospitalization per PCP note in 2024 for acute bronchitis and asthma exacerbation), anxiety, and obesity who presents w/ complaints of severe LE swelling over the past two weens, right > left; patient was recently seen by her cardiologist, Dr. Boss, since symptom onset, and her diuretic was changed from Lasix (which had improved patient's previous history of severe LE swelling in the past, at the time, in April 2024, left > right per PCP note) to Bumex 2mg daily -- patient states symptoms had improved at first but has since progressed. LE Dopplers performed on 2/13/25 were negative for DVT. Patient also complains of a recent nonproductive cough and wheezing; she denies, however, fever/chills, known sick contacts, myalgia, rhinorrhea, CP, palpitations, SOB/ZAMORA, abd pain, n/v/d/c, or have urinary complaints.     ED Presenting Vitals: 98.1F, 85bpm, 175/83, 18br/m, 94% on RA  ED Course: s/p IV Acetaminophen 1g x1, IV Bumex 1g x1, IV Cefazolin 1g x1, PO Tamiflu 75mg x1, PO Tramadol 50mg x1 (21 Feb 2025 15:58)    Hospital Course:  Patient was admitted for management of influenza infection, acute gout flare, and ROYAL. She was treated for Influenza A with Tamiflu dosed according to her glomerular filtration rate (GFR) for 5 days. She required supplemental oxygen via nasal cannula initially, which was weaned as tolerated. Due to her history of asthma and acute bronchitis/asthma exacerbation, she received Duonebs, incentive spirometry, and her peak flows were monitored. She also received antitussive therapy. Due to concern for superimposed pneumonia, she was started on Ceftriaxone for 5 days.    Her acute gout flare was treated with Colchicine 1.2mg followed by 0.6mg one hour later. Further doses were held due to the development of diarrhea. GI PCR neg. Stool ova&parasites neg. NSAIDs and steroids were avoided due to her age, comorbidities, and active infection.    The patient's ROYAL was thought to be multifactorial, possibly related to acute tubular necrosis (ATN) secondary to infection, and potentially a prerenal component from poor oral intake and concomitant diuretic use. Her home lisinopril was held temporarily. IV Bumex was initially held due to the ROYAL but later resumed per cardiology recommendations. She was encouraged to increase her oral fluid intake. Her renal function, electrolytes, fluid status, and hemodynamics were closely monitored.    The bilateral leg edema was of unclear etiology, with negative Doppler studies for DVT. Hypervolemia related to her infection was considered as a contributing factor.    Her diabetes was managed with a basal-bolus insulin regimen, and her other chronic medical conditions (HFpEF, atrial fibrillation) were managed medically.  A nutritional consult was placed for morbid obesity.    Important Medication Changes and Reason:    Active or Pending Issues Requiring Follow-up:    Advanced Directives:   [x] Full code  [ ] DNR  [ ] Hospice    Discharge Diagnoses:  Influenza A infection  Acute gout flare  Acute kidney injury (ROYAL)  Heart failure with preserved ejection fraction (HFpEF)  Atrial fibrillation  Insulin-dependent diabetes mellitus (IDDM)  Chronic kidney disease (CKD)  Asthma  Anxiety  Morbid obesity       HPI:  79yoF w/ PMHx of s/p CVA, CAD, HFpEF, Afib on Eliquis, IDDM, CKD, asthma (hospitalization per PCP note in 2024 for acute bronchitis and asthma exacerbation), anxiety, and obesity who presents w/ complaints of severe LE swelling over the past two weens, right > left; patient was recently seen by her cardiologist, Dr. Boss, since symptom onset, and her diuretic was changed from Lasix (which had improved patient's previous history of severe LE swelling in the past, at the time, in April 2024, left > right per PCP note) to Bumex 2mg daily -- patient states symptoms had improved at first but has since progressed. LE Dopplers performed on 2/13/25 were negative for DVT. Patient also complains of a recent nonproductive cough and wheezing; she denies, however, fever/chills, known sick contacts, myalgia, rhinorrhea, CP, palpitations, SOB/ZAMORA, abd pain, n/v/d/c, or have urinary complaints.     ED Presenting Vitals: 98.1F, 85bpm, 175/83, 18br/m, 94% on RA  ED Course: s/p IV Acetaminophen 1g x1, IV Bumex 1g x1, IV Cefazolin 1g x1, PO Tamiflu 75mg x1, PO Tramadol 50mg x1 (21 Feb 2025 15:58)    Hospital Course:  Patient was admitted for management of influenza infection, acute gout flare, and ROYAL. She was treated for Influenza A with Tamiflu dosed according to her glomerular filtration rate (GFR) for 5 days. She required supplemental oxygen via nasal cannula initially, which was weaned as tolerated. Due to her history of asthma and acute bronchitis/asthma exacerbation, she received Duonebs, incentive spirometry, and her peak flows were monitored. She also received antitussive therapy. Due to concern for superimposed pneumonia, she was started on Ceftriaxone for 5 days.    Her acute gout flare was treated with Colchicine 1.2mg followed by 0.6mg one hour later. Further doses were held due to the development of diarrhea. GI PCR neg. Stool ova&parasites neg. NSAIDs and steroids were avoided due to her age, comorbidities, and active infection.    The patient's ROYAL was thought to be multifactorial, possibly related to acute tubular necrosis (ATN) secondary to infection, and potentially a prerenal component from poor oral intake and concomitant diuretic use. Her home lisinopril was held temporarily. IV Bumex was initially held due to the ROYAL but later resumed per cardiology recommendations. She was encouraged to increase her oral fluid intake. Her renal function, electrolytes, fluid status, and hemodynamics were closely monitored.    The bilateral leg edema was of unclear etiology, with negative Doppler studies for DVT. Hypervolemia related to her infection was considered as a contributing factor.    Her diabetes was managed with a basal-bolus insulin regimen, and her other chronic medical conditions (HFpEF, atrial fibrillation) were managed medically.  A nutritional consult was placed for morbid obesity.     Weight - Discharge/Trend: 122.5kg    Documented EF: 70-75%    Guideline Directed Medical Therapy Prescribed/Reason why not prescribed:  B-Blocker:   ARNI/ACE-I/ARB:   MRA:   SGLT2 inhibitor:    Appointment scheduled within 7 days?  [] YES [ ] NO    Important Medication Changes and Reason:    Active or Pending Issues Requiring Follow-up:    Advanced Directives:   [x] Full code  [ ] DNR  [ ] Hospice    Discharge Diagnoses:  Influenza A infection  Acute gout flare  Acute kidney injury (ROYAL)  Heart failure with preserved ejection fraction (HFpEF)  Atrial fibrillation  Insulin-dependent diabetes mellitus (IDDM)  Chronic kidney disease (CKD)  Asthma  Anxiety  Morbid obesity       HPI:  79yoF w/ PMHx of s/p CVA, CAD, HFpEF, Afib on Eliquis, IDDM, CKD, asthma (hospitalization per PCP note in 2024 for acute bronchitis and asthma exacerbation), anxiety, and obesity who presents w/ complaints of severe LE swelling over the past two weens, right > left; patient was recently seen by her cardiologist, Dr. Boss, since symptom onset, and her diuretic was changed from Lasix (which had improved patient's previous history of severe LE swelling in the past, at the time, in April 2024, left > right per PCP note) to Bumex 2mg daily -- patient states symptoms had improved at first but has since progressed. LE Dopplers performed on 2/13/25 were negative for DVT. Patient also complains of a recent nonproductive cough and wheezing; she denies, however, fever/chills, known sick contacts, myalgia, rhinorrhea, CP, palpitations, SOB/ZAMORA, abd pain, n/v/d/c, or have urinary complaints.     ED Presenting Vitals: 98.1F, 85bpm, 175/83, 18br/m, 94% on RA  ED Course: s/p IV Acetaminophen 1g x1, IV Bumex 1g x1, IV Cefazolin 1g x1, PO Tamiflu 75mg x1, PO Tramadol 50mg x1 (21 Feb 2025 15:58)    Hospital Course:  Patient was admitted for management of influenza infection, acute gout flare, and ROYAL. She was treated for Influenza A with Tamiflu dosed according to her glomerular filtration rate (GFR) for 5 days. She required supplemental oxygen via nasal cannula initially, which was weaned as tolerated. Due to her history of asthma and acute bronchitis/asthma exacerbation, she received Duonebs, incentive spirometry, and her peak flows were monitored. She also received antitussive therapy. Due to concern for superimposed pneumonia, she was started on Ceftriaxone for 5 days.    Her acute gout flare was treated with Colchicine 1.2mg followed by 0.6mg one hour later. Further doses were held due to the development of diarrhea. GI PCR neg. Stool ova&parasites neg. NSAIDs and steroids were avoided due to her age, comorbidities, and active infection.    The patient's ROYAL was thought to be multifactorial, possibly related to acute tubular necrosis (ATN) secondary to infection, and potentially a prerenal component from poor oral intake and concomitant diuretic use. Her home lisinopril was held temporarily. IV Bumex was initially held due to the ROYAL but later resumed per cardiology recommendations. She was encouraged to increase her oral fluid intake. Her renal function, electrolytes, fluid status, and hemodynamics were closely monitored.    The bilateral leg edema was of unclear etiology, with negative Doppler studies for DVT. Hypervolemia related to her infection was considered as a contributing factor.    Her diabetes was managed with a basal-bolus insulin regimen, and her other chronic medical conditions (HFpEF, atrial fibrillation) were managed medically.  A nutritional consult was placed for morbid obesity.     ******INCOMPLETE********    Weight - Discharge/Trend: 122.5kg    Documented EF: 70-75%    Guideline Directed Medical Therapy Prescribed/Reason why not prescribed:  B-Blocker:   ARNI/ACE-I/ARB:   MRA:   SGLT2 inhibitor:    Appointment scheduled within 7 days?  [] YES [ ] NO    Important Medication Changes and Reason:    Active or Pending Issues Requiring Follow-up:    Advanced Directives:   [x] Full code  [ ] DNR  [ ] Hospice    Discharge Diagnoses:  Influenza A infection  Acute gout flare  Acute kidney injury (ROYAL)  Heart failure with preserved ejection fraction (HFpEF)  Atrial fibrillation  Insulin-dependent diabetes mellitus (IDDM)  Chronic kidney disease (CKD)  Asthma  Anxiety  Morbid obesity       HPI:  79yoF w/ PMHx of s/p CVA, CAD, HFpEF, Afib on Eliquis, IDDM, CKD, asthma (hospitalization per PCP note in 2024 for acute bronchitis and asthma exacerbation), anxiety, and obesity who presents w/ complaints of severe LE swelling over the past two weeks, right > left; patient was recently seen by her cardiologist, Dr. Bsos, since symptom onset, and her diuretic was changed from Lasix (which had improved patient's previous history of severe LE swelling in the past, at the time, in April 2024, left > right per PCP note) to Bumex 2mg daily -- patient states symptoms had improved at first but has since progressed. LE Dopplers performed on 2/13/25 were negative for DVT. Patient also complains of a recent nonproductive cough and wheezing; she denies, however, fever/chills, known sick contacts, myalgia, rhinorrhea, CP, palpitations, SOB/ZAMORA, abd pain, n/v/d/c, or have urinary complaints.     ED Presenting Vitals: 98.1F, 85bpm, 175/83, 18br/m, 94% on RA  ED Course: s/p IV Acetaminophen 1g x1, IV Bumex 1g x1, IV Cefazolin 1g x1, PO Tamiflu 75mg x1, PO Tramadol 50mg x1 (21 Feb 2025 15:58)    Hospital Course:  Patient was admitted for management of influenza infection, acute gout flare, and ROYAL. She was treated for Influenza A with Tamiflu dosed according to her glomerular filtration rate (GFR) for 5 days. She required supplemental oxygen via nasal cannula initially, which was weaned as tolerated. Due to her history of asthma and acute bronchitis/asthma exacerbation, she received Duonebs, incentive spirometry, and her peak flows were monitored. She also received antitussive therapy. Due to concern for superimposed pneumonia, she was started on Ceftriaxone for 5 days.    Her acute gout flare was treated with Colchicine 1.2mg followed by 0.6mg one hour later. Further doses were held due to the development of diarrhea. GI PCR negative. Stool ova& parasites negative. NSAIDs and steroids were avoided due to her age, comorbidities, and active infection.    The patient's ROYAL was thought to be multifactorial, possibly related to acute tubular necrosis (ATN) secondary to infection, and potentially a prerenal component from poor oral intake and concomitant diuretic use. Her home lisinopril was held temporarily. IV Bumex was initially held due to the ROYAL but later resumed per cardiology recommendations. She was encouraged to increase her oral fluid intake. Her renal function, electrolytes, fluid status, and hemodynamics were closely monitored.    The bilateral leg edema was of unclear etiology, with negative Doppler studies for DVT. Hypervolemia related to her infection was considered as a contributing factor.    Her diabetes was managed with a basal-bolus insulin regimen, and her other chronic medical conditions (HFpEF, atrial fibrillation) were managed medically.      Patient symptomatically improved and hemodynamically stable for DC.     Weight - Discharge/Trend: 122.5kg    Documented EF: 70-75%    Guideline Directed Medical Therapy Prescribed   B-Blocker: N/A, on diltiazem and Multaq for Afib  ARNI/ACE-I/ARB: Lisinopril  MRA: N/A   SGLT2 inhibitor: Farxiga    Appointment scheduled within 7 days?  [ ] YES [ ] NO     Important Medication Changes and Reason:  N/A    Active or Pending Issues Requiring Follow-up:  Follow-up PCP and cardiology    Advanced Directives:   [] Full code  [X] DNR  [ ] Hospice    Discharge Diagnoses:  Acute hypoxic respiratory failure 2/2 Influenza A infection  Acute gout flare  Acute kidney injury (ROYAL) likely ATN, prerenal etiology  Acute on chronic heart failure with preserved ejection fraction (HFpEF)  Paroxsymal atrial fibrillation  Insulin-dependent diabetes mellitus (IDDM) with hyperglycemia   Chronic kidney disease (CKD) stage 4  Asthma  Anxiety  Morbid obesity       HPI:  79yoF w/ PMHx of s/p CVA, CAD, HFpEF, Afib on Eliquis, IDDM, CKD, asthma (hospitalization per PCP note in 2024 for acute bronchitis and asthma exacerbation), anxiety, and obesity who presents w/ complaints of severe LE swelling over the past two weeks, right > left; patient was recently seen by her cardiologist, Dr. Boss, since symptom onset, and her diuretic was changed from Lasix (which had improved patient's previous history of severe LE swelling in the past, at the time, in April 2024, left > right per PCP note) to Bumex 2mg daily -- patient states symptoms had improved at first but has since progressed. LE Dopplers performed on 2/13/25 were negative for DVT. Patient also complains of a recent nonproductive cough and wheezing; she denies, however, fever/chills, known sick contacts, myalgia, rhinorrhea, CP, palpitations, SOB/ZAMORA, abd pain, n/v/d/c, or have urinary complaints.     ED Presenting Vitals: 98.1F, 85bpm, 175/83, 18br/m, 94% on RA  ED Course: s/p IV Acetaminophen 1g x1, IV Bumex 1g x1, IV Cefazolin 1g x1, PO Tamiflu 75mg x1, PO Tramadol 50mg x1 (21 Feb 2025 15:58)    Hospital Course:  Patient was admitted for management of influenza infection, acute gout flare, and ROYAL. She was treated for Influenza A with Tamiflu dosed according to her glomerular filtration rate (GFR) for 5 days. She required supplemental oxygen via nasal cannula initially, which was weaned as tolerated. Due to her history of asthma and acute bronchitis/asthma exacerbation, she received Duonebs, incentive spirometry, and her peak flows were monitored. She also received antitussive therapy. Due to concern for superimposed pneumonia, she was started on Ceftriaxone for 5 days.    Her acute gout flare was treated with Colchicine 1.2mg followed by 0.6mg one hour later. Further doses were held due to the development of diarrhea. GI PCR negative. Stool ova& parasites negative. NSAIDs and steroids were avoided due to her age, comorbidities, and active infection.    The patient's ROYAL was thought to be multifactorial, possibly related to acute tubular necrosis (ATN) secondary to infection, and potentially a prerenal component from poor oral intake and concomitant diuretic use. Her home lisinopril was held temporarily. IV Bumex was initially held due to the ROYAL but later resumed per cardiology recommendations. She was encouraged to increase her oral fluid intake. Her renal function, electrolytes, fluid status, and hemodynamics were closely monitored.    The bilateral leg edema was of unclear etiology, with negative Doppler studies for DVT. Hypervolemia related to her infection was considered as a contributing factor.    Her diabetes was managed with a basal-bolus insulin regimen, and her other chronic medical conditions (HFpEF, atrial fibrillation) were managed medically.      Patient symptomatically improved and hemodynamically stable for DC.     Weight - Discharge/Trend: 122.5kg    Documented EF: 70-75%    Guideline Directed Medical Therapy Prescribed   B-Blocker: N/A, on diltiazem and Multaq for Afib  ARNI/ACE-I/ARB: Lisinopril  MRA: N/A   SGLT2 inhibitor: Farxiga    Appointment scheduled within 7 days?  [ ] YES [ ] NO     Important Medication Changes and Reason:  N/A    Active or Pending Issues Requiring Follow-up:  Follow-up PCP and cardiology    Advanced Directives:   [] Full code  [X] DNR  [ ] Hospice    Discharge Diagnoses:  Acute hypoxic respiratory failure 2/2 Influenza A infection  Acute gout flare  Acute kidney injury (ROYAL) likely ATN, prerenal etiology  Acute on chronic heart failure with preserved ejection fraction (HFpEF)  Paroxsymal atrial fibrillation  Insulin-dependent diabetes mellitus (IDDM) with hyperglycemia   Chronic kidney disease (CKD) stage 4  Asthma  Anxiety  Morbid obesity  Hypokalemia

## 2025-02-28 NOTE — DISCHARGE NOTE PROVIDER - CARE PROVIDER_API CALL
Aj Boss  Cardiology  [address] [facility] [facility] [address], [address]  [place] [address]  Phone: [phone]  Fax: [phone]  Established Patient  Follow Up Time: 1 week

## 2025-02-28 NOTE — DISCHARGE NOTE PROVIDER - NSDCCPCAREPLAN_GEN_ALL_CORE_FT
PRINCIPAL DISCHARGE DIAGNOSIS  Diagnosis: Lower extremity edema  Assessment and Plan of Treatment: Your lower extremity doppler was negative for deep vein thrombus/clot (DVT).  Contirbuting factor likely due to hypervolemia. Continue with oral diuretic- Bumex 2mg two times a day. Follow up with your cardiologist and PCP outpatient within 1 week of discharge.      SECONDARY DISCHARGE DIAGNOSES  Diagnosis: Influenza A  Assessment and Plan of Treatment: You were treated for with 5 day course of Tamiflu. You required supplemental oxygen via nasal cannula initially, which was weaned as tolerated. Due to concern for superimposed pneumonia, you were started on Ceftriaxone for 5 days.     PRINCIPAL DISCHARGE DIAGNOSIS  Diagnosis: Lower extremity edema  Assessment and Plan of Treatment: Your lower extremity doppler was negative for deep vein thrombus/clot (DVT).  Contirbuting factor likely due to hypervolemia. Continue with oral diuretic- Bumex 2mg daily. Follow up with your cardiologist and PCP outpatient within 1 week of discharge.  If any new or worsening symptoms, including fever, chest pain, shortness of breath, nausea, vomit, abdominal pain, seek immediate medical attention.      SECONDARY DISCHARGE DIAGNOSES  Diagnosis: Influenza A  Assessment and Plan of Treatment: You were treated for with 5 day course of Tamiflu. You required supplemental oxygen via nasal cannula initially, which was weaned as tolerated. Due to concern for superimposed pneumonia, you were started on Ceftriaxone for 5 days.

## 2025-02-28 NOTE — DISCHARGE NOTE PROVIDER - NSDCFUSCHEDAPPT_GEN_ALL_CORE_FT
Nito Nuñez  [facility]  ENDOCRIN [address]  Scheduled Appointment: 03/10/2025    [facility]  CARDIOLOGY [address]   Scheduled Appointment: 04/24/2025     Lemuel Damon  [facility] Physician [facility]  INTMED [address]  Scheduled Appointment: 03/07/2025    Nito Nuñez  [facility] Physician [facility]  ENDOCRIN [address]  Scheduled Appointment: 03/10/2025    [facility]  CARDIOLOGY [address]   Scheduled Appointment: 04/24/2025     [facility] Physician [facility]  CARDIOLOGY [address]   Scheduled Appointment: 04/24/2025

## 2025-02-28 NOTE — PROGRESS NOTE ADULT - SUBJECTIVE AND OBJECTIVE BOX
[facility] Division of Hospital Medicine  Angie Ross DO  MS Teams PREFERRED      SUBJECTIVE / OVERNIGHT EVENTS: No acute events overnight. Patient reports diarrhea resolved. Pain in right great toe also improved. She is still coughing but overall improved.  ADDITIONAL REVIEW OF SYSTEMS:    MEDICATIONS  (STANDING):  albuterol/ipratropium for Nebulization 3 milliLiter(s) Nebulizer every 6 hours  apixaban 5 milliGRAM(s) Oral every 12 hours  artificial  tears Solution 1 Drop(s) Both EYES two times a day  aspirin enteric coated 81 milliGRAM(s) Oral daily  atorvastatin 40 milliGRAM(s) Oral at bedtime  benzonatate 100 milliGRAM(s) Oral three times a day  buMETAnide 2 milliGRAM(s) Oral two times a day  cefTRIAXone   IVPB 1000 milliGRAM(s) IV Intermittent every 24 hours  dextrose 5%. 1000 milliLiter(s) (100 mL/Hr) IV Continuous <Continuous>  dextrose 5%. 1000 milliLiter(s) (50 mL/Hr) IV Continuous <Continuous>  dextrose 50% Injectable 25 Gram(s) IV Push once  dextrose 50% Injectable 12.5 Gram(s) IV Push once  dextrose 50% Injectable 25 Gram(s) IV Push once  diltiazem    milliGRAM(s) Oral daily  doxazosin 2 milliGRAM(s) Oral daily  dronedarone 400 milliGRAM(s) Oral two times a day  glucagon  Injectable 1 milliGRAM(s) IntraMuscular once  insulin glargine Injectable (LANTUS) 5 Unit(s) SubCutaneous every morning  insulin glargine Injectable (LANTUS) 10 Unit(s) SubCutaneous at bedtime  insulin lispro (ADMELOG) corrective regimen sliding scale   SubCutaneous three times a day before meals  insulin lispro (ADMELOG) corrective regimen sliding scale   SubCutaneous at bedtime  sodium chloride 0.65% Nasal 1 Spray(s) Both Nostrils three times a day    MEDICATIONS  (PRN):  dextrose Oral Gel 15 Gram(s) Oral once PRN Blood Glucose LESS THAN 70 milliGRAM(s)/deciliter  guaiFENesin Oral Liquid (Sugar-Free) 100 milliGRAM(s) Oral every 6 hours PRN Cough      I&O's Summary    27 Feb 2025 07:01  -  28 Feb 2025 07:00  --------------------------------------------------------  IN: 0 mL / OUT: 850 mL / NET: -850 mL    28 Feb 2025 07:01  -  28 Feb 2025 16:47  --------------------------------------------------------  IN: 0 mL / OUT: 600 mL / NET: -600 mL        PHYSICAL EXAM:  Vital Signs Last 24 Hrs  T(C): 36.9 (28 Feb 2025 15:00), Max: 37.5 (28 Feb 2025 10:40)  T(F): 98.4 (28 Feb 2025 15:00), Max: 99.5 (28 Feb 2025 10:40)  HR: 68 (28 Feb 2025 15:00) (68 - 73)  BP: 120/72 (28 Feb 2025 15:00) (120/72 - 164/73)  BP(mean): --  RR: 18 (28 Feb 2025 15:00) (18 - 18)  SpO2: 93% (28 Feb 2025 15:00) (93% - 98%)    Parameters below as of 28 Feb 2025 15:00  Patient On (Oxygen Delivery Method): nasal cannula  O2 Flow (L/min): 3    CONSTITUTIONAL: NAD   EYES: Conjunctiva and sclera clear  ENMT: Moist oral mucosa, no pharyngeal injection or exudates   NECK: Supple, midline  RESPIRATORY: Normal respiratory effort; coarse breath sounds, improved. on 2 L NC  CARDIOVASCULAR: Regular rate and rhythm, normal S1 and S2.   ABDOMEN: Nontender to palpation, no rebound/guarding  PSYCH: A+O to person, place, and time; affect appropriate  MSK: Right first metatarsal joint erythematous and tender, improved        LABS:    02-28    144  |  106  |  42[H]  ----------------------------<  150[H]  3.6   |  24  |  1.30    Ca    9.3      28 Feb 2025 07:17            Urinalysis Basic - ( 28 Feb 2025 07:17 )    Color: x / Appearance: x / SG: x / pH: x  Gluc: 150 mg/dL / Ketone: x  / Bili: x / Urobili: x   Blood: x / Protein: x / Nitrite: x   Leuk Esterase: x / RBC: x / WBC x   Sq Epi: x / Non Sq Epi: x / Bacteria: x          RADIOLOGY & ADDITIONAL TESTS:  Results Reviewed:   Imaging Personally Reviewed:  Electrocardiogram Personally Reviewed:    COORDINATION OF CARE:  Care Discussed with Consultants/Other Providers [Y/N]: Y  Prior or Outpatient Records Reviewed [Y/N]: Y

## 2025-02-28 NOTE — DISCHARGE NOTE PROVIDER - NSDCFUADDAPPT_GEN_ALL_CORE_FT
APPTS ARE READY TO BE MADE: [x] YES    Best Family or Patient Contact (if needed):    Additional Information about above appointments (if needed):    1: Cardiology follow up   2:   3:     Other comments or requests:    APPTS ARE READY TO BE MADE: [x] YES    Best Family or Patient Contact (if needed):    Additional Information about above appointments (if needed):    1: Cardiology/PCP follow up within 1 week of discharge with Dr. Boss  2:       Other comments or requests:    APPTS ARE READY TO BE MADE: [x] YES    Best Family or Patient Contact (if needed):    Additional Information about above appointments (if needed):    1: Cardiology/PCP follow up within 1 week of discharge with Dr. Horne  2:       Other comments or requests:     HOME CARDIOLOGY/DR HORNE  Appointment was scheduled by our team on the patient's behalf through the provider's office. As Per Dr. Horne's office ok for patient to follow up with Dr. Lemuel Damon other practicioner in the practice.  Dr. Lemuel Damon 3/7/2025 at 2:00 PM  [address] [facility], [place] [address]  [phone]   APPTS ARE READY TO BE MADE: [x] YES    Best Family or Patient Contact (if needed):    Additional Information about above appointments (if needed):    1: Cardiology/PCP follow up within 1 week of discharge with Dr. Horne; if unable HOME cardiology  2:       Other comments or requests:     HOME CARDIOLOGY/DR HORNE  Appointment was scheduled by our team on the patient's behalf through the provider's office. As Per Dr. Horne's office ok for patient to follow up with Dr. Lemuel Damon other practicioner in the practice.  Dr. Lemuel Damon 3/7/2025 at 2:00 PM  [address] [facility], [place], [place] [address] [phone]

## 2025-02-28 NOTE — DISCHARGE NOTE PROVIDER - DISCHARGE DATE
Referred to [facility]    Dasia Nguyen, 1987  Medication (name, frequency and route):  Benlysta [id number]   Start of Care Date: 12/29/23 (Confirmed with patient)  Infusion location: Patient Home  Skilled Nursing will be provided by: [facility]  @ [phone]    Kelsie Rodriguez RN      
10-Mar-2025

## 2025-02-28 NOTE — PROGRESS NOTE ADULT - PROBLEM SELECTOR PLAN 3
On outpatient labs, Cre 1.7-1.9   Cre elevated, possible ATN in setting of infection, also possible prerenal given infection/poor oral intake and concomitant diuretic use.   Encourage oral intake  Resume Bumex per cardiology   Hold lisinopril for now given ROYAL - resume if Cre stable   Continue to monitor Cre

## 2025-02-28 NOTE — PROGRESS NOTE ADULT - SUBJECTIVE AND OBJECTIVE BOX
DATE OF SERVICE: 02-28-25 @ 08:52    Patient is a 79y old  Female who presents with a chief complaint of Influenza A (27 Feb 2025 17:23)      INTERVAL HISTORY: feels okay, c/o gout flare pain in right foot    REVIEW OF SYSTEMS:  CONSTITUTIONAL: No weakness  EYES/ENT: No visual changes;  No throat pain   NECK: No pain or stiffness  RESPIRATORY: No cough, wheezing; No shortness of breath  CARDIOVASCULAR: No chest pain or palpitations  GASTROINTESTINAL: No abdominal  pain. No nausea, vomiting, or hematemesis  GENITOURINARY: No dysuria, frequency or hematuria  NEUROLOGICAL: No stroke like symptoms  SKIN: No rashes      	  MEDICATIONS:  diltiazem    milliGRAM(s) Oral daily  doxazosin 2 milliGRAM(s) Oral daily  dronedarone 400 milliGRAM(s) Oral two times a day        PHYSICAL EXAM:  T(C): 36.6 (02-28-25 @ 04:43), Max: 37.3 (02-27-25 @ 20:07)  HR: 69 (02-28-25 @ 04:43) (68 - 73)  BP: 164/73 (02-28-25 @ 04:43) (140/60 - 164/82)  RR: 18 (02-28-25 @ 04:43) (18 - 18)  SpO2: 96% (02-28-25 @ 04:43) (96% - 98%)  Wt(kg): --  I&O's Summary    27 Feb 2025 07:01  -  28 Feb 2025 07:00  --------------------------------------------------------  IN: 0 mL / OUT: 850 mL / NET: -850 mL          Appearance: In no distress	  HEENT:    PERRL, EOMI	  Cardiovascular:  S1 S2, No JVD  Respiratory: Lungs clear to auscultation	  Gastrointestinal:  Soft, Non-tender, + BS	  Vascularature:  No edema of LE  Psychiatric: Appropriate affect   Neuro: no acute focal deficits           02-28    144  |  106  |  42[H]  ----------------------------<  150[H]  3.6   |  24  |  1.30    Ca    9.3      28 Feb 2025 07:17          Labs personally reviewed      ASSESSMENT/PLAN: 	      79yoF w/ PMHx of s/p CVA, CAD, HFpEF, Afib on Eliquis, IDDM, CKD, asthma (hospitalization per PCP note in 2024 for acute bronchitis and asthma exacerbation), anxiety, and obesity who presents w/ complaints of severe LE swelling over the past two weeks, right > left, along with new cough and wheezing, found to be positive for Influenza A, w/ LE Dopplers negative for acute DVT (though exam limited by body habitus and pain).       Problem/Plan - 1:  ·  Problem: Acute diastolic HF  ·  Plan: On Bumex 2mg PO BID as OP  - Recent OP TTE with preserved EF and grade 1 DD  - TTE unremarkable with normal filling pressures  - Hold diuretics given ROYAL on CKD.  - Cr 1.30 today 2/28, resume PO Bumex 2mg BID    Problem/Plan - 2:  ·  Problem: Chronic atrial fibrillation.   ·  Plan: - continue home Cardizem, and Dronedarone  - continue home AC w/ Eliquis BID.    Problem/Plan - 3:  ·  Problem: Diabetes mellitus.   ·  Plan: - at home patient reportedly on Lantus 18units QHS and 9units in AM  - will continue w/ Lantus 10units QHS and 5units in AM for now, w/ mISS and serial FS monitoring.    Problem/Plan - 4:  ·  Problem:  DVT PPX  ·  Plan: Eliquis Iolani Behrbom, AG-NP   Cole Christopher DO [facility]  Cardiovascular Medicine  [address] [facility], [address]  Available through call or text on [employer] TEAM[employer]  Office: [phone]

## 2025-02-28 NOTE — DISCHARGE NOTE PROVIDER - DETAILS OF MALNUTRITION DIAGNOSIS/DIAGNOSES
This patient has been assessed with a concern for Malnutrition and was treated during this hospitalization for the following Nutrition diagnosis/diagnoses:     -  02/23/2025: Morbid obesity (BMI > 40)

## 2025-03-01 LAB
ADD ON TEST-SPECIMEN IN LAB: SIGNIFICANT CHANGE UP
ANION GAP SERPL CALC-SCNC: 18 MMOL/L — HIGH (ref 5–17)
BUN SERPL-MCNC: 40 MG/DL — HIGH (ref 7–23)
CALCIUM SERPL-MCNC: 9.6 MG/DL — SIGNIFICANT CHANGE UP (ref 8.4–10.5)
CHLORIDE SERPL-SCNC: 103 MMOL/L — SIGNIFICANT CHANGE UP (ref 96–108)
CO2 SERPL-SCNC: 23 MMOL/L — SIGNIFICANT CHANGE UP (ref 22–31)
CREAT SERPL-MCNC: 1.39 MG/DL — HIGH (ref 0.5–1.3)
CULTURE RESULTS: SIGNIFICANT CHANGE UP
EGFR: 39 ML/MIN/1.73M2 — LOW
EGFR: 39 ML/MIN/1.73M2 — LOW
GLUCOSE BLDC GLUCOMTR-MCNC: 153 MG/DL — HIGH (ref 70–99)
GLUCOSE BLDC GLUCOMTR-MCNC: 167 MG/DL — HIGH (ref 70–99)
GLUCOSE BLDC GLUCOMTR-MCNC: 169 MG/DL — HIGH (ref 70–99)
GLUCOSE BLDC GLUCOMTR-MCNC: 288 MG/DL — HIGH (ref 70–99)
GLUCOSE SERPL-MCNC: 175 MG/DL — HIGH (ref 70–99)
HCT VFR BLD CALC: 40 % — SIGNIFICANT CHANGE UP (ref 34.5–45)
HGB BLD-MCNC: 12.7 G/DL — SIGNIFICANT CHANGE UP (ref 11.5–15.5)
MAGNESIUM SERPL-MCNC: 1.7 MG/DL — SIGNIFICANT CHANGE UP (ref 1.6–2.6)
MCHC RBC-ENTMCNC: 27.1 PG — SIGNIFICANT CHANGE UP (ref 27–34)
MCHC RBC-ENTMCNC: 31.8 G/DL — LOW (ref 32–36)
MCV RBC AUTO: 85.5 FL — SIGNIFICANT CHANGE UP (ref 80–100)
NRBC BLD AUTO-RTO: 0 /100 WBCS — SIGNIFICANT CHANGE UP (ref 0–0)
PHOSPHATE SERPL-MCNC: 2.6 MG/DL — SIGNIFICANT CHANGE UP (ref 2.5–4.5)
PLATELET # BLD AUTO: 130 K/UL — LOW (ref 150–400)
POTASSIUM SERPL-MCNC: 3.3 MMOL/L — LOW (ref 3.5–5.3)
POTASSIUM SERPL-SCNC: 3.3 MMOL/L — LOW (ref 3.5–5.3)
PROCALCITONIN SERPL-MCNC: 0.12 NG/ML — HIGH (ref 0.02–0.1)
RBC # BLD: 4.68 M/UL — SIGNIFICANT CHANGE UP (ref 3.8–5.2)
RBC # FLD: 16.1 % — HIGH (ref 10.3–14.5)
SODIUM SERPL-SCNC: 144 MMOL/L — SIGNIFICANT CHANGE UP (ref 135–145)
SPECIMEN SOURCE: SIGNIFICANT CHANGE UP
WBC # BLD: 6.02 K/UL — SIGNIFICANT CHANGE UP (ref 3.8–10.5)
WBC # FLD AUTO: 6.02 K/UL — SIGNIFICANT CHANGE UP (ref 3.8–10.5)

## 2025-03-01 PROCEDURE — 71045 X-RAY EXAM CHEST 1 VIEW: CPT | Mod: 26

## 2025-03-01 PROCEDURE — 99233 SBSQ HOSP IP/OBS HIGH 50: CPT

## 2025-03-01 RX ADMIN — INSULIN LISPRO 6: 100 INJECTION, SOLUTION INTRAVENOUS; SUBCUTANEOUS at 17:05

## 2025-03-01 RX ADMIN — DRONEDARONE 400 MILLIGRAM(S): 400 TABLET, FILM COATED ORAL at 05:05

## 2025-03-01 RX ADMIN — Medication 1 SPRAY(S): at 05:06

## 2025-03-01 RX ADMIN — BUMETANIDE 2 MILLIGRAM(S): 1 TABLET ORAL at 13:09

## 2025-03-01 RX ADMIN — Medication 1 DROP(S): at 05:06

## 2025-03-01 RX ADMIN — ATORVASTATIN CALCIUM 40 MILLIGRAM(S): 80 TABLET, FILM COATED ORAL at 21:49

## 2025-03-01 RX ADMIN — Medication 0.5 MILLIGRAM(S): at 21:49

## 2025-03-01 RX ADMIN — Medication 1 SPRAY(S): at 21:49

## 2025-03-01 RX ADMIN — INSULIN GLARGINE-YFGN 10 UNIT(S): 100 INJECTION, SOLUTION SUBCUTANEOUS at 21:49

## 2025-03-01 RX ADMIN — IPRATROPIUM BROMIDE AND ALBUTEROL SULFATE 3 MILLILITER(S): .5; 2.5 SOLUTION RESPIRATORY (INHALATION) at 17:05

## 2025-03-01 RX ADMIN — APIXABAN 5 MILLIGRAM(S): 2.5 TABLET, FILM COATED ORAL at 17:03

## 2025-03-01 RX ADMIN — DEXTROMETHORPHAN HBR, GUAIFENESIN 100 MILLIGRAM(S): 200 LIQUID ORAL at 04:51

## 2025-03-01 RX ADMIN — DEXTROMETHORPHAN HBR, GUAIFENESIN 100 MILLIGRAM(S): 200 LIQUID ORAL at 23:52

## 2025-03-01 RX ADMIN — Medication 100 MILLIGRAM(S): at 05:06

## 2025-03-01 RX ADMIN — Medication 81 MILLIGRAM(S): at 12:38

## 2025-03-01 RX ADMIN — DEXTROMETHORPHAN HBR, GUAIFENESIN 100 MILLIGRAM(S): 200 LIQUID ORAL at 12:35

## 2025-03-01 RX ADMIN — IPRATROPIUM BROMIDE AND ALBUTEROL SULFATE 3 MILLILITER(S): .5; 2.5 SOLUTION RESPIRATORY (INHALATION) at 00:15

## 2025-03-01 RX ADMIN — IPRATROPIUM BROMIDE AND ALBUTEROL SULFATE 3 MILLILITER(S): .5; 2.5 SOLUTION RESPIRATORY (INHALATION) at 12:36

## 2025-03-01 RX ADMIN — BUMETANIDE 2 MILLIGRAM(S): 1 TABLET ORAL at 05:05

## 2025-03-01 RX ADMIN — DOXAZOSIN MESYLATE 2 MILLIGRAM(S): 8 TABLET ORAL at 05:05

## 2025-03-01 RX ADMIN — Medication 1 SPRAY(S): at 13:10

## 2025-03-01 RX ADMIN — INSULIN GLARGINE-YFGN 5 UNIT(S): 100 INJECTION, SOLUTION SUBCUTANEOUS at 08:43

## 2025-03-01 RX ADMIN — DILTIAZEM HYDROCHLORIDE 360 MILLIGRAM(S): 240 TABLET, EXTENDED RELEASE ORAL at 05:05

## 2025-03-01 RX ADMIN — CEFTRIAXONE 100 MILLIGRAM(S): 500 INJECTION, POWDER, FOR SOLUTION INTRAMUSCULAR; INTRAVENOUS at 12:36

## 2025-03-01 RX ADMIN — APIXABAN 5 MILLIGRAM(S): 2.5 TABLET, FILM COATED ORAL at 05:05

## 2025-03-01 RX ADMIN — Medication 100 MILLIGRAM(S): at 13:09

## 2025-03-01 RX ADMIN — IPRATROPIUM BROMIDE AND ALBUTEROL SULFATE 3 MILLILITER(S): .5; 2.5 SOLUTION RESPIRATORY (INHALATION) at 05:06

## 2025-03-01 RX ADMIN — INSULIN LISPRO 2: 100 INJECTION, SOLUTION INTRAVENOUS; SUBCUTANEOUS at 12:36

## 2025-03-01 RX ADMIN — DRONEDARONE 400 MILLIGRAM(S): 400 TABLET, FILM COATED ORAL at 17:04

## 2025-03-01 RX ADMIN — IPRATROPIUM BROMIDE AND ALBUTEROL SULFATE 3 MILLILITER(S): .5; 2.5 SOLUTION RESPIRATORY (INHALATION) at 23:52

## 2025-03-01 RX ADMIN — Medication 40 MILLIEQUIVALENT(S): at 17:03

## 2025-03-01 RX ADMIN — Medication 100 MILLIGRAM(S): at 21:48

## 2025-03-01 RX ADMIN — INSULIN LISPRO 2: 100 INJECTION, SOLUTION INTRAVENOUS; SUBCUTANEOUS at 08:42

## 2025-03-01 NOTE — PROGRESS NOTE ADULT - SUBJECTIVE AND OBJECTIVE BOX
Patient is a 79y old  Female who presents with a chief complaint of Influenza A (28 Feb 2025 17:27)      SUBJECTIVE / OVERNIGHT EVENTS:  Pt seen and examined. No acute events overnight. c/o cough productive of yellowish sputum. Denies SOB. Sating 94% on 3L NC.    MEDICATIONS  (STANDING):  albuterol/ipratropium for Nebulization 3 milliLiter(s) Nebulizer every 6 hours  apixaban 5 milliGRAM(s) Oral every 12 hours  artificial  tears Solution 1 Drop(s) Both EYES two times a day  aspirin enteric coated 81 milliGRAM(s) Oral daily  atorvastatin 40 milliGRAM(s) Oral at bedtime  benzonatate 100 milliGRAM(s) Oral three times a day  buMETAnide 2 milliGRAM(s) Oral two times a day  dextrose 5%. 1000 milliLiter(s) (50 mL/Hr) IV Continuous <Continuous>  dextrose 5%. 1000 milliLiter(s) (100 mL/Hr) IV Continuous <Continuous>  dextrose 50% Injectable 25 Gram(s) IV Push once  dextrose 50% Injectable 12.5 Gram(s) IV Push once  dextrose 50% Injectable 25 Gram(s) IV Push once  diltiazem    milliGRAM(s) Oral daily  doxazosin 2 milliGRAM(s) Oral daily  dronedarone 400 milliGRAM(s) Oral two times a day  glucagon  Injectable 1 milliGRAM(s) IntraMuscular once  hydrocodone/homatropine Syrup 5 milliLiter(s) Oral three times a day  insulin glargine Injectable (LANTUS) 5 Unit(s) SubCutaneous every morning  insulin glargine Injectable (LANTUS) 10 Unit(s) SubCutaneous at bedtime  insulin lispro (ADMELOG) corrective regimen sliding scale   SubCutaneous three times a day before meals  insulin lispro (ADMELOG) corrective regimen sliding scale   SubCutaneous at bedtime  LORazepam     Tablet 0.5 milliGRAM(s) Oral at bedtime  potassium chloride    Tablet ER 40 milliEquivalent(s) Oral once  sodium chloride 0.65% Nasal 1 Spray(s) Both Nostrils three times a day    MEDICATIONS  (PRN):  dextrose Oral Gel 15 Gram(s) Oral once PRN Blood Glucose LESS THAN 70 milliGRAM(s)/deciliter  guaiFENesin Oral Liquid (Sugar-Free) 100 milliGRAM(s) Oral every 6 hours PRN Cough      Vital Signs Last 24 Hrs  T(C): 36.6 (01 Mar 2025 12:47), Max: 37.1 (01 Mar 2025 05:45)  T(F): 97.8 (01 Mar 2025 12:47), Max: 98.8 (01 Mar 2025 05:45)  HR: 77 (01 Mar 2025 12:47) (68 - 79)  BP: 134/76 (01 Mar 2025 12:47) (134/76 - 163/73)  BP(mean): --  RR: 18 (01 Mar 2025 12:47) (18 - 18)  SpO2: 94% (01 Mar 2025 12:47) (91% - 94%)    Parameters below as of 01 Mar 2025 12:47  Patient On (Oxygen Delivery Method): nasal cannula  O2 Flow (L/min): 3    CAPILLARY BLOOD GLUCOSE      POCT Blood Glucose.: 153 mg/dL (01 Mar 2025 11:45)  POCT Blood Glucose.: 167 mg/dL (01 Mar 2025 07:56)  POCT Blood Glucose.: 139 mg/dL (28 Feb 2025 21:26)  POCT Blood Glucose.: 138 mg/dL (28 Feb 2025 17:00)    I&O's Summary    28 Feb 2025 07:01  -  01 Mar 2025 07:00  --------------------------------------------------------  IN: 0 mL / OUT: 2400 mL / NET: -2400 mL    01 Mar 2025 07:01  -  01 Mar 2025 16:03  --------------------------------------------------------  IN: 0 mL / OUT: 700 mL / NET: -700 mL        PHYSICAL EXAM:  GENERAL: NAD, well-groomed  HEAD:  Atraumatic, Normocephalic  EYES: conjunctiva and sclera clear  NECK: Supple, No JVD  CHEST/LUNG: decreased air entry bilaterally; No wheeze  HEART: Regular rate and rhythm; No murmurs, rubs, or gallops  ABDOMEN: Soft, Nontender, Nondistended; Bowel sounds present  EXTREMITIES:  2+ Peripheral Pulses, No clubbing, cyanosis, or edema  PSYCH: AAOx3  NEUROLOGY: non-focal  SKIN: No rashes or lesions    LABS:    03-01    144  |  103  |  40[H]  ----------------------------<  175[H]  3.3[L]   |  23  |  1.39[H]    Ca    9.6      01 Mar 2025 07:28  Phos  2.6     03-01  Mg     1.7     03-01            Urinalysis Basic - ( 01 Mar 2025 07:28 )    Color: x / Appearance: x / SG: x / pH: x  Gluc: 175 mg/dL / Ketone: x  / Bili: x / Urobili: x   Blood: x / Protein: x / Nitrite: x   Leuk Esterase: x / RBC: x / WBC x   Sq Epi: x / Non Sq Epi: x / Bacteria: x

## 2025-03-01 NOTE — PROGRESS NOTE ADULT - PROBLEM SELECTOR PLAN 2
- s/p Tamiflu dosed per GFR x 5 days   - Patient currently on NC - wean as tolerated  - Incentive spirometry, Duonebs, monitor peak flows given hx of asthma and hospitalization for asthma exacerbation in the past year  - Antitussive supportive care  - Continue Ceftriaxone (2/25-) for possible superimposed PNA x 5 days ; end date 3/1  - still c/o productive cough ; will add Hycodan x 3 days ; c/w robitussin, tessalon perles  - repeat CXR today  - check procalcitonin

## 2025-03-01 NOTE — PROGRESS NOTE ADULT - PROBLEM SELECTOR PLAN 3
On outpatient labs, Cre 1.7-1.9   Cre elevated, possible ATN in setting of infection, also possible prerenal given infection/poor oral intake and concomitant diuretic use.   Encourage oral intake  c/w Bumex per cardiology   Hold lisinopril for now given ROYAL - resume if Cre stable   Continue to monitor Cre ; 1.39 today ( pt states her baseline is ~ 1.4)

## 2025-03-02 DIAGNOSIS — J96.01 ACUTE RESPIRATORY FAILURE WITH HYPOXIA: ICD-10-CM

## 2025-03-02 LAB
ADD ON TEST-SPECIMEN IN LAB: SIGNIFICANT CHANGE UP
ANION GAP SERPL CALC-SCNC: 13 MMOL/L — SIGNIFICANT CHANGE UP (ref 5–17)
BUN SERPL-MCNC: 40 MG/DL — HIGH (ref 7–23)
CALCIUM SERPL-MCNC: 9.4 MG/DL — SIGNIFICANT CHANGE UP (ref 8.4–10.5)
CHLORIDE SERPL-SCNC: 101 MMOL/L — SIGNIFICANT CHANGE UP (ref 96–108)
CO2 SERPL-SCNC: 25 MMOL/L — SIGNIFICANT CHANGE UP (ref 22–31)
CREAT SERPL-MCNC: 1.46 MG/DL — HIGH (ref 0.5–1.3)
EGFR: 36 ML/MIN/1.73M2 — LOW
EGFR: 36 ML/MIN/1.73M2 — LOW
GLUCOSE BLDC GLUCOMTR-MCNC: 161 MG/DL — HIGH (ref 70–99)
GLUCOSE BLDC GLUCOMTR-MCNC: 167 MG/DL — HIGH (ref 70–99)
GLUCOSE BLDC GLUCOMTR-MCNC: 178 MG/DL — HIGH (ref 70–99)
GLUCOSE BLDC GLUCOMTR-MCNC: 198 MG/DL — HIGH (ref 70–99)
GLUCOSE SERPL-MCNC: 222 MG/DL — HIGH (ref 70–99)
MAGNESIUM SERPL-MCNC: 1.4 MG/DL — LOW (ref 1.6–2.6)
NT-PROBNP SERPL-SCNC: 450 PG/ML — HIGH (ref 0–300)
POTASSIUM SERPL-MCNC: 3.4 MMOL/L — LOW (ref 3.5–5.3)
POTASSIUM SERPL-SCNC: 3.4 MMOL/L — LOW (ref 3.5–5.3)
SODIUM SERPL-SCNC: 139 MMOL/L — SIGNIFICANT CHANGE UP (ref 135–145)

## 2025-03-02 PROCEDURE — 71250 CT THORAX DX C-: CPT | Mod: 26

## 2025-03-02 PROCEDURE — 99233 SBSQ HOSP IP/OBS HIGH 50: CPT

## 2025-03-02 RX ORDER — BUMETANIDE 1 MG/1
2 TABLET ORAL
Refills: 0 | Status: DISCONTINUED | OUTPATIENT
Start: 2025-03-02 | End: 2025-03-05

## 2025-03-02 RX ORDER — MAGNESIUM SULFATE 500 MG/ML
2 SYRINGE (ML) INJECTION ONCE
Refills: 0 | Status: COMPLETED | OUTPATIENT
Start: 2025-03-02 | End: 2025-03-02

## 2025-03-02 RX ADMIN — IPRATROPIUM BROMIDE AND ALBUTEROL SULFATE 3 MILLILITER(S): .5; 2.5 SOLUTION RESPIRATORY (INHALATION) at 23:16

## 2025-03-02 RX ADMIN — Medication 1 SPRAY(S): at 22:18

## 2025-03-02 RX ADMIN — DOXAZOSIN MESYLATE 2 MILLIGRAM(S): 8 TABLET ORAL at 06:04

## 2025-03-02 RX ADMIN — INSULIN LISPRO 2: 100 INJECTION, SOLUTION INTRAVENOUS; SUBCUTANEOUS at 12:17

## 2025-03-02 RX ADMIN — DILTIAZEM HYDROCHLORIDE 360 MILLIGRAM(S): 240 TABLET, EXTENDED RELEASE ORAL at 06:03

## 2025-03-02 RX ADMIN — Medication 1 SPRAY(S): at 13:13

## 2025-03-02 RX ADMIN — Medication 81 MILLIGRAM(S): at 12:17

## 2025-03-02 RX ADMIN — Medication 25 GRAM(S): at 13:15

## 2025-03-02 RX ADMIN — INSULIN LISPRO 2: 100 INJECTION, SOLUTION INTRAVENOUS; SUBCUTANEOUS at 08:30

## 2025-03-02 RX ADMIN — DRONEDARONE 400 MILLIGRAM(S): 400 TABLET, FILM COATED ORAL at 06:04

## 2025-03-02 RX ADMIN — Medication 1 DROP(S): at 06:05

## 2025-03-02 RX ADMIN — Medication 100 MILLIGRAM(S): at 22:18

## 2025-03-02 RX ADMIN — IPRATROPIUM BROMIDE AND ALBUTEROL SULFATE 3 MILLILITER(S): .5; 2.5 SOLUTION RESPIRATORY (INHALATION) at 06:03

## 2025-03-02 RX ADMIN — APIXABAN 5 MILLIGRAM(S): 2.5 TABLET, FILM COATED ORAL at 17:14

## 2025-03-02 RX ADMIN — APIXABAN 5 MILLIGRAM(S): 2.5 TABLET, FILM COATED ORAL at 06:04

## 2025-03-02 RX ADMIN — Medication 1 DROP(S): at 17:14

## 2025-03-02 RX ADMIN — DEXTROMETHORPHAN HBR, GUAIFENESIN 100 MILLIGRAM(S): 200 LIQUID ORAL at 09:01

## 2025-03-02 RX ADMIN — ATORVASTATIN CALCIUM 40 MILLIGRAM(S): 80 TABLET, FILM COATED ORAL at 22:18

## 2025-03-02 RX ADMIN — BUMETANIDE 2 MILLIGRAM(S): 1 TABLET ORAL at 13:14

## 2025-03-02 RX ADMIN — IPRATROPIUM BROMIDE AND ALBUTEROL SULFATE 3 MILLILITER(S): .5; 2.5 SOLUTION RESPIRATORY (INHALATION) at 12:17

## 2025-03-02 RX ADMIN — Medication 100 MILLIGRAM(S): at 06:04

## 2025-03-02 RX ADMIN — Medication 100 MILLIGRAM(S): at 16:42

## 2025-03-02 RX ADMIN — Medication 0.5 MILLIGRAM(S): at 22:18

## 2025-03-02 RX ADMIN — Medication 1 SPRAY(S): at 06:04

## 2025-03-02 RX ADMIN — INSULIN GLARGINE-YFGN 10 UNIT(S): 100 INJECTION, SOLUTION SUBCUTANEOUS at 22:17

## 2025-03-02 RX ADMIN — INSULIN GLARGINE-YFGN 5 UNIT(S): 100 INJECTION, SOLUTION SUBCUTANEOUS at 08:39

## 2025-03-02 RX ADMIN — IPRATROPIUM BROMIDE AND ALBUTEROL SULFATE 3 MILLILITER(S): .5; 2.5 SOLUTION RESPIRATORY (INHALATION) at 17:13

## 2025-03-02 RX ADMIN — DRONEDARONE 400 MILLIGRAM(S): 400 TABLET, FILM COATED ORAL at 17:13

## 2025-03-02 RX ADMIN — Medication 40 MILLIEQUIVALENT(S): at 13:13

## 2025-03-02 RX ADMIN — INSULIN LISPRO 2: 100 INJECTION, SOLUTION INTRAVENOUS; SUBCUTANEOUS at 17:14

## 2025-03-02 RX ADMIN — BUMETANIDE 2 MILLIGRAM(S): 1 TABLET ORAL at 06:03

## 2025-03-02 NOTE — PROGRESS NOTE ADULT - PROBLEM SELECTOR PLAN 5
On outpatient labs, Creat 1.7-1.9   Creat elevated, possible ATN in setting of infection, also possible prerenal given infection/poor oral intake and concomitant diuretic use.   Encourage oral intake  c/w Bumex per cardiology   Hold lisinopril for now given ROYAL - resume if Creat stable   Continue to monitor Creat ; 1.46 today ; up from 1.39 yesterday ( pt states her baseline is ~ 1.4)

## 2025-03-02 NOTE — PROGRESS NOTE ADULT - PROBLEM SELECTOR PLAN 1
- s/p Tamiflu  x 5 days   - Patient currently on NC - wean as tolerated  - Incentive spirometry  -  Duonebs   - Antitussive supportive care  - Continue Ceftriaxone (2/25-) for possible superimposed PNA x 5 days ; end date 3/1  - still c/o productive cough ; added Hycodan x 3 days ; c/w robitussin, tessalon perles  - procalcitonin 0.12  - repeat CXR w/increased pleural effusion

## 2025-03-02 NOTE — PROGRESS NOTE ADULT - PROBLEM SELECTOR PLAN 7
-  Incentive spirometry, Duonebs,  - Antitussive supportive care  -  monitor respiratory status closely given ongoing Influenza infection  - wean off O2 as tolerated

## 2025-03-02 NOTE — PROGRESS NOTE ADULT - PROBLEM SELECTOR PLAN 3
- iso Influenza A, HFpEF, Asthma  - sating 94% on 2L NC  -  repeat CXR w/increased pleural effusion  - will switch to IV Bumex  - pro-   - d/w Cardiology ; reccs to check CT chest

## 2025-03-02 NOTE — PROGRESS NOTE ADULT - SUBJECTIVE AND OBJECTIVE BOX
DATE OF SERVICE: 03-02-25 @ 13:22    Patient is a 79y old  Female who presents with a chief complaint of Influenza A (01 Mar 2025 10:02)      INTERVAL HISTORY: Feels very short of breath.     REVIEW OF SYSTEMS:  CONSTITUTIONAL: No weakness  EYES/ENT: No visual changes;  No throat pain   NECK: No pain or stiffness  RESPIRATORY: + cough, + wheezing; + shortness of breath  CARDIOVASCULAR: No chest pain or palpitations  GASTROINTESTINAL: No abdominal  pain. No nausea, vomiting, or hematemesis  GENITOURINARY: No dysuria, frequency or hematuria  NEUROLOGICAL: No stroke like symptoms  SKIN: No rashes      	  MEDICATIONS:  buMETAnide Injectable 2 milliGRAM(s) IV Push two times a day  diltiazem    milliGRAM(s) Oral daily  doxazosin 2 milliGRAM(s) Oral daily  dronedarone 400 milliGRAM(s) Oral two times a day        PHYSICAL EXAM:  T(C): 36.6 (03-02-25 @ 12:30), Max: 36.9 (03-01-25 @ 21:08)  HR: 77 (03-02-25 @ 12:30) (66 - 77)  BP: 120/69 (03-02-25 @ 12:30) (120/69 - 150/67)  RR: 18 (03-02-25 @ 12:30) (18 - 18)  SpO2: 92% (03-02-25 @ 12:30) (92% - 97%)  Wt(kg): --  I&O's Summary    01 Mar 2025 07:01  -  02 Mar 2025 07:00  --------------------------------------------------------  IN: 0 mL / OUT: 2150 mL / NET: -2150 mL          Appearance: In no distress	  HEENT:    PERRL, EOMI	  Cardiovascular:  S1 S2, No JVD  Respiratory: B/L wheezing, rales	  Gastrointestinal:  Soft, Non-tender, + BS	  Vascularature:  No edema of LE  Psychiatric: Appropriate affect   Neuro: no acute focal deficits                               12.7   6.02  )-----------( 130      ( 01 Mar 2025 17:12 )             40.0     03-02    139  |  101  |  40[H]  ----------------------------<  222[H]  3.4[L]   |  25  |  1.46[H]    Ca    9.4      02 Mar 2025 09:24  Phos  2.6     03-01  Mg     1.4     03-02          Labs personally reviewed      ASSESSMENT/PLAN: 	    79yoF w/ PMHx of s/p CVA, CAD, HFpEF, Afib on Eliquis, IDDM, CKD, asthma (hospitalization per PCP note in 2024 for acute bronchitis and asthma exacerbation), anxiety, and obesity who presents w/ complaints of severe LE swelling over the past two weeks, right > left, along with new cough and wheezing, found to be positive for Influenza A, w/ LE Dopplers negative for acute DVT (though exam limited by body habitus and pain).       Problem/Plan - 1:  ·  Problem: Acute diastolic HF  ·  Plan: On Bumex 2mg PO BID as OP  - Recent OP TTE with preserved EF and grade 1 DD  - TTE unremarkable with normal filling pressures; Repeat TTE pending  - CXR with increased B/L pleural effusion with associated atelectasis   - Given degree of hypoxia and SOB, Bumex 2mg IV BID restarted  - CT Chest, Repeat BNP pending    Problem/Plan - 2:  ·  Problem: Chronic atrial fibrillation.   ·  Plan: - continue home Cardizem, and Dronedarone  - continue home AC w/ Eliquis BID.    Problem/Plan - 3:  ·  Problem: Diabetes mellitus.   ·  Plan: - at home patient reportedly on Lantus 18units QHS and 9units in AM  - will continue w/ Lantus 10units QHS and 5units in AM for now, w/ mISS and serial FS monitoring.    Problem/Plan - 4:  ·  Problem:  DVT PPX  ·  Plan: Nomi De Leon, ASHLEY-JAXON Christopher,  [facility]  Cardiovascular Medicine  [address], [address]  Available through call or text on [employer] TE[employer]  Office: [phone]   DATE OF SERVICE: 03-02-25 @ 13:22    Patient is a 79y old  Female who presents with a chief complaint of Influenza A (01 Mar 2025 10:02)      INTERVAL HISTORY: Feels short of breath.     REVIEW OF SYSTEMS:  CONSTITUTIONAL: No weakness  EYES/ENT: No visual changes;  No throat pain   NECK: No pain or stiffness  RESPIRATORY: + cough, + wheezing; + shortness of breath  CARDIOVASCULAR: No chest pain or palpitations  GASTROINTESTINAL: No abdominal  pain. No nausea, vomiting, or hematemesis  GENITOURINARY: No dysuria, frequency or hematuria  NEUROLOGICAL: No stroke like symptoms  SKIN: No rashes      	  MEDICATIONS:  buMETAnide Injectable 2 milliGRAM(s) IV Push two times a day  diltiazem    milliGRAM(s) Oral daily  doxazosin 2 milliGRAM(s) Oral daily  dronedarone 400 milliGRAM(s) Oral two times a day        PHYSICAL EXAM:  T(C): 36.6 (03-02-25 @ 12:30), Max: 36.9 (03-01-25 @ 21:08)  HR: 77 (03-02-25 @ 12:30) (66 - 77)  BP: 120/69 (03-02-25 @ 12:30) (120/69 - 150/67)  RR: 18 (03-02-25 @ 12:30) (18 - 18)  SpO2: 92% (03-02-25 @ 12:30) (92% - 97%)  Wt(kg): --  I&O's Summary    01 Mar 2025 07:01  -  02 Mar 2025 07:00  --------------------------------------------------------  IN: 0 mL / OUT: 2150 mL / NET: -2150 mL          Appearance: In no distress	  HEENT:    PERRL, EOMI	  Cardiovascular:  S1 S2, No JVD  Respiratory: B/L wheezing, rales	  Gastrointestinal:  Soft, Non-tender, + BS	  Vascularature:  No edema of LE  Psychiatric: Appropriate affect   Neuro: no acute focal deficits                               12.7   6.02  )-----------( 130      ( 01 Mar 2025 17:12 )             40.0     03-02    139  |  101  |  40[H]  ----------------------------<  222[H]  3.4[L]   |  25  |  1.46[H]    Ca    9.4      02 Mar 2025 09:24  Phos  2.6     03-01  Mg     1.4     03-02          Labs personally reviewed      ASSESSMENT/PLAN: 	    79yoF w/ PMHx of s/p CVA, CAD, HFpEF, Afib on Eliquis, IDDM, CKD, asthma (hospitalization per PCP note in 2024 for acute bronchitis and asthma exacerbation), anxiety, and obesity who presents w/ complaints of severe LE swelling over the past two weeks, right > left, along with new cough and wheezing, found to be positive for Influenza A, w/ LE Dopplers negative for acute DVT (though exam limited by body habitus and pain).       Problem/Plan - 1:  ·  Problem: Acute diastolic HF  ·  Plan: On Bumex 2mg PO BID as OP  - Recent OP TTE with preserved EF and grade 1 DD  - TTE unremarkable with normal filling pressures; Repeat TTE to assess filling pressures  - CXR with increased B/L pleural effusion with associated atelectasis   - Given degree of hypoxia and SOB, Bumex 2mg IV BID restarted  - CT Chest, Repeat BNP pending to further eval cause of SOB/hypoxia    Problem/Plan - 2:  ·  Problem: Chronic atrial fibrillation.   ·  Plan: - continue home Cardizem, and Dronedarone  - continue home AC w/ Eliquis BID.    Problem/Plan - 3:  ·  Problem: Diabetes mellitus.   ·  Plan: - at home patient reportedly on Lantus 18units QHS and 9units in AM  - will continue w/ Lantus 10units QHS and 5units in AM for now, w/ mISS and serial FS monitoring.    Problem/Plan - 4:  ·  Problem:  DVT PPX  ·  Plan: Nomi De Leon, ASHLEY-JAXON Christopher,  [facility]  Cardiovascular Medicine  [address], [address]  Available through call or text on [employer] TE[employer]  Office: [phone]

## 2025-03-02 NOTE — PROGRESS NOTE ADULT - SUBJECTIVE AND OBJECTIVE BOX
Patient is a 79y old  Female who presents with a chief complaint of Influenza A (02 Mar 2025 13:22)      SUBJECTIVE / OVERNIGHT EVENTS:  Pt seen and examined. c/o worsening SOB and cough. Sating 945 on 2L NC.    MEDICATIONS  (STANDING):  albuterol/ipratropium for Nebulization 3 milliLiter(s) Nebulizer every 6 hours  apixaban 5 milliGRAM(s) Oral every 12 hours  artificial  tears Solution 1 Drop(s) Both EYES two times a day  aspirin enteric coated 81 milliGRAM(s) Oral daily  atorvastatin 40 milliGRAM(s) Oral at bedtime  benzonatate 100 milliGRAM(s) Oral three times a day  buMETAnide Injectable 2 milliGRAM(s) IV Push two times a day  dextrose 5%. 1000 milliLiter(s) (50 mL/Hr) IV Continuous <Continuous>  dextrose 5%. 1000 milliLiter(s) (100 mL/Hr) IV Continuous <Continuous>  dextrose 50% Injectable 25 Gram(s) IV Push once  dextrose 50% Injectable 12.5 Gram(s) IV Push once  dextrose 50% Injectable 25 Gram(s) IV Push once  diltiazem    milliGRAM(s) Oral daily  doxazosin 2 milliGRAM(s) Oral daily  dronedarone 400 milliGRAM(s) Oral two times a day  glucagon  Injectable 1 milliGRAM(s) IntraMuscular once  hydrocodone/homatropine Syrup 5 milliLiter(s) Oral three times a day  insulin glargine Injectable (LANTUS) 5 Unit(s) SubCutaneous every morning  insulin glargine Injectable (LANTUS) 10 Unit(s) SubCutaneous at bedtime  insulin lispro (ADMELOG) corrective regimen sliding scale   SubCutaneous three times a day before meals  insulin lispro (ADMELOG) corrective regimen sliding scale   SubCutaneous at bedtime  LORazepam     Tablet 0.5 milliGRAM(s) Oral at bedtime  sodium chloride 0.65% Nasal 1 Spray(s) Both Nostrils three times a day    MEDICATIONS  (PRN):  dextrose Oral Gel 15 Gram(s) Oral once PRN Blood Glucose LESS THAN 70 milliGRAM(s)/deciliter  guaiFENesin Oral Liquid (Sugar-Free) 100 milliGRAM(s) Oral every 6 hours PRN Cough      Vital Signs Last 24 Hrs  T(C): 36.6 (02 Mar 2025 12:30), Max: 36.9 (01 Mar 2025 21:08)  T(F): 97.9 (02 Mar 2025 12:30), Max: 98.4 (01 Mar 2025 21:08)  HR: 77 (02 Mar 2025 12:30) (66 - 77)  BP: 120/69 (02 Mar 2025 12:30) (120/69 - 150/67)  BP(mean): --  RR: 18 (02 Mar 2025 12:30) (18 - 18)  SpO2: 92% (02 Mar 2025 12:30) (92% - 97%)    Parameters below as of 02 Mar 2025 12:30  Patient On (Oxygen Delivery Method): nasal cannula      CAPILLARY BLOOD GLUCOSE      POCT Blood Glucose.: 167 mg/dL (02 Mar 2025 12:10)  POCT Blood Glucose.: 198 mg/dL (02 Mar 2025 08:01)  POCT Blood Glucose.: 169 mg/dL (01 Mar 2025 21:20)  POCT Blood Glucose.: 288 mg/dL (01 Mar 2025 16:56)    I&O's Summary    01 Mar 2025 07:01  -  02 Mar 2025 07:00  --------------------------------------------------------  IN: 0 mL / OUT: 2150 mL / NET: -2150 mL        PHYSICAL EXAM:  GENERAL: NAD, well-groomed  HEAD:  Atraumatic, Normocephalic  EYES: conjunctiva and sclera clear  NECK: Supple, No JVD  CHEST/LUNG: decreased air entry bilaterally; No wheeze  HEART: Regular rate and rhythm; No murmurs, rubs, or gallops  ABDOMEN: Soft, Nontender, Nondistended; Bowel sounds present  EXTREMITIES:  2+ Peripheral Pulses, No clubbing, cyanosis, or edema  PSYCH: AAOx3  NEUROLOGY: non-focal  SKIN: No rashes or lesions    LABS:                        12.7   6.02  )-----------( 130      ( 01 Mar 2025 17:12 )             40.0     03-02    139  |  101  |  40[H]  ----------------------------<  222[H]  3.4[L]   |  25  |  1.46[H]    Ca    9.4      02 Mar 2025 09:24  Phos  2.6     03-01  Mg     1.4     03-02            Urinalysis Basic - ( 02 Mar 2025 09:24 )    Color: x / Appearance: x / SG: x / pH: x  Gluc: 222 mg/dL / Ketone: x  / Bili: x / Urobili: x   Blood: x / Protein: x / Nitrite: x   Leuk Esterase: x / RBC: x / WBC x   Sq Epi: x / Non Sq Epi: x / Bacteria: x        RADIOLOGY & ADDITIONAL TESTS:    Imaging Personally Reviewed:  CXR 3/1  Increased small bilateral pleural effusions, left greater than right,   with associated atelectasis.            Care Discussed with Consultants/Other Providers: Cardiology

## 2025-03-02 NOTE — PROGRESS NOTE ADULT - PROBLEM SELECTOR PLAN 2
- Continue home ASA, statin  - Holding lisinopril due to ROYAL  - repeat CXR w/increased pleural effusion  - will switch to IV Bumex  - Monitor fluid status, hemodynamics, renal function, and electrolytes closely

## 2025-03-03 ENCOUNTER — RESULT REVIEW (OUTPATIENT)
Age: 80
End: 2025-03-03

## 2025-03-03 LAB
ANION GAP SERPL CALC-SCNC: 14 MMOL/L — SIGNIFICANT CHANGE UP (ref 5–17)
BUN SERPL-MCNC: 48 MG/DL — HIGH (ref 7–23)
CALCIUM SERPL-MCNC: 9.7 MG/DL — SIGNIFICANT CHANGE UP (ref 8.4–10.5)
CHLORIDE SERPL-SCNC: 101 MMOL/L — SIGNIFICANT CHANGE UP (ref 96–108)
CO2 SERPL-SCNC: 23 MMOL/L — SIGNIFICANT CHANGE UP (ref 22–31)
CREAT SERPL-MCNC: 1.59 MG/DL — HIGH (ref 0.5–1.3)
EGFR: 33 ML/MIN/1.73M2 — LOW
EGFR: 33 ML/MIN/1.73M2 — LOW
GLUCOSE BLDC GLUCOMTR-MCNC: 166 MG/DL — HIGH (ref 70–99)
GLUCOSE BLDC GLUCOMTR-MCNC: 186 MG/DL — HIGH (ref 70–99)
GLUCOSE BLDC GLUCOMTR-MCNC: 192 MG/DL — HIGH (ref 70–99)
GLUCOSE BLDC GLUCOMTR-MCNC: 200 MG/DL — HIGH (ref 70–99)
GLUCOSE SERPL-MCNC: 197 MG/DL — HIGH (ref 70–99)
MAGNESIUM SERPL-MCNC: 1.8 MG/DL — SIGNIFICANT CHANGE UP (ref 1.6–2.6)
NT-PROBNP SERPL-SCNC: 254 PG/ML — SIGNIFICANT CHANGE UP (ref 0–300)
POTASSIUM SERPL-MCNC: 3.6 MMOL/L — SIGNIFICANT CHANGE UP (ref 3.5–5.3)
POTASSIUM SERPL-SCNC: 3.6 MMOL/L — SIGNIFICANT CHANGE UP (ref 3.5–5.3)
SODIUM SERPL-SCNC: 138 MMOL/L — SIGNIFICANT CHANGE UP (ref 135–145)

## 2025-03-03 PROCEDURE — 93308 TTE F-UP OR LMTD: CPT | Mod: 26

## 2025-03-03 PROCEDURE — 99223 1ST HOSP IP/OBS HIGH 75: CPT

## 2025-03-03 PROCEDURE — 93321 DOPPLER ECHO F-UP/LMTD STD: CPT | Mod: 26

## 2025-03-03 PROCEDURE — 99233 SBSQ HOSP IP/OBS HIGH 50: CPT

## 2025-03-03 RX ADMIN — DILTIAZEM HYDROCHLORIDE 360 MILLIGRAM(S): 240 TABLET, EXTENDED RELEASE ORAL at 06:21

## 2025-03-03 RX ADMIN — BUMETANIDE 2 MILLIGRAM(S): 1 TABLET ORAL at 06:23

## 2025-03-03 RX ADMIN — DEXTROMETHORPHAN HBR, GUAIFENESIN 100 MILLIGRAM(S): 200 LIQUID ORAL at 03:11

## 2025-03-03 RX ADMIN — IPRATROPIUM BROMIDE AND ALBUTEROL SULFATE 3 MILLILITER(S): .5; 2.5 SOLUTION RESPIRATORY (INHALATION) at 13:08

## 2025-03-03 RX ADMIN — INSULIN LISPRO 2: 100 INJECTION, SOLUTION INTRAVENOUS; SUBCUTANEOUS at 18:21

## 2025-03-03 RX ADMIN — DEXTROMETHORPHAN HBR, GUAIFENESIN 100 MILLIGRAM(S): 200 LIQUID ORAL at 18:21

## 2025-03-03 RX ADMIN — INSULIN GLARGINE-YFGN 10 UNIT(S): 100 INJECTION, SOLUTION SUBCUTANEOUS at 22:01

## 2025-03-03 RX ADMIN — APIXABAN 5 MILLIGRAM(S): 2.5 TABLET, FILM COATED ORAL at 06:21

## 2025-03-03 RX ADMIN — Medication 100 MILLIGRAM(S): at 13:08

## 2025-03-03 RX ADMIN — INSULIN GLARGINE-YFGN 5 UNIT(S): 100 INJECTION, SOLUTION SUBCUTANEOUS at 08:56

## 2025-03-03 RX ADMIN — DEXTROMETHORPHAN HBR, GUAIFENESIN 100 MILLIGRAM(S): 200 LIQUID ORAL at 09:16

## 2025-03-03 RX ADMIN — Medication 1 DROP(S): at 18:22

## 2025-03-03 RX ADMIN — IPRATROPIUM BROMIDE AND ALBUTEROL SULFATE 3 MILLILITER(S): .5; 2.5 SOLUTION RESPIRATORY (INHALATION) at 18:22

## 2025-03-03 RX ADMIN — INSULIN LISPRO 2: 100 INJECTION, SOLUTION INTRAVENOUS; SUBCUTANEOUS at 13:08

## 2025-03-03 RX ADMIN — INSULIN LISPRO 2: 100 INJECTION, SOLUTION INTRAVENOUS; SUBCUTANEOUS at 08:55

## 2025-03-03 RX ADMIN — Medication 100 MILLIGRAM(S): at 06:21

## 2025-03-03 RX ADMIN — Medication 0.5 MILLIGRAM(S): at 22:02

## 2025-03-03 RX ADMIN — Medication 1 SPRAY(S): at 06:22

## 2025-03-03 RX ADMIN — Medication 1 SPRAY(S): at 13:09

## 2025-03-03 RX ADMIN — Medication 1 DROP(S): at 06:22

## 2025-03-03 RX ADMIN — IPRATROPIUM BROMIDE AND ALBUTEROL SULFATE 3 MILLILITER(S): .5; 2.5 SOLUTION RESPIRATORY (INHALATION) at 23:56

## 2025-03-03 RX ADMIN — APIXABAN 5 MILLIGRAM(S): 2.5 TABLET, FILM COATED ORAL at 18:22

## 2025-03-03 RX ADMIN — DOXAZOSIN MESYLATE 2 MILLIGRAM(S): 8 TABLET ORAL at 06:22

## 2025-03-03 RX ADMIN — BUMETANIDE 2 MILLIGRAM(S): 1 TABLET ORAL at 13:08

## 2025-03-03 RX ADMIN — DRONEDARONE 400 MILLIGRAM(S): 400 TABLET, FILM COATED ORAL at 18:22

## 2025-03-03 RX ADMIN — Medication 1 SPRAY(S): at 22:02

## 2025-03-03 RX ADMIN — DRONEDARONE 400 MILLIGRAM(S): 400 TABLET, FILM COATED ORAL at 06:21

## 2025-03-03 RX ADMIN — IPRATROPIUM BROMIDE AND ALBUTEROL SULFATE 3 MILLILITER(S): .5; 2.5 SOLUTION RESPIRATORY (INHALATION) at 03:23

## 2025-03-03 RX ADMIN — Medication 81 MILLIGRAM(S): at 08:55

## 2025-03-03 RX ADMIN — ATORVASTATIN CALCIUM 40 MILLIGRAM(S): 80 TABLET, FILM COATED ORAL at 22:02

## 2025-03-03 NOTE — PROGRESS NOTE ADULT - SUBJECTIVE AND OBJECTIVE BOX
Maricarmen Paredes M.D.  Division of Hospital Medicine  Available on [employer] TEAMS    SUBJECTIVE / ACUTE INTERVAL EVENTS: Patient seen and examined. No overnight events. Patient states she feels much better, as her cough continues to improve along with her breathing -- she is currently on 2L-NC, sitting up in chair, breathing comfortably, speaking in full sentences. Reviewed CT Chest findings with patient -- awaiting pulmonology's evaluation and recommendations.     OBJECTIVE:   Vital Signs Last 24 Hrs  T(F): 98.3 (03 Mar 2025 12:20), Max: 98.4 (03 Mar 2025 05:29)  HR: 91 (03 Mar 2025 12:20) (67 - 91)  BP: 157/67 (03 Mar 2025 12:20) (157/67 - 185/74)  RR: 18 (03 Mar 2025 12:20) (18 - 18)  SpO2: 94% (03 Mar 2025 12:20) (92% - 94%)  Parameters below as of 03 Mar 2025 12:20  Patient On (Oxygen Delivery Method): nasal cannula  O2 Flow (L/min): 2    I&O's Summary  02 Mar 2025 07:01  -  03 Mar 2025 07:00  --------------------------------------------------------  IN: 0 mL / OUT: 2150 mL / NET: -2150 mL    Physical Examination:  GEN: elderly woman, sitting up in chair in NAD, on O2 supp via NC-2L  PSYCH: A&Ox3, mood and affect appear appropriate   NEURO: no focal neurologic deficits appreciated  RESPI: no accessory muscle use, B/L air entry   CARDIO: regular rate/rhythm, no LE edema B/L  ABD: soft, NT, ND  EXT: patient able to move all extremities spontaneously  VASC: peripheral pulses palpated    Labs:  CAPILLARY BLOOD GLUCOSE  POCT Blood Glucose.: 186 mg/dL (03 Mar 2025 12:23)  POCT Blood Glucose.: 200 mg/dL (03 Mar 2025 08:15)  POCT Blood Glucose.: 161 mg/dL (02 Mar 2025 21:49)  POCT Blood Glucose.: 178 mg/dL (02 Mar 2025 16:54)                        12.7   6.02  )-----------( 130      ( 01 Mar 2025 17:12 )             40.0     03-03  138  |  101  |  48[H]  ----------------------------<  197[H]  3.6   |  23  |  1.59[H]    Ca    9.7      03 Mar 2025 06:58  Mg     1.8     03-03    Urinalysis Basic - ( 03 Mar 2025 06:58 )  Color: x / Appearance: x / SG: x / pH: x  Gluc: 197 mg/dL / Ketone: x  / Bili: x / Urobili: x   Blood: x / Protein: x / Nitrite: x   Leuk Esterase: x / RBC: x / WBC x   Sq Epi: x / Non Sq Epi: x / Bacteria: x    Imaging Personally Reviewed:  - Limited TTE as reported:  1. The left ventricular diastolic function is indeterminate, with elevated left ventricular filling pressure. 2. Pulmonary artery systolic pressure could not be estimated. 3. Compared to the transthoracic echocardiogram performed on 2/24/2025, filling pressure is elevated.  - CT Chest w/o contrast as reported: Tracheobronchial secretions, extensive in the lower lobes.  Complete collapse left lower lobe and near complete collapse right lower lobe. Patchy bilateral lung opacities may be infectious or inflammatory.    Consultant(s) Notes Reviewed: Cardiology   Care Discussed with Consultants/Other Providers: RACHEL Keita    MEDICATIONS  (STANDING):  albuterol/ipratropium for Nebulization 3 milliLiter(s) Nebulizer every 6 hours  apixaban 5 milliGRAM(s) Oral every 12 hours  artificial  tears Solution 1 Drop(s) Both EYES two times a day  aspirin enteric coated 81 milliGRAM(s) Oral daily  atorvastatin 40 milliGRAM(s) Oral at bedtime  benzonatate 100 milliGRAM(s) Oral three times a day  buMETAnide Injectable 2 milliGRAM(s) IV Push two times a day  dextrose 5%. 1000 milliLiter(s) (100 mL/Hr) IV Continuous <Continuous>  dextrose 5%. 1000 milliLiter(s) (50 mL/Hr) IV Continuous <Continuous>  dextrose 50% Injectable 25 Gram(s) IV Push once  dextrose 50% Injectable 12.5 Gram(s) IV Push once  dextrose 50% Injectable 25 Gram(s) IV Push once  diltiazem    milliGRAM(s) Oral daily  doxazosin 2 milliGRAM(s) Oral daily  dronedarone 400 milliGRAM(s) Oral two times a day  glucagon  Injectable 1 milliGRAM(s) IntraMuscular once  hydrocodone/homatropine Syrup 5 milliLiter(s) Oral three times a day  insulin glargine Injectable (LANTUS) 5 Unit(s) SubCutaneous every morning  insulin glargine Injectable (LANTUS) 10 Unit(s) SubCutaneous at bedtime  insulin lispro (ADMELOG) corrective regimen sliding scale   SubCutaneous three times a day before meals  insulin lispro (ADMELOG) corrective regimen sliding scale   SubCutaneous at bedtime  LORazepam     Tablet 0.5 milliGRAM(s) Oral at bedtime  sodium chloride 0.65% Nasal 1 Spray(s) Both Nostrils three times a day    MEDICATIONS  (PRN):  dextrose Oral Gel 15 Gram(s) Oral once PRN Blood Glucose LESS THAN 70 milliGRAM(s)/deciliter  guaiFENesin Oral Liquid (Sugar-Free) 100 milliGRAM(s) Oral every 6 hours PRN Cough

## 2025-03-03 NOTE — PROGRESS NOTE ADULT - PROBLEM SELECTOR PLAN 3
- iso Influenza A, HFpEF, Asthma  - sating 94% on 2L NC  - Repeat CXR w/increased pleural effusion  - CT Chest w/o contrast as reported: Tracheobronchial secretions, extensive in the lower lobes.  Complete collapse left lower lobe and near complete collapse right lower lobe. Patchy bilateral lung opacities may be infectious or inflammatory.  - Will appreciate pulmonology consult, called on 3/3  - Diuretics as above

## 2025-03-03 NOTE — PROGRESS NOTE ADULT - PROBLEM SELECTOR PLAN 5
On outpatient labs, Creat 1.7-1.9   Creat elevated, possible ATN in setting of infection, also possible prerenal given infection/poor oral intake and concomitant diuretic use.   Encourage oral intake  c/w Bumex per cardiology   Hold lisinopril for now given ROYAL - resume if Creat stable   Continue to monitor Creat; pt states her baseline is ~ 1.4)

## 2025-03-03 NOTE — PROGRESS NOTE ADULT - PROBLEM SELECTOR PLAN 2
- Acute on chronic as developed during hospitalization  - Continue home ASA, statin  - Holding lisinopril due to ROYAL  - Continue IV diuresis, appreciate cardiology  - Monitor fluid status, hemodynamics, renal function, and electrolytes closely

## 2025-03-03 NOTE — CONSULT NOTE ADULT - SUBJECTIVE AND OBJECTIVE BOX
DATE OF SERVICE: 02-21-25      CHIEF COMPLAINT:Patient is a 79y old  Female who presents with a chief complaint of Influenza A (21 Feb 2025 15:58)      HISTORY OF PRESENT ILLNESS:HPI:  79yoF w/ PMHx of s/p CVA, CAD, HFpEF, Afib on Eliquis, IDDM, CKD, asthma (hospitalization per PCP note in 2024 for acute bronchitis and asthma exacerbation), anxiety, and obesity who presents w/ complaints of severe LE swelling over the past two weens, right > left; patient was recently seen by her cardiologist, Dr. Boss, since symptom onset, and her diuretic was changed from Lasix (which had improved patient's previous history of severe LE swelling in the past, at the time, in April 2024, left > right per PCP note) to Bumex 2mg daily -- patient states symptoms had improved at first but has since progressed. LE Dopplers performed on 2/13/25 were negative for DVT. Patient also complains of a recent nonproductive cough and wheezing; she denies, however, fever/chills, known sick contacts, myalgia, rhinorrhea, CP, palpitations, SOB/ZAMORA, abd pain, n/v/d/c, or have urinary complaints.     ED Presenting Vitals: 98.1F, 85bpm, 175/83, 18br/m, 94% on RA  ED Course: s/p IV Acetaminophen 1g x1, IV Bumex 1g x1, IV Cefazolin 1g x1, PO Tamiflu 75mg x1, PO Tramadol 50mg x1 (21 Feb 2025 15:58)      PAST MEDICAL & SURGICAL HISTORY:  Hypertension      Hyperlipemia      Diabetes mellitus type 2 in obese      Atrial fibrillation      CVA (cerebral vascular accident)      Renal insufficiency      Knee pain, left      Anxiety      Hypomagnesemia      History of cholecystectomy      Coronary stent patent              MEDICATIONS:  apixaban 5 milliGRAM(s) Oral every 12 hours  diltiazem    milliGRAM(s) Oral daily  dronedarone 400 milliGRAM(s) Oral two times a day    oseltamivir      oseltamivir 75 milliGRAM(s) Oral once    benzonatate 100 milliGRAM(s) Oral three times a day  guaiFENesin Oral Liquid (Sugar-Free) 100 milliGRAM(s) Oral every 6 hours PRN    LORazepam     Tablet 0.5 milliGRAM(s) Oral at bedtime      atorvastatin 40 milliGRAM(s) Oral at bedtime  dextrose 50% Injectable 25 Gram(s) IV Push once  dextrose 50% Injectable 12.5 Gram(s) IV Push once  dextrose 50% Injectable 25 Gram(s) IV Push once  dextrose Oral Gel 15 Gram(s) Oral once PRN  glucagon  Injectable 1 milliGRAM(s) IntraMuscular once  insulin glargine Injectable (LANTUS) 10 Unit(s) SubCutaneous at bedtime  insulin lispro (ADMELOG) corrective regimen sliding scale   SubCutaneous three times a day before meals  insulin lispro (ADMELOG) corrective regimen sliding scale   SubCutaneous at bedtime    dextrose 5%. 1000 milliLiter(s) IV Continuous <Continuous>  dextrose 5%. 1000 milliLiter(s) IV Continuous <Continuous>      FAMILY HISTORY:  Family history of myocardial infarction    FH: heart disease  mother, father        Non-contributory    SOCIAL HISTORY:    [ ] not a smoker    Allergies    No Known Allergies    Intolerances    	    REVIEW OF SYSTEMS:  CONSTITUTIONAL: No fever  EYES: No eye pain, visual disturbances, or discharge  ENMT:  No difficulty hearing, tinnitus  NECK: No pain or stiffness  RESPIRATORY: No cough, wheezing,  CARDIOVASCULAR: No chest pain, palpitations, passing out, dizziness, + leg swelling  GASTROINTESTINAL:  No nausea, vomiting, diarrhea or constipation. No melena.  GENITOURINARY: No dysuria, hematuria  NEUROLOGICAL: No stroke like symptoms  SKIN: No burning or lesions   ENDOCRINE: No heat or cold intolerance  MUSCULOSKELETAL: No joint pain or swelling  PSYCHIATRIC: No  anxiety, mood swings  HEME/LYMPH: No bleeding gums  ALLERGY AND IMMUNOLOGIC: No hives or eczema	    All other ROS negative    PHYSICAL EXAM:  T(C): 37.4 (02-21-25 @ 19:43), Max: 37.8 (02-21-25 @ 15:05)  HR: 67 (02-21-25 @ 19:43) (67 - 85)  BP: 132/67 (02-21-25 @ 19:43) (132/67 - 171/83)  RR: 17 (02-21-25 @ 19:43) (17 - 18)  SpO2: 96% (02-21-25 @ 19:43) (92% - 96%)  Wt(kg): --  I&O's Summary      Appearance: Normal	  HEENT:   Normal oral mucosa, EOMI	  Cardiovascular:  S1 S2, No JVD,    Respiratory: Lungs clear to auscultation	  Psychiatry: Alert  Gastrointestinal:  Soft, Non-tender, + BS	  Skin: No rashes   Neurologic: Non-focal  Extremities:  No edema  Vascular: Peripheral pulses palpable    	    	  	  CARDIAC MARKERS:  Labs personally reviewed by me                                  13.8   4.31  )-----------( 141      ( 21 Feb 2025 11:54 )             43.4     02-21    142  |  101  |  47[H]  ----------------------------<  119[H]  3.9   |  26  |  1.71[H]    Ca    10.3      21 Feb 2025 11:54    TPro  7.9  /  Alb  4.6  /  TBili  0.5  /  DBili  x   /  AST  35  /  ALT  37  /  AlkPhos  92  02-21          EKG: Personally reviewed by me - NSR LAFB  Radiology: Personally reviewed by me - CXR  Small left pleural effusion.      TTE 11/1/2024  1.Left ventricular cavity is normal in size. Left ventricular wall thickness is mildly increased. Left ventricular systolic function is normal with an ejection fraction visually estimated at 65 to 70 %. There is poor visualization of the endocardial borders to determine the presence of wall motion abnormalities.2.There is mild (grade 1) left ventricular diastolic dysfunction.3.Left atrium is severely dilated.4.Mild mitral regurgitation.5.Ascending aorta is dilated, measuring 3.80 cm (indexed 1.72 cm/m²).6.Left ventricular endocardium is not well visualized; however, the left ventricular systolic function appears grossly normal.7.Lipomatous interatrial septal hypertrophy present.8.Mild mitral valve leaflet calcification.9.There is mild -moderate calcification of the mitral valve annulus.10.Mild mitral valve stenosis, secondary to dystrophic mitral annular calcification.11.Normal pulmonary artery pressure.12.Pericardial fat pad is seen anterior to the right ventricle.13.There is normal LV mass and concentric remodeling.14.Trace pericardial effusion noted adjacent to the posterior left ventricle        Assessment:  · Assessment	  79yoF w/ PMHx of s/p CVA, CAD, HFpEF, Afib on Eliquis, IDDM, CKD, asthma (hospitalization per PCP note in 2024 for acute bronchitis and asthma exacerbation), anxiety, and obesity who presents w/ complaints of severe LE swelling over the past two weeks, right > left, along with new cough and wheezing, found to be positive for Influenza A, w/ LE Dopplers negative for acute DVT (though exam limited by body habitus and pain).       Problem/Plan - 1:  ·  Problem: Acute diastolic HF  ·  Plan: On Bumex 2mg PO BID as OP  - Switch to Bumex 2mg IV BID  - TTE given new LE edema  - Recent OP TTE with preserved EF and grade 1 DD    Problem/Plan - 2:  ·  Problem: Chronic atrial fibrillation.   ·  Plan: - continue home Cardizem, and Dronedarone  - continue home AC w/ Eliquis BID.    Problem/Plan - 3:  ·  Problem: Diabetes mellitus.   ·  Plan: - at home patient reportedly on Lantus 18units QHS and 9units in AM  - will continue w/ Lantus 10units QHS and 5units in AM for now, w/ mISS and serial FS monitoring.    Problem/Plan - 4:  ·  Problem:  DVT PPX  ·  Plan: Nomi Christopher DO [facility]  Cardiovascular Medicine  [address] [place] [address], [address]  Office [phone]  Available via call/text on [employer] Teams
HPI: Dyspnea    Pt is a 79F with MHx AFib on NOAC, HFpEF, CAD, IDDMII, CKD, asthma with recurrent bronchitis, and obesity (Type III BMI 48) initially presenting to [facility] on [facility]/21/25 with LE edema admitted with acute hypoxemic respiratory failure 2/2 Influenza A PNA c/b asthma exacerbation +/- pulmonary edema. Pt initially clinically improved on diuretics, ceftriaxone, and tamiflu, but hospital course was c/b worsening dyspnea and productive cough. CT imaging performed, significant for new severe tracheobronchial secretions with associated bibasilar atelectasis and superimposed b/l lung consolidations. Pt restarted on IV diuretics and airway clearance therapy, today feeling clinically better than last week.     PAST MEDICAL & SURGICAL HISTORY:  Hypertension      Hyperlipemia      Diabetes mellitus type 2 in obese      Atrial fibrillation      CVA (cerebral vascular accident)      Renal insufficiency      Knee pain, left      Anxiety      Hypomagnesemia      History of cholecystectomy      Coronary stent patent          FAMILY HISTORY:  Family history of myocardial infarction    FH: heart disease  mother, father        SOCIAL HISTORY:  Smoking: Denies   EtOH Use: Denies  Drug Use: Denies  Occupation: Retired  Exposures: None  Recent Travel: None    Allergies    No Known Allergies    Intolerances        HOME MEDICATIONS:    REVIEW OF SYSTEMS:  Constitutional: No fevers or chills. No weight loss. No fatigue or generalised malaise.  Eyes: No itching or discharge from the eyes  ENT: No ear pain. No ear discharge. No nasal congestion. No post nasal drip. No epistaxis. No throat pain. No sore throat. No difficulty swallowing.   CV: No chest pain. No palpitations. No lightheadedness or dizziness.   Resp: No dyspnea at rest. No dyspnea on exertion. No orthopnea. No wheezing. No cough. No stridor. No sputum production. No chest pain with respiration.  GI: No nausea. No vomiting. No diarrhea.  MSK: No joint pain or pain in any extremities  Integumentary: No skin lesions. No pedal edema.  Neurological: No gross motor weakness. No sensory changes.    [x ] All other systems negative  [ ] Unable to assess ROS because ________    OBJECTIVE:  ICU Vital Signs Last 24 Hrs  T(C): 36.8 (03 Mar 2025 12:20), Max: 36.9 (03 Mar 2025 05:29)  T(F): 98.3 (03 Mar 2025 12:20), Max: 98.4 (03 Mar 2025 05:29)  HR: 91 (03 Mar 2025 12:20) (67 - 91)  BP: 157/67 (03 Mar 2025 12:20) (157/67 - 185/74)  BP(mean): --  ABP: --  ABP(mean): --  RR: 18 (03 Mar 2025 12:20) (18 - 18)  SpO2: 94% (03 Mar 2025 12:20) (92% - 94%)    O2 Parameters below as of 03 Mar 2025 12:20  Patient On (Oxygen Delivery Method): nasal cannula  O2 Flow (L/min): 2            03-02 @ 07:01  -  03-03 @ 07:00  --------------------------------------------------------  IN: 0 mL / OUT: 2150 mL / NET: -2150 mL      CAPILLARY BLOOD GLUCOSE      POCT Blood Glucose.: 186 mg/dL (03 Mar 2025 12:23)      PHYSICAL EXAM:  General: Awake, alert, oriented X 3.   HEENT: Atraumatic, normocephalic.                  No tonsillar or pharyngeal exudates.  Lymph Nodes: No palpable lymphadenopathy  Neck: No JVD. No carotid bruit.   Respiratory: Normal chest expansion                         Normal percussion                         Normal and equal air entry                         Faint wheeze, no rhonchi or rales.  Cardiovascular: S1 S2 normal. No murmurs, rubs or gallops.   Abdomen: Soft, non-tender, non-distended. No organomegaly.  Extremities: Warm to touch. Peripheral pulse palpable. No pedal edema.   Skin: No rashes or skin lesions  Neurological: Non-focal  Psychiatry: Appropriate mood and affect.    HOSPITAL MEDICATIONS:  MEDICATIONS  (STANDING):  albuterol/ipratropium for Nebulization 3 milliLiter(s) Nebulizer every 6 hours  apixaban 5 milliGRAM(s) Oral every 12 hours  artificial  tears Solution 1 Drop(s) Both EYES two times a day  aspirin enteric coated 81 milliGRAM(s) Oral daily  atorvastatin 40 milliGRAM(s) Oral at bedtime  benzonatate 100 milliGRAM(s) Oral three times a day  buMETAnide Injectable 2 milliGRAM(s) IV Push two times a day  dextrose 5%. 1000 milliLiter(s) (100 mL/Hr) IV Continuous <Continuous>  dextrose 5%. 1000 milliLiter(s) (50 mL/Hr) IV Continuous <Continuous>  dextrose 50% Injectable 25 Gram(s) IV Push once  dextrose 50% Injectable 12.5 Gram(s) IV Push once  dextrose 50% Injectable 25 Gram(s) IV Push once  diltiazem    milliGRAM(s) Oral daily  doxazosin 2 milliGRAM(s) Oral daily  dronedarone 400 milliGRAM(s) Oral two times a day  glucagon  Injectable 1 milliGRAM(s) IntraMuscular once  hydrocodone/homatropine Syrup 5 milliLiter(s) Oral three times a day  insulin glargine Injectable (LANTUS) 5 Unit(s) SubCutaneous every morning  insulin glargine Injectable (LANTUS) 10 Unit(s) SubCutaneous at bedtime  insulin lispro (ADMELOG) corrective regimen sliding scale   SubCutaneous three times a day before meals  insulin lispro (ADMELOG) corrective regimen sliding scale   SubCutaneous at bedtime  LORazepam     Tablet 0.5 milliGRAM(s) Oral at bedtime  sodium chloride 0.65% Nasal 1 Spray(s) Both Nostrils three times a day    MEDICATIONS  (PRN):  dextrose Oral Gel 15 Gram(s) Oral once PRN Blood Glucose LESS THAN 70 milliGRAM(s)/deciliter  guaiFENesin Oral Liquid (Sugar-Free) 100 milliGRAM(s) Oral every 6 hours PRN Cough      LABS:                        12.7   6.02  )-----------( 130      ( 01 Mar 2025 17:12 )             40.0     03-03    138  |  101  |  48[H]  ----------------------------<  197[H]  3.6   |  23  |  1.59[H]    Ca    9.7      03 Mar 2025 06:58  Mg     1.8     03-03        Urinalysis Basic - ( 03 Mar 2025 06:58 )    Color: x / Appearance: x / SG: x / pH: x  Gluc: 197 mg/dL / Ketone: x  / Bili: x / Urobili: x   Blood: x / Protein: x / Nitrite: x   Leuk Esterase: x / RBC: x / WBC x   Sq Epi: x / Non Sq Epi: x / Bacteria: x            MICROBIOLOGY: Reviewed     RADIOLOGY:  [x ] Reviewed and interpreted by me    Point of Care Ultrasound Findings: Pending    PFT: None available    EKG: Reviewed

## 2025-03-03 NOTE — PROGRESS NOTE ADULT - SUBJECTIVE AND OBJECTIVE BOX
[facility] Attending Follow-up Note     Patient seen and examined at bedside.    Overnight Events:     + dyspnea     REVIEW OF SYSTEMS:  Constitutional:     [x ] negative [ ] fevers [ ] chills [ ] weight loss [ ] weight gain  HEENT:                  [x ] negative [ ] dry eyes [ ] eye irritation [ ] postnasal drip [ ] nasal congestion  CV:                         [ x] negative  [ ] chest pain [ ] orthopnea [ ] palpitations [ ] murmur  Resp:                     [ x] negative [ ] cough [ ] shortness of breath [ ] dyspnea [ ] wheezing [ ] sputum [ ]hemoptysis  GI:                          [ x] negative [ ] nausea [ ] vomiting [ ] diarrhea [ ] constipation [ ] abd pain [ ] dysphagia   :                        [ x] negative [ ] dysuria [ ] nocturia [ ] hematuria [ ] increased urinary frequency  Musculoskeletal: [ x] negative [ ] back pain [ ] myalgias [ ] arthralgias [ ] fracture  Skin:                       [ x] negative [ ] rash [ ] itch  Neurological:        [x ] negative [ ] headache [ ] dizziness [ ] syncope [ ] weakness [ ] numbness  Psychiatric:           [ x] negative [ ] anxiety [ ] depression  Endocrine:            [ x] negative [ ] diabetes [ ] thyroid problem  Heme/Lymph:      [ x] negative [ ] anemia [ ] bleeding problem  Allergic/Immune: [ x] negative [ ] itchy eyes [ ] nasal discharge [ ] hives [ ] angioedema    [ x] All other systems negative  [ ] Unable to assess ROS due to    Current Meds:  albuterol/ipratropium for Nebulization 3 milliLiter(s) Nebulizer every 6 hours  apixaban 5 milliGRAM(s) Oral every 12 hours  artificial  tears Solution 1 Drop(s) Both EYES two times a day  aspirin enteric coated 81 milliGRAM(s) Oral daily  atorvastatin 40 milliGRAM(s) Oral at bedtime  benzonatate 100 milliGRAM(s) Oral three times a day  buMETAnide Injectable 2 milliGRAM(s) IV Push two times a day  dextrose 5%. 1000 milliLiter(s) IV Continuous <Continuous>  dextrose 5%. 1000 milliLiter(s) IV Continuous <Continuous>  dextrose 50% Injectable 25 Gram(s) IV Push once  dextrose 50% Injectable 12.5 Gram(s) IV Push once  dextrose 50% Injectable 25 Gram(s) IV Push once  dextrose Oral Gel 15 Gram(s) Oral once PRN  diltiazem    milliGRAM(s) Oral daily  doxazosin 2 milliGRAM(s) Oral daily  dronedarone 400 milliGRAM(s) Oral two times a day  glucagon  Injectable 1 milliGRAM(s) IntraMuscular once  guaiFENesin Oral Liquid (Sugar-Free) 100 milliGRAM(s) Oral every 6 hours PRN  hydrocodone/homatropine Syrup 5 milliLiter(s) Oral three times a day  insulin glargine Injectable (LANTUS) 5 Unit(s) SubCutaneous every morning  insulin glargine Injectable (LANTUS) 10 Unit(s) SubCutaneous at bedtime  insulin lispro (ADMELOG) corrective regimen sliding scale   SubCutaneous three times a day before meals  insulin lispro (ADMELOG) corrective regimen sliding scale   SubCutaneous at bedtime  LORazepam     Tablet 0.5 milliGRAM(s) Oral at bedtime  sodium chloride 0.65% Nasal 1 Spray(s) Both Nostrils three times a day      PAST MEDICAL & SURGICAL HISTORY:  Hypertension      Hyperlipemia      Diabetes mellitus type 2 in obese      Atrial fibrillation      CVA (cerebral vascular accident)      Renal insufficiency      Knee pain, left      Anxiety      Hypomagnesemia      History of cholecystectomy      Coronary stent patent          Vitals:  T(F): 98.3 (03-03), Max: 98.4 (03-03)  HR: 91 (03-03) (67 - 91)  BP: 157/67 (03-03) (157/67 - 185/74)  RR: 18 (03-03)  SpO2: 94% (03-03)  I&O's Summary    02 Mar 2025 07:01  -  03 Mar 2025 07:00  --------------------------------------------------------  IN: 0 mL / OUT: 2150 mL / NET: -2150 mL    03 Mar 2025 07:01  -  03 Mar 2025 17:15  --------------------------------------------------------  IN: 0 mL / OUT: 950 mL / NET: -950 mL        Physical Exam:  Appearance: No acute distress  HENT: No JVD   Cardiovascular: RRR, S1/S2, no murmurs  Respiratory: CTABL  Gastrointestinal: soft, NT ND, +BS  Musculoskeletal: No clubbing, no edema   Neurologic: Non-focal  Skin: No rashes, ecchymoses, or cyanosis      03-03    138  |  101  |  48[H]  ----------------------------<  197[H]  3.6   |  23  |  1.59[H]    Ca    9.7      03 Mar 2025 06:58  Mg     1.8     03-03                    Cardiovascular Testings:

## 2025-03-03 NOTE — PROGRESS NOTE ADULT - PROBLEM SELECTOR PLAN 7
- Patient has been hospitalized for asthma in past year  - Incentive spirometry, Duonebs,  - Antitussive supportive care  - Monitor respiratory status closely, wean off O2 as tolerated  - Will appreciate pulm as above

## 2025-03-03 NOTE — CONSULT NOTE ADULT - ASSESSMENT
Pt is a 79F with MHx AFib on NOAC, HFpEF, CAD, IDDMII, CKD, asthma with recurrent bronchitis, and obesity (Type III BMI 48) initially presenting to [facility] on 2/21/25 with LE edema admitted with acute hypoxemic respiratory failure 2/2 Influenza A PNA c/b asthma exacerbation +/- pulmonary edema with hospital course c/b recurrent hypoxemia with evidence of bibasilar mucous plugging and recurrent fluid overload.    -Recommend continuation of IV diuresis for goal net (-) 1L/24 hrs  -Will f/u TTE.   -Aggressive airway clearance therapy: DuoNebs + Airway Percussive Device (At bedside)  -OOB to chair and ambulation as tolerated. Incentive spirometry at bedside.   -Would change cough suppressants to prn dosing only  -No pulmonary indication for antibiotics or systemic steroids at this time  -Please obtain AM CXR. Will check POCUS if residual PLEFs remain despite diuretics  -Pulmonary will continue to follow while in hospital

## 2025-03-03 NOTE — PROGRESS NOTE ADULT - PROBLEM SELECTOR PLAN 1
- s/p Tamiflu x 5 days   - Patient currently on NC - wean as tolerated to maintain Spo2 > 92% as tolerated  - Incentive spirometry  - Duonebs   - Antitussive supportive care  - S/p Ceftriaxone course for possible superimposed PNA  - Productive cough improving; added Hycodan x 3 days ; c/w Robitussin, tessalon perles

## 2025-03-04 LAB
ANION GAP SERPL CALC-SCNC: 14 MMOL/L — SIGNIFICANT CHANGE UP (ref 5–17)
BUN SERPL-MCNC: 58 MG/DL — HIGH (ref 7–23)
CALCIUM SERPL-MCNC: 9.3 MG/DL — SIGNIFICANT CHANGE UP (ref 8.4–10.5)
CHLORIDE SERPL-SCNC: 98 MMOL/L — SIGNIFICANT CHANGE UP (ref 96–108)
CO2 SERPL-SCNC: 27 MMOL/L — SIGNIFICANT CHANGE UP (ref 22–31)
CREAT SERPL-MCNC: 1.81 MG/DL — HIGH (ref 0.5–1.3)
EGFR: 28 ML/MIN/1.73M2 — LOW
EGFR: 28 ML/MIN/1.73M2 — LOW
GLUCOSE BLDC GLUCOMTR-MCNC: 166 MG/DL — HIGH (ref 70–99)
GLUCOSE BLDC GLUCOMTR-MCNC: 184 MG/DL — HIGH (ref 70–99)
GLUCOSE BLDC GLUCOMTR-MCNC: 201 MG/DL — HIGH (ref 70–99)
GLUCOSE BLDC GLUCOMTR-MCNC: 264 MG/DL — HIGH (ref 70–99)
GLUCOSE SERPL-MCNC: 187 MG/DL — HIGH (ref 70–99)
HCT VFR BLD CALC: 39 % — SIGNIFICANT CHANGE UP (ref 34.5–45)
HGB BLD-MCNC: 12.4 G/DL — SIGNIFICANT CHANGE UP (ref 11.5–15.5)
MAGNESIUM SERPL-MCNC: 1.4 MG/DL — LOW (ref 1.6–2.6)
MCHC RBC-ENTMCNC: 26.8 PG — LOW (ref 27–34)
MCHC RBC-ENTMCNC: 31.8 G/DL — LOW (ref 32–36)
MCV RBC AUTO: 84.4 FL — SIGNIFICANT CHANGE UP (ref 80–100)
NRBC BLD AUTO-RTO: 0 /100 WBCS — SIGNIFICANT CHANGE UP (ref 0–0)
NT-PROBNP SERPL-SCNC: 171 PG/ML — SIGNIFICANT CHANGE UP (ref 0–300)
PHOSPHATE SERPL-MCNC: 3.7 MG/DL — SIGNIFICANT CHANGE UP (ref 2.5–4.5)
PLATELET # BLD AUTO: 149 K/UL — LOW (ref 150–400)
POTASSIUM SERPL-MCNC: 3 MMOL/L — LOW (ref 3.5–5.3)
POTASSIUM SERPL-SCNC: 3 MMOL/L — LOW (ref 3.5–5.3)
RBC # BLD: 4.62 M/UL — SIGNIFICANT CHANGE UP (ref 3.8–5.2)
RBC # FLD: 15.9 % — HIGH (ref 10.3–14.5)
SODIUM SERPL-SCNC: 139 MMOL/L — SIGNIFICANT CHANGE UP (ref 135–145)
WBC # BLD: 6.18 K/UL — SIGNIFICANT CHANGE UP (ref 3.8–10.5)
WBC # FLD AUTO: 6.18 K/UL — SIGNIFICANT CHANGE UP (ref 3.8–10.5)

## 2025-03-04 PROCEDURE — 99233 SBSQ HOSP IP/OBS HIGH 50: CPT | Mod: FS

## 2025-03-04 PROCEDURE — 99233 SBSQ HOSP IP/OBS HIGH 50: CPT

## 2025-03-04 PROCEDURE — 71045 X-RAY EXAM CHEST 1 VIEW: CPT | Mod: 26

## 2025-03-04 PROCEDURE — 99232 SBSQ HOSP IP/OBS MODERATE 35: CPT

## 2025-03-04 RX ORDER — ACETAMINOPHEN 500 MG/5ML
650 LIQUID (ML) ORAL EVERY 6 HOURS
Refills: 0 | Status: DISCONTINUED | OUTPATIENT
Start: 2025-03-04 | End: 2025-03-10

## 2025-03-04 RX ORDER — MAGNESIUM SULFATE 500 MG/ML
1 SYRINGE (ML) INJECTION ONCE
Refills: 0 | Status: COMPLETED | OUTPATIENT
Start: 2025-03-04 | End: 2025-03-04

## 2025-03-04 RX ADMIN — Medication 0.5 MILLIGRAM(S): at 21:17

## 2025-03-04 RX ADMIN — INSULIN LISPRO 2: 100 INJECTION, SOLUTION INTRAVENOUS; SUBCUTANEOUS at 17:45

## 2025-03-04 RX ADMIN — DRONEDARONE 400 MILLIGRAM(S): 400 TABLET, FILM COATED ORAL at 06:51

## 2025-03-04 RX ADMIN — Medication 650 MILLIGRAM(S): at 17:43

## 2025-03-04 RX ADMIN — Medication 1 SPRAY(S): at 06:51

## 2025-03-04 RX ADMIN — DEXTROMETHORPHAN HBR, GUAIFENESIN 100 MILLIGRAM(S): 200 LIQUID ORAL at 00:44

## 2025-03-04 RX ADMIN — Medication 1 SPRAY(S): at 21:18

## 2025-03-04 RX ADMIN — INSULIN GLARGINE-YFGN 10 UNIT(S): 100 INJECTION, SOLUTION SUBCUTANEOUS at 21:18

## 2025-03-04 RX ADMIN — IPRATROPIUM BROMIDE AND ALBUTEROL SULFATE 3 MILLILITER(S): .5; 2.5 SOLUTION RESPIRATORY (INHALATION) at 23:51

## 2025-03-04 RX ADMIN — DOXAZOSIN MESYLATE 2 MILLIGRAM(S): 8 TABLET ORAL at 06:51

## 2025-03-04 RX ADMIN — IPRATROPIUM BROMIDE AND ALBUTEROL SULFATE 3 MILLILITER(S): .5; 2.5 SOLUTION RESPIRATORY (INHALATION) at 06:50

## 2025-03-04 RX ADMIN — Medication 1 SPRAY(S): at 14:12

## 2025-03-04 RX ADMIN — BUMETANIDE 2 MILLIGRAM(S): 1 TABLET ORAL at 06:50

## 2025-03-04 RX ADMIN — IPRATROPIUM BROMIDE AND ALBUTEROL SULFATE 3 MILLILITER(S): .5; 2.5 SOLUTION RESPIRATORY (INHALATION) at 17:49

## 2025-03-04 RX ADMIN — Medication 650 MILLIGRAM(S): at 23:51

## 2025-03-04 RX ADMIN — BUMETANIDE 2 MILLIGRAM(S): 1 TABLET ORAL at 14:11

## 2025-03-04 RX ADMIN — INSULIN GLARGINE-YFGN 5 UNIT(S): 100 INJECTION, SOLUTION SUBCUTANEOUS at 08:40

## 2025-03-04 RX ADMIN — Medication 100 GRAM(S): at 19:06

## 2025-03-04 RX ADMIN — DRONEDARONE 400 MILLIGRAM(S): 400 TABLET, FILM COATED ORAL at 17:44

## 2025-03-04 RX ADMIN — DEXTROMETHORPHAN HBR, GUAIFENESIN 100 MILLIGRAM(S): 200 LIQUID ORAL at 21:16

## 2025-03-04 RX ADMIN — DEXTROMETHORPHAN HBR, GUAIFENESIN 100 MILLIGRAM(S): 200 LIQUID ORAL at 08:47

## 2025-03-04 RX ADMIN — Medication 81 MILLIGRAM(S): at 08:48

## 2025-03-04 RX ADMIN — INSULIN LISPRO 2: 100 INJECTION, SOLUTION INTRAVENOUS; SUBCUTANEOUS at 08:55

## 2025-03-04 RX ADMIN — APIXABAN 5 MILLIGRAM(S): 2.5 TABLET, FILM COATED ORAL at 17:44

## 2025-03-04 RX ADMIN — INSULIN LISPRO 6: 100 INJECTION, SOLUTION INTRAVENOUS; SUBCUTANEOUS at 11:59

## 2025-03-04 RX ADMIN — DILTIAZEM HYDROCHLORIDE 360 MILLIGRAM(S): 240 TABLET, EXTENDED RELEASE ORAL at 06:51

## 2025-03-04 RX ADMIN — APIXABAN 5 MILLIGRAM(S): 2.5 TABLET, FILM COATED ORAL at 06:51

## 2025-03-04 RX ADMIN — DEXTROMETHORPHAN HBR, GUAIFENESIN 100 MILLIGRAM(S): 200 LIQUID ORAL at 14:55

## 2025-03-04 RX ADMIN — ATORVASTATIN CALCIUM 40 MILLIGRAM(S): 80 TABLET, FILM COATED ORAL at 21:17

## 2025-03-04 RX ADMIN — Medication 1 DROP(S): at 06:51

## 2025-03-04 RX ADMIN — IPRATROPIUM BROMIDE AND ALBUTEROL SULFATE 3 MILLILITER(S): .5; 2.5 SOLUTION RESPIRATORY (INHALATION) at 12:00

## 2025-03-04 RX ADMIN — Medication 40 MILLIEQUIVALENT(S): at 21:16

## 2025-03-04 NOTE — PROGRESS NOTE ADULT - PROBLEM SELECTOR PLAN 1
- s/p Tamiflu x 5 days   - Patient currently on NC - wean as tolerated to maintain Spo2 > 92% as tolerated  - Incentive spirometry  - Duonebs   - Antitussive supportive care  - S/p Ceftriaxone course for possible superimposed PNA  - Productive cough improving; s/p Hycodan x 3 days ; c/w Robitussin, tessalon perles - PRN 12-Aug-2019 04:27

## 2025-03-04 NOTE — PROGRESS NOTE ADULT - PROBLEM SELECTOR PLAN 3
- iso Influenza A, HFpEF, Asthma  - sating 94% on 2L NC  - Repeat CXR w/increased pleural effusion  - CT Chest w/o contrast as reported: Tracheobronchial secretions, extensive in the lower lobes.  Complete collapse left lower lobe and near complete collapse right lower lobe. Patchy bilateral lung opacities may be infectious or inflammatory.  - Appreciate pulmonology: -Diuresis in place, pt clinically responded well -Aggressive airway clearance therapy: DuoNebs + Airway Percussive Device (At bedside). Pt doing well with treatments. -OOB to chair and ambulation as tolerated. Incentive spirometry at bedside. -Cough suppressants prn dosing only -No pulmonary indication for antibiotics or systemic steroids at this time -Pulmonary will continue to follow while in hospital   - Diuretics as above

## 2025-03-04 NOTE — PROGRESS NOTE ADULT - SUBJECTIVE AND OBJECTIVE BOX
HPI: Dyspnea    Pt is a 79F with MHx AFib on NOAC, HFpEF, CAD, IDDMII, CKD, asthma with recurrent bronchitis, and obesity (Type III BMI 48) initially presenting to [facility] on 2/21/25 with LE edema admitted with acute hypoxemic respiratory failure 2/2 Influenza A PNA c/b asthma exacerbation +/- pulmonary edema. Pt initially clinically improved on diuretics, ceftriaxone, and tamiflu, but hospital course was c/b worsening dyspnea and productive cough. CT imaging performed, significant for new severe tracheobronchial secretions with associated bibasilar atelectasis and superimposed b/l lung consolidations. Pt restarted on IV diuretics and airway clearance therapy, today feeling clinically better than last week.     PAST MEDICAL & SURGICAL HISTORY:  Hypertension      Hyperlipemia      Diabetes mellitus type 2 in obese      Atrial fibrillation      CVA (cerebral vascular accident)      Renal insufficiency      Knee pain, left      Anxiety      Hypomagnesemia      History of cholecystectomy      Coronary stent patent          FAMILY HISTORY:  Family history of myocardial infarction    FH: heart disease  mother, father        SOCIAL HISTORY:  Smoking: Denies   EtOH Use: Denies  Drug Use: Denies  Occupation: Retired  Exposures: None  Recent Travel: None    Allergies    No Known Allergies    Intolerances        HOME MEDICATIONS:    REVIEW OF SYSTEMS:  Constitutional: No fevers or chills. No weight loss. No fatigue or generalised malaise.  Eyes: No itching or discharge from the eyes  ENT: No ear pain. No ear discharge. No nasal congestion. No post nasal drip. No epistaxis. No throat pain. No sore throat. No difficulty swallowing.   CV: No chest pain. No palpitations. No lightheadedness or dizziness.   Resp: No dyspnea at rest. No dyspnea on exertion. No orthopnea. No wheezing. No cough. No stridor. No sputum production. No chest pain with respiration.  GI: No nausea. No vomiting. No diarrhea.  MSK: No joint pain or pain in any extremities  Integumentary: No skin lesions. No pedal edema.  Neurological: No gross motor weakness. No sensory changes.    [x ] All other systems negative  [ ] Unable to assess ROS because ________    OBJECTIVE:  ICU Vital Signs Last 24 Hrs  T(C): 36.8 (03 Mar 2025 12:20), Max: 36.9 (03 Mar 2025 05:29)  T(F): 98.3 (03 Mar 2025 12:20), Max: 98.4 (03 Mar 2025 05:29)  HR: 91 (03 Mar 2025 12:20) (67 - 91)  BP: 157/67 (03 Mar 2025 12:20) (157/67 - 185/74)  BP(mean): --  ABP: --  ABP(mean): --  RR: 18 (03 Mar 2025 12:20) (18 - 18)  SpO2: 94% (03 Mar 2025 12:20) (92% - 94%)    O2 Parameters below as of 03 Mar 2025 12:20  Patient On (Oxygen Delivery Method): nasal cannula  O2 Flow (L/min): 2            03-02 @ 07:01  -  03-03 @ 07:00  --------------------------------------------------------  IN: 0 mL / OUT: 2150 mL / NET: -2150 mL      CAPILLARY BLOOD GLUCOSE      POCT Blood Glucose.: 186 mg/dL (03 Mar 2025 12:23)      PHYSICAL EXAM:  General: Awake, alert, oriented X 3.   HEENT: Atraumatic, normocephalic.                  No tonsillar or pharyngeal exudates.  Lymph Nodes: No palpable lymphadenopathy  Neck: No JVD. No carotid bruit.   Respiratory: Normal chest expansion                         Normal percussion                         Normal and equal air entry                         Faint wheeze, no rhonchi or rales.  Cardiovascular: S1 S2 normal. No murmurs, rubs or gallops.   Abdomen: Soft, non-tender, non-distended. No organomegaly.  Extremities: Warm to touch. Peripheral pulse palpable. No pedal edema.   Skin: No rashes or skin lesions  Neurological: Non-focal  Psychiatry: Appropriate mood and affect.    HOSPITAL MEDICATIONS:  MEDICATIONS  (STANDING):  albuterol/ipratropium for Nebulization 3 milliLiter(s) Nebulizer every 6 hours  apixaban 5 milliGRAM(s) Oral every 12 hours  artificial  tears Solution 1 Drop(s) Both EYES two times a day  aspirin enteric coated 81 milliGRAM(s) Oral daily  atorvastatin 40 milliGRAM(s) Oral at bedtime  benzonatate 100 milliGRAM(s) Oral three times a day  buMETAnide Injectable 2 milliGRAM(s) IV Push two times a day  dextrose 5%. 1000 milliLiter(s) (100 mL/Hr) IV Continuous <Continuous>  dextrose 5%. 1000 milliLiter(s) (50 mL/Hr) IV Continuous <Continuous>  dextrose 50% Injectable 25 Gram(s) IV Push once  dextrose 50% Injectable 12.5 Gram(s) IV Push once  dextrose 50% Injectable 25 Gram(s) IV Push once  diltiazem    milliGRAM(s) Oral daily  doxazosin 2 milliGRAM(s) Oral daily  dronedarone 400 milliGRAM(s) Oral two times a day  glucagon  Injectable 1 milliGRAM(s) IntraMuscular once  hydrocodone/homatropine Syrup 5 milliLiter(s) Oral three times a day  insulin glargine Injectable (LANTUS) 5 Unit(s) SubCutaneous every morning  insulin glargine Injectable (LANTUS) 10 Unit(s) SubCutaneous at bedtime  insulin lispro (ADMELOG) corrective regimen sliding scale   SubCutaneous three times a day before meals  insulin lispro (ADMELOG) corrective regimen sliding scale   SubCutaneous at bedtime  LORazepam     Tablet 0.5 milliGRAM(s) Oral at bedtime  sodium chloride 0.65% Nasal 1 Spray(s) Both Nostrils three times a day    MEDICATIONS  (PRN):  dextrose Oral Gel 15 Gram(s) Oral once PRN Blood Glucose LESS THAN 70 milliGRAM(s)/deciliter  guaiFENesin Oral Liquid (Sugar-Free) 100 milliGRAM(s) Oral every 6 hours PRN Cough      LABS:                        12.7   6.02  )-----------( 130      ( 01 Mar 2025 17:12 )             40.0     03-03    138  |  101  |  48[H]  ----------------------------<  197[H]  3.6   |  23  |  1.59[H]    Ca    9.7      03 Mar 2025 06:58  Mg     1.8     03-03        Urinalysis Basic - ( 03 Mar 2025 06:58 )    Color: x / Appearance: x / SG: x / pH: x  Gluc: 197 mg/dL / Ketone: x  / Bili: x / Urobili: x   Blood: x / Protein: x / Nitrite: x   Leuk Esterase: x / RBC: x / WBC x   Sq Epi: x / Non Sq Epi: x / Bacteria: x            MICROBIOLOGY: Reviewed     RADIOLOGY:  [x ] Reviewed and interpreted by me    Point of Care Ultrasound Findings: Pending    PFT: None available    EKG: Reviewed

## 2025-03-04 NOTE — PROGRESS NOTE ADULT - SUBJECTIVE AND OBJECTIVE BOX
Maricarmen Paredes M.D.  Division of Hospital Medicine  Available on [employer] TEAMS    SUBJECTIVE / ACUTE INTERVAL EVENTS: Patient seen and examined. No overnight events. No subjective complaints as she states her breathing is improving. Appreciate pulmonology's continued recommendations.     OBJECTIVE:   Vital Signs Last 24 Hrs  T(F): 98.8 (04 Mar 2025 12:22), Max: 98.8 (04 Mar 2025 12:22)  HR: 61 (04 Mar 2025 12:22) (61 - 70)  BP: 113/62 (04 Mar 2025 12:22) (113/62 - 154/71)  RR: 18 (04 Mar 2025 12:22) (18 - 18)  SpO2: 94% (04 Mar 2025 12:22) (94% - 94%)  Parameters below as of 04 Mar 2025 12:22  Patient On (Oxygen Delivery Method): nasal cannula  O2 Flow (L/min): 2    I&O's Summary  03 Mar 2025 07:01  -  04 Mar 2025 07:00  --------------------------------------------------------  IN: 0 mL / OUT: 2150 mL / NET: -2150 mL    Physical Examination:  GEN: elderly woman, sitting up in chair in NAD, on O2 supp via NC-2L  PSYCH: A&Ox3, mood and affect appear appropriate   NEURO: no focal neurologic deficits appreciated  RESPI: no accessory muscle use, B/L air entry   CARDIO: regular rate/rhythm, no LE edema B/L  ABD: soft, NT, ND  EXT: patient able to move all extremities spontaneously  VASC: peripheral pulses palpated    Labs:  CAPILLARY BLOOD GLUCOSE  POCT Blood Glucose.: 264 mg/dL (04 Mar 2025 11:30)  POCT Blood Glucose.: 184 mg/dL (04 Mar 2025 07:41)  POCT Blood Glucose.: 192 mg/dL (03 Mar 2025 21:42)  POCT Blood Glucose.: 166 mg/dL (03 Mar 2025 17:23)    03-03  138  |  101  |  48[H]  ----------------------------<  197[H]  3.6   |  23  |  1.59[H]    Ca    9.7      03 Mar 2025 06:58  Mg     1.8     03-03    Urinalysis Basic - ( 03 Mar 2025 06:58 )  Color: x / Appearance: x / SG: x / pH: x  Gluc: 197 mg/dL / Ketone: x  / Bili: x / Urobili: x   Blood: x / Protein: x / Nitrite: x   Leuk Esterase: x / RBC: x / WBC x   Sq Epi: x / Non Sq Epi: x / Bacteria: x    Imaging Personally Reviewed:  - TTE as reported:   1. The left ventricular diastolic function is indeterminate, with elevated left ventricular filling pressure. 2. Pulmonary artery systolic pressure could not be estimated. 3. Compared to the transthoracic echocardiogram performed on 2/24/2025, filling pressure is elevated.    Consultant(s) Notes Reviewed: Pulmonology   Care Discussed with Consultants/Other Providers: RACHEL Chang    MEDICATIONS  (STANDING):  albuterol/ipratropium for Nebulization 3 milliLiter(s) Nebulizer every 6 hours  apixaban 5 milliGRAM(s) Oral every 12 hours  artificial  tears Solution 1 Drop(s) Both EYES two times a day  aspirin enteric coated 81 milliGRAM(s) Oral daily  atorvastatin 40 milliGRAM(s) Oral at bedtime  buMETAnide Injectable 2 milliGRAM(s) IV Push two times a day  dextrose 5%. 1000 milliLiter(s) (100 mL/Hr) IV Continuous <Continuous>  dextrose 5%. 1000 milliLiter(s) (50 mL/Hr) IV Continuous <Continuous>  dextrose 50% Injectable 25 Gram(s) IV Push once  dextrose 50% Injectable 12.5 Gram(s) IV Push once  dextrose 50% Injectable 25 Gram(s) IV Push once  diltiazem    milliGRAM(s) Oral daily  doxazosin 2 milliGRAM(s) Oral daily  dronedarone 400 milliGRAM(s) Oral two times a day  glucagon  Injectable 1 milliGRAM(s) IntraMuscular once  insulin glargine Injectable (LANTUS) 5 Unit(s) SubCutaneous every morning  insulin glargine Injectable (LANTUS) 10 Unit(s) SubCutaneous at bedtime  insulin lispro (ADMELOG) corrective regimen sliding scale   SubCutaneous three times a day before meals  insulin lispro (ADMELOG) corrective regimen sliding scale   SubCutaneous at bedtime  LORazepam     Tablet 0.5 milliGRAM(s) Oral at bedtime  sodium chloride 0.65% Nasal 1 Spray(s) Both Nostrils three times a day    MEDICATIONS  (PRN):  acetaminophen     Tablet .. 650 milliGRAM(s) Oral every 6 hours PRN Mild Pain (1 - 3)  dextrose Oral Gel 15 Gram(s) Oral once PRN Blood Glucose LESS THAN 70 milliGRAM(s)/deciliter  guaiFENesin Oral Liquid (Sugar-Free) 100 milliGRAM(s) Oral every 6 hours PRN Cough

## 2025-03-04 NOTE — PROGRESS NOTE ADULT - SUBJECTIVE AND OBJECTIVE BOX
[facility] Attending Follow-up Note     Patient seen and examined at bedside.    Overnight Events:     no events   feels better     REVIEW OF SYSTEMS:  Constitutional:     [x ] negative [ ] fevers [ ] chills [ ] weight loss [ ] weight gain  HEENT:                  [x ] negative [ ] dry eyes [ ] eye irritation [ ] postnasal drip [ ] nasal congestion  CV:                         [ x] negative  [ ] chest pain [ ] orthopnea [ ] palpitations [ ] murmur  Resp:                     [ x] negative [ ] cough [ ] shortness of breath [ ] dyspnea [ ] wheezing [ ] sputum [ ]hemoptysis  GI:                          [ x] negative [ ] nausea [ ] vomiting [ ] diarrhea [ ] constipation [ ] abd pain [ ] dysphagia   :                        [ x] negative [ ] dysuria [ ] nocturia [ ] hematuria [ ] increased urinary frequency  Musculoskeletal: [ x] negative [ ] back pain [ ] myalgias [ ] arthralgias [ ] fracture  Skin:                       [ x] negative [ ] rash [ ] itch  Neurological:        [x ] negative [ ] headache [ ] dizziness [ ] syncope [ ] weakness [ ] numbness  Psychiatric:           [ x] negative [ ] anxiety [ ] depression  Endocrine:            [ x] negative [ ] diabetes [ ] thyroid problem  Heme/Lymph:      [ x] negative [ ] anemia [ ] bleeding problem  Allergic/Immune: [ x] negative [ ] itchy eyes [ ] nasal discharge [ ] hives [ ] angioedema    [ x] All other systems negative  [ ] Unable to assess ROS due to    Current Meds:  acetaminophen     Tablet .. 650 milliGRAM(s) Oral every 6 hours PRN  albuterol/ipratropium for Nebulization 3 milliLiter(s) Nebulizer every 6 hours  apixaban 5 milliGRAM(s) Oral every 12 hours  artificial  tears Solution 1 Drop(s) Both EYES two times a day  aspirin enteric coated 81 milliGRAM(s) Oral daily  atorvastatin 40 milliGRAM(s) Oral at bedtime  buMETAnide Injectable 2 milliGRAM(s) IV Push two times a day  dextrose 5%. 1000 milliLiter(s) IV Continuous <Continuous>  dextrose 5%. 1000 milliLiter(s) IV Continuous <Continuous>  dextrose 50% Injectable 25 Gram(s) IV Push once  dextrose 50% Injectable 12.5 Gram(s) IV Push once  dextrose 50% Injectable 25 Gram(s) IV Push once  dextrose Oral Gel 15 Gram(s) Oral once PRN  diltiazem    milliGRAM(s) Oral daily  doxazosin 2 milliGRAM(s) Oral daily  dronedarone 400 milliGRAM(s) Oral two times a day  glucagon  Injectable 1 milliGRAM(s) IntraMuscular once  guaiFENesin Oral Liquid (Sugar-Free) 100 milliGRAM(s) Oral every 6 hours PRN  insulin glargine Injectable (LANTUS) 5 Unit(s) SubCutaneous every morning  insulin glargine Injectable (LANTUS) 10 Unit(s) SubCutaneous at bedtime  insulin lispro (ADMELOG) corrective regimen sliding scale   SubCutaneous three times a day before meals  insulin lispro (ADMELOG) corrective regimen sliding scale   SubCutaneous at bedtime  LORazepam     Tablet 0.5 milliGRAM(s) Oral at bedtime  potassium chloride    Tablet ER 40 milliEquivalent(s) Oral every 4 hours  sodium chloride 0.65% Nasal 1 Spray(s) Both Nostrils three times a day      PAST MEDICAL & SURGICAL HISTORY:  Hypertension      Hyperlipemia      Diabetes mellitus type 2 in obese      Atrial fibrillation      CVA (cerebral vascular accident)      Renal insufficiency      Knee pain, left      Anxiety      Hypomagnesemia      History of cholecystectomy      Coronary stent patent          Vitals:  T(F): 98.2 (03-04), Max: 98.8 (03-04)  HR: 68 (03-04) (61 - 68)  BP: 129/66 (03-04) (113/62 - 154/71)  RR: 18 (03-04)  SpO2: 94% (03-04)  I&O's Summary    03 Mar 2025 07:01  -  04 Mar 2025 07:00  --------------------------------------------------------  IN: 0 mL / OUT: 2150 mL / NET: -2150 mL        Physical Exam:  Appearance: No acute distress  HENT: No JVD   Cardiovascular: RRR, S1/S2, no murmurs  Respiratory: CTABL  Gastrointestinal: soft, NT ND, +BS  Musculoskeletal: No clubbing, no edema   Neurologic: Non-focal  Skin: No rashes, ecchymoses, or cyanosis                          12.4   6.18  )-----------( 149      ( 04 Mar 2025 18:14 )             39.0     03-04    139  |  98  |  58[H]  ----------------------------<  187[H]  3.0[L]   |  27  |  1.81[H]    Ca    9.3      04 Mar 2025 18:14  Phos  3.7     03-04  Mg     1.4     03-04                    Cardiovascular Testings:

## 2025-03-04 NOTE — PROGRESS NOTE ADULT - PROBLEM SELECTOR PLAN 2
- Acute on chronic as developed during hospitalization  - Continue home ASA, statin  - Holding lisinopril due to ROYAL  - Continue IV diuresis, appreciate cardiology  - Monitor fluid status, hemodynamics, renal function, and electrolytes closely - AM labs not drawn on 3/4, send STAT labs

## 2025-03-05 ENCOUNTER — RX RENEWAL (OUTPATIENT)
Age: 80
End: 2025-03-05

## 2025-03-05 LAB
ANION GAP SERPL CALC-SCNC: 13 MMOL/L — SIGNIFICANT CHANGE UP (ref 5–17)
BUN SERPL-MCNC: 57 MG/DL — HIGH (ref 7–23)
CALCIUM SERPL-MCNC: 9.7 MG/DL — SIGNIFICANT CHANGE UP (ref 8.4–10.5)
CHLORIDE SERPL-SCNC: 100 MMOL/L — SIGNIFICANT CHANGE UP (ref 96–108)
CO2 SERPL-SCNC: 25 MMOL/L — SIGNIFICANT CHANGE UP (ref 22–31)
CREAT SERPL-MCNC: 1.69 MG/DL — HIGH (ref 0.5–1.3)
EGFR: 31 ML/MIN/1.73M2 — LOW
EGFR: 31 ML/MIN/1.73M2 — LOW
GLUCOSE BLDC GLUCOMTR-MCNC: 157 MG/DL — HIGH (ref 70–99)
GLUCOSE BLDC GLUCOMTR-MCNC: 193 MG/DL — HIGH (ref 70–99)
GLUCOSE BLDC GLUCOMTR-MCNC: 222 MG/DL — HIGH (ref 70–99)
GLUCOSE BLDC GLUCOMTR-MCNC: 258 MG/DL — HIGH (ref 70–99)
GLUCOSE SERPL-MCNC: 204 MG/DL — HIGH (ref 70–99)
HCT VFR BLD CALC: 41.6 % — SIGNIFICANT CHANGE UP (ref 34.5–45)
HGB BLD-MCNC: 13.1 G/DL — SIGNIFICANT CHANGE UP (ref 11.5–15.5)
MAGNESIUM SERPL-MCNC: 1.9 MG/DL — SIGNIFICANT CHANGE UP (ref 1.6–2.6)
MCHC RBC-ENTMCNC: 27 PG — SIGNIFICANT CHANGE UP (ref 27–34)
MCHC RBC-ENTMCNC: 31.5 G/DL — LOW (ref 32–36)
MCV RBC AUTO: 85.8 FL — SIGNIFICANT CHANGE UP (ref 80–100)
NRBC BLD AUTO-RTO: 0 /100 WBCS — SIGNIFICANT CHANGE UP (ref 0–0)
PHOSPHATE SERPL-MCNC: 4 MG/DL — SIGNIFICANT CHANGE UP (ref 2.5–4.5)
PLATELET # BLD AUTO: 157 K/UL — SIGNIFICANT CHANGE UP (ref 150–400)
POTASSIUM SERPL-MCNC: 3.6 MMOL/L — SIGNIFICANT CHANGE UP (ref 3.5–5.3)
POTASSIUM SERPL-SCNC: 3.6 MMOL/L — SIGNIFICANT CHANGE UP (ref 3.5–5.3)
RBC # BLD: 4.85 M/UL — SIGNIFICANT CHANGE UP (ref 3.8–5.2)
RBC # FLD: 16.2 % — HIGH (ref 10.3–14.5)
SODIUM SERPL-SCNC: 138 MMOL/L — SIGNIFICANT CHANGE UP (ref 135–145)
WBC # BLD: 4.42 K/UL — SIGNIFICANT CHANGE UP (ref 3.8–10.5)
WBC # FLD AUTO: 4.42 K/UL — SIGNIFICANT CHANGE UP (ref 3.8–10.5)

## 2025-03-05 PROCEDURE — 99233 SBSQ HOSP IP/OBS HIGH 50: CPT | Mod: FS

## 2025-03-05 PROCEDURE — 99232 SBSQ HOSP IP/OBS MODERATE 35: CPT

## 2025-03-05 PROCEDURE — 99233 SBSQ HOSP IP/OBS HIGH 50: CPT

## 2025-03-05 RX ORDER — LISINOPRIL 5 MG/1
40 TABLET ORAL DAILY
Refills: 0 | Status: DISCONTINUED | OUTPATIENT
Start: 2025-03-06 | End: 2025-03-10

## 2025-03-05 RX ORDER — BUMETANIDE 1 MG/1
2 TABLET ORAL
Refills: 0 | Status: DISCONTINUED | OUTPATIENT
Start: 2025-03-05 | End: 2025-03-07

## 2025-03-05 RX ADMIN — Medication 0.5 MILLIGRAM(S): at 21:45

## 2025-03-05 RX ADMIN — BUMETANIDE 2 MILLIGRAM(S): 1 TABLET ORAL at 05:26

## 2025-03-05 RX ADMIN — DEXTROMETHORPHAN HBR, GUAIFENESIN 100 MILLIGRAM(S): 200 LIQUID ORAL at 22:38

## 2025-03-05 RX ADMIN — Medication 1 SPRAY(S): at 15:30

## 2025-03-05 RX ADMIN — DRONEDARONE 400 MILLIGRAM(S): 400 TABLET, FILM COATED ORAL at 17:47

## 2025-03-05 RX ADMIN — Medication 81 MILLIGRAM(S): at 12:32

## 2025-03-05 RX ADMIN — DOXAZOSIN MESYLATE 2 MILLIGRAM(S): 8 TABLET ORAL at 05:25

## 2025-03-05 RX ADMIN — Medication 1 SPRAY(S): at 05:26

## 2025-03-05 RX ADMIN — INSULIN LISPRO 2: 100 INJECTION, SOLUTION INTRAVENOUS; SUBCUTANEOUS at 12:34

## 2025-03-05 RX ADMIN — IPRATROPIUM BROMIDE AND ALBUTEROL SULFATE 3 MILLILITER(S): .5; 2.5 SOLUTION RESPIRATORY (INHALATION) at 17:47

## 2025-03-05 RX ADMIN — Medication 1 DROP(S): at 17:49

## 2025-03-05 RX ADMIN — Medication 1 DROP(S): at 05:27

## 2025-03-05 RX ADMIN — INSULIN LISPRO 2: 100 INJECTION, SOLUTION INTRAVENOUS; SUBCUTANEOUS at 17:48

## 2025-03-05 RX ADMIN — BUMETANIDE 2 MILLIGRAM(S): 1 TABLET ORAL at 15:30

## 2025-03-05 RX ADMIN — DEXTROMETHORPHAN HBR, GUAIFENESIN 100 MILLIGRAM(S): 200 LIQUID ORAL at 05:26

## 2025-03-05 RX ADMIN — IPRATROPIUM BROMIDE AND ALBUTEROL SULFATE 3 MILLILITER(S): .5; 2.5 SOLUTION RESPIRATORY (INHALATION) at 12:31

## 2025-03-05 RX ADMIN — INSULIN LISPRO 2: 100 INJECTION, SOLUTION INTRAVENOUS; SUBCUTANEOUS at 21:47

## 2025-03-05 RX ADMIN — APIXABAN 5 MILLIGRAM(S): 2.5 TABLET, FILM COATED ORAL at 05:25

## 2025-03-05 RX ADMIN — Medication 40 MILLIEQUIVALENT(S): at 05:17

## 2025-03-05 RX ADMIN — DEXTROMETHORPHAN HBR, GUAIFENESIN 100 MILLIGRAM(S): 200 LIQUID ORAL at 15:33

## 2025-03-05 RX ADMIN — INSULIN LISPRO 4: 100 INJECTION, SOLUTION INTRAVENOUS; SUBCUTANEOUS at 08:41

## 2025-03-05 RX ADMIN — IPRATROPIUM BROMIDE AND ALBUTEROL SULFATE 3 MILLILITER(S): .5; 2.5 SOLUTION RESPIRATORY (INHALATION) at 05:25

## 2025-03-05 RX ADMIN — DRONEDARONE 400 MILLIGRAM(S): 400 TABLET, FILM COATED ORAL at 05:25

## 2025-03-05 RX ADMIN — APIXABAN 5 MILLIGRAM(S): 2.5 TABLET, FILM COATED ORAL at 17:48

## 2025-03-05 RX ADMIN — INSULIN GLARGINE-YFGN 10 UNIT(S): 100 INJECTION, SOLUTION SUBCUTANEOUS at 21:50

## 2025-03-05 RX ADMIN — INSULIN GLARGINE-YFGN 5 UNIT(S): 100 INJECTION, SOLUTION SUBCUTANEOUS at 08:41

## 2025-03-05 RX ADMIN — DILTIAZEM HYDROCHLORIDE 360 MILLIGRAM(S): 240 TABLET, EXTENDED RELEASE ORAL at 05:25

## 2025-03-05 RX ADMIN — ATORVASTATIN CALCIUM 40 MILLIGRAM(S): 80 TABLET, FILM COATED ORAL at 21:45

## 2025-03-05 NOTE — PROGRESS NOTE ADULT - SUBJECTIVE AND OBJECTIVE BOX
INCOMPLETE NOTE    Maricarmen Paredes M.D.  Division of Hospital Medicine  Available on [employer] TEAMS    SUBJECTIVE / ACUTE INTERVAL EVENTS: Patient seen and examined. No overnight events. No subjective complaints.     OBJECTIVE:   Vital Signs Last 24 Hrs  T(F): 97.9 (05 Mar 2025 04:45), Max: 98.8 (04 Mar 2025 12:22)  HR: 64 (05 Mar 2025 04:45) (61 - 68)  BP: 144/72 (05 Mar 2025 04:45) (113/62 - 144/72)  RR: 18 (05 Mar 2025 04:45) (18 - 18)  SpO2: 94% (05 Mar 2025 04:45) (94% - 94%)  Parameters below as of 05 Mar 2025 04:45  Patient On (Oxygen Delivery Method): nasal cannula    I&O's Summary  04 Mar 2025 07:01  -  05 Mar 2025 07:00  --------------------------------------------------------  IN: 0 mL / OUT: 600 mL / NET: -600 mL    Physical Examination:  GEN: elderly woman, sitting up in chair in NAD, on O2 supp via NC-2L  PSYCH: A&Ox3, mood and affect appear appropriate   NEURO: no focal neurologic deficits appreciated  RESPI: no accessory muscle use, B/L air entry   CARDIO: regular rate/rhythm, no LE edema B/L  ABD: soft, NT, ND  EXT: patient able to move all extremities spontaneously  VASC: peripheral pulses palpated    Labs:  CAPILLARY BLOOD GLUCOSE  POCT Blood Glucose.: 222 mg/dL (05 Mar 2025 08:08)  POCT Blood Glucose.: 201 mg/dL (04 Mar 2025 21:16)  POCT Blood Glucose.: 166 mg/dL (04 Mar 2025 16:54)  POCT Blood Glucose.: 264 mg/dL (04 Mar 2025 11:30)                        13.1   4.42  )-----------( 157      ( 05 Mar 2025 07:06 )             41.6     03-05  138  |  100  |  57[H]  ----------------------------<  204[H]  3.6   |  25  |  1.69[H]    Ca    9.7      05 Mar 2025 07:06  Phos  4.0     03-05  Mg     1.9     03-05    Urinalysis Basic - ( 05 Mar 2025 07:06 )  Color: x / Appearance: x / SG: x / pH: x  Gluc: 204 mg/dL / Ketone: x  / Bili: x / Urobili: x   Blood: x / Protein: x / Nitrite: x   Leuk Esterase: x / RBC: x / WBC x   Sq Epi: x / Non Sq Epi: x / Bacteria: x    Imaging Personally Reviewed:  - CXR as reported:     Consultant(s) Notes Reviewed: Pulmonology, Cardiology    Care Discussed with Consultants/Other Providers: RACHEL Juan    MEDICATIONS  (STANDING):  albuterol/ipratropium for Nebulization 3 milliLiter(s) Nebulizer every 6 hours  apixaban 5 milliGRAM(s) Oral every 12 hours  artificial  tears Solution 1 Drop(s) Both EYES two times a day  aspirin enteric coated 81 milliGRAM(s) Oral daily  atorvastatin 40 milliGRAM(s) Oral at bedtime  buMETAnide Injectable 2 milliGRAM(s) IV Push two times a day  dextrose 5%. 1000 milliLiter(s) (100 mL/Hr) IV Continuous <Continuous>  dextrose 5%. 1000 milliLiter(s) (50 mL/Hr) IV Continuous <Continuous>  dextrose 50% Injectable 25 Gram(s) IV Push once  dextrose 50% Injectable 12.5 Gram(s) IV Push once  dextrose 50% Injectable 25 Gram(s) IV Push once  diltiazem    milliGRAM(s) Oral daily  doxazosin 2 milliGRAM(s) Oral daily  dronedarone 400 milliGRAM(s) Oral two times a day  glucagon  Injectable 1 milliGRAM(s) IntraMuscular once  insulin glargine Injectable (LANTUS) 5 Unit(s) SubCutaneous every morning  insulin glargine Injectable (LANTUS) 10 Unit(s) SubCutaneous at bedtime  insulin lispro (ADMELOG) corrective regimen sliding scale   SubCutaneous three times a day before meals  insulin lispro (ADMELOG) corrective regimen sliding scale   SubCutaneous at bedtime  LORazepam     Tablet 0.5 milliGRAM(s) Oral at bedtime  sodium chloride 0.65% Nasal 1 Spray(s) Both Nostrils three times a day    MEDICATIONS  (PRN):  acetaminophen     Tablet .. 650 milliGRAM(s) Oral every 6 hours PRN Mild Pain (1 - 3)  dextrose Oral Gel 15 Gram(s) Oral once PRN Blood Glucose LESS THAN 70 milliGRAM(s)/deciliter  guaiFENesin Oral Liquid (Sugar-Free) 100 milliGRAM(s) Oral every 6 hours PRN Cough Maricarmen Paredes M.D.  Division of Hospital Medicine  Available on [employer] TEAMS    SUBJECTIVE / ACUTE INTERVAL EVENTS: Patient seen and examined. No overnight events. No subjective complaints except that she would like to be OOB to chair and to work with physical therapy. Continue to wean O2 supp as tolerated to maintain SpO2 > 92% -- obtain ambulatory SpO2 once on RA. Transitioned to oral diuresis today per cardiology. Appreciate pulmonology's recommendations.      OBJECTIVE:   Vital Signs Last 24 Hrs  T(F): 97.9 (05 Mar 2025 04:45), Max: 98.8 (04 Mar 2025 12:22)  HR: 64 (05 Mar 2025 04:45) (61 - 68)  BP: 144/72 (05 Mar 2025 04:45) (113/62 - 144/72)  RR: 18 (05 Mar 2025 04:45) (18 - 18)  SpO2: 94% (05 Mar 2025 04:45) (94% - 94%)  Parameters below as of 05 Mar 2025 04:45  Patient On (Oxygen Delivery Method): nasal cannula    I&O's Summary  04 Mar 2025 07:01  -  05 Mar 2025 07:00  --------------------------------------------------------  IN: 0 mL / OUT: 600 mL / NET: -600 mL    Physical Examination:  GEN: elderly woman, sitting up in bed in NAD, on O2 supp via NC-2L  PSYCH: A&Ox3, mood and affect appear appropriate   NEURO: no focal neurologic deficits appreciated  RESPI: no accessory muscle use, B/L air entry   CARDIO: regular rate/rhythm, no LE edema B/L  ABD: soft, NT, ND  EXT: patient able to move all extremities spontaneously  VASC: peripheral pulses palpated    Labs:  CAPILLARY BLOOD GLUCOSE  POCT Blood Glucose.: 222 mg/dL (05 Mar 2025 08:08)  POCT Blood Glucose.: 201 mg/dL (04 Mar 2025 21:16)  POCT Blood Glucose.: 166 mg/dL (04 Mar 2025 16:54)  POCT Blood Glucose.: 264 mg/dL (04 Mar 2025 11:30)                        13.1   4.42  )-----------( 157      ( 05 Mar 2025 07:06 )             41.6     03-05  138  |  100  |  57[H]  ----------------------------<  204[H]  3.6   |  25  |  1.69[H]    Ca    9.7      05 Mar 2025 07:06  Phos  4.0     03-05  Mg     1.9     03-05    Urinalysis Basic - ( 05 Mar 2025 07:06 )  Color: x / Appearance: x / SG: x / pH: x  Gluc: 204 mg/dL / Ketone: x  / Bili: x / Urobili: x   Blood: x / Protein: x / Nitrite: x   Leuk Esterase: x / RBC: x / WBC x   Sq Epi: x / Non Sq Epi: x / Bacteria: x    Imaging Personally Reviewed:  - CXR as reported: Bibasilar atelectasis/infiltrates, unchanged. Less pulmonary congestion. No sizable pleural effusion.    Consultant(s) Notes Reviewed: Pulmonology, Cardiology    Care Discussed with Consultants/Other Providers: RACHEL Juan    MEDICATIONS  (STANDING):  albuterol/ipratropium for Nebulization 3 milliLiter(s) Nebulizer every 6 hours  apixaban 5 milliGRAM(s) Oral every 12 hours  artificial  tears Solution 1 Drop(s) Both EYES two times a day  aspirin enteric coated 81 milliGRAM(s) Oral daily  atorvastatin 40 milliGRAM(s) Oral at bedtime  buMETAnide Injectable 2 milliGRAM(s) IV Push two times a day  dextrose 5%. 1000 milliLiter(s) (100 mL/Hr) IV Continuous <Continuous>  dextrose 5%. 1000 milliLiter(s) (50 mL/Hr) IV Continuous <Continuous>  dextrose 50% Injectable 25 Gram(s) IV Push once  dextrose 50% Injectable 12.5 Gram(s) IV Push once  dextrose 50% Injectable 25 Gram(s) IV Push once  diltiazem    milliGRAM(s) Oral daily  doxazosin 2 milliGRAM(s) Oral daily  dronedarone 400 milliGRAM(s) Oral two times a day  glucagon  Injectable 1 milliGRAM(s) IntraMuscular once  insulin glargine Injectable (LANTUS) 5 Unit(s) SubCutaneous every morning  insulin glargine Injectable (LANTUS) 10 Unit(s) SubCutaneous at bedtime  insulin lispro (ADMELOG) corrective regimen sliding scale   SubCutaneous three times a day before meals  insulin lispro (ADMELOG) corrective regimen sliding scale   SubCutaneous at bedtime  LORazepam     Tablet 0.5 milliGRAM(s) Oral at bedtime  sodium chloride 0.65% Nasal 1 Spray(s) Both Nostrils three times a day    MEDICATIONS  (PRN):  acetaminophen     Tablet .. 650 milliGRAM(s) Oral every 6 hours PRN Mild Pain (1 - 3)  dextrose Oral Gel 15 Gram(s) Oral once PRN Blood Glucose LESS THAN 70 milliGRAM(s)/deciliter  guaiFENesin Oral Liquid (Sugar-Free) 100 milliGRAM(s) Oral every 6 hours PRN Cough

## 2025-03-05 NOTE — PROGRESS NOTE ADULT - PROBLEM SELECTOR PLAN 1
- s/p Tamiflu x 5 days   - Patient currently on NC - wean as tolerated to maintain Spo2 > 92% as tolerated  - Incentive spirometry  - Duonebs   - Antitussive supportive care  - S/p Ceftriaxone course for possible superimposed PNA  - Productive cough improving; s/p Hycodan x 3 days ; c/w Robitussin, tessalon perles - PRN

## 2025-03-05 NOTE — PROGRESS NOTE ADULT - PROBLEM SELECTOR PLAN 2
- Acute on chronic as developed during hospitalization  - Continue home ASA, statin  - Holding lisinopril due to ROYAL - resume on 3/6 w/ hold parameters  - Appreciate cardiology -- IV to PO diuretics on 3/5  - Monitor fluid status, hemodynamics, renal function, and electrolytes closely

## 2025-03-05 NOTE — PROGRESS NOTE ADULT - SUBJECTIVE AND OBJECTIVE BOX
[facility] Attending Follow-up Note     Patient seen and examined at bedside.    Overnight Events:     events noted     REVIEW OF SYSTEMS:  Constitutional:     [x ] negative [ ] fevers [ ] chills [ ] weight loss [ ] weight gain  HEENT:                  [x ] negative [ ] dry eyes [ ] eye irritation [ ] postnasal drip [ ] nasal congestion  CV:                         [ x] negative  [ ] chest pain [ ] orthopnea [ ] palpitations [ ] murmur  Resp:                     [ x] negative [ ] cough [ ] shortness of breath [ ] dyspnea [ ] wheezing [ ] sputum [ ]hemoptysis  GI:                          [ x] negative [ ] nausea [ ] vomiting [ ] diarrhea [ ] constipation [ ] abd pain [ ] dysphagia   :                        [ x] negative [ ] dysuria [ ] nocturia [ ] hematuria [ ] increased urinary frequency  Musculoskeletal: [ x] negative [ ] back pain [ ] myalgias [ ] arthralgias [ ] fracture  Skin:                       [ x] negative [ ] rash [ ] itch  Neurological:        [x ] negative [ ] headache [ ] dizziness [ ] syncope [ ] weakness [ ] numbness  Psychiatric:           [ x] negative [ ] anxiety [ ] depression  Endocrine:            [ x] negative [ ] diabetes [ ] thyroid problem  Heme/Lymph:      [ x] negative [ ] anemia [ ] bleeding problem  Allergic/Immune: [ x] negative [ ] itchy eyes [ ] nasal discharge [ ] hives [ ] angioedema    [ x] All other systems negative  [ ] Unable to assess ROS due to    Current Meds:  acetaminophen     Tablet .. 650 milliGRAM(s) Oral every 6 hours PRN  albuterol/ipratropium for Nebulization 3 milliLiter(s) Nebulizer every 6 hours  apixaban 5 milliGRAM(s) Oral every 12 hours  artificial  tears Solution 1 Drop(s) Both EYES two times a day  aspirin enteric coated 81 milliGRAM(s) Oral daily  atorvastatin 40 milliGRAM(s) Oral at bedtime  buMETAnide 2 milliGRAM(s) Oral two times a day  dextrose 5%. 1000 milliLiter(s) IV Continuous <Continuous>  dextrose 5%. 1000 milliLiter(s) IV Continuous <Continuous>  dextrose 50% Injectable 25 Gram(s) IV Push once  dextrose 50% Injectable 12.5 Gram(s) IV Push once  dextrose 50% Injectable 25 Gram(s) IV Push once  dextrose Oral Gel 15 Gram(s) Oral once PRN  diltiazem    milliGRAM(s) Oral daily  doxazosin 2 milliGRAM(s) Oral daily  dronedarone 400 milliGRAM(s) Oral two times a day  glucagon  Injectable 1 milliGRAM(s) IntraMuscular once  guaiFENesin Oral Liquid (Sugar-Free) 100 milliGRAM(s) Oral every 6 hours PRN  insulin glargine Injectable (LANTUS) 5 Unit(s) SubCutaneous every morning  insulin glargine Injectable (LANTUS) 10 Unit(s) SubCutaneous at bedtime  insulin lispro (ADMELOG) corrective regimen sliding scale   SubCutaneous three times a day before meals  insulin lispro (ADMELOG) corrective regimen sliding scale   SubCutaneous at bedtime  lisinopril 40 milliGRAM(s) Oral daily  LORazepam     Tablet 0.5 milliGRAM(s) Oral at bedtime  sodium chloride 0.65% Nasal 1 Spray(s) Both Nostrils three times a day      PAST MEDICAL & SURGICAL HISTORY:  Hypertension      Hyperlipemia      Diabetes mellitus type 2 in obese      Atrial fibrillation      CVA (cerebral vascular accident)      Renal insufficiency      Knee pain, left      Anxiety      Hypomagnesemia      History of cholecystectomy      Coronary stent patent          Vitals:  T(F): 98.1 (03-06), Max: 98.1 (03-06)  HR: 72 (03-06) (62 - 72)  BP: 148/72 (03-06) (129/72 - 149/68)  RR: 18 (03-06)  SpO2: 93% (03-06)  I&O's Summary    05 Mar 2025 07:01  -  06 Mar 2025 07:00  --------------------------------------------------------  IN: 450 mL / OUT: 1000 mL / NET: -550 mL        Physical Exam:  Appearance: No acute distress  HENT: No JVD   Cardiovascular: RRR, S1/S2, no murmurs  Respiratory: CTABL  Gastrointestinal: soft, NT ND, +BS  Musculoskeletal: No clubbing, no edema   Neurologic: Non-focal  Skin: No rashes, ecchymoses, or cyanosis                          13.1   4.61  )-----------( 152      ( 06 Mar 2025 06:56 )             41.4     03-06    140  |  101  |  56[H]  ----------------------------<  211[H]  3.5   |  23  |  1.70[H]    Ca    9.8      06 Mar 2025 06:56  Phos  4.0     03-06  Mg     1.6     03-06                    Cardiovascular Testings:

## 2025-03-05 NOTE — PROGRESS NOTE ADULT - SUBJECTIVE AND OBJECTIVE BOX
Interval Events: No acute events overnight. Pt seen and examined at bedside. No new respiratory issues, is upset that someone took away her recliner. Continues to have productive cough, but is able to expectorate well.     PAST MEDICAL & SURGICAL HISTORY:  Hypertension      Hyperlipemia      Diabetes mellitus type 2 in obese      Atrial fibrillation      CVA (cerebral vascular accident)      Renal insufficiency      Knee pain, left      Anxiety      Hypomagnesemia      History of cholecystectomy      Coronary stent patent          FAMILY HISTORY:  Family history of myocardial infarction    FH: heart disease  mother, father        SOCIAL HISTORY:  Smoking: Denies   EtOH Use: Denies  Drug Use: Denies  Occupation: Retired  Exposures: None  Recent Travel: None    Allergies    No Known Allergies    Intolerances        HOME MEDICATIONS:    REVIEW OF SYSTEMS:  Constitutional: No fevers or chills. No weight loss. No fatigue or generalised malaise.  Eyes: No itching or discharge from the eyes  ENT: No ear pain. No ear discharge. No nasal congestion. No post nasal drip. No epistaxis. No throat pain. No sore throat. No difficulty swallowing.   CV: No chest pain. No palpitations. No lightheadedness or dizziness.   Resp: No dyspnea at rest. No dyspnea on exertion. No orthopnea. No wheezing. No cough. No stridor. No sputum production. No chest pain with respiration.  GI: No nausea. No vomiting. No diarrhea.  MSK: No joint pain or pain in any extremities  Integumentary: No skin lesions. No pedal edema.  Neurological: No gross motor weakness. No sensory changes.    [x ] All other systems negative  [ ] Unable to assess ROS because ________    OBJECTIVE:  ICU Vital Signs Last 24 Hrs  T(C): 36.8 (03 Mar 2025 12:20), Max: 36.9 (03 Mar 2025 05:29)  T(F): 98.3 (03 Mar 2025 12:20), Max: 98.4 (03 Mar 2025 05:29)  HR: 91 (03 Mar 2025 12:20) (67 - 91)  BP: 157/67 (03 Mar 2025 12:20) (157/67 - 185/74)  BP(mean): --  ABP: --  ABP(mean): --  RR: 18 (03 Mar 2025 12:20) (18 - 18)  SpO2: 94% (03 Mar 2025 12:20) (92% - 94%)    O2 Parameters below as of 03 Mar 2025 12:20  Patient On (Oxygen Delivery Method): nasal cannula  O2 Flow (L/min): 2            03-02 @ 07:01  -  03-03 @ 07:00  --------------------------------------------------------  IN: 0 mL / OUT: 2150 mL / NET: -2150 mL      CAPILLARY BLOOD GLUCOSE      POCT Blood Glucose.: 186 mg/dL (03 Mar 2025 12:23)      PHYSICAL EXAM:  General: Awake, alert, oriented X 3.   HEENT: Atraumatic, normocephalic.                  No tonsillar or pharyngeal exudates.  Lymph Nodes: No palpable lymphadenopathy  Neck: No JVD. No carotid bruit.   Respiratory: Normal chest expansion                         Normal percussion                         Normal and equal air entry                         Faint wheeze, no rhonchi or rales.  Cardiovascular: S1 S2 normal. No murmurs, rubs or gallops.   Abdomen: Soft, non-tender, non-distended. No organomegaly.  Extremities: Warm to touch. Peripheral pulse palpable. No pedal edema.   Skin: No rashes or skin lesions  Neurological: Non-focal  Psychiatry: Appropriate mood and affect.    HOSPITAL MEDICATIONS:  MEDICATIONS  (STANDING):  albuterol/ipratropium for Nebulization 3 milliLiter(s) Nebulizer every 6 hours  apixaban 5 milliGRAM(s) Oral every 12 hours  artificial  tears Solution 1 Drop(s) Both EYES two times a day  aspirin enteric coated 81 milliGRAM(s) Oral daily  atorvastatin 40 milliGRAM(s) Oral at bedtime  benzonatate 100 milliGRAM(s) Oral three times a day  buMETAnide Injectable 2 milliGRAM(s) IV Push two times a day  dextrose 5%. 1000 milliLiter(s) (100 mL/Hr) IV Continuous <Continuous>  dextrose 5%. 1000 milliLiter(s) (50 mL/Hr) IV Continuous <Continuous>  dextrose 50% Injectable 25 Gram(s) IV Push once  dextrose 50% Injectable 12.5 Gram(s) IV Push once  dextrose 50% Injectable 25 Gram(s) IV Push once  diltiazem    milliGRAM(s) Oral daily  doxazosin 2 milliGRAM(s) Oral daily  dronedarone 400 milliGRAM(s) Oral two times a day  glucagon  Injectable 1 milliGRAM(s) IntraMuscular once  hydrocodone/homatropine Syrup 5 milliLiter(s) Oral three times a day  insulin glargine Injectable (LANTUS) 5 Unit(s) SubCutaneous every morning  insulin glargine Injectable (LANTUS) 10 Unit(s) SubCutaneous at bedtime  insulin lispro (ADMELOG) corrective regimen sliding scale   SubCutaneous three times a day before meals  insulin lispro (ADMELOG) corrective regimen sliding scale   SubCutaneous at bedtime  LORazepam     Tablet 0.5 milliGRAM(s) Oral at bedtime  sodium chloride 0.65% Nasal 1 Spray(s) Both Nostrils three times a day    MEDICATIONS  (PRN):  dextrose Oral Gel 15 Gram(s) Oral once PRN Blood Glucose LESS THAN 70 milliGRAM(s)/deciliter  guaiFENesin Oral Liquid (Sugar-Free) 100 milliGRAM(s) Oral every 6 hours PRN Cough      LABS:                        12.7   6.02  )-----------( 130      ( 01 Mar 2025 17:12 )             40.0     03-03    138  |  101  |  48[H]  ----------------------------<  197[H]  3.6   |  23  |  1.59[H]    Ca    9.7      03 Mar 2025 06:58  Mg     1.8     03-03        Urinalysis Basic - ( 03 Mar 2025 06:58 )    Color: x / Appearance: x / SG: x / pH: x  Gluc: 197 mg/dL / Ketone: x  / Bili: x / Urobili: x   Blood: x / Protein: x / Nitrite: x   Leuk Esterase: x / RBC: x / WBC x   Sq Epi: x / Non Sq Epi: x / Bacteria: x            MICROBIOLOGY: Reviewed     RADIOLOGY:  [x ] Reviewed and interpreted by me    Point of Care Ultrasound Findings: Pending    PFT: None available    EKG: Reviewed

## 2025-03-05 NOTE — PROGRESS NOTE ADULT - PROBLEM SELECTOR PLAN 5
On outpatient labs, Creat 1.7-1.9   Creat elevated, possible ATN in setting of infection, also possible prerenal given infection/poor oral intake and concomitant diuretic use.   Improving  c/w Bumex per cardiology   Hold lisinopril for now given ROYAL - resume on 3/6 w/ hold parameters

## 2025-03-06 LAB
ANION GAP SERPL CALC-SCNC: 16 MMOL/L — SIGNIFICANT CHANGE UP (ref 5–17)
BUN SERPL-MCNC: 56 MG/DL — HIGH (ref 7–23)
CALCIUM SERPL-MCNC: 9.8 MG/DL — SIGNIFICANT CHANGE UP (ref 8.4–10.5)
CHLORIDE SERPL-SCNC: 101 MMOL/L — SIGNIFICANT CHANGE UP (ref 96–108)
CO2 SERPL-SCNC: 23 MMOL/L — SIGNIFICANT CHANGE UP (ref 22–31)
CREAT SERPL-MCNC: 1.7 MG/DL — HIGH (ref 0.5–1.3)
EGFR: 30 ML/MIN/1.73M2 — LOW
EGFR: 30 ML/MIN/1.73M2 — LOW
GLUCOSE BLDC GLUCOMTR-MCNC: 156 MG/DL — HIGH (ref 70–99)
GLUCOSE BLDC GLUCOMTR-MCNC: 194 MG/DL — HIGH (ref 70–99)
GLUCOSE BLDC GLUCOMTR-MCNC: 251 MG/DL — HIGH (ref 70–99)
GLUCOSE BLDC GLUCOMTR-MCNC: 289 MG/DL — HIGH (ref 70–99)
GLUCOSE SERPL-MCNC: 211 MG/DL — HIGH (ref 70–99)
HCT VFR BLD CALC: 41.4 % — SIGNIFICANT CHANGE UP (ref 34.5–45)
HGB BLD-MCNC: 13.1 G/DL — SIGNIFICANT CHANGE UP (ref 11.5–15.5)
MAGNESIUM SERPL-MCNC: 1.6 MG/DL — SIGNIFICANT CHANGE UP (ref 1.6–2.6)
MCHC RBC-ENTMCNC: 27.1 PG — SIGNIFICANT CHANGE UP (ref 27–34)
MCHC RBC-ENTMCNC: 31.6 G/DL — LOW (ref 32–36)
MCV RBC AUTO: 85.7 FL — SIGNIFICANT CHANGE UP (ref 80–100)
NRBC BLD AUTO-RTO: 0 /100 WBCS — SIGNIFICANT CHANGE UP (ref 0–0)
PHOSPHATE SERPL-MCNC: 4 MG/DL — SIGNIFICANT CHANGE UP (ref 2.5–4.5)
PLATELET # BLD AUTO: 152 K/UL — SIGNIFICANT CHANGE UP (ref 150–400)
POTASSIUM SERPL-MCNC: 3.5 MMOL/L — SIGNIFICANT CHANGE UP (ref 3.5–5.3)
POTASSIUM SERPL-SCNC: 3.5 MMOL/L — SIGNIFICANT CHANGE UP (ref 3.5–5.3)
RBC # BLD: 4.83 M/UL — SIGNIFICANT CHANGE UP (ref 3.8–5.2)
RBC # FLD: 16.3 % — HIGH (ref 10.3–14.5)
SODIUM SERPL-SCNC: 140 MMOL/L — SIGNIFICANT CHANGE UP (ref 135–145)
WBC # BLD: 4.61 K/UL — SIGNIFICANT CHANGE UP (ref 3.8–10.5)
WBC # FLD AUTO: 4.61 K/UL — SIGNIFICANT CHANGE UP (ref 3.8–10.5)

## 2025-03-06 PROCEDURE — 99233 SBSQ HOSP IP/OBS HIGH 50: CPT

## 2025-03-06 PROCEDURE — 99232 SBSQ HOSP IP/OBS MODERATE 35: CPT

## 2025-03-06 RX ADMIN — BUMETANIDE 2 MILLIGRAM(S): 1 TABLET ORAL at 14:30

## 2025-03-06 RX ADMIN — DEXTROMETHORPHAN HBR, GUAIFENESIN 100 MILLIGRAM(S): 200 LIQUID ORAL at 17:16

## 2025-03-06 RX ADMIN — Medication 1 SPRAY(S): at 14:30

## 2025-03-06 RX ADMIN — APIXABAN 5 MILLIGRAM(S): 2.5 TABLET, FILM COATED ORAL at 17:16

## 2025-03-06 RX ADMIN — INSULIN GLARGINE-YFGN 10 UNIT(S): 100 INJECTION, SOLUTION SUBCUTANEOUS at 22:04

## 2025-03-06 RX ADMIN — INSULIN LISPRO 2: 100 INJECTION, SOLUTION INTRAVENOUS; SUBCUTANEOUS at 17:18

## 2025-03-06 RX ADMIN — INSULIN LISPRO 2: 100 INJECTION, SOLUTION INTRAVENOUS; SUBCUTANEOUS at 08:41

## 2025-03-06 RX ADMIN — APIXABAN 5 MILLIGRAM(S): 2.5 TABLET, FILM COATED ORAL at 05:20

## 2025-03-06 RX ADMIN — DILTIAZEM HYDROCHLORIDE 360 MILLIGRAM(S): 240 TABLET, EXTENDED RELEASE ORAL at 05:18

## 2025-03-06 RX ADMIN — IPRATROPIUM BROMIDE AND ALBUTEROL SULFATE 3 MILLILITER(S): .5; 2.5 SOLUTION RESPIRATORY (INHALATION) at 23:17

## 2025-03-06 RX ADMIN — Medication 650 MILLIGRAM(S): at 10:24

## 2025-03-06 RX ADMIN — INSULIN LISPRO 6: 100 INJECTION, SOLUTION INTRAVENOUS; SUBCUTANEOUS at 11:50

## 2025-03-06 RX ADMIN — INSULIN LISPRO 2: 100 INJECTION, SOLUTION INTRAVENOUS; SUBCUTANEOUS at 22:04

## 2025-03-06 RX ADMIN — IPRATROPIUM BROMIDE AND ALBUTEROL SULFATE 3 MILLILITER(S): .5; 2.5 SOLUTION RESPIRATORY (INHALATION) at 11:49

## 2025-03-06 RX ADMIN — ATORVASTATIN CALCIUM 40 MILLIGRAM(S): 80 TABLET, FILM COATED ORAL at 22:05

## 2025-03-06 RX ADMIN — Medication 81 MILLIGRAM(S): at 11:49

## 2025-03-06 RX ADMIN — Medication 1 DROP(S): at 17:17

## 2025-03-06 RX ADMIN — DEXTROMETHORPHAN HBR, GUAIFENESIN 100 MILLIGRAM(S): 200 LIQUID ORAL at 05:20

## 2025-03-06 RX ADMIN — Medication 0.5 MILLIGRAM(S): at 22:05

## 2025-03-06 RX ADMIN — Medication 650 MILLIGRAM(S): at 09:54

## 2025-03-06 RX ADMIN — IPRATROPIUM BROMIDE AND ALBUTEROL SULFATE 3 MILLILITER(S): .5; 2.5 SOLUTION RESPIRATORY (INHALATION) at 00:30

## 2025-03-06 RX ADMIN — Medication 650 MILLIGRAM(S): at 23:00

## 2025-03-06 RX ADMIN — DRONEDARONE 400 MILLIGRAM(S): 400 TABLET, FILM COATED ORAL at 17:16

## 2025-03-06 RX ADMIN — BUMETANIDE 2 MILLIGRAM(S): 1 TABLET ORAL at 05:18

## 2025-03-06 RX ADMIN — IPRATROPIUM BROMIDE AND ALBUTEROL SULFATE 3 MILLILITER(S): .5; 2.5 SOLUTION RESPIRATORY (INHALATION) at 17:16

## 2025-03-06 RX ADMIN — DEXTROMETHORPHAN HBR, GUAIFENESIN 100 MILLIGRAM(S): 200 LIQUID ORAL at 23:17

## 2025-03-06 RX ADMIN — DRONEDARONE 400 MILLIGRAM(S): 400 TABLET, FILM COATED ORAL at 05:21

## 2025-03-06 RX ADMIN — IPRATROPIUM BROMIDE AND ALBUTEROL SULFATE 3 MILLILITER(S): .5; 2.5 SOLUTION RESPIRATORY (INHALATION) at 05:18

## 2025-03-06 RX ADMIN — Medication 650 MILLIGRAM(S): at 22:03

## 2025-03-06 RX ADMIN — DOXAZOSIN MESYLATE 2 MILLIGRAM(S): 8 TABLET ORAL at 05:20

## 2025-03-06 RX ADMIN — INSULIN GLARGINE-YFGN 5 UNIT(S): 100 INJECTION, SOLUTION SUBCUTANEOUS at 08:42

## 2025-03-06 RX ADMIN — LISINOPRIL 40 MILLIGRAM(S): 5 TABLET ORAL at 05:20

## 2025-03-06 NOTE — PROGRESS NOTE ADULT - SUBJECTIVE AND OBJECTIVE BOX
[facility] Attending Follow-up Note     Patient seen and examined at bedside.    Overnight Events:     events noted     REVIEW OF SYSTEMS:  Constitutional:     [x ] negative [ ] fevers [ ] chills [ ] weight loss [ ] weight gain  HEENT:                  [x ] negative [ ] dry eyes [ ] eye irritation [ ] postnasal drip [ ] nasal congestion  CV:                         [ x] negative  [ ] chest pain [ ] orthopnea [ ] palpitations [ ] murmur  Resp:                     [ x] negative [ ] cough [ ] shortness of breath [ ] dyspnea [ ] wheezing [ ] sputum [ ]hemoptysis  GI:                          [ x] negative [ ] nausea [ ] vomiting [ ] diarrhea [ ] constipation [ ] abd pain [ ] dysphagia   :                        [ x] negative [ ] dysuria [ ] nocturia [ ] hematuria [ ] increased urinary frequency  Musculoskeletal: [ x] negative [ ] back pain [ ] myalgias [ ] arthralgias [ ] fracture  Skin:                       [ x] negative [ ] rash [ ] itch  Neurological:        [x ] negative [ ] headache [ ] dizziness [ ] syncope [ ] weakness [ ] numbness  Psychiatric:           [ x] negative [ ] anxiety [ ] depression  Endocrine:            [ x] negative [ ] diabetes [ ] thyroid problem  Heme/Lymph:      [ x] negative [ ] anemia [ ] bleeding problem  Allergic/Immune: [ x] negative [ ] itchy eyes [ ] nasal discharge [ ] hives [ ] angioedema    [ x] All other systems negative  [ ] Unable to assess ROS due to    Current Meds:  acetaminophen     Tablet .. 650 milliGRAM(s) Oral every 6 hours PRN  albuterol/ipratropium for Nebulization 3 milliLiter(s) Nebulizer every 6 hours  apixaban 5 milliGRAM(s) Oral every 12 hours  artificial  tears Solution 1 Drop(s) Both EYES two times a day  aspirin enteric coated 81 milliGRAM(s) Oral daily  atorvastatin 40 milliGRAM(s) Oral at bedtime  buMETAnide 2 milliGRAM(s) Oral daily  dextrose 5%. 1000 milliLiter(s) IV Continuous <Continuous>  dextrose 5%. 1000 milliLiter(s) IV Continuous <Continuous>  dextrose 50% Injectable 25 Gram(s) IV Push once  dextrose 50% Injectable 12.5 Gram(s) IV Push once  dextrose 50% Injectable 25 Gram(s) IV Push once  dextrose Oral Gel 15 Gram(s) Oral once PRN  diltiazem    milliGRAM(s) Oral daily  doxazosin 2 milliGRAM(s) Oral daily  dronedarone 400 milliGRAM(s) Oral two times a day  glucagon  Injectable 1 milliGRAM(s) IntraMuscular once  guaiFENesin Oral Liquid (Sugar-Free) 100 milliGRAM(s) Oral every 6 hours PRN  insulin glargine Injectable (LANTUS) 18 Unit(s) SubCutaneous at bedtime  insulin glargine Injectable (LANTUS) 7 Unit(s) SubCutaneous every morning  insulin lispro (ADMELOG) corrective regimen sliding scale   SubCutaneous three times a day before meals  insulin lispro (ADMELOG) corrective regimen sliding scale   SubCutaneous at bedtime  lisinopril 40 milliGRAM(s) Oral daily  LORazepam     Tablet 0.5 milliGRAM(s) Oral at bedtime  sodium chloride 0.65% Nasal 1 Spray(s) Both Nostrils three times a day      PAST MEDICAL & SURGICAL HISTORY:  Hypertension      Hyperlipemia      Diabetes mellitus type 2 in obese      Atrial fibrillation      CVA (cerebral vascular accident)      Renal insufficiency      Knee pain, left      Anxiety      Hypomagnesemia      History of cholecystectomy      Coronary stent patent          Vitals:  T(F): 98.4 (03-07), Max: 98.4 (03-07)  HR: 70 (03-07) (64 - 81)  BP: 117/57 (03-07) (115/79 - 148/63)  RR: 18 (03-07)  SpO2: 95% (03-07)  I&O's Summary    06 Mar 2025 07:01  -  07 Mar 2025 07:00  --------------------------------------------------------  IN: 1000 mL / OUT: 1000 mL / NET: 0 mL    07 Mar 2025 07:01  -  08 Mar 2025 01:45  --------------------------------------------------------  IN: 600 mL / OUT: 0 mL / NET: 600 mL        Physical Exam:  Appearance: No acute distress  HENT: No JVD   Cardiovascular: RRR, S1/S2, no murmurs  Respiratory: CTABL  Gastrointestinal: soft, NT ND, +BS  Musculoskeletal: No clubbing, no edema   Neurologic: Non-focal  Skin: No rashes, ecchymoses, or cyanosis                          12.1   6.11  )-----------( 155      ( 07 Mar 2025 07:09 )             38.8     03-07    138  |  99  |  73[H]  ----------------------------<  254[H]  3.4[L]   |  22  |  2.33[H]    Ca    9.3      07 Mar 2025 07:09  Phos  4.7     03-07  Mg     1.6     03-07                    Cardiovascular Testings:

## 2025-03-06 NOTE — PROGRESS NOTE ADULT - SUBJECTIVE AND OBJECTIVE BOX
Maricarmen Paredes M.D.  Division of Hospital Medicine  Available on [employer] TEAMS    SUBJECTIVE / ACUTE INTERVAL EVENTS: Patient seen and examined. No overnight events. No subjective complaints.     OBJECTIVE:   Vital Signs Last 24 Hrs  T(C): 36.8 (06 Mar 2025 12:11), Max: 36.8 (06 Mar 2025 12:11)  T(F): 98.3 (06 Mar 2025 12:11), Max: 98.3 (06 Mar 2025 12:11)  HR: 70 (06 Mar 2025 12:11) (67 - 72)  BP: 108/69 (06 Mar 2025 12:11) (108/69 - 148/72)  BP(mean): --  RR: 18 (06 Mar 2025 12:11) (18 - 18)  SpO2: 96% (06 Mar 2025 12:11) (93% - 96%)    Parameters below as of 06 Mar 2025 12:11  Patient On (Oxygen Delivery Method): nasal cannula  O2 Flow (L/min): 2      I&O's Summary    05 Mar 2025 07:01  -  06 Mar 2025 07:00  --------------------------------------------------------  IN: 450 mL / OUT: 1000 mL / NET: -550 mL        Physical Examination:  GEN: thin man, laying in stretcher in NAD  PSYCH: A&Ox3, mood and affect appear appropriate   SKIN: intact, no e/o rash  NEURO: no focal neurologic deficits appreciated; CN II-XII intact, sensation intact B/L, motor 5/5 in all mm groups  EYES: PERRL, anicteric, EOMI, no vertical/horizontal nystagmus  HEAD: NC, AT  NECK: supple  RESPI: no accessory muscle use, B/L air entry, CTAB   CARDIO: regular rate/rhythm, no LE edema B/L  ABD: soft, NT, ND, +BS  EXT: patient able to move all extremities spontaneously  VASC: peripheral pulses palpated    Telemetry:     Labs:  CAPILLARY BLOOD GLUCOSE      POCT Blood Glucose.: 251 mg/dL (06 Mar 2025 11:28)  POCT Blood Glucose.: 194 mg/dL (06 Mar 2025 08:01)  POCT Blood Glucose.: 258 mg/dL (05 Mar 2025 21:31)  POCT Blood Glucose.: 193 mg/dL (05 Mar 2025 16:57)                          13.1   4.61  )-----------( 152      ( 06 Mar 2025 06:56 )             41.4     03-06    140  |  101  |  56[H]  ----------------------------<  211[H]  3.5   |  23  |  1.70[H]    Ca    9.8      06 Mar 2025 06:56  Phos  4.0     03-06  Mg     1.6     03-06    Urinalysis Basic - ( 06 Mar 2025 06:56 )  Color: x / Appearance: x / SG: x / pH: x  Gluc: 211 mg/dL / Ketone: x  / Bili: x / Urobili: x   Blood: x / Protein: x / Nitrite: x   Leuk Esterase: x / RBC: x / WBC x   Sq Epi: x / Non Sq Epi: x / Bacteria: x    Imaging Personally Reviewed:    Consultant(s) Notes Reviewed:    Care Discussed with Consultants/Other Providers:    MEDICATIONS  (STANDING):  albuterol/ipratropium for Nebulization 3 milliLiter(s) Nebulizer every 6 hours  apixaban 5 milliGRAM(s) Oral every 12 hours  artificial  tears Solution 1 Drop(s) Both EYES two times a day  aspirin enteric coated 81 milliGRAM(s) Oral daily  atorvastatin 40 milliGRAM(s) Oral at bedtime  buMETAnide 2 milliGRAM(s) Oral two times a day  dextrose 5%. 1000 milliLiter(s) (100 mL/Hr) IV Continuous <Continuous>  dextrose 5%. 1000 milliLiter(s) (50 mL/Hr) IV Continuous <Continuous>  dextrose 50% Injectable 25 Gram(s) IV Push once  dextrose 50% Injectable 12.5 Gram(s) IV Push once  dextrose 50% Injectable 25 Gram(s) IV Push once  diltiazem    milliGRAM(s) Oral daily  doxazosin 2 milliGRAM(s) Oral daily  dronedarone 400 milliGRAM(s) Oral two times a day  glucagon  Injectable 1 milliGRAM(s) IntraMuscular once  insulin glargine Injectable (LANTUS) 5 Unit(s) SubCutaneous every morning  insulin glargine Injectable (LANTUS) 10 Unit(s) SubCutaneous at bedtime  insulin lispro (ADMELOG) corrective regimen sliding scale   SubCutaneous three times a day before meals  insulin lispro (ADMELOG) corrective regimen sliding scale   SubCutaneous at bedtime  lisinopril 40 milliGRAM(s) Oral daily  LORazepam     Tablet 0.5 milliGRAM(s) Oral at bedtime  sodium chloride 0.65% Nasal 1 Spray(s) Both Nostrils three times a day    MEDICATIONS  (PRN):  acetaminophen     Tablet .. 650 milliGRAM(s) Oral every 6 hours PRN Mild Pain (1 - 3)  dextrose Oral Gel 15 Gram(s) Oral once PRN Blood Glucose LESS THAN 70 milliGRAM(s)/deciliter  guaiFENesin Oral Liquid (Sugar-Free) 100 milliGRAM(s) Oral every 6 hours PRN Cough Maricarmen Paredes M.D.  Division of Hospital Medicine  Available on [employer] TEAMS    SUBJECTIVE / ACUTE INTERVAL EVENTS: Patient seen and examined. No overnight events. No subjective complaints except for sciatica pain for which patient requests Tramadol x1 which helps at home -- monitor QTc. Continue to wean O2 supp as tolerated -- maintain SpO2 > 94%. Transition to PO diuresis per cardiology. Will appreciate pulmonology's further recommendations.     OBJECTIVE:   Vital Signs Last 24 Hrs  T(F): 98.3 (06 Mar 2025 12:11), Max: 98.3 (06 Mar 2025 12:11)  HR: 70 (06 Mar 2025 12:11) (67 - 72)  BP: 108/69 (06 Mar 2025 12:11) (108/69 - 148/72)  RR: 18 (06 Mar 2025 12:11) (18 - 18)  SpO2: 96% (06 Mar 2025 12:11) (93% - 96%)  Parameters below as of 06 Mar 2025 12:11  Patient On (Oxygen Delivery Method): nasal cannula  O2 Flow (L/min): 2    I&O's Summary  05 Mar 2025 07:01  -  06 Mar 2025 07:00  --------------------------------------------------------  IN: 450 mL / OUT: 1000 mL / NET: -550 mL    Physical Examination:  GEN: elderly woman, sitting up in bed in NAD, on O2 supp via NC-2L  PSYCH: A&Ox3, mood and affect appear appropriate   NEURO: no focal neurologic deficits appreciated  RESPI: no accessory muscle use, B/L air entry   CARDIO: regular rate/rhythm, no LE edema B/L  ABD: soft, NT, ND  EXT: patient able to move all extremities spontaneously  VASC: peripheral pulses palpated    Labs:  CAPILLARY BLOOD GLUCOSE  POCT Blood Glucose.: 251 mg/dL (06 Mar 2025 11:28)  POCT Blood Glucose.: 194 mg/dL (06 Mar 2025 08:01)  POCT Blood Glucose.: 258 mg/dL (05 Mar 2025 21:31)  POCT Blood Glucose.: 193 mg/dL (05 Mar 2025 16:57)                        13.1   4.61  )-----------( 152      ( 06 Mar 2025 06:56 )             41.4     03-06  140  |  101  |  56[H]  ----------------------------<  211[H]  3.5   |  23  |  1.70[H]    Ca    9.8      06 Mar 2025 06:56  Phos  4.0     03-06  Mg     1.6     03-06    Urinalysis Basic - ( 06 Mar 2025 06:56 )  Color: x / Appearance: x / SG: x / pH: x  Gluc: 211 mg/dL / Ketone: x  / Bili: x / Urobili: x   Blood: x / Protein: x / Nitrite: x   Leuk Esterase: x / RBC: x / WBC x   Sq Epi: x / Non Sq Epi: x / Bacteria: x    Consultant(s) Notes Reviewed: Cardiology, Pulmonology    Care Discussed with Consultants/Other Providers: RACHEL Juan    MEDICATIONS  (STANDING):  albuterol/ipratropium for Nebulization 3 milliLiter(s) Nebulizer every 6 hours  apixaban 5 milliGRAM(s) Oral every 12 hours  artificial  tears Solution 1 Drop(s) Both EYES two times a day  aspirin enteric coated 81 milliGRAM(s) Oral daily  atorvastatin 40 milliGRAM(s) Oral at bedtime  buMETAnide 2 milliGRAM(s) Oral two times a day  dextrose 5%. 1000 milliLiter(s) (100 mL/Hr) IV Continuous <Continuous>  dextrose 5%. 1000 milliLiter(s) (50 mL/Hr) IV Continuous <Continuous>  dextrose 50% Injectable 25 Gram(s) IV Push once  dextrose 50% Injectable 12.5 Gram(s) IV Push once  dextrose 50% Injectable 25 Gram(s) IV Push once  diltiazem    milliGRAM(s) Oral daily  doxazosin 2 milliGRAM(s) Oral daily  dronedarone 400 milliGRAM(s) Oral two times a day  glucagon  Injectable 1 milliGRAM(s) IntraMuscular once  insulin glargine Injectable (LANTUS) 5 Unit(s) SubCutaneous every morning  insulin glargine Injectable (LANTUS) 10 Unit(s) SubCutaneous at bedtime  insulin lispro (ADMELOG) corrective regimen sliding scale   SubCutaneous three times a day before meals  insulin lispro (ADMELOG) corrective regimen sliding scale   SubCutaneous at bedtime  lisinopril 40 milliGRAM(s) Oral daily  LORazepam     Tablet 0.5 milliGRAM(s) Oral at bedtime  sodium chloride 0.65% Nasal 1 Spray(s) Both Nostrils three times a day    MEDICATIONS  (PRN):  acetaminophen     Tablet .. 650 milliGRAM(s) Oral every 6 hours PRN Mild Pain (1 - 3)  dextrose Oral Gel 15 Gram(s) Oral once PRN Blood Glucose LESS THAN 70 milliGRAM(s)/deciliter  guaiFENesin Oral Liquid (Sugar-Free) 100 milliGRAM(s) Oral every 6 hours PRN Cough

## 2025-03-07 ENCOUNTER — APPOINTMENT (OUTPATIENT)
Dept: INTERNAL MEDICINE | Facility: CLINIC | Age: 80
End: 2025-03-07

## 2025-03-07 LAB
ANION GAP SERPL CALC-SCNC: 17 MMOL/L — SIGNIFICANT CHANGE UP (ref 5–17)
BUN SERPL-MCNC: 73 MG/DL — HIGH (ref 7–23)
CALCIUM SERPL-MCNC: 9.3 MG/DL — SIGNIFICANT CHANGE UP (ref 8.4–10.5)
CHLORIDE SERPL-SCNC: 99 MMOL/L — SIGNIFICANT CHANGE UP (ref 96–108)
CO2 SERPL-SCNC: 22 MMOL/L — SIGNIFICANT CHANGE UP (ref 22–31)
CREAT SERPL-MCNC: 2.33 MG/DL — HIGH (ref 0.5–1.3)
EGFR: 21 ML/MIN/1.73M2 — LOW
EGFR: 21 ML/MIN/1.73M2 — LOW
GLUCOSE BLDC GLUCOMTR-MCNC: 197 MG/DL — HIGH (ref 70–99)
GLUCOSE BLDC GLUCOMTR-MCNC: 207 MG/DL — HIGH (ref 70–99)
GLUCOSE BLDC GLUCOMTR-MCNC: 212 MG/DL — HIGH (ref 70–99)
GLUCOSE BLDC GLUCOMTR-MCNC: 242 MG/DL — HIGH (ref 70–99)
GLUCOSE SERPL-MCNC: 254 MG/DL — HIGH (ref 70–99)
HCT VFR BLD CALC: 38.8 % — SIGNIFICANT CHANGE UP (ref 34.5–45)
HGB BLD-MCNC: 12.1 G/DL — SIGNIFICANT CHANGE UP (ref 11.5–15.5)
MAGNESIUM SERPL-MCNC: 1.6 MG/DL — SIGNIFICANT CHANGE UP (ref 1.6–2.6)
MCHC RBC-ENTMCNC: 27.1 PG — SIGNIFICANT CHANGE UP (ref 27–34)
MCHC RBC-ENTMCNC: 31.2 G/DL — LOW (ref 32–36)
MCV RBC AUTO: 86.8 FL — SIGNIFICANT CHANGE UP (ref 80–100)
NRBC BLD AUTO-RTO: 0 /100 WBCS — SIGNIFICANT CHANGE UP (ref 0–0)
PHOSPHATE SERPL-MCNC: 4.7 MG/DL — HIGH (ref 2.5–4.5)
PLATELET # BLD AUTO: 155 K/UL — SIGNIFICANT CHANGE UP (ref 150–400)
POTASSIUM SERPL-MCNC: 3.4 MMOL/L — LOW (ref 3.5–5.3)
POTASSIUM SERPL-SCNC: 3.4 MMOL/L — LOW (ref 3.5–5.3)
RBC # BLD: 4.47 M/UL — SIGNIFICANT CHANGE UP (ref 3.8–5.2)
RBC # FLD: 16.7 % — HIGH (ref 10.3–14.5)
SODIUM SERPL-SCNC: 138 MMOL/L — SIGNIFICANT CHANGE UP (ref 135–145)
WBC # BLD: 6.11 K/UL — SIGNIFICANT CHANGE UP (ref 3.8–10.5)
WBC # FLD AUTO: 6.11 K/UL — SIGNIFICANT CHANGE UP (ref 3.8–10.5)

## 2025-03-07 PROCEDURE — 99233 SBSQ HOSP IP/OBS HIGH 50: CPT

## 2025-03-07 PROCEDURE — 99232 SBSQ HOSP IP/OBS MODERATE 35: CPT

## 2025-03-07 RX ORDER — INSULIN GLARGINE-YFGN 100 [IU]/ML
7 INJECTION, SOLUTION SUBCUTANEOUS EVERY MORNING
Refills: 0 | Status: DISCONTINUED | OUTPATIENT
Start: 2025-03-07 | End: 2025-03-10

## 2025-03-07 RX ORDER — BUMETANIDE 1 MG/1
2 TABLET ORAL DAILY
Refills: 0 | Status: DISCONTINUED | OUTPATIENT
Start: 2025-03-08 | End: 2025-03-10

## 2025-03-07 RX ORDER — TRAMADOL HYDROCHLORIDE 50 MG/1
25 TABLET, FILM COATED ORAL ONCE
Refills: 0 | Status: DISCONTINUED | OUTPATIENT
Start: 2025-03-07 | End: 2025-03-07

## 2025-03-07 RX ORDER — LORAZEPAM 4 MG/ML
0.5 VIAL (ML) INJECTION AT BEDTIME
Refills: 0 | Status: DISCONTINUED | OUTPATIENT
Start: 2025-03-07 | End: 2025-03-10

## 2025-03-07 RX ORDER — INSULIN GLARGINE-YFGN 100 [IU]/ML
18 INJECTION, SOLUTION SUBCUTANEOUS AT BEDTIME
Refills: 0 | Status: DISCONTINUED | OUTPATIENT
Start: 2025-03-07 | End: 2025-03-10

## 2025-03-07 RX ORDER — INSULIN GLARGINE-YFGN 100 [IU]/ML
6 INJECTION, SOLUTION SUBCUTANEOUS EVERY MORNING
Refills: 0 | Status: DISCONTINUED | OUTPATIENT
Start: 2025-03-07 | End: 2025-03-07

## 2025-03-07 RX ADMIN — INSULIN LISPRO 2: 100 INJECTION, SOLUTION INTRAVENOUS; SUBCUTANEOUS at 18:11

## 2025-03-07 RX ADMIN — DEXTROMETHORPHAN HBR, GUAIFENESIN 100 MILLIGRAM(S): 200 LIQUID ORAL at 05:25

## 2025-03-07 RX ADMIN — Medication 40 MILLIEQUIVALENT(S): at 18:11

## 2025-03-07 RX ADMIN — DEXTROMETHORPHAN HBR, GUAIFENESIN 100 MILLIGRAM(S): 200 LIQUID ORAL at 12:07

## 2025-03-07 RX ADMIN — TRAMADOL HYDROCHLORIDE 25 MILLIGRAM(S): 50 TABLET, FILM COATED ORAL at 17:15

## 2025-03-07 RX ADMIN — DRONEDARONE 400 MILLIGRAM(S): 400 TABLET, FILM COATED ORAL at 05:26

## 2025-03-07 RX ADMIN — INSULIN GLARGINE-YFGN 18 UNIT(S): 100 INJECTION, SOLUTION SUBCUTANEOUS at 22:04

## 2025-03-07 RX ADMIN — DOXAZOSIN MESYLATE 2 MILLIGRAM(S): 8 TABLET ORAL at 05:26

## 2025-03-07 RX ADMIN — Medication 650 MILLIGRAM(S): at 06:26

## 2025-03-07 RX ADMIN — Medication 1 DROP(S): at 18:03

## 2025-03-07 RX ADMIN — DRONEDARONE 400 MILLIGRAM(S): 400 TABLET, FILM COATED ORAL at 18:02

## 2025-03-07 RX ADMIN — BUMETANIDE 2 MILLIGRAM(S): 1 TABLET ORAL at 05:26

## 2025-03-07 RX ADMIN — INSULIN LISPRO 4: 100 INJECTION, SOLUTION INTRAVENOUS; SUBCUTANEOUS at 08:30

## 2025-03-07 RX ADMIN — APIXABAN 5 MILLIGRAM(S): 2.5 TABLET, FILM COATED ORAL at 05:26

## 2025-03-07 RX ADMIN — DILTIAZEM HYDROCHLORIDE 360 MILLIGRAM(S): 240 TABLET, EXTENDED RELEASE ORAL at 05:26

## 2025-03-07 RX ADMIN — IPRATROPIUM BROMIDE AND ALBUTEROL SULFATE 3 MILLILITER(S): .5; 2.5 SOLUTION RESPIRATORY (INHALATION) at 12:07

## 2025-03-07 RX ADMIN — IPRATROPIUM BROMIDE AND ALBUTEROL SULFATE 3 MILLILITER(S): .5; 2.5 SOLUTION RESPIRATORY (INHALATION) at 05:26

## 2025-03-07 RX ADMIN — TRAMADOL HYDROCHLORIDE 25 MILLIGRAM(S): 50 TABLET, FILM COATED ORAL at 16:15

## 2025-03-07 RX ADMIN — Medication 0.5 MILLIGRAM(S): at 22:04

## 2025-03-07 RX ADMIN — IPRATROPIUM BROMIDE AND ALBUTEROL SULFATE 3 MILLILITER(S): .5; 2.5 SOLUTION RESPIRATORY (INHALATION) at 18:02

## 2025-03-07 RX ADMIN — Medication 81 MILLIGRAM(S): at 12:07

## 2025-03-07 RX ADMIN — Medication 650 MILLIGRAM(S): at 05:26

## 2025-03-07 RX ADMIN — LISINOPRIL 40 MILLIGRAM(S): 5 TABLET ORAL at 05:29

## 2025-03-07 RX ADMIN — ATORVASTATIN CALCIUM 40 MILLIGRAM(S): 80 TABLET, FILM COATED ORAL at 22:04

## 2025-03-07 RX ADMIN — INSULIN GLARGINE-YFGN 5 UNIT(S): 100 INJECTION, SOLUTION SUBCUTANEOUS at 08:37

## 2025-03-07 RX ADMIN — Medication 1 SPRAY(S): at 15:13

## 2025-03-07 RX ADMIN — INSULIN LISPRO 4: 100 INJECTION, SOLUTION INTRAVENOUS; SUBCUTANEOUS at 12:12

## 2025-03-07 RX ADMIN — APIXABAN 5 MILLIGRAM(S): 2.5 TABLET, FILM COATED ORAL at 18:03

## 2025-03-07 RX ADMIN — Medication 1 SPRAY(S): at 21:48

## 2025-03-07 RX ADMIN — DEXTROMETHORPHAN HBR, GUAIFENESIN 100 MILLIGRAM(S): 200 LIQUID ORAL at 18:02

## 2025-03-07 RX ADMIN — IPRATROPIUM BROMIDE AND ALBUTEROL SULFATE 3 MILLILITER(S): .5; 2.5 SOLUTION RESPIRATORY (INHALATION) at 23:26

## 2025-03-07 NOTE — PROVIDER CONTACT NOTE (OTHER) - BACKGROUND
Pt has the flu, she has hx of asthma, a. fib on elquis, CVA and CAD.
Can be found in H&P.
Pt admitted for edema and CHF

## 2025-03-07 NOTE — PROVIDER CONTACT NOTE (OTHER) - REASON
Pt complaining of cough
Pt stated she is DNR, but presents as full code in chart
Patient complaining of new symptoms

## 2025-03-07 NOTE — PROGRESS NOTE ADULT - SUBJECTIVE AND OBJECTIVE BOX
[facility] Attending Follow-up Note     Patient seen and examined at bedside.    Overnight Events:     events noted     REVIEW OF SYSTEMS:  Constitutional:     [x ] negative [ ] fevers [ ] chills [ ] weight loss [ ] weight gain  HEENT:                  [x ] negative [ ] dry eyes [ ] eye irritation [ ] postnasal drip [ ] nasal congestion  CV:                         [ x] negative  [ ] chest pain [ ] orthopnea [ ] palpitations [ ] murmur  Resp:                     [ x] negative [ ] cough [ ] shortness of breath [ ] dyspnea [ ] wheezing [ ] sputum [ ]hemoptysis  GI:                          [ x] negative [ ] nausea [ ] vomiting [ ] diarrhea [ ] constipation [ ] abd pain [ ] dysphagia   :                        [ x] negative [ ] dysuria [ ] nocturia [ ] hematuria [ ] increased urinary frequency  Musculoskeletal: [ x] negative [ ] back pain [ ] myalgias [ ] arthralgias [ ] fracture  Skin:                       [ x] negative [ ] rash [ ] itch  Neurological:        [x ] negative [ ] headache [ ] dizziness [ ] syncope [ ] weakness [ ] numbness  Psychiatric:           [ x] negative [ ] anxiety [ ] depression  Endocrine:            [ x] negative [ ] diabetes [ ] thyroid problem  Heme/Lymph:      [ x] negative [ ] anemia [ ] bleeding problem  Allergic/Immune: [ x] negative [ ] itchy eyes [ ] nasal discharge [ ] hives [ ] angioedema    [ x] All other systems negative  [ ] Unable to assess ROS due to    Current Meds:  acetaminophen     Tablet .. 650 milliGRAM(s) Oral every 6 hours PRN  albuterol/ipratropium for Nebulization 3 milliLiter(s) Nebulizer every 6 hours  apixaban 5 milliGRAM(s) Oral every 12 hours  artificial  tears Solution 1 Drop(s) Both EYES two times a day  aspirin enteric coated 81 milliGRAM(s) Oral daily  atorvastatin 40 milliGRAM(s) Oral at bedtime  buMETAnide 2 milliGRAM(s) Oral daily  dextrose 5%. 1000 milliLiter(s) IV Continuous <Continuous>  dextrose 5%. 1000 milliLiter(s) IV Continuous <Continuous>  dextrose 50% Injectable 25 Gram(s) IV Push once  dextrose 50% Injectable 12.5 Gram(s) IV Push once  dextrose 50% Injectable 25 Gram(s) IV Push once  dextrose Oral Gel 15 Gram(s) Oral once PRN  diltiazem    milliGRAM(s) Oral daily  doxazosin 2 milliGRAM(s) Oral daily  dronedarone 400 milliGRAM(s) Oral two times a day  glucagon  Injectable 1 milliGRAM(s) IntraMuscular once  guaiFENesin Oral Liquid (Sugar-Free) 100 milliGRAM(s) Oral every 6 hours PRN  insulin glargine Injectable (LANTUS) 18 Unit(s) SubCutaneous at bedtime  insulin glargine Injectable (LANTUS) 7 Unit(s) SubCutaneous every morning  insulin lispro (ADMELOG) corrective regimen sliding scale   SubCutaneous three times a day before meals  insulin lispro (ADMELOG) corrective regimen sliding scale   SubCutaneous at bedtime  lidocaine   4% Patch 1 Patch Transdermal every 24 hours  lisinopril 40 milliGRAM(s) Oral daily  LORazepam     Tablet 0.5 milliGRAM(s) Oral at bedtime  sodium chloride 0.65% Nasal 1 Spray(s) Both Nostrils three times a day      PAST MEDICAL & SURGICAL HISTORY:  Hypertension      Hyperlipemia      Diabetes mellitus type 2 in obese      Atrial fibrillation      CVA (cerebral vascular accident)      Renal insufficiency      Knee pain, left      Anxiety      Hypomagnesemia      History of cholecystectomy      Coronary stent patent          Vitals:  T(F): 98 (03-09), Max: 98.4 (03-09)  HR: 81 (03-09) (60 - 81)  BP: 118/67 (03-09) (114/66 - 118/67)  RR: 17 (03-09)  SpO2: 93% (03-09)  I&O's Summary    08 Mar 2025 06:01  -  09 Mar 2025 07:00  --------------------------------------------------------  IN: 480 mL / OUT: 900 mL / NET: -420 mL    09 Mar 2025 07:01  -  10 Mar 2025 02:30  --------------------------------------------------------  IN: 0 mL / OUT: 900 mL / NET: -900 mL        Physical Exam:  Appearance: No acute distress  HENT: No JVD   Cardiovascular: RRR, S1/S2, no murmurs  Respiratory: CTABL  Gastrointestinal: soft, NT ND, +BS  Musculoskeletal: No clubbing, no edema   Neurologic: Non-focal  Skin: No rashes, ecchymoses, or cyanosis      03-08    141  |  101  |  84[H]  ----------------------------<  223[H]  3.7   |  19[L]  |  2.21[H]    Ca    9.6      08 Mar 2025 07:11                    Cardiovascular Testings:

## 2025-03-07 NOTE — PROGRESS NOTE ADULT - SUBJECTIVE AND OBJECTIVE BOX
Maricarmen Paredes M.D.  Division of Hospital Medicine  Available on [employer] TEAMS    SUBJECTIVE / ACUTE INTERVAL EVENTS: Patient seen and examined. No overnight events. No subjective complaints as patient states she feels improved today. Cr uptrending this AM -- will replete K and monitor fluid status and discuss whether to reduce oral diuretics with cardiology.     OBJECTIVE:   Vital Signs Last 24 Hrs  T(F): 98 (07 Mar 2025 12:11), Max: 98.5 (06 Mar 2025 20:55)  HR: 64 (07 Mar 2025 12:11) (64 - 81)  BP: 148/63 (07 Mar 2025 12:11) (115/79 - 148/63)  RR: 18 (07 Mar 2025 12:11) (18 - 18)  SpO2: 94% (07 Mar 2025 12:11) (94% - 99%)  Parameters below as of 07 Mar 2025 12:11  Patient On (Oxygen Delivery Method): nasal cannula  O2 Flow (L/min): 2    I&O's Summary  06 Mar 2025 07:01  -  07 Mar 2025 07:00  --------------------------------------------------------  IN: 1000 mL / OUT: 1000 mL / NET: 0 mL    Physical Examination:  GEN: elderly woman, sitting up in bed in NAD, on O2 supp via NC-2L  PSYCH: A&Ox3, mood and affect appear appropriate   NEURO: no focal neurologic deficits appreciated  RESPI: no accessory muscle use, B/L air entry   CARDIO: regular rate/rhythm, no LE edema B/L  ABD: soft, NT, ND  EXT: patient able to move all extremities spontaneously  VASC: peripheral pulses palpated    Labs:  CAPILLARY BLOOD GLUCOSE  POCT Blood Glucose.: 242 mg/dL (07 Mar 2025 12:11)  POCT Blood Glucose.: 212 mg/dL (07 Mar 2025 08:20)  POCT Blood Glucose.: 289 mg/dL (06 Mar 2025 21:44)  POCT Blood Glucose.: 156 mg/dL (06 Mar 2025 17:02)                        12.1   6.11  )-----------( 155      ( 07 Mar 2025 07:09 )             38.8     03-07  138  |  99  |  73[H]  ----------------------------<  254[H]  3.4[L]   |  22  |  2.33[H]    Ca    9.3      07 Mar 2025 07:09  Phos  4.7     03-07  Mg     1.6     03-07    Urinalysis Basic - ( 07 Mar 2025 07:09 )  Color: x / Appearance: x / SG: x / pH: x  Gluc: 254 mg/dL / Ketone: x  / Bili: x / Urobili: x   Blood: x / Protein: x / Nitrite: x   Leuk Esterase: x / RBC: x / WBC x   Sq Epi: x / Non Sq Epi: x / Bacteria: x    Care Discussed with Consultants/Other Providers: RACHEL Tucker    MEDICATIONS  (STANDING):  albuterol/ipratropium for Nebulization 3 milliLiter(s) Nebulizer every 6 hours  apixaban 5 milliGRAM(s) Oral every 12 hours  artificial  tears Solution 1 Drop(s) Both EYES two times a day  aspirin enteric coated 81 milliGRAM(s) Oral daily  atorvastatin 40 milliGRAM(s) Oral at bedtime  buMETAnide 2 milliGRAM(s) Oral two times a day  dextrose 5%. 1000 milliLiter(s) (100 mL/Hr) IV Continuous <Continuous>  dextrose 5%. 1000 milliLiter(s) (50 mL/Hr) IV Continuous <Continuous>  dextrose 50% Injectable 25 Gram(s) IV Push once  dextrose 50% Injectable 12.5 Gram(s) IV Push once  dextrose 50% Injectable 25 Gram(s) IV Push once  diltiazem    milliGRAM(s) Oral daily  doxazosin 2 milliGRAM(s) Oral daily  dronedarone 400 milliGRAM(s) Oral two times a day  glucagon  Injectable 1 milliGRAM(s) IntraMuscular once  insulin glargine Injectable (LANTUS) 10 Unit(s) SubCutaneous at bedtime  insulin glargine Injectable (LANTUS) 5 Unit(s) SubCutaneous every morning  insulin lispro (ADMELOG) corrective regimen sliding scale   SubCutaneous three times a day before meals  insulin lispro (ADMELOG) corrective regimen sliding scale   SubCutaneous at bedtime  lisinopril 40 milliGRAM(s) Oral daily  LORazepam     Tablet 0.5 milliGRAM(s) Oral at bedtime  sodium chloride 0.65% Nasal 1 Spray(s) Both Nostrils three times a day    MEDICATIONS  (PRN):  acetaminophen     Tablet .. 650 milliGRAM(s) Oral every 6 hours PRN Mild Pain (1 - 3)  dextrose Oral Gel 15 Gram(s) Oral once PRN Blood Glucose LESS THAN 70 milliGRAM(s)/deciliter  guaiFENesin Oral Liquid (Sugar-Free) 100 milliGRAM(s) Oral every 6 hours PRN Cough Maricarmen Paredes M.D.  Division of Hospital Medicine  Available on [employer] TE[employer]    SUBJECTIVE / ACUTE INTERVAL EVENTS: Patient seen and examined. No overnight events. No subjective complaints as patient states she feels improved today. Cr uptrending this AM -- will replete K and monitor fluid status and decrease Bumex to once daily today.     OBJECTIVE:   Vital Signs Last 24 Hrs  T(F): 98 (07 Mar 2025 12:11), Max: 98.5 (06 Mar 2025 20:55)  HR: 64 (07 Mar 2025 12:11) (64 - 81)  BP: 148/63 (07 Mar 2025 12:11) (115/79 - 148/63)  RR: 18 (07 Mar 2025 12:11) (18 - 18)  SpO2: 94% (07 Mar 2025 12:11) (94% - 99%)  Parameters below as of 07 Mar 2025 12:11  Patient On (Oxygen Delivery Method): nasal cannula  O2 Flow (L/min): 2    I&O's Summary  06 Mar 2025 07:01  -  07 Mar 2025 07:00  --------------------------------------------------------  IN: 1000 mL / OUT: 1000 mL / NET: 0 mL    Physical Examination:  GEN: elderly woman, sitting up in bed in NAD, on O2 supp via NC-2L  PSYCH: A&Ox3, mood and affect appear appropriate   NEURO: no focal neurologic deficits appreciated  RESPI: no accessory muscle use, B/L air entry   CARDIO: regular rate/rhythm, no LE edema B/L  ABD: soft, NT, ND  EXT: patient able to move all extremities spontaneously  VASC: peripheral pulses palpated    Labs:  CAPILLARY BLOOD GLUCOSE  POCT Blood Glucose.: 242 mg/dL (07 Mar 2025 12:11)  POCT Blood Glucose.: 212 mg/dL (07 Mar 2025 08:20)  POCT Blood Glucose.: 289 mg/dL (06 Mar 2025 21:44)  POCT Blood Glucose.: 156 mg/dL (06 Mar 2025 17:02)                        12.1   6.11  )-----------( 155      ( 07 Mar 2025 07:09 )             38.8     03-07  138  |  99  |  73[H]  ----------------------------<  254[H]  3.4[L]   |  22  |  2.33[H]    Ca    9.3      07 Mar 2025 07:09  Phos  4.7     03-07  Mg     1.6     03-07    Urinalysis Basic - ( 07 Mar 2025 07:09 )  Color: x / Appearance: x / SG: x / pH: x  Gluc: 254 mg/dL / Ketone: x  / Bili: x / Urobili: x   Blood: x / Protein: x / Nitrite: x   Leuk Esterase: x / RBC: x / WBC x   Sq Epi: x / Non Sq Epi: x / Bacteria: x    Care Discussed with Consultants/Other Providers: RACHEL Tucker    MEDICATIONS  (STANDING):  albuterol/ipratropium for Nebulization 3 milliLiter(s) Nebulizer every 6 hours  apixaban 5 milliGRAM(s) Oral every 12 hours  artificial  tears Solution 1 Drop(s) Both EYES two times a day  aspirin enteric coated 81 milliGRAM(s) Oral daily  atorvastatin 40 milliGRAM(s) Oral at bedtime  buMETAnide 2 milliGRAM(s) Oral two times a day  dextrose 5%. 1000 milliLiter(s) (100 mL/Hr) IV Continuous <Continuous>  dextrose 5%. 1000 milliLiter(s) (50 mL/Hr) IV Continuous <Continuous>  dextrose 50% Injectable 25 Gram(s) IV Push once  dextrose 50% Injectable 12.5 Gram(s) IV Push once  dextrose 50% Injectable 25 Gram(s) IV Push once  diltiazem    milliGRAM(s) Oral daily  doxazosin 2 milliGRAM(s) Oral daily  dronedarone 400 milliGRAM(s) Oral two times a day  glucagon  Injectable 1 milliGRAM(s) IntraMuscular once  insulin glargine Injectable (LANTUS) 10 Unit(s) SubCutaneous at bedtime  insulin glargine Injectable (LANTUS) 5 Unit(s) SubCutaneous every morning  insulin lispro (ADMELOG) corrective regimen sliding scale   SubCutaneous three times a day before meals  insulin lispro (ADMELOG) corrective regimen sliding scale   SubCutaneous at bedtime  lisinopril 40 milliGRAM(s) Oral daily  LORazepam     Tablet 0.5 milliGRAM(s) Oral at bedtime  sodium chloride 0.65% Nasal 1 Spray(s) Both Nostrils three times a day    MEDICATIONS  (PRN):  acetaminophen     Tablet .. 650 milliGRAM(s) Oral every 6 hours PRN Mild Pain (1 - 3)  dextrose Oral Gel 15 Gram(s) Oral once PRN Blood Glucose LESS THAN 70 milliGRAM(s)/deciliter  guaiFENesin Oral Liquid (Sugar-Free) 100 milliGRAM(s) Oral every 6 hours PRN Cough

## 2025-03-07 NOTE — PROVIDER CONTACT NOTE (OTHER) - ACTION/TREATMENT ORDERED:
Provider made aware and stated that pt will remain full code until the items are in her chart and will relay the information to the AM team.

## 2025-03-07 NOTE — PROGRESS NOTE ADULT - PROBLEM SELECTOR PLAN 2
- Acute on chronic as developed during hospitalization  - Continue home ASA, statin  - Holding lisinopril due to ROYAL - resumed on 3/6 w/ hold parameters  - Appreciate cardiology -- IV to PO diuretics on 3/5  - Monitor fluid status, hemodynamics, renal function, and electrolytes closely

## 2025-03-07 NOTE — PROGRESS NOTE ADULT - PROBLEM SELECTOR PLAN 9
- At home patient reportedly on Lantus 18units QHS and 9units in AM  - Will continue w/ Lantus 10units QHS and 5units in AM for now, w/ mISS and serial FS monitoring - At home patient reportedly on Lantus 18units QHS and 9units in AM  - Will continue w/ Lantus 10 --> 18unis units QHS and 5units --> 7units in AM as of 3/7  - ISS and serial FS monitoring

## 2025-03-07 NOTE — PROGRESS NOTE ADULT - PROBLEM SELECTOR PLAN 5
On outpatient labs, Creat 1.7-1.9   Creat elevated, possible ATN in setting of infection, also possible prerenal given infection/poor oral intake and concomitant diuretic use. Improved to baseline but uptrending again as of 3/7 -- monitor fluid status and discuss w/ cardiology plan for diuretics (reduce dose?); replete K PRN and closely monitor

## 2025-03-08 LAB
ANION GAP SERPL CALC-SCNC: 21 MMOL/L — HIGH (ref 5–17)
BUN SERPL-MCNC: 84 MG/DL — HIGH (ref 7–23)
CALCIUM SERPL-MCNC: 9.6 MG/DL — SIGNIFICANT CHANGE UP (ref 8.4–10.5)
CHLORIDE SERPL-SCNC: 101 MMOL/L — SIGNIFICANT CHANGE UP (ref 96–108)
CO2 SERPL-SCNC: 19 MMOL/L — LOW (ref 22–31)
CREAT SERPL-MCNC: 2.21 MG/DL — HIGH (ref 0.5–1.3)
EGFR: 22 ML/MIN/1.73M2 — LOW
EGFR: 22 ML/MIN/1.73M2 — LOW
GLUCOSE BLDC GLUCOMTR-MCNC: 171 MG/DL — HIGH (ref 70–99)
GLUCOSE BLDC GLUCOMTR-MCNC: 198 MG/DL — HIGH (ref 70–99)
GLUCOSE BLDC GLUCOMTR-MCNC: 206 MG/DL — HIGH (ref 70–99)
GLUCOSE BLDC GLUCOMTR-MCNC: 210 MG/DL — HIGH (ref 70–99)
GLUCOSE SERPL-MCNC: 223 MG/DL — HIGH (ref 70–99)
POTASSIUM SERPL-MCNC: 3.7 MMOL/L — SIGNIFICANT CHANGE UP (ref 3.5–5.3)
POTASSIUM SERPL-SCNC: 3.7 MMOL/L — SIGNIFICANT CHANGE UP (ref 3.5–5.3)
SODIUM SERPL-SCNC: 141 MMOL/L — SIGNIFICANT CHANGE UP (ref 135–145)

## 2025-03-08 PROCEDURE — 93010 ELECTROCARDIOGRAM REPORT: CPT

## 2025-03-08 PROCEDURE — 99233 SBSQ HOSP IP/OBS HIGH 50: CPT

## 2025-03-08 PROCEDURE — 99232 SBSQ HOSP IP/OBS MODERATE 35: CPT

## 2025-03-08 RX ORDER — LIDOCAINE HYDROCHLORIDE 20 MG/ML
1 JELLY TOPICAL EVERY 24 HOURS
Refills: 0 | Status: DISCONTINUED | OUTPATIENT
Start: 2025-03-08 | End: 2025-03-10

## 2025-03-08 RX ADMIN — Medication 650 MILLIGRAM(S): at 19:23

## 2025-03-08 RX ADMIN — DEXTROMETHORPHAN HBR, GUAIFENESIN 100 MILLIGRAM(S): 200 LIQUID ORAL at 00:01

## 2025-03-08 RX ADMIN — LIDOCAINE HYDROCHLORIDE 1 PATCH: 20 JELLY TOPICAL at 11:52

## 2025-03-08 RX ADMIN — DRONEDARONE 400 MILLIGRAM(S): 400 TABLET, FILM COATED ORAL at 05:53

## 2025-03-08 RX ADMIN — IPRATROPIUM BROMIDE AND ALBUTEROL SULFATE 3 MILLILITER(S): .5; 2.5 SOLUTION RESPIRATORY (INHALATION) at 18:16

## 2025-03-08 RX ADMIN — Medication 650 MILLIGRAM(S): at 11:53

## 2025-03-08 RX ADMIN — BUMETANIDE 2 MILLIGRAM(S): 1 TABLET ORAL at 05:54

## 2025-03-08 RX ADMIN — IPRATROPIUM BROMIDE AND ALBUTEROL SULFATE 3 MILLILITER(S): .5; 2.5 SOLUTION RESPIRATORY (INHALATION) at 23:19

## 2025-03-08 RX ADMIN — IPRATROPIUM BROMIDE AND ALBUTEROL SULFATE 3 MILLILITER(S): .5; 2.5 SOLUTION RESPIRATORY (INHALATION) at 11:52

## 2025-03-08 RX ADMIN — DOXAZOSIN MESYLATE 2 MILLIGRAM(S): 8 TABLET ORAL at 05:54

## 2025-03-08 RX ADMIN — APIXABAN 5 MILLIGRAM(S): 2.5 TABLET, FILM COATED ORAL at 05:54

## 2025-03-08 RX ADMIN — INSULIN GLARGINE-YFGN 18 UNIT(S): 100 INJECTION, SOLUTION SUBCUTANEOUS at 21:23

## 2025-03-08 RX ADMIN — DEXTROMETHORPHAN HBR, GUAIFENESIN 100 MILLIGRAM(S): 200 LIQUID ORAL at 18:23

## 2025-03-08 RX ADMIN — Medication 1 DROP(S): at 18:16

## 2025-03-08 RX ADMIN — DILTIAZEM HYDROCHLORIDE 360 MILLIGRAM(S): 240 TABLET, EXTENDED RELEASE ORAL at 05:54

## 2025-03-08 RX ADMIN — Medication 650 MILLIGRAM(S): at 10:53

## 2025-03-08 RX ADMIN — Medication 1 SPRAY(S): at 21:27

## 2025-03-08 RX ADMIN — INSULIN LISPRO 4: 100 INJECTION, SOLUTION INTRAVENOUS; SUBCUTANEOUS at 17:59

## 2025-03-08 RX ADMIN — INSULIN GLARGINE-YFGN 7 UNIT(S): 100 INJECTION, SOLUTION SUBCUTANEOUS at 08:53

## 2025-03-08 RX ADMIN — Medication 650 MILLIGRAM(S): at 18:23

## 2025-03-08 RX ADMIN — INSULIN LISPRO 4: 100 INJECTION, SOLUTION INTRAVENOUS; SUBCUTANEOUS at 08:52

## 2025-03-08 RX ADMIN — Medication 1 DROP(S): at 06:06

## 2025-03-08 RX ADMIN — ATORVASTATIN CALCIUM 40 MILLIGRAM(S): 80 TABLET, FILM COATED ORAL at 21:24

## 2025-03-08 RX ADMIN — Medication 81 MILLIGRAM(S): at 11:51

## 2025-03-08 RX ADMIN — APIXABAN 5 MILLIGRAM(S): 2.5 TABLET, FILM COATED ORAL at 18:16

## 2025-03-08 RX ADMIN — LISINOPRIL 40 MILLIGRAM(S): 5 TABLET ORAL at 05:55

## 2025-03-08 RX ADMIN — INSULIN LISPRO 2: 100 INJECTION, SOLUTION INTRAVENOUS; SUBCUTANEOUS at 11:53

## 2025-03-08 RX ADMIN — Medication 0.5 MILLIGRAM(S): at 21:24

## 2025-03-08 RX ADMIN — DRONEDARONE 400 MILLIGRAM(S): 400 TABLET, FILM COATED ORAL at 18:16

## 2025-03-08 RX ADMIN — Medication 1 SPRAY(S): at 14:03

## 2025-03-08 RX ADMIN — LIDOCAINE HYDROCHLORIDE 1 PATCH: 20 JELLY TOPICAL at 19:52

## 2025-03-08 RX ADMIN — IPRATROPIUM BROMIDE AND ALBUTEROL SULFATE 3 MILLILITER(S): .5; 2.5 SOLUTION RESPIRATORY (INHALATION) at 05:53

## 2025-03-08 RX ADMIN — DEXTROMETHORPHAN HBR, GUAIFENESIN 100 MILLIGRAM(S): 200 LIQUID ORAL at 11:51

## 2025-03-08 RX ADMIN — Medication 1 SPRAY(S): at 06:06

## 2025-03-08 RX ADMIN — LIDOCAINE HYDROCHLORIDE 1 PATCH: 20 JELLY TOPICAL at 23:53

## 2025-03-08 NOTE — PROGRESS NOTE ADULT - SUBJECTIVE AND OBJECTIVE BOX
[facility] Attending Follow-up Note     Patient seen and examined at bedside.    Overnight Events:     events noted   cr elevated     REVIEW OF SYSTEMS:  Constitutional:     [x ] negative [ ] fevers [ ] chills [ ] weight loss [ ] weight gain  HEENT:                  [x ] negative [ ] dry eyes [ ] eye irritation [ ] postnasal drip [ ] nasal congestion  CV:                         [ x] negative  [ ] chest pain [ ] orthopnea [ ] palpitations [ ] murmur  Resp:                     [ x] negative [ ] cough [ ] shortness of breath [ ] dyspnea [ ] wheezing [ ] sputum [ ]hemoptysis  GI:                          [ x] negative [ ] nausea [ ] vomiting [ ] diarrhea [ ] constipation [ ] abd pain [ ] dysphagia   :                        [ x] negative [ ] dysuria [ ] nocturia [ ] hematuria [ ] increased urinary frequency  Musculoskeletal: [ x] negative [ ] back pain [ ] myalgias [ ] arthralgias [ ] fracture  Skin:                       [ x] negative [ ] rash [ ] itch  Neurological:        [x ] negative [ ] headache [ ] dizziness [ ] syncope [ ] weakness [ ] numbness  Psychiatric:           [ x] negative [ ] anxiety [ ] depression  Endocrine:            [ x] negative [ ] diabetes [ ] thyroid problem  Heme/Lymph:      [ x] negative [ ] anemia [ ] bleeding problem  Allergic/Immune: [ x] negative [ ] itchy eyes [ ] nasal discharge [ ] hives [ ] angioedema    [ x] All other systems negative  [ ] Unable to assess ROS due to    Current Meds:  acetaminophen     Tablet .. 650 milliGRAM(s) Oral every 6 hours PRN  albuterol/ipratropium for Nebulization 3 milliLiter(s) Nebulizer every 6 hours  apixaban 5 milliGRAM(s) Oral every 12 hours  artificial  tears Solution 1 Drop(s) Both EYES two times a day  aspirin enteric coated 81 milliGRAM(s) Oral daily  atorvastatin 40 milliGRAM(s) Oral at bedtime  buMETAnide 2 milliGRAM(s) Oral daily  dextrose 5%. 1000 milliLiter(s) IV Continuous <Continuous>  dextrose 5%. 1000 milliLiter(s) IV Continuous <Continuous>  dextrose 50% Injectable 25 Gram(s) IV Push once  dextrose 50% Injectable 12.5 Gram(s) IV Push once  dextrose 50% Injectable 25 Gram(s) IV Push once  dextrose Oral Gel 15 Gram(s) Oral once PRN  diltiazem    milliGRAM(s) Oral daily  doxazosin 2 milliGRAM(s) Oral daily  dronedarone 400 milliGRAM(s) Oral two times a day  glucagon  Injectable 1 milliGRAM(s) IntraMuscular once  guaiFENesin Oral Liquid (Sugar-Free) 100 milliGRAM(s) Oral every 6 hours PRN  insulin glargine Injectable (LANTUS) 18 Unit(s) SubCutaneous at bedtime  insulin glargine Injectable (LANTUS) 7 Unit(s) SubCutaneous every morning  insulin lispro (ADMELOG) corrective regimen sliding scale   SubCutaneous three times a day before meals  insulin lispro (ADMELOG) corrective regimen sliding scale   SubCutaneous at bedtime  lidocaine   4% Patch 1 Patch Transdermal every 24 hours  lisinopril 40 milliGRAM(s) Oral daily  LORazepam     Tablet 0.5 milliGRAM(s) Oral at bedtime  sodium chloride 0.65% Nasal 1 Spray(s) Both Nostrils three times a day      PAST MEDICAL & SURGICAL HISTORY:  Hypertension      Hyperlipemia      Diabetes mellitus type 2 in obese      Atrial fibrillation      CVA (cerebral vascular accident)      Renal insufficiency      Knee pain, left      Anxiety      Hypomagnesemia      History of cholecystectomy      Coronary stent patent          Vitals:  T(F): 98 (03-09), Max: 98.4 (03-09)  HR: 81 (03-09) (60 - 81)  BP: 118/67 (03-09) (114/66 - 118/67)  RR: 17 (03-09)  SpO2: 93% (03-09)  I&O's Summary    08 Mar 2025 06:01  -  09 Mar 2025 07:00  --------------------------------------------------------  IN: 480 mL / OUT: 900 mL / NET: -420 mL    09 Mar 2025 07:01  -  10 Mar 2025 02:28  --------------------------------------------------------  IN: 0 mL / OUT: 900 mL / NET: -900 mL        Physical Exam:  Appearance: No acute distress  HENT: No JVD   Cardiovascular: RRR, S1/S2, no murmurs  Respiratory: CTABL  Gastrointestinal: soft, NT ND, +BS  Musculoskeletal: No clubbing, no edema   Neurologic: Non-focal  Skin: No rashes, ecchymoses, or cyanosis      03-08    141  |  101  |  84[H]  ----------------------------<  223[H]  3.7   |  19[L]  |  2.21[H]    Ca    9.6      08 Mar 2025 07:11                    Cardiovascular Testings:

## 2025-03-08 NOTE — PROGRESS NOTE ADULT - PROBLEM SELECTOR PLAN 2
- Acute on chronic as developed during hospitalization  - Continue home ASA, statin  - Holding lisinopril due to ROYAL - resumed on 3/6 w/ hold parameters  - Appreciate cardiology -- IV to PO diuretics on 3/5 -- reduced diuretics to daily as of 3/7 given uptrending Cr   - Monitor fluid status, hemodynamics, renal function, and electrolytes closely

## 2025-03-08 NOTE — PROGRESS NOTE ADULT - PROBLEM SELECTOR PLAN 3
- iso Influenza A, HFpEF, Asthma  - Wean O2 supp to maintain SpO2 between 90-92%; obtain ambulatory SpO2 once on RA  - Repeat CXR w/increased pleural effusion  - CT Chest w/o contrast as reported: Tracheobronchial secretions, extensive in the lower lobes.  Complete collapse left lower lobe and near complete collapse right lower lobe. Patchy bilateral lung opacities may be infectious or inflammatory.  - Appreciate pulmonology: -Diuresis in place, pt clinically responded well -Aggressive airway clearance therapy: DuoNebs + Airway Percussive Device (At bedside). Pt doing well with treatments. -OOB to chair and ambulation as tolerated. Incentive spirometry at bedside. -Cough suppressants prn dosing only -No pulmonary indication for antibiotics or systemic steroids at this time -Pulmonary will continue to follow while in hospital   - Diuretics as above

## 2025-03-08 NOTE — PROGRESS NOTE ADULT - PROBLEM SELECTOR PLAN 9
- At home patient reportedly on Lantus 18units QHS and 9units in AM   - Here now on Lantus 18units WHS and 7units in AM  - ISS and serial FS monitoring

## 2025-03-08 NOTE — PROGRESS NOTE ADULT - PROBLEM SELECTOR PLAN 5
On outpatient labs, Creat 1.7-1.9   Creat elevated, possible ATN in setting of infection, also possible prerenal given infection/poor oral intake and concomitant diuretic use. Improved to baseline but uptrending again as of 3/7 -- monitor fluid status with PO diuretic reduced to daily for now -- f/u cardiology

## 2025-03-08 NOTE — PROGRESS NOTE ADULT - SUBJECTIVE AND OBJECTIVE BOX
INCOMPLETE NOTE    Maricarmen Paredes M.D.  Division of Hospital Medicine  Available on [employer] TEAMS    SUBJECTIVE / ACUTE INTERVAL EVENTS: Patient seen and examined. No overnight events. No subjective complaints. Monitor Cr.     OBJECTIVE:   Vital Signs Last 24 Hrs  T(F): 97.8 (08 Mar 2025 04:52), Max: 98.4 (07 Mar 2025 21:51)  HR: 68 (08 Mar 2025 04:52) (64 - 70)  BP: 145/88 (08 Mar 2025 04:52) (117/57 - 148/63)  RR: 18 (08 Mar 2025 04:52) (18 - 18)  SpO2: 94% (08 Mar 2025 04:52) (94% - 95%)  Parameters below as of 08 Mar 2025 04:52  Patient On (Oxygen Delivery Method): nasal cannula  O2 Flow (L/min): 2    I&O's Summary  07 Mar 2025 07:01  -  08 Mar 2025 07:00  --------------------------------------------------------  IN: 600 mL / OUT: 0 mL / NET: 600 mL    Physical Examination:  GEN: elderly woman, sitting up in bed in NAD, on O2 supp via NC-2L  PSYCH: A&Ox3, mood and affect appear appropriate   NEURO: no focal neurologic deficits appreciated  RESPI: no accessory muscle use, B/L air entry   CARDIO: regular rate/rhythm, no LE edema B/L  ABD: soft, NT, ND  EXT: patient able to move all extremities spontaneously  VASC: peripheral pulses palpated    Labs:  CAPILLARY BLOOD GLUCOSE  POCT Blood Glucose.: 207 mg/dL (07 Mar 2025 21:57)  POCT Blood Glucose.: 197 mg/dL (07 Mar 2025 18:11)  POCT Blood Glucose.: 242 mg/dL (07 Mar 2025 12:11)                        12.1   6.11  )-----------( 155      ( 07 Mar 2025 07:09 )             38.8     03-08  141  |  101  |  84[H]  ----------------------------<  223[H]  3.7   |  19[L]  |  2.21[H]    Ca    9.6      08 Mar 2025 07:11  Phos  4.7     03-07  Mg     1.6     03-07    Urinalysis Basic - ( 08 Mar 2025 07:11 )  Color: x / Appearance: x / SG: x / pH: x  Gluc: 223 mg/dL / Ketone: x  / Bili: x / Urobili: x   Blood: x / Protein: x / Nitrite: x   Leuk Esterase: x / RBC: x / WBC x   Sq Epi: x / Non Sq Epi: x / Bacteria: x    Consultant(s) Notes Reviewed: Care Coordination    Care Discussed with Consultants/Other Providers: RACHEL Perkins    MEDICATIONS  (STANDING):  albuterol/ipratropium for Nebulization 3 milliLiter(s) Nebulizer every 6 hours  apixaban 5 milliGRAM(s) Oral every 12 hours  artificial  tears Solution 1 Drop(s) Both EYES two times a day  aspirin enteric coated 81 milliGRAM(s) Oral daily  atorvastatin 40 milliGRAM(s) Oral at bedtime  buMETAnide 2 milliGRAM(s) Oral daily  dextrose 5%. 1000 milliLiter(s) (50 mL/Hr) IV Continuous <Continuous>  dextrose 5%. 1000 milliLiter(s) (100 mL/Hr) IV Continuous <Continuous>  dextrose 50% Injectable 25 Gram(s) IV Push once  dextrose 50% Injectable 12.5 Gram(s) IV Push once  dextrose 50% Injectable 25 Gram(s) IV Push once  diltiazem    milliGRAM(s) Oral daily  doxazosin 2 milliGRAM(s) Oral daily  dronedarone 400 milliGRAM(s) Oral two times a day  glucagon  Injectable 1 milliGRAM(s) IntraMuscular once  insulin glargine Injectable (LANTUS) 18 Unit(s) SubCutaneous at bedtime  insulin glargine Injectable (LANTUS) 7 Unit(s) SubCutaneous every morning  insulin lispro (ADMELOG) corrective regimen sliding scale   SubCutaneous three times a day before meals  insulin lispro (ADMELOG) corrective regimen sliding scale   SubCutaneous at bedtime  lisinopril 40 milliGRAM(s) Oral daily  LORazepam     Tablet 0.5 milliGRAM(s) Oral at bedtime  sodium chloride 0.65% Nasal 1 Spray(s) Both Nostrils three times a day    MEDICATIONS  (PRN):  acetaminophen     Tablet .. 650 milliGRAM(s) Oral every 6 hours PRN Mild Pain (1 - 3)  dextrose Oral Gel 15 Gram(s) Oral once PRN Blood Glucose LESS THAN 70 milliGRAM(s)/deciliter  guaiFENesin Oral Liquid (Sugar-Free) 100 milliGRAM(s) Oral every 6 hours PRN Cough Mariacrmen Paredes M.D.  Division of Hospital Medicine  Available on [employer] TEAMS    SUBJECTIVE / ACUTE INTERVAL EVENTS: Patient seen and examined. No overnight events. No subjective complaints. Monitor Cr -- slightly downtrending this AM.     OBJECTIVE:   Vital Signs Last 24 Hrs  T(F): 97.8 (08 Mar 2025 04:52), Max: 98.4 (07 Mar 2025 21:51)  HR: 68 (08 Mar 2025 04:52) (64 - 70)  BP: 145/88 (08 Mar 2025 04:52) (117/57 - 148/63)  RR: 18 (08 Mar 2025 04:52) (18 - 18)  SpO2: 94% (08 Mar 2025 04:52) (94% - 95%)  Parameters below as of 08 Mar 2025 04:52  Patient On (Oxygen Delivery Method): nasal cannula  O2 Flow (L/min): 2    I&O's Summary  07 Mar 2025 07:01  -  08 Mar 2025 07:00  --------------------------------------------------------  IN: 600 mL / OUT: 0 mL / NET: 600 mL    Physical Examination:  GEN: elderly woman, sitting up in bed in NAD, on O2 supp via NC-2L  PSYCH: A&Ox3, mood and affect appear appropriate   NEURO: no focal neurologic deficits appreciated  RESPI: no accessory muscle use, B/L air entry   CARDIO: regular rate/rhythm, no LE edema B/L  ABD: soft, NT, ND  EXT: patient able to move all extremities spontaneously  VASC: peripheral pulses palpated    Labs:  CAPILLARY BLOOD GLUCOSE  POCT Blood Glucose.: 207 mg/dL (07 Mar 2025 21:57)  POCT Blood Glucose.: 197 mg/dL (07 Mar 2025 18:11)  POCT Blood Glucose.: 242 mg/dL (07 Mar 2025 12:11)                        12.1   6.11  )-----------( 155      ( 07 Mar 2025 07:09 )             38.8     03-08  141  |  101  |  84[H]  ----------------------------<  223[H]  3.7   |  19[L]  |  2.21[H]    Ca    9.6      08 Mar 2025 07:11  Phos  4.7     03-07  Mg     1.6     03-07    Urinalysis Basic - ( 08 Mar 2025 07:11 )  Color: x / Appearance: x / SG: x / pH: x  Gluc: 223 mg/dL / Ketone: x  / Bili: x / Urobili: x   Blood: x / Protein: x / Nitrite: x   Leuk Esterase: x / RBC: x / WBC x   Sq Epi: x / Non Sq Epi: x / Bacteria: x    Consultant(s) Notes Reviewed: Care Coordination    Care Discussed with Consultants/Other Providers: ACP Shinamol Gregorio    MEDICATIONS  (STANDING):  albuterol/ipratropium for Nebulization 3 milliLiter(s) Nebulizer every 6 hours  apixaban 5 milliGRAM(s) Oral every 12 hours  artificial  tears Solution 1 Drop(s) Both EYES two times a day  aspirin enteric coated 81 milliGRAM(s) Oral daily  atorvastatin 40 milliGRAM(s) Oral at bedtime  buMETAnide 2 milliGRAM(s) Oral daily  dextrose 5%. 1000 milliLiter(s) (50 mL/Hr) IV Continuous <Continuous>  dextrose 5%. 1000 milliLiter(s) (100 mL/Hr) IV Continuous <Continuous>  dextrose 50% Injectable 25 Gram(s) IV Push once  dextrose 50% Injectable 12.5 Gram(s) IV Push once  dextrose 50% Injectable 25 Gram(s) IV Push once  diltiazem    milliGRAM(s) Oral daily  doxazosin 2 milliGRAM(s) Oral daily  dronedarone 400 milliGRAM(s) Oral two times a day  glucagon  Injectable 1 milliGRAM(s) IntraMuscular once  insulin glargine Injectable (LANTUS) 18 Unit(s) SubCutaneous at bedtime  insulin glargine Injectable (LANTUS) 7 Unit(s) SubCutaneous every morning  insulin lispro (ADMELOG) corrective regimen sliding scale   SubCutaneous three times a day before meals  insulin lispro (ADMELOG) corrective regimen sliding scale   SubCutaneous at bedtime  lisinopril 40 milliGRAM(s) Oral daily  LORazepam     Tablet 0.5 milliGRAM(s) Oral at bedtime  sodium chloride 0.65% Nasal 1 Spray(s) Both Nostrils three times a day    MEDICATIONS  (PRN):  acetaminophen     Tablet .. 650 milliGRAM(s) Oral every 6 hours PRN Mild Pain (1 - 3)  dextrose Oral Gel 15 Gram(s) Oral once PRN Blood Glucose LESS THAN 70 milliGRAM(s)/deciliter  guaiFENesin Oral Liquid (Sugar-Free) 100 milliGRAM(s) Oral every 6 hours PRN Cough

## 2025-03-09 LAB
GLUCOSE BLDC GLUCOMTR-MCNC: 144 MG/DL — HIGH (ref 70–99)
GLUCOSE BLDC GLUCOMTR-MCNC: 178 MG/DL — HIGH (ref 70–99)
GLUCOSE BLDC GLUCOMTR-MCNC: 183 MG/DL — HIGH (ref 70–99)
GLUCOSE BLDC GLUCOMTR-MCNC: 218 MG/DL — HIGH (ref 70–99)

## 2025-03-09 PROCEDURE — 99232 SBSQ HOSP IP/OBS MODERATE 35: CPT

## 2025-03-09 PROCEDURE — 99233 SBSQ HOSP IP/OBS HIGH 50: CPT

## 2025-03-09 RX ORDER — TRAMADOL HYDROCHLORIDE 50 MG/1
25 TABLET, FILM COATED ORAL ONCE
Refills: 0 | Status: DISCONTINUED | OUTPATIENT
Start: 2025-03-09 | End: 2025-03-09

## 2025-03-09 RX ADMIN — INSULIN LISPRO 2: 100 INJECTION, SOLUTION INTRAVENOUS; SUBCUTANEOUS at 17:51

## 2025-03-09 RX ADMIN — Medication 1 DROP(S): at 17:50

## 2025-03-09 RX ADMIN — DRONEDARONE 400 MILLIGRAM(S): 400 TABLET, FILM COATED ORAL at 05:57

## 2025-03-09 RX ADMIN — IPRATROPIUM BROMIDE AND ALBUTEROL SULFATE 3 MILLILITER(S): .5; 2.5 SOLUTION RESPIRATORY (INHALATION) at 17:51

## 2025-03-09 RX ADMIN — LIDOCAINE HYDROCHLORIDE 1 PATCH: 20 JELLY TOPICAL at 23:35

## 2025-03-09 RX ADMIN — INSULIN LISPRO 2: 100 INJECTION, SOLUTION INTRAVENOUS; SUBCUTANEOUS at 09:21

## 2025-03-09 RX ADMIN — DEXTROMETHORPHAN HBR, GUAIFENESIN 100 MILLIGRAM(S): 200 LIQUID ORAL at 16:46

## 2025-03-09 RX ADMIN — DEXTROMETHORPHAN HBR, GUAIFENESIN 100 MILLIGRAM(S): 200 LIQUID ORAL at 05:56

## 2025-03-09 RX ADMIN — IPRATROPIUM BROMIDE AND ALBUTEROL SULFATE 3 MILLILITER(S): .5; 2.5 SOLUTION RESPIRATORY (INHALATION) at 05:56

## 2025-03-09 RX ADMIN — DRONEDARONE 400 MILLIGRAM(S): 400 TABLET, FILM COATED ORAL at 17:48

## 2025-03-09 RX ADMIN — Medication 81 MILLIGRAM(S): at 11:36

## 2025-03-09 RX ADMIN — LISINOPRIL 40 MILLIGRAM(S): 5 TABLET ORAL at 05:57

## 2025-03-09 RX ADMIN — BUMETANIDE 2 MILLIGRAM(S): 1 TABLET ORAL at 05:57

## 2025-03-09 RX ADMIN — TRAMADOL HYDROCHLORIDE 25 MILLIGRAM(S): 50 TABLET, FILM COATED ORAL at 10:11

## 2025-03-09 RX ADMIN — LIDOCAINE HYDROCHLORIDE 1 PATCH: 20 JELLY TOPICAL at 11:35

## 2025-03-09 RX ADMIN — APIXABAN 5 MILLIGRAM(S): 2.5 TABLET, FILM COATED ORAL at 05:57

## 2025-03-09 RX ADMIN — Medication 1 SPRAY(S): at 15:32

## 2025-03-09 RX ADMIN — APIXABAN 5 MILLIGRAM(S): 2.5 TABLET, FILM COATED ORAL at 17:48

## 2025-03-09 RX ADMIN — LIDOCAINE HYDROCHLORIDE 1 PATCH: 20 JELLY TOPICAL at 19:59

## 2025-03-09 RX ADMIN — ATORVASTATIN CALCIUM 40 MILLIGRAM(S): 80 TABLET, FILM COATED ORAL at 21:54

## 2025-03-09 RX ADMIN — DILTIAZEM HYDROCHLORIDE 360 MILLIGRAM(S): 240 TABLET, EXTENDED RELEASE ORAL at 05:57

## 2025-03-09 RX ADMIN — Medication 1 SPRAY(S): at 21:47

## 2025-03-09 RX ADMIN — DEXTROMETHORPHAN HBR, GUAIFENESIN 100 MILLIGRAM(S): 200 LIQUID ORAL at 23:00

## 2025-03-09 RX ADMIN — INSULIN GLARGINE-YFGN 7 UNIT(S): 100 INJECTION, SOLUTION SUBCUTANEOUS at 09:07

## 2025-03-09 RX ADMIN — Medication 1 SPRAY(S): at 05:53

## 2025-03-09 RX ADMIN — IPRATROPIUM BROMIDE AND ALBUTEROL SULFATE 3 MILLILITER(S): .5; 2.5 SOLUTION RESPIRATORY (INHALATION) at 11:36

## 2025-03-09 RX ADMIN — DOXAZOSIN MESYLATE 2 MILLIGRAM(S): 8 TABLET ORAL at 05:57

## 2025-03-09 RX ADMIN — Medication 1 DROP(S): at 05:52

## 2025-03-09 RX ADMIN — INSULIN LISPRO 4: 100 INJECTION, SOLUTION INTRAVENOUS; SUBCUTANEOUS at 11:13

## 2025-03-09 RX ADMIN — IPRATROPIUM BROMIDE AND ALBUTEROL SULFATE 3 MILLILITER(S): .5; 2.5 SOLUTION RESPIRATORY (INHALATION) at 23:01

## 2025-03-09 RX ADMIN — Medication 0.5 MILLIGRAM(S): at 21:53

## 2025-03-09 RX ADMIN — INSULIN GLARGINE-YFGN 18 UNIT(S): 100 INJECTION, SOLUTION SUBCUTANEOUS at 21:53

## 2025-03-09 NOTE — PROGRESS NOTE ADULT - PROBLEM SELECTOR PLAN 5
On outpatient labs, Creat 1.7-1.9   Creat elevated, possible ATN in setting of infection, also possible prerenal given infection/poor oral intake and concomitant diuretic use. Improved to baseline but uptrending again as of 3/7 -- downtrending as of 3/8 -- monitor fluid status with PO diuretic reduced to daily for now -- f/u cardiology for diuretics recommendations on discharge

## 2025-03-09 NOTE — PROGRESS NOTE ADULT - SUBJECTIVE AND OBJECTIVE BOX
INCOMPLETE NOTE    Maricarmen Paredes M.D.  Division of Hospital Medicine  Available on [employer] TEAMS    SUBJECTIVE / ACUTE INTERVAL EVENTS: Patient seen and examined. No overnight events. No subjective complaints.     OBJECTIVE:   Vital Signs Last 24 Hrs  T(F): 98 (09 Mar 2025 04:40), Max: 98 (08 Mar 2025 20:19)  HR: 60 (09 Mar 2025 04:40) (60 - 65)  BP: 114/66 (09 Mar 2025 04:40) (107/52 - 115/66)  RR: 18 (09 Mar 2025 04:40) (18 - 18)  SpO2: 95% (09 Mar 2025 04:40) (94% - 97%)  Parameters below as of 09 Mar 2025 04:40  Patient On (Oxygen Delivery Method): room air    I&O's Summary  08 Mar 2025 06:01  -  09 Mar 2025 07:00  --------------------------------------------------------  IN: 480 mL / OUT: 900 mL / NET: -420 mL    Physical Examination:  GEN: elderly woman, sitting up in bed in NAD, on O2 supp via NC-2L  PSYCH: A&Ox3, mood and affect appear appropriate   NEURO: no focal neurologic deficits appreciated  RESPI: no accessory muscle use, B/L air entry   CARDIO: regular rate/rhythm, no LE edema B/L  ABD: soft, NT, ND  EXT: patient able to move all extremities spontaneously  VASC: peripheral pulses palpated    Labs:  CAPILLARY BLOOD GLUCOSE  POCT Blood Glucose.: 198 mg/dL (08 Mar 2025 21:09)  POCT Blood Glucose.: 206 mg/dL (08 Mar 2025 17:01)  POCT Blood Glucose.: 171 mg/dL (08 Mar 2025 11:24)  POCT Blood Glucose.: 210 mg/dL (08 Mar 2025 08:42)    03-08  141  |  101  |  84[H]  ----------------------------<  223[H]  3.7   |  19[L]  |  2.21[H]    Ca    9.6      08 Mar 2025 07:11    Urinalysis Basic - ( 08 Mar 2025 07:11 )  Color: x / Appearance: x / SG: x / pH: x  Gluc: 223 mg/dL / Ketone: x  / Bili: x / Urobili: x   Blood: x / Protein: x / Nitrite: x   Leuk Esterase: x / RBC: x / WBC x   Sq Epi: x / Non Sq Epi: x / Bacteria: x    Care Discussed with Consultants/Other Providers: RACHEL Perkins    MEDICATIONS  (STANDING):  albuterol/ipratropium for Nebulization 3 milliLiter(s) Nebulizer every 6 hours  apixaban 5 milliGRAM(s) Oral every 12 hours  artificial  tears Solution 1 Drop(s) Both EYES two times a day  aspirin enteric coated 81 milliGRAM(s) Oral daily  atorvastatin 40 milliGRAM(s) Oral at bedtime  buMETAnide 2 milliGRAM(s) Oral daily  dextrose 5%. 1000 milliLiter(s) (100 mL/Hr) IV Continuous <Continuous>  dextrose 5%. 1000 milliLiter(s) (50 mL/Hr) IV Continuous <Continuous>  dextrose 50% Injectable 25 Gram(s) IV Push once  dextrose 50% Injectable 12.5 Gram(s) IV Push once  dextrose 50% Injectable 25 Gram(s) IV Push once  diltiazem    milliGRAM(s) Oral daily  doxazosin 2 milliGRAM(s) Oral daily  dronedarone 400 milliGRAM(s) Oral two times a day  glucagon  Injectable 1 milliGRAM(s) IntraMuscular once  insulin glargine Injectable (LANTUS) 18 Unit(s) SubCutaneous at bedtime  insulin glargine Injectable (LANTUS) 7 Unit(s) SubCutaneous every morning  insulin lispro (ADMELOG) corrective regimen sliding scale   SubCutaneous three times a day before meals  insulin lispro (ADMELOG) corrective regimen sliding scale   SubCutaneous at bedtime  lidocaine   4% Patch 1 Patch Transdermal every 24 hours  lisinopril 40 milliGRAM(s) Oral daily  LORazepam     Tablet 0.5 milliGRAM(s) Oral at bedtime  sodium chloride 0.65% Nasal 1 Spray(s) Both Nostrils three times a day    MEDICATIONS  (PRN):  acetaminophen     Tablet .. 650 milliGRAM(s) Oral every 6 hours PRN Mild Pain (1 - 3)  dextrose Oral Gel 15 Gram(s) Oral once PRN Blood Glucose LESS THAN 70 milliGRAM(s)/deciliter  guaiFENesin Oral Liquid (Sugar-Free) 100 milliGRAM(s) Oral every 6 hours PRN Cough Maricarmen Paredes M.D.  Division of Hospital Medicine  Available on [employer] TEAMS    SUBJECTIVE / ACUTE INTERVAL EVENTS: Patient seen and examined. No overnight events. No subjective complaints. Patient now weaned off O2 supp and breathing comfortably at rest on RA -- will obtain ambulatory SpO2. Discharge planning to home for tomorrow underway, which patient is very eager for.     OBJECTIVE:   Vital Signs Last 24 Hrs  T(F): 98 (09 Mar 2025 04:40), Max: 98 (08 Mar 2025 20:19)  HR: 60 (09 Mar 2025 04:40) (60 - 65)  BP: 114/66 (09 Mar 2025 04:40) (107/52 - 115/66)  RR: 18 (09 Mar 2025 04:40) (18 - 18)  SpO2: 95% (09 Mar 2025 04:40) (94% - 97%)  Parameters below as of 09 Mar 2025 04:40  Patient On (Oxygen Delivery Method): room air    I&O's Summary  08 Mar 2025 06:01  -  09 Mar 2025 07:00  --------------------------------------------------------  IN: 480 mL / OUT: 900 mL / NET: -420 mL    Physical Examination:  GEN: elderly woman, sitting up in chair in NAD, on RA  PSYCH: A&Ox3, mood and affect appear appropriate   NEURO: no focal neurologic deficits appreciated  RESPI: no accessory muscle use, B/L air entry   CARDIO: regular rate/rhythm, no LE edema B/L  ABD: soft, NT, ND  EXT: patient able to move all extremities spontaneously  VASC: peripheral pulses palpated    Labs:  CAPILLARY BLOOD GLUCOSE  POCT Blood Glucose.: 198 mg/dL (08 Mar 2025 21:09)  POCT Blood Glucose.: 206 mg/dL (08 Mar 2025 17:01)  POCT Blood Glucose.: 171 mg/dL (08 Mar 2025 11:24)  POCT Blood Glucose.: 210 mg/dL (08 Mar 2025 08:42)    03-08  141  |  101  |  84[H]  ----------------------------<  223[H]  3.7   |  19[L]  |  2.21[H]    Ca    9.6      08 Mar 2025 07:11    Urinalysis Basic - ( 08 Mar 2025 07:11 )  Color: x / Appearance: x / SG: x / pH: x  Gluc: 223 mg/dL / Ketone: x  / Bili: x / Urobili: x   Blood: x / Protein: x / Nitrite: x   Leuk Esterase: x / RBC: x / WBC x   Sq Epi: x / Non Sq Epi: x / Bacteria: x    Care Discussed with Consultants/Other Providers: RACHEL Perkins    MEDICATIONS  (STANDING):  albuterol/ipratropium for Nebulization 3 milliLiter(s) Nebulizer every 6 hours  apixaban 5 milliGRAM(s) Oral every 12 hours  artificial  tears Solution 1 Drop(s) Both EYES two times a day  aspirin enteric coated 81 milliGRAM(s) Oral daily  atorvastatin 40 milliGRAM(s) Oral at bedtime  buMETAnide 2 milliGRAM(s) Oral daily  dextrose 5%. 1000 milliLiter(s) (100 mL/Hr) IV Continuous <Continuous>  dextrose 5%. 1000 milliLiter(s) (50 mL/Hr) IV Continuous <Continuous>  dextrose 50% Injectable 25 Gram(s) IV Push once  dextrose 50% Injectable 12.5 Gram(s) IV Push once  dextrose 50% Injectable 25 Gram(s) IV Push once  diltiazem    milliGRAM(s) Oral daily  doxazosin 2 milliGRAM(s) Oral daily  dronedarone 400 milliGRAM(s) Oral two times a day  glucagon  Injectable 1 milliGRAM(s) IntraMuscular once  insulin glargine Injectable (LANTUS) 18 Unit(s) SubCutaneous at bedtime  insulin glargine Injectable (LANTUS) 7 Unit(s) SubCutaneous every morning  insulin lispro (ADMELOG) corrective regimen sliding scale   SubCutaneous three times a day before meals  insulin lispro (ADMELOG) corrective regimen sliding scale   SubCutaneous at bedtime  lidocaine   4% Patch 1 Patch Transdermal every 24 hours  lisinopril 40 milliGRAM(s) Oral daily  LORazepam     Tablet 0.5 milliGRAM(s) Oral at bedtime  sodium chloride 0.65% Nasal 1 Spray(s) Both Nostrils three times a day    MEDICATIONS  (PRN):  acetaminophen     Tablet .. 650 milliGRAM(s) Oral every 6 hours PRN Mild Pain (1 - 3)  dextrose Oral Gel 15 Gram(s) Oral once PRN Blood Glucose LESS THAN 70 milliGRAM(s)/deciliter  guaiFENesin Oral Liquid (Sugar-Free) 100 milliGRAM(s) Oral every 6 hours PRN Cough

## 2025-03-09 NOTE — PROGRESS NOTE ADULT - NUTRITIONAL ASSESSMENT
This patient has been assessed with a concern for Malnutrition and has been determined to have a diagnosis/diagnoses of Morbid obesity (BMI > 40).    This patient is being managed with:   Diet DASH/TLC-  Sodium & Cholesterol Restricted  Consistent Carbohydrate {No Snacks} (CSTCHO)  Entered: Feb 22 2025  1:27AM  

## 2025-03-09 NOTE — PROGRESS NOTE ADULT - SUBJECTIVE AND OBJECTIVE BOX
[facility] Attending Follow-up Note     Patient seen and examined at bedside.    Overnight Events:     doing well on RA   no cardiac sxs     REVIEW OF SYSTEMS:  Constitutional:     [x ] negative [ ] fevers [ ] chills [ ] weight loss [ ] weight gain  HEENT:                  [x ] negative [ ] dry eyes [ ] eye irritation [ ] postnasal drip [ ] nasal congestion  CV:                         [ x] negative  [ ] chest pain [ ] orthopnea [ ] palpitations [ ] murmur  Resp:                     [ x] negative [ ] cough [ ] shortness of breath [ ] dyspnea [ ] wheezing [ ] sputum [ ]hemoptysis  GI:                          [ x] negative [ ] nausea [ ] vomiting [ ] diarrhea [ ] constipation [ ] abd pain [ ] dysphagia   :                        [ x] negative [ ] dysuria [ ] nocturia [ ] hematuria [ ] increased urinary frequency  Musculoskeletal: [ x] negative [ ] back pain [ ] myalgias [ ] arthralgias [ ] fracture  Skin:                       [ x] negative [ ] rash [ ] itch  Neurological:        [x ] negative [ ] headache [ ] dizziness [ ] syncope [ ] weakness [ ] numbness  Psychiatric:           [ x] negative [ ] anxiety [ ] depression  Endocrine:            [ x] negative [ ] diabetes [ ] thyroid problem  Heme/Lymph:      [ x] negative [ ] anemia [ ] bleeding problem  Allergic/Immune: [ x] negative [ ] itchy eyes [ ] nasal discharge [ ] hives [ ] angioedema    [ x] All other systems negative  [ ] Unable to assess ROS due to    Current Meds:  acetaminophen     Tablet .. 650 milliGRAM(s) Oral every 6 hours PRN  albuterol/ipratropium for Nebulization 3 milliLiter(s) Nebulizer every 6 hours  apixaban 5 milliGRAM(s) Oral every 12 hours  artificial  tears Solution 1 Drop(s) Both EYES two times a day  aspirin enteric coated 81 milliGRAM(s) Oral daily  atorvastatin 40 milliGRAM(s) Oral at bedtime  buMETAnide 2 milliGRAM(s) Oral daily  dextrose 5%. 1000 milliLiter(s) IV Continuous <Continuous>  dextrose 5%. 1000 milliLiter(s) IV Continuous <Continuous>  dextrose 50% Injectable 25 Gram(s) IV Push once  dextrose 50% Injectable 12.5 Gram(s) IV Push once  dextrose 50% Injectable 25 Gram(s) IV Push once  dextrose Oral Gel 15 Gram(s) Oral once PRN  diltiazem    milliGRAM(s) Oral daily  doxazosin 2 milliGRAM(s) Oral daily  dronedarone 400 milliGRAM(s) Oral two times a day  glucagon  Injectable 1 milliGRAM(s) IntraMuscular once  guaiFENesin Oral Liquid (Sugar-Free) 100 milliGRAM(s) Oral every 6 hours PRN  insulin glargine Injectable (LANTUS) 18 Unit(s) SubCutaneous at bedtime  insulin glargine Injectable (LANTUS) 7 Unit(s) SubCutaneous every morning  insulin lispro (ADMELOG) corrective regimen sliding scale   SubCutaneous three times a day before meals  insulin lispro (ADMELOG) corrective regimen sliding scale   SubCutaneous at bedtime  lidocaine   4% Patch 1 Patch Transdermal every 24 hours  lisinopril 40 milliGRAM(s) Oral daily  LORazepam     Tablet 0.5 milliGRAM(s) Oral at bedtime  sodium chloride 0.65% Nasal 1 Spray(s) Both Nostrils three times a day      PAST MEDICAL & SURGICAL HISTORY:  Hypertension      Hyperlipemia      Diabetes mellitus type 2 in obese      Atrial fibrillation      CVA (cerebral vascular accident)      Renal insufficiency      Knee pain, left      Anxiety      Hypomagnesemia      History of cholecystectomy      Coronary stent patent          Vitals:  T(F): 98 (03-09), Max: 98.4 (03-09)  HR: 81 (03-09) (60 - 81)  BP: 118/67 (03-09) (114/66 - 118/67)  RR: 17 (03-09)  SpO2: 93% (03-09)  I&O's Summary    08 Mar 2025 06:01  -  09 Mar 2025 07:00  --------------------------------------------------------  IN: 480 mL / OUT: 900 mL / NET: -420 mL    09 Mar 2025 07:01  -  10 Mar 2025 02:32  --------------------------------------------------------  IN: 0 mL / OUT: 900 mL / NET: -900 mL        Physical Exam:  Appearance: No acute distress  HENT: No JVD   Cardiovascular: RRR, S1/S2, no murmurs  Respiratory: CTABL  Gastrointestinal: soft, NT ND, +BS  Musculoskeletal: No clubbing, no edema   Neurologic: Non-focal  Skin: No rashes, ecchymoses, or cyanosis      03-08    141  |  101  |  84[H]  ----------------------------<  223[H]  3.7   |  19[L]  |  2.21[H]    Ca    9.6      08 Mar 2025 07:11                    Cardiovascular Testings:

## 2025-03-09 NOTE — PROGRESS NOTE ADULT - PROBLEM SELECTOR PLAN 3
- iso Influenza A, HFpEF, Asthma  - Patient currently on RA - obtain ambulatory SpO2  - Repeat CXR w/increased pleural effusion  - CT Chest w/o contrast as reported: Tracheobronchial secretions, extensive in the lower lobes.  Complete collapse left lower lobe and near complete collapse right lower lobe. Patchy bilateral lung opacities may be infectious or inflammatory.  - Appreciate pulmonology: -Diuresis in place, pt clinically responded well -Aggressive airway clearance therapy: DuoNebs + Airway Percussive Device (At bedside). Pt doing well with treatments. -OOB to chair and ambulation as tolerated. Incentive spirometry at bedside. -Cough suppressants prn dosing only -No pulmonary indication for antibiotics or systemic steroids at this time -Pulmonary will continue to follow while in hospital   - Diuretics as above

## 2025-03-09 NOTE — PROGRESS NOTE ADULT - PROBLEM SELECTOR PLAN 1
- s/p Tamiflu x 5 days   - Patient currently on RA - obtain ambulatory SpO2  - Incentive spirometry  - Duonebs   - Antitussive supportive care  - S/p Ceftriaxone course for possible superimposed PNA  - Productive cough improving; s/p Hycodan x 3 days ; c/w Robitussin, tessalon perles - PRN

## 2025-03-09 NOTE — PROGRESS NOTE ADULT - PROBLEM SELECTOR PLAN 9
- At home patient reportedly on Lantus 18units QHS and 9units in AM   - Here now on Lantus 18units QHS and 7units in AM  - ISS and serial FS monitoring

## 2025-03-09 NOTE — PROGRESS NOTE ADULT - PROBLEM SELECTOR PLAN 8
Patient with pain in right foot- 1st metatarsal joint erythematous and tender on examination  XR Right Foot:  No acute osseous abnormalities.  Elevated uric acid levels   Suspect acute gout flare  s/p Colchicine 1.2mg x 1 and 0.6mg x1 an hour later. Holding further doses of colchicine as patient had diarrhea   Avoid NSAIDs given risks in older adults and steroids in setting of infection.  Encouraged oral hydration.
Patient with pain in right foot- 1st metatarsal joint erythematous and tender on examination  XR Right Foot:  No acute osseous abnormalities.  Elevated uric acid levels   Suspect acute gout flare  s/p Colchicine 1.2mg x 1 and 0.6mg x1 an hour later. Holding further doses of colchicine as patient had diarrhea   Avoid NSAIDs given risks in older adults and steroids in setting of infection.  Encouraged oral hydration.
- As above, monitor respiratory status closely given ongoing Influenza infection
Patient with pain in right foot- 1st metatarsal joint erythematous and tender on examination  XR Right Foot:  No acute osseous abnormalities.  Elevated uric acid levels   Suspect acute gout flare  s/p Colchicine 1.2mg x 1 and 0.6mg x1 an hour later. Holding further doses of colchicine as patient had diarrhea   Avoid NSAIDs given risks in older adults and steroids in setting of infection.  Encouraged oral hydration.
- As above, monitor respiratory status closely given ongoing Influenza infection
Patient with pain in right foot- 1st metatarsal joint erythematous and tender on examination  XR Right Foot:  No acute osseous abnormalities.  Elevated uric acid levels   Suspect acute gout flare  s/p Colchicine 1.2mg x 1 and 0.6mg x1 an hour later. Holding further doses of colchicine as patient had diarrhea   Avoid NSAIDs given risks in older adults and steroids in setting of infection.  Encouraged oral hydration.
- As above, monitor respiratory status closely given ongoing Influenza infection
Patient with pain in right foot- 1st metatarsal joint erythematous and tender on examination  XR Right Foot:  No acute osseous abnormalities.  Elevated uric acid levels   Suspect acute gout flare  s/p Colchicine 1.2mg x 1 and 0.6mg x1 an hour later. Holding further doses of colchicine as patient had diarrhea   Avoid NSAIDs given risks in older adults and steroids in setting of infection.  Encouraged oral hydration.
Patient with pain in right foot- 1st metatarsal joint erythematous and tender on examination  XR Right Foot:  No acute osseous abnormalities.  Elevated uric acid levels   Suspect acute gout flare  s/p Colchicine 1.2mg x 1 and 0.6mg x1 an hour later. Holding further doses of colchicine as patient had diarrhea   Avoid NSAIDs given risks in older adults and steroids in setting of infection.  Encouraged oral hydration.
- As above, monitor respiratory status closely given ongoing Influenza infection
Detail Level: Detailed
Quality 226: Preventive Care And Screening: Tobacco Use: Screening And Cessation Intervention: Patient screened for tobacco use and is an ex/non-smoker
Quality 130: Documentation Of Current Medications In The Medical Record: Current Medications Documented

## 2025-03-09 NOTE — PROGRESS NOTE ADULT - PROBLEM SELECTOR PROBLEM 1
Med rec completed per pt's family   Allergies reviewed  No PO antibiotics in the last 14 days       Influenza A

## 2025-03-10 ENCOUNTER — TRANSCRIPTION ENCOUNTER (OUTPATIENT)
Age: 80
End: 2025-03-10

## 2025-03-10 VITALS
SYSTOLIC BLOOD PRESSURE: 139 MMHG | RESPIRATION RATE: 16 BRPM | HEART RATE: 65 BPM | TEMPERATURE: 98 F | OXYGEN SATURATION: 93 % | DIASTOLIC BLOOD PRESSURE: 68 MMHG

## 2025-03-10 LAB
ANION GAP SERPL CALC-SCNC: 17 MMOL/L — SIGNIFICANT CHANGE UP (ref 5–17)
BUN SERPL-MCNC: 78 MG/DL — HIGH (ref 7–23)
CALCIUM SERPL-MCNC: 9.8 MG/DL — SIGNIFICANT CHANGE UP (ref 8.4–10.5)
CHLORIDE SERPL-SCNC: 101 MMOL/L — SIGNIFICANT CHANGE UP (ref 96–108)
CO2 SERPL-SCNC: 21 MMOL/L — LOW (ref 22–31)
CREAT SERPL-MCNC: 1.83 MG/DL — HIGH (ref 0.5–1.3)
EGFR: 28 ML/MIN/1.73M2 — LOW
EGFR: 28 ML/MIN/1.73M2 — LOW
GLUCOSE BLDC GLUCOMTR-MCNC: 172 MG/DL — HIGH (ref 70–99)
GLUCOSE BLDC GLUCOMTR-MCNC: 194 MG/DL — HIGH (ref 70–99)
GLUCOSE BLDC GLUCOMTR-MCNC: 221 MG/DL — HIGH (ref 70–99)
GLUCOSE SERPL-MCNC: 177 MG/DL — HIGH (ref 70–99)
POTASSIUM SERPL-MCNC: 3.3 MMOL/L — LOW (ref 3.5–5.3)
POTASSIUM SERPL-SCNC: 3.3 MMOL/L — LOW (ref 3.5–5.3)
SODIUM SERPL-SCNC: 139 MMOL/L — SIGNIFICANT CHANGE UP (ref 135–145)

## 2025-03-10 PROCEDURE — 96376 TX/PRO/DX INJ SAME DRUG ADON: CPT

## 2025-03-10 PROCEDURE — 73620 X-RAY EXAM OF FOOT: CPT

## 2025-03-10 PROCEDURE — 99239 HOSP IP/OBS DSCHRG MGMT >30: CPT

## 2025-03-10 PROCEDURE — 97530 THERAPEUTIC ACTIVITIES: CPT

## 2025-03-10 PROCEDURE — 99497 ADVNCD CARE PLAN 30 MIN: CPT | Mod: 25

## 2025-03-10 PROCEDURE — 85610 PROTHROMBIN TIME: CPT

## 2025-03-10 PROCEDURE — 97161 PT EVAL LOW COMPLEX 20 MIN: CPT

## 2025-03-10 PROCEDURE — 96374 THER/PROPH/DIAG INJ IV PUSH: CPT

## 2025-03-10 PROCEDURE — 82947 ASSAY GLUCOSE BLOOD QUANT: CPT

## 2025-03-10 PROCEDURE — 97116 GAIT TRAINING THERAPY: CPT

## 2025-03-10 PROCEDURE — 81001 URINALYSIS AUTO W/SCOPE: CPT

## 2025-03-10 PROCEDURE — 84295 ASSAY OF SERUM SODIUM: CPT

## 2025-03-10 PROCEDURE — 84100 ASSAY OF PHOSPHORUS: CPT

## 2025-03-10 PROCEDURE — C8929: CPT

## 2025-03-10 PROCEDURE — 82962 GLUCOSE BLOOD TEST: CPT

## 2025-03-10 PROCEDURE — 80048 BASIC METABOLIC PNL TOTAL CA: CPT

## 2025-03-10 PROCEDURE — 87507 IADNA-DNA/RNA PROBE TQ 12-25: CPT

## 2025-03-10 PROCEDURE — 87637 SARSCOV2&INF A&B&RSV AMP PRB: CPT

## 2025-03-10 PROCEDURE — 84145 PROCALCITONIN (PCT): CPT

## 2025-03-10 PROCEDURE — 82330 ASSAY OF CALCIUM: CPT

## 2025-03-10 PROCEDURE — 93970 EXTREMITY STUDY: CPT

## 2025-03-10 PROCEDURE — 85018 HEMOGLOBIN: CPT

## 2025-03-10 PROCEDURE — 83036 HEMOGLOBIN GLYCOSYLATED A1C: CPT

## 2025-03-10 PROCEDURE — 82803 BLOOD GASES ANY COMBINATION: CPT

## 2025-03-10 PROCEDURE — 84484 ASSAY OF TROPONIN QUANT: CPT

## 2025-03-10 PROCEDURE — 85025 COMPLETE CBC W/AUTO DIFF WBC: CPT

## 2025-03-10 PROCEDURE — 36415 COLL VENOUS BLD VENIPUNCTURE: CPT

## 2025-03-10 PROCEDURE — 85730 THROMBOPLASTIN TIME PARTIAL: CPT

## 2025-03-10 PROCEDURE — 93321 DOPPLER ECHO F-UP/LMTD STD: CPT

## 2025-03-10 PROCEDURE — 83735 ASSAY OF MAGNESIUM: CPT

## 2025-03-10 PROCEDURE — 87046 STOOL CULTR AEROBIC BACT EA: CPT

## 2025-03-10 PROCEDURE — 96375 TX/PRO/DX INJ NEW DRUG ADDON: CPT

## 2025-03-10 PROCEDURE — 99233 SBSQ HOSP IP/OBS HIGH 50: CPT

## 2025-03-10 PROCEDURE — 99285 EMERGENCY DEPT VISIT HI MDM: CPT

## 2025-03-10 PROCEDURE — 82570 ASSAY OF URINE CREATININE: CPT

## 2025-03-10 PROCEDURE — 71045 X-RAY EXAM CHEST 1 VIEW: CPT

## 2025-03-10 PROCEDURE — 93308 TTE F-UP OR LMTD: CPT

## 2025-03-10 PROCEDURE — 84540 ASSAY OF URINE/UREA-N: CPT

## 2025-03-10 PROCEDURE — 83935 ASSAY OF URINE OSMOLALITY: CPT

## 2025-03-10 PROCEDURE — 80053 COMPREHEN METABOLIC PANEL: CPT

## 2025-03-10 PROCEDURE — 71250 CT THORAX DX C-: CPT | Mod: MC

## 2025-03-10 PROCEDURE — 83880 ASSAY OF NATRIURETIC PEPTIDE: CPT

## 2025-03-10 PROCEDURE — 84300 ASSAY OF URINE SODIUM: CPT

## 2025-03-10 PROCEDURE — 85027 COMPLETE CBC AUTOMATED: CPT

## 2025-03-10 PROCEDURE — 83605 ASSAY OF LACTIC ACID: CPT

## 2025-03-10 PROCEDURE — 87177 OVA AND PARASITES SMEARS: CPT

## 2025-03-10 PROCEDURE — 84132 ASSAY OF SERUM POTASSIUM: CPT

## 2025-03-10 PROCEDURE — 0225U NFCT DS DNA&RNA 21 SARSCOV2: CPT

## 2025-03-10 PROCEDURE — 85014 HEMATOCRIT: CPT

## 2025-03-10 PROCEDURE — 93005 ELECTROCARDIOGRAM TRACING: CPT

## 2025-03-10 PROCEDURE — 87045 FECES CULTURE AEROBIC BACT: CPT

## 2025-03-10 PROCEDURE — 82435 ASSAY OF BLOOD CHLORIDE: CPT

## 2025-03-10 PROCEDURE — 94640 AIRWAY INHALATION TREATMENT: CPT

## 2025-03-10 PROCEDURE — 84550 ASSAY OF BLOOD/URIC ACID: CPT

## 2025-03-10 RX ORDER — INSULIN GLARGINE-YFGN 100 [IU]/ML
8 INJECTION, SOLUTION SUBCUTANEOUS
Qty: 0 | Refills: 0 | DISCHARGE

## 2025-03-10 RX ORDER — DEXTROMETHORPHAN HBR, GUAIFENESIN 200 MG/10ML
5 LIQUID ORAL
Qty: 100 | Refills: 0
Start: 2025-03-10 | End: 2025-03-14

## 2025-03-10 RX ORDER — BENZONATATE 100 MG
100 CAPSULE ORAL ONCE
Refills: 0 | Status: COMPLETED | OUTPATIENT
Start: 2025-03-10 | End: 2025-03-10

## 2025-03-10 RX ADMIN — Medication 1 SPRAY(S): at 15:18

## 2025-03-10 RX ADMIN — APIXABAN 5 MILLIGRAM(S): 2.5 TABLET, FILM COATED ORAL at 05:54

## 2025-03-10 RX ADMIN — INSULIN GLARGINE-YFGN 7 UNIT(S): 100 INJECTION, SOLUTION SUBCUTANEOUS at 10:00

## 2025-03-10 RX ADMIN — INSULIN LISPRO 4: 100 INJECTION, SOLUTION INTRAVENOUS; SUBCUTANEOUS at 09:27

## 2025-03-10 RX ADMIN — Medication 40 MILLIEQUIVALENT(S): at 10:01

## 2025-03-10 RX ADMIN — DEXTROMETHORPHAN HBR, GUAIFENESIN 100 MILLIGRAM(S): 200 LIQUID ORAL at 05:01

## 2025-03-10 RX ADMIN — LISINOPRIL 40 MILLIGRAM(S): 5 TABLET ORAL at 05:54

## 2025-03-10 RX ADMIN — Medication 1 DROP(S): at 05:43

## 2025-03-10 RX ADMIN — Medication 1 SPRAY(S): at 05:43

## 2025-03-10 RX ADMIN — IPRATROPIUM BROMIDE AND ALBUTEROL SULFATE 3 MILLILITER(S): .5; 2.5 SOLUTION RESPIRATORY (INHALATION) at 12:18

## 2025-03-10 RX ADMIN — BUMETANIDE 2 MILLIGRAM(S): 1 TABLET ORAL at 05:54

## 2025-03-10 RX ADMIN — DILTIAZEM HYDROCHLORIDE 360 MILLIGRAM(S): 240 TABLET, EXTENDED RELEASE ORAL at 05:54

## 2025-03-10 RX ADMIN — DRONEDARONE 400 MILLIGRAM(S): 400 TABLET, FILM COATED ORAL at 05:54

## 2025-03-10 RX ADMIN — DOXAZOSIN MESYLATE 2 MILLIGRAM(S): 8 TABLET ORAL at 05:54

## 2025-03-10 RX ADMIN — IPRATROPIUM BROMIDE AND ALBUTEROL SULFATE 3 MILLILITER(S): .5; 2.5 SOLUTION RESPIRATORY (INHALATION) at 05:54

## 2025-03-10 RX ADMIN — Medication 100 MILLIGRAM(S): at 01:03

## 2025-03-10 RX ADMIN — Medication 81 MILLIGRAM(S): at 15:17

## 2025-03-10 NOTE — DISCHARGE NOTE NURSING/CASE MANAGEMENT/SOCIAL WORK - NSDCFUADDAPPT_GEN_ALL_CORE_FT
APPTS ARE READY TO BE MADE: [x] YES    Best Family or Patient Contact (if needed):    Additional Information about above appointments (if needed):    1: Cardiology/PCP follow up within 1 week of discharge with Dr. Horne; if unable HOME cardiology  2:       Other comments or requests:     HOME CARDIOLOGY/DR HORNE  Appointment was scheduled by our team on the patient's behalf through the provider's office. As Per Dr. Horne's office ok for patient to follow up with Dr. Lemuel Damon other practicioner in the practice.  Dr. Lemuel Damon 3/7/2025 at 2:00 PM  [address] [facility], [place], [place] [address] [phone]

## 2025-03-10 NOTE — PROGRESS NOTE ADULT - PROVIDER SPECIALTY LIST ADULT
Cardiology
Hospitalist
Hospitalist
Pulmonology
Cardiology
Cardiology
Hospitalist
Pulmonology
Pulmonology
Cardiology
Hospitalist

## 2025-03-10 NOTE — PROGRESS NOTE ADULT - SUBJECTIVE AND OBJECTIVE BOX
DATE OF SERVICE: 03-10-25 @ 12:07    Patient is a 79y old  Female who presents with a chief complaint of Influenza A (09 Mar 2025 18:32)      INTERVAL HISTORY: feels much better.  Walked throughout unit today without breathing difficulty.  Still with harsh cough, non productive, relieved by nebulizer.    REVIEW OF SYSTEMS:  CONSTITUTIONAL: No weakness  EYES/ENT: No visual changes;  No throat pain   NECK: No pain or stiffness  RESPIRATORY: No cough, wheezing; No shortness of breath  CARDIOVASCULAR: No chest pain or palpitations  GASTROINTESTINAL: No abdominal  pain. No nausea, vomiting, or hematemesis  GENITOURINARY: No dysuria, frequency or hematuria  NEUROLOGICAL: No stroke like symptoms  SKIN: No rashes    	  MEDICATIONS:  buMETAnide 2 milliGRAM(s) Oral daily  diltiazem    milliGRAM(s) Oral daily  doxazosin 2 milliGRAM(s) Oral daily  dronedarone 400 milliGRAM(s) Oral two times a day  lisinopril 40 milliGRAM(s) Oral daily        PHYSICAL EXAM:  T(C): 36.7 (03-10-25 @ 05:12), Max: 36.7 (03-09-25 @ 20:29)  HR: 68 (03-10-25 @ 05:12) (68 - 81)  BP: 131/72 (03-10-25 @ 05:12) (118/67 - 131/72)  RR: 18 (03-10-25 @ 05:12) (17 - 18)  SpO2: 93% (03-10-25 @ 05:12) (93% - 93%)  Wt(kg): --  I&O's Summary    09 Mar 2025 07:01  -  10 Mar 2025 07:00  --------------------------------------------------------  IN: 0 mL / OUT: 900 mL / NET: -900 mL          Appearance: In no distress	  HEENT:    PERRL, EOMI	  Cardiovascular:  S1 S2, No JVD  Respiratory: Lungs clear to auscultation	  Gastrointestinal:  Soft, Non-tender, + BS	  Vascularature:  No edema of LE  Psychiatric: Appropriate affect   Neuro: no acute focal deficits           03-10    139  |  101  |  78[H]  ----------------------------<  177[H]  3.3[L]   |  21[L]  |  1.83[H]    Ca    9.8      10 Mar 2025 07:02          Labs personally reviewed      ASSESSMENT/PLAN: 	    79yoF w/ PMHx of s/p CVA, CAD, HFpEF, Afib on Eliquis, IDDM, CKD, asthma (hospitalization per PCP note in 2024 for acute bronchitis and asthma exacerbation), anxiety, and obesity who presents w/ complaints of severe LE swelling over the past two weeks, right > left, along with new cough and wheezing, found to be positive for Influenza A, w/ LE Dopplers negative for acute DVT (though exam limited by body habitus and pain).       Problem/Plan - 1:  ·  Problem: Acute diastolic HF  ·  Plan: On Bumex 2mg PO BID as OP  - Recent OP TTE with preserved EF and grade 1 DD  - TTE unremarkable with normal filling pressures; Repeat TTE to assess filling pressures  - CXR with increased B/L pleural effusion with associated atelectasis   - Given degree of hypoxia and SOB, Bumex 2mg IV BID restarted  - 3/3 , down from 450.   - 3/4 , rec to transition to po bumex 2 mg BID    - 3/5 cont po diuretic, home bp meds, bp stable   - 3/6 stable on po diuretic   - 3/7 Cr up, po bumex decreased to QD   - 3/8 Cr peaked   - 3/9 would rec discharge on PO bumex 2 mg QD     Problem/Plan - 2:  ·  Problem: Chronic atrial fibrillation.   ·  Plan: - continue home Cardizem, and Dronedarone  - continue home AC w/ Eliquis BID.    Problem/Plan - 3:  ·  Problem: Diabetes mellitus.   ·  Plan: - at home patient reportedly on Lantus 18units QHS and 9units in AM  - will continue w/ Lantus 10units QHS and 5units in AM for now, w/ mISS and serial FS monitoring.    Problem/Plan - 4:  ·  Problem:  DVT PPX  ·  Plan: Eliquis Iolani Behrbom, ASHLEY-JAXON Christopher, DO [facility]  Cardiovascular Medicine  [address] [place] [address], [address]  Available through call or text on [employer]  Office: [phone]

## 2025-03-10 NOTE — PROGRESS NOTE ADULT - NS ATTEND OPT1 GEN_ALL_CORE
Addended by: SHAHZAD TERRY on: 8/16/2023 01:01 PM     Modules accepted: Orders    
I independently performed the documented:

## 2025-03-10 NOTE — PROGRESS NOTE ADULT - NS ATTEND AMEND GEN_ALL_CORE FT
Patient care and plan discussed and reviewed with Advanced Care Provider. Plan as outlined above edited by me to reflect our discussion.   In addition, I participated in    - Ordering, reviewing, and interpreting labs, testing, and imaging.  - Reviewing prior hospitalization and where necessary, outpatient records.  - Counselling and educating patient and/or family regarding interpretation of aforementioned items and plan of care.  - Communicating with other health professionals (when not separately reported), and documenting clinical information in the electronic health record.
Patient care and plan discussed and reviewed with Advanced Care Provider. Plan as outlined above edited by me to reflect our discussion.   In addition, I participated in    - Ordering, reviewing, and interpreting labs, testing, and imaging.  - Reviewing prior hospitalization and where necessary, outpatient records.  - Counselling and educating patient and/or family regarding interpretation of aforementioned items and plan of care.  - Communicating with other health professionals (when not separately reported), and documenting clinical information in the electronic health record.
Patient care and plan discussed and reviewed with Advanced Care Provider. Plan as outlined above edited by me to reflect our discussion.   In addition, I participated in    - Ordering, reviewing, and interpreting labs, testing, and imaging.  - Reviewing prior hospitalization and outpatient records when necessary  - Counselling and educating patient and/or family regarding interpretation of aforementioned items and plan of care.  - Communicating with other health professionals (when not separately reported), and documenting clinical information in the electronic health record.
Patient care and plan discussed and reviewed with Advanced Care Provider. Plan as outlined above edited by me to reflect our discussion.   In addition, I participated in    - Ordering, reviewing, and interpreting labs, testing, and imaging.  - Reviewing prior hospitalization and where necessary, outpatient records.  - Counselling and educating patient and/or family regarding interpretation of aforementioned items and plan of care.  - Communicating with other health professionals (when not separately reported), and documenting clinical information in the electronic health record.
·  Problem: Acute diastolic HF  ·  Plan: On Bumex 2mg PO BID as OP  - Switch to Bumex 2mg IV BID and trend Cr  - TTE given new LE edema  - Recent OP TTE with preserved EF and grade 1 DD

## 2025-03-10 NOTE — PROGRESS NOTE ADULT - TIME BILLING
Review of patient records, including history, laboratory data, and imaging. Patient evaluation and assessment. Coordination of care. Time excludes teaching or procedures.
conducting history and physical examination, reviewing and ordering diagnostic workup as above, discussing with consultants, determining acute diagnoses / plan for continued monitoring and management, and communication with patient.
conducting history and physical examination, reviewing and ordering diagnostic workup as above, discussing with consultants, determining acute diagnoses / plan for continued monitoring and management, and communication with patient and caregivers.
conducting history and physical examination, reviewing and ordering diagnostic workup as above, discussing with consultants, determining acute diagnoses / plan for continued monitoring and management, and communication with patient.
conducting history and physical examination, reviewing and ordering diagnostic workup as above, discussing with consultants, determining acute diagnoses / plan for continued monitoring and management, and communication with patient and caregivers.

## 2025-03-10 NOTE — PROGRESS NOTE ADULT - ASSESSMENT
79yoF w/ PMHx of s/p CVA, CAD, HFpEF, Afib on Eliquis, IDDM, CKD, asthma (hospitalization per PCP note in 2024 for acute bronchitis and asthma exacerbation), anxiety, and obesity who presents w/ complaints of severe LE swelling over the past two weeks, right > left, along with new cough and wheezing, found to be positive for Influenza A, w/ LE Dopplers negative for acute DVT (though exam limited by body habitus and pain).       Problem/Plan - 1:  ·  Problem: Acute diastolic HF  ·  Plan: On Bumex 2mg PO BID as OP  - Recent OP TTE with preserved EF and grade 1 DD  - TTE unremarkable with normal filling pressures; Repeat TTE to assess filling pressures  - CXR with increased B/L pleural effusion with associated atelectasis   - Given degree of hypoxia and SOB, Bumex 2mg IV BID restarted  - 3/3 , down from 450. Cont IV bumex 2 mg BID     Problem/Plan - 2:  ·  Problem: Chronic atrial fibrillation.   ·  Plan: - continue home Cardizem, and Dronedarone  - continue home AC w/ Eliquis BID.    Problem/Plan - 3:  ·  Problem: Diabetes mellitus.   ·  Plan: - at home patient reportedly on Lantus 18units QHS and 9units in AM  - will continue w/ Lantus 10units QHS and 5units in AM for now, w/ mISS and serial FS monitoring.    Problem/Plan - 4:  ·  Problem:  DVT PPX  ·  Plan: Nomi Potter MD FAC  Attending Interventional Cardiologist, [facility].   Avaliable on [employer] Team  
79yoF w/ PMHx of s/p CVA, CAD, HFpEF, Afib on Eliquis, IDDM, CKD, asthma (hospitalization per PCP note in 2024 for acute bronchitis and asthma exacerbation), anxiety, and obesity who presents w/ complaints of severe LE swelling over the past two weeks, right > left, along with new cough and wheezing, found to be positive for Influenza A, w/ LE Dopplers negative for acute DVT (though exam limited by body habitus and pain).       Problem/Plan - 1:  ·  Problem: Acute diastolic HF  ·  Plan: On Bumex 2mg PO BID as OP  - Recent OP TTE with preserved EF and grade 1 DD  - TTE unremarkable with normal filling pressures; Repeat TTE to assess filling pressures  - CXR with increased B/L pleural effusion with associated atelectasis   - Given degree of hypoxia and SOB, Bumex 2mg IV BID restarted  - 3/3 , down from 450.   - 3/4 , rec to transition to po bumex 2 mg BID    - 3/5 cont po diuretic, home bp meds, bp stable     Problem/Plan - 2:  ·  Problem: Chronic atrial fibrillation.   ·  Plan: - continue home Cardizem, and Dronedarone  - continue home AC w/ Eliquis BID.    Problem/Plan - 3:  ·  Problem: Diabetes mellitus.   ·  Plan: - at home patient reportedly on Lantus 18units QHS and 9units in AM  - will continue w/ Lantus 10units QHS and 5units in AM for now, w/ mISS and serial FS monitoring.    Problem/Plan - 4:  ·  Problem:  DVT PPX  ·  Plan: Nomi Potter MD FAC  Attending Interventional Cardiologist, [facility].   Avaliable on [email or web address] Team
79yoF w/ PMHx of s/p CVA, CAD, HFpEF, Afib on Eliquis, IDDM, CKD, asthma (hospitalization per PCP note in 2024 for acute bronchitis and asthma exacerbation), anxiety, and obesity who presents w/ complaints of severe LE swelling over the past two weeks, right > left, along with new cough and wheezing, found to be positive for Influenza A, w/ LE Dopplers negative for acute DVT (though exam limited by body habitus and pain).       Problem/Plan - 1:  ·  Problem: Acute diastolic HF  ·  Plan: On Bumex 2mg PO BID as OP  - Recent OP TTE with preserved EF and grade 1 DD  - TTE unremarkable with normal filling pressures; Repeat TTE to assess filling pressures  - CXR with increased B/L pleural effusion with associated atelectasis   - Given degree of hypoxia and SOB, Bumex 2mg IV BID restarted  - 3/3 , down from 450.   - 3/4 , rec to transition to po bumex 2 mg BID    - 3/5 cont po diuretic, home bp meds, bp stable   - 3/6 stable on po diuretic   - 3/7 Cr up, po bumex decreased to QD     Problem/Plan - 2:  ·  Problem: Chronic atrial fibrillation.   ·  Plan: - continue home Cardizem, and Dronedarone  - continue home AC w/ Eliquis BID.    Problem/Plan - 3:  ·  Problem: Diabetes mellitus.   ·  Plan: - at home patient reportedly on Lantus 18units QHS and 9units in AM  - will continue w/ Lantus 10units QHS and 5units in AM for now, w/ mISS and serial FS monitoring.    Problem/Plan - 4:  ·  Problem:  DVT PPX  ·  Plan: Nomi Potter MD FAC  Attending Interventional Cardiologist, [facility].   Avaliable on [employer] Team  
79yoF w/ PMHx of s/p CVA, CAD, HFpEF, Afib on Eliquis, IDDM, CKD, asthma (hospitalization per PCP note in 2024 for acute bronchitis and asthma exacerbation), anxiety, and obesity who presents w/ complaints of severe LE swelling over the past two weeks, right > left, along with new cough and wheezing, found to be positive for Influenza A, w/ LE Dopplers negative for acute DVT (though exam limited by body habitus and pain).       Problem/Plan - 1:  ·  Problem: Acute diastolic HF  ·  Plan: On Bumex 2mg PO BID as OP  - Recent OP TTE with preserved EF and grade 1 DD  - TTE unremarkable with normal filling pressures; Repeat TTE to assess filling pressures  - CXR with increased B/L pleural effusion with associated atelectasis   - Given degree of hypoxia and SOB, Bumex 2mg IV BID restarted  - 3/3 , down from 450.   - 3/4 , rec to transition to po bumex 2 mg BID    - 3/5 cont po diuretic, home bp meds, bp stable   - 3/6 stable on po diuretic   - 3/7 Cr up, po bumex decreased to QD     Problem/Plan - 2:  ·  Problem: Chronic atrial fibrillation.   ·  Plan: - continue home Cardizem, and Dronedarone  - continue home AC w/ Eliquis BID.    Problem/Plan - 3:  ·  Problem: Diabetes mellitus.   ·  Plan: - at home patient reportedly on Lantus 18units QHS and 9units in AM  - will continue w/ Lantus 10units QHS and 5units in AM for now, w/ mISS and serial FS monitoring.    Problem/Plan - 4:  ·  Problem:  DVT PPX  ·  Plan: Nomi Potter MD FAC  Attending Interventional Cardiologist, [facility].   Avaliable on [employer] Team
79yoF w/ PMHx of s/p CVA, CAD, HFpEF, Afib on Eliquis, IDDM, CKD, asthma (hospitalization per PCP note in 2024 for acute bronchitis and asthma exacerbation), anxiety, and obesity who presents w/ complaints of severe LE swelling over the past two weeks, right > left, along with new cough and wheezing, found to be positive for Influenza A, course c/b ADHF, and acute respiratory failure. 
79yoF w/ PMHx of s/p CVA, CAD, HFpEF, Afib on Eliquis, IDDM, CKD, asthma (hospitalization per PCP note in 2024 for acute bronchitis and asthma exacerbation), anxiety, and obesity who presents w/ complaints of severe LE swelling over the past two weeks, right > left, along with new cough and wheezing, found to be positive for Influenza A, w/ LE Dopplers negative for acute DVT (though exam limited by body habitus and pain).       Problem/Plan - 1:  ·  Problem: Acute diastolic HF  ·  Plan: On Bumex 2mg PO BID as OP  - Recent OP TTE with preserved EF and grade 1 DD  - TTE unremarkable with normal filling pressures; Repeat TTE to assess filling pressures  - CXR with increased B/L pleural effusion with associated atelectasis   - Given degree of hypoxia and SOB, Bumex 2mg IV BID restarted  - 3/3 , down from 450.   - 3/4 , rec to transition to po bumex 2 mg BID      Problem/Plan - 2:  ·  Problem: Chronic atrial fibrillation.   ·  Plan: - continue home Cardizem, and Dronedarone  - continue home AC w/ Eliquis BID.    Problem/Plan - 3:  ·  Problem: Diabetes mellitus.   ·  Plan: - at home patient reportedly on Lantus 18units QHS and 9units in AM  - will continue w/ Lantus 10units QHS and 5units in AM for now, w/ mISS and serial FS monitoring.    Problem/Plan - 4:  ·  Problem:  DVT PPX  ·  Plan: Nomi Potter MD FAC  [facility]   Avaliable on [employer] Team  
79yoF w/ PMHx of s/p CVA, CAD, HFpEF, Afib on Eliquis, IDDM, CKD, asthma (hospitalization per PCP note in 2024 for acute bronchitis and asthma exacerbation), anxiety, and obesity who presents w/ complaints of severe LE swelling over the past two weeks, right > left, along with new cough and wheezing, found to be positive for Influenza A, w/ LE Dopplers negative for acute DVT (though exam limited by body habitus and pain).       Problem/Plan - 1:  ·  Problem: Acute diastolic HF  ·  Plan: On Bumex 2mg PO BID as OP  - Recent OP TTE with preserved EF and grade 1 DD  - TTE unremarkable with normal filling pressures; Repeat TTE to assess filling pressures  - CXR with increased B/L pleural effusion with associated atelectasis   - Given degree of hypoxia and SOB, Bumex 2mg IV BID restarted  - 3/3 , down from 450.   - 3/4 , rec to transition to po bumex 2 mg BID    - 3/5 cont po diuretic, home bp meds, bp stable   - 3/6 stable on po diuretic     Problem/Plan - 2:  ·  Problem: Chronic atrial fibrillation.   ·  Plan: - continue home Cardizem, and Dronedarone  - continue home AC w/ Eliquis BID.    Problem/Plan - 3:  ·  Problem: Diabetes mellitus.   ·  Plan: - at home patient reportedly on Lantus 18units QHS and 9units in AM  - will continue w/ Lantus 10units QHS and 5units in AM for now, w/ mISS and serial FS monitoring.    Problem/Plan - 4:  ·  Problem:  DVT PPX  ·  Plan: Nomi Potter MD [facility]  Attending Interventional Cardiologist, [facility].   Avaliable on [employer] Team  
79yoF w/ PMHx of s/p CVA, CAD, HFpEF, Afib on Eliquis, IDDM, CKD, asthma (hospitalization per PCP note in 2024 for acute bronchitis and asthma exacerbation), anxiety, and obesity who presents w/ complaints of severe LE swelling over the past two weeks, right > left, along with new cough and wheezing, found to be positive for Influenza A, w/ LE Dopplers negative for acute DVT (though exam limited by body habitus and pain).       Problem/Plan - 1:  ·  Problem: Acute diastolic HF  ·  Plan: On Bumex 2mg PO BID as OP  - Recent OP TTE with preserved EF and grade 1 DD  - TTE unremarkable with normal filling pressures; Repeat TTE to assess filling pressures  - CXR with increased B/L pleural effusion with associated atelectasis   - Given degree of hypoxia and SOB, Bumex 2mg IV BID restarted  - 3/3 , down from 450.   - 3/4 , rec to transition to po bumex 2 mg BID    - 3/5 cont po diuretic, home bp meds, bp stable   - 3/6 stable on po diuretic   - 3/7 Cr up, po bumex decreased to QD   - 3/8 Cr peaked   - 3/9 would rec discharge on PO bumex 2 mg QD     Problem/Plan - 2:  ·  Problem: Chronic atrial fibrillation.   ·  Plan: - continue home Cardizem, and Dronedarone  - continue home AC w/ Eliquis BID.    Problem/Plan - 3:  ·  Problem: Diabetes mellitus.   ·  Plan: - at home patient reportedly on Lantus 18units QHS and 9units in AM  - will continue w/ Lantus 10units QHS and 5units in AM for now, w/ mISS and serial FS monitoring.    Problem/Plan - 4:  ·  Problem:  DVT PPX  ·  Plan: Nomi Potter MD [facility]  Attending Interventional Cardiologist, [facility].   Avaliable on [employer] Team
Pt is a 79F with MHx AFib on NOAC, HFpEF, CAD, IDDMII, CKD, asthma with recurrent bronchitis, and obesity (Type III BMI 48) initially presenting to [facility] on 2/21/25 with LE edema admitted with acute hypoxemic respiratory failure 2/2 Influenza A PNA c/b asthma exacerbation +/- pulmonary edema with hospital course c/b recurrent hypoxemia with evidence of bibasilar mucous plugging and recurrent fluid overload. Pt clinically improved today, CXR improved as well. TTE performed, with evidence of increase filling pressures. Pt well-appearing today, no new respiratory issues or complaints.     -Diuresis in place, pt clinically responded well   -Aggressive airway clearance therapy: DuoNebs + Airway Percussive Device (At bedside).   -OOB to chair and ambulation as tolerated. Incentive spirometry at bedside.   -Cough suppressants prn dosing only  -No pulmonary indication for antibiotics or systemic steroids at this time  -Pulmonary will continue to follow while in hospital 
Pt is a 79F with MHx AFib on NOAC, HFpEF, CAD, IDDMII, CKD, asthma with recurrent bronchitis, and obesity (Type III BMI 48) initially presenting to [facility] on 2/21/25 with LE edema admitted with acute hypoxemic respiratory failure 2/2 Influenza A PNA c/b asthma exacerbation +/- pulmonary edema with hospital course c/b recurrent hypoxemia with evidence of bibasilar mucous plugging and recurrent fluid overload. Pt clinically improved today, CXR improved as well. TTE performed, with evidence of increase filling pressures.    -Diuresis in place, pt clinically responded well   -Aggressive airway clearance therapy: DuoNebs + Airway Percussive Device (At bedside). Pt doing well with treatments.  -OOB to chair and ambulation as tolerated. Incentive spirometry at bedside.   -Cough suppressants prn dosing only  -No pulmonary indication for antibiotics or systemic steroids at this time  -Pulmonary will continue to follow while in hospital 
78 yo F with PMHx AFib on NOAC, HFpEF, CAD, IDDMII, CKD, asthma with recurrent bronchitis, and obesity (Type III BMI 48) initially presenting to [facility] on 2/21/25 with LE edema admitted with acute hypoxemic respiratory failure 2/2 Influenza A PNA c/b asthma exacerbation +/- pulmonary edema with hospital course c/b recurrent hypoxemia with evidence of bibasilar mucous plugging and recurrent fluid overload. Pt clinically improved today, CXR improved as well. TTE performed, with evidence of increase filling pressures. Pt well-appearing today, no new respiratory issues or complaints. Euvolemic on exam    -Diuresis in place, pt clinically responded well   -Aggressive airway clearance therapy: DuoNebs + Airway Percussive Device (At bedside).   -OOB to chair and ambulation as tolerated. Incentive spirometry at bedside.   -Cough suppressants prn dosing only; Tessalon perles  -No pulmonary indication for antibiotics or systemic steroids at this time  - would trial Budesonide 0.25mg neb (first dose STAT) BID starting TODAY until discharge (rinse mouth after use) to determine if this helps with cough)  - Has nebulizer at home , needs rx for Albuterol neb to be used every 4-6hrs prn for cough/SOB  No Pulmonary CI to discharge.  Should f/u with outpatient Pulm within next 4 weeks.
79yoF w/ PMHx of s/p CVA, CAD, HFpEF, Afib on Eliquis, IDDM, CKD, asthma (hospitalization per PCP note in 2024 for acute bronchitis and asthma exacerbation), anxiety, and obesity who presents w/ complaints of severe LE swelling over the past two weeks, right > left, along with new cough and wheezing, found to be positive for Influenza A, w/ LE Dopplers negative for acute DVT (though exam limited by body habitus and pain). 
79yoF w/ PMHx of s/p CVA, CAD, HFpEF, Afib on Eliquis, IDDM, CKD, asthma (hospitalization per PCP note in 2024 for acute bronchitis and asthma exacerbation), anxiety, and obesity who presents w/ complaints of severe LE swelling over the past two weeks, right > left, along with new cough and wheezing, found to be positive for Influenza A, course c/b ADHF, and acute respiratory failure. 
79yoF w/ PMHx of s/p CVA, CAD, HFpEF, Afib on Eliquis, IDDM, CKD, asthma (hospitalization per PCP note in 2024 for acute bronchitis and asthma exacerbation), anxiety, and obesity who presents w/ complaints of severe LE swelling over the past two weeks, right > left, along with new cough and wheezing, found to be positive for Influenza A, w/ LE Dopplers negative for acute DVT (though exam limited by body habitus and pain). 
79yoF w/ PMHx of s/p CVA, CAD, HFpEF, Afib on Eliquis, IDDM, CKD, asthma (hospitalization per PCP note in 2024 for acute bronchitis and asthma exacerbation), anxiety, and obesity who presents w/ complaints of severe LE swelling over the past two weeks, right > left, along with new cough and wheezing, found to be positive for Influenza A, w/ LE Dopplers negative for acute DVT (though exam limited by body habitus and pain). 
79yoF w/ PMHx of s/p CVA, CAD, HFpEF, Afib on Eliquis, IDDM, CKD, asthma (hospitalization per PCP note in 2024 for acute bronchitis and asthma exacerbation), anxiety, and obesity who presents w/ complaints of severe LE swelling over the past two weeks, right > left, along with new cough and wheezing, found to be positive for Influenza A, course c/b ADHF, and acute respiratory failure. 
79yoF w/ PMHx of s/p CVA, CAD, HFpEF, Afib on Eliquis, IDDM, CKD, asthma (hospitalization per PCP note in 2024 for acute bronchitis and asthma exacerbation), anxiety, and obesity who presents w/ complaints of severe LE swelling over the past two weeks, right > left, along with new cough and wheezing, found to be positive for Influenza A, course c/b ADHF, and acute respiratory failure. 
79yoF w/ PMHx of s/p CVA, CAD, HFpEF, Afib on Eliquis, IDDM, CKD, asthma (hospitalization per PCP note in 2024 for acute bronchitis and asthma exacerbation), anxiety, and obesity who presents w/ complaints of severe LE swelling over the past two weeks, right > left, along with new cough and wheezing, found to be positive for Influenza A, w/ LE Dopplers negative for acute DVT (though exam limited by body habitus and pain). 
79yoF w/ PMHx of s/p CVA, CAD, HFpEF, Afib on Eliquis, IDDM, CKD, asthma (hospitalization per PCP note in 2024 for acute bronchitis and asthma exacerbation), anxiety, and obesity who presents w/ complaints of severe LE swelling over the past two weeks, right > left, along with new cough and wheezing, found to be positive for Influenza A, w/ LE Dopplers negative for acute DVT (though exam limited by body habitus and pain). 
79yoF w/ PMHx of s/p CVA, CAD, HFpEF, Afib on Eliquis, IDDM, CKD, asthma (hospitalization per PCP note in 2024 for acute bronchitis and asthma exacerbation), anxiety, and obesity who presents w/ complaints of severe LE swelling over the past two weeks, right > left, along with new cough and wheezing, found to be positive for Influenza A, course c/b ADHF, and acute respiratory failure. 
79yoF w/ PMHx of s/p CVA, CAD, HFpEF, Afib on Eliquis, IDDM, CKD, asthma (hospitalization per PCP note in 2024 for acute bronchitis and asthma exacerbation), anxiety, and obesity who presents w/ complaints of severe LE swelling over the past two weeks, right > left, along with new cough and wheezing, found to be positive for Influenza A, w/ LE Dopplers negative for acute DVT (though exam limited by body habitus and pain). 
79yoF w/ PMHx of s/p CVA, CAD, HFpEF, Afib on Eliquis, IDDM, CKD, asthma (hospitalization per PCP note in 2024 for acute bronchitis and asthma exacerbation), anxiety, and obesity who presents w/ complaints of severe LE swelling over the past two weeks, right > left, along with new cough and wheezing, found to be positive for Influenza A, course c/b ADHF, and acute respiratory failure. 
79yoF w/ PMHx of s/p CVA, CAD, HFpEF, Afib on Eliquis, IDDM, CKD, asthma (hospitalization per PCP note in 2024 for acute bronchitis and asthma exacerbation), anxiety, and obesity who presents w/ complaints of severe LE swelling over the past two weeks, right > left, along with new cough and wheezing, found to be positive for Influenza A, w/ LE Dopplers negative for acute DVT (though exam limited by body habitus and pain). 
79yoF w/ PMHx of s/p CVA, CAD, HFpEF, Afib on Eliquis, IDDM, CKD, asthma (hospitalization per PCP note in 2024 for acute bronchitis and asthma exacerbation), anxiety, and obesity who presents w/ complaints of severe LE swelling over the past two weeks, right > left, along with new cough and wheezing, found to be positive for Influenza A, course c/b ADHF, and acute respiratory failure.

## 2025-03-10 NOTE — GOALS OF CARE CONVERSATION - ADVANCED CARE PLANNING - CONVERSATION DETAILS
Introduced self and team and role in care of patient. Reviewed events leading to patient's current status, and overall condition at present. Explained role of healthcare proxy and its role in decision making should the patient be unable to express his own wishes or lacks capacity to make medical decisions. Explained Family Health Care Decision Act (FHCDA) Surrogate Decision Maker Hierarchy in the absence of a health care agent. Patient reports that her  passed away a few months ago. She has three daughters. She has no documented a healthcare proxy but agreeable to her daughters sharing decision making per FHCDA. Discussed treatment preferences, including chest compression and intubation. Patient would like to discuss with her family. Encouraged ongoing GOC. Remains FULL CODE
Patient reported that she would not want chest compressions in the event of cardiac arrest but would be agreeable to trial of intubation, though she would not want a tracheostomy. She also expressed that she would not want feeding tube or dialysis. MOLST completed at bedside.

## 2025-03-10 NOTE — DISCHARGE NOTE NURSING/CASE MANAGEMENT/SOCIAL WORK - FINANCIAL ASSISTANCE
[facility] provides services at a reduced cost to those who are determined to be eligible through [facility]’s financial assistance program. Information regarding [facility]’s financial assistance program can be found by going to [email or web address] or by calling [phone].

## 2025-03-10 NOTE — PROGRESS NOTE ADULT - REASON FOR ADMISSION
Influenza A

## 2025-03-10 NOTE — PROGRESS NOTE ADULT - NS ATTEND OPT1A GEN_ALL_CORE
History/Exam/Medical decision making
History/Medical decision making
History/Exam/Medical decision making

## 2025-03-10 NOTE — DISCHARGE NOTE NURSING/CASE MANAGEMENT/SOCIAL WORK - PATIENT PORTAL LINK FT
You can access the FollowMyHealth Patient Portal offered by [facility] by registering at the following website: [email or web address]. By joining [facility]’s FollowMyHealth portal, you will also be able to view your health information using other applications (apps) compatible with our system.

## 2025-03-10 NOTE — PROGRESS NOTE ADULT - SUBJECTIVE AND OBJECTIVE BOX
DATE OF SERVICE: 03-10-25 @ 12:07    Patient is a 79y old  Female who presents with a chief complaint of Influenza A (09 Mar 2025 18:32)      INTERVAL HISTORY: feels okay    REVIEW OF SYSTEMS:  CONSTITUTIONAL: No weakness  EYES/ENT: No visual changes;  No throat pain   NECK: No pain or stiffness  RESPIRATORY: No cough, wheezing; No shortness of breath  CARDIOVASCULAR: No chest pain or palpitations  GASTROINTESTINAL: No abdominal  pain. No nausea, vomiting, or hematemesis  GENITOURINARY: No dysuria, frequency or hematuria  NEUROLOGICAL: No stroke like symptoms  SKIN: No rashes    	  MEDICATIONS:  buMETAnide 2 milliGRAM(s) Oral daily  diltiazem    milliGRAM(s) Oral daily  doxazosin 2 milliGRAM(s) Oral daily  dronedarone 400 milliGRAM(s) Oral two times a day  lisinopril 40 milliGRAM(s) Oral daily        PHYSICAL EXAM:  T(C): 36.7 (03-10-25 @ 05:12), Max: 36.7 (03-09-25 @ 20:29)  HR: 68 (03-10-25 @ 05:12) (68 - 81)  BP: 131/72 (03-10-25 @ 05:12) (118/67 - 131/72)  RR: 18 (03-10-25 @ 05:12) (17 - 18)  SpO2: 93% (03-10-25 @ 05:12) (93% - 93%)  Wt(kg): --  I&O's Summary    09 Mar 2025 07:01  -  10 Mar 2025 07:00  --------------------------------------------------------  IN: 0 mL / OUT: 900 mL / NET: -900 mL          Appearance: In no distress	  HEENT:    PERRL, EOMI	  Cardiovascular:  S1 S2, No JVD  Respiratory: Lungs clear to auscultation	  Gastrointestinal:  Soft, Non-tender, + BS	  Vascularature:  No edema of LE  Psychiatric: Appropriate affect   Neuro: no acute focal deficits           03-10    139  |  101  |  78[H]  ----------------------------<  177[H]  3.3[L]   |  21[L]  |  1.83[H]    Ca    9.8      10 Mar 2025 07:02          Labs personally reviewed      ASSESSMENT/PLAN: 	    79yoF w/ PMHx of s/p CVA, CAD, HFpEF, Afib on Eliquis, IDDM, CKD, asthma (hospitalization per PCP note in 2024 for acute bronchitis and asthma exacerbation), anxiety, and obesity who presents w/ complaints of severe LE swelling over the past two weeks, right > left, along with new cough and wheezing, found to be positive for Influenza A, w/ LE Dopplers negative for acute DVT (though exam limited by body habitus and pain).       Problem/Plan - 1:  ·  Problem: Acute diastolic HF  ·  Plan: On Bumex 2mg PO BID as OP  - Recent OP TTE with preserved EF and grade 1 DD  - TTE unremarkable with normal filling pressures; Repeat TTE to assess filling pressures  - CXR with increased B/L pleural effusion with associated atelectasis   - Given degree of hypoxia and SOB, Bumex 2mg IV BID restarted  - 3/3 , down from 450.   - 3/4 , rec to transition to po bumex 2 mg BID    - 3/5 cont po diuretic, home bp meds, bp stable   - 3/6 stable on po diuretic   - 3/7 Cr up, po bumex decreased to QD   - 3/8 Cr peaked   - 3/9 would rec discharge on PO bumex 2 mg QD     Problem/Plan - 2:  ·  Problem: Chronic atrial fibrillation.   ·  Plan: - continue home Cardizem, and Dronedarone  - continue home AC w/ Eliquis BID.    Problem/Plan - 3:  ·  Problem: Diabetes mellitus.   ·  Plan: - at home patient reportedly on Lantus 18units QHS and 9units in AM  - will continue w/ Lantus 10units QHS and 5units in AM for now, w/ mISS and serial FS monitoring.    Problem/Plan - 4:  ·  Problem:  DVT PPX  ·  Plan: Eliquis Iolani Behrbom, ASHLEY-NP   Cole Christopher DO [facility]  Cardiovascular Medicine  [address], [address]  Available through call or text on [employer] TE[employer]  Office: [phone]   DATE OF SERVICE: 03-10-25 @ 12:07    Patient is a 79y old  Female who presents with a chief complaint of Influenza A (09 Mar 2025 18:32)      INTERVAL HISTORY: feels okay    REVIEW OF SYSTEMS:  CONSTITUTIONAL: No weakness  EYES/ENT: No visual changes;  No throat pain   NECK: No pain or stiffness  RESPIRATORY: No cough, wheezing; No shortness of breath  CARDIOVASCULAR: No chest pain or palpitations  GASTROINTESTINAL: No abdominal  pain. No nausea, vomiting, or hematemesis  GENITOURINARY: No dysuria, frequency or hematuria  NEUROLOGICAL: No stroke like symptoms  SKIN: No rashes    	  MEDICATIONS:  buMETAnide 2 milliGRAM(s) Oral daily  diltiazem    milliGRAM(s) Oral daily  doxazosin 2 milliGRAM(s) Oral daily  dronedarone 400 milliGRAM(s) Oral two times a day  lisinopril 40 milliGRAM(s) Oral daily        PHYSICAL EXAM:  T(C): 36.7 (03-10-25 @ 05:12), Max: 36.7 (03-09-25 @ 20:29)  HR: 68 (03-10-25 @ 05:12) (68 - 81)  BP: 131/72 (03-10-25 @ 05:12) (118/67 - 131/72)  RR: 18 (03-10-25 @ 05:12) (17 - 18)  SpO2: 93% (03-10-25 @ 05:12) (93% - 93%)  Wt(kg): --  I&O's Summary    09 Mar 2025 07:01  -  10 Mar 2025 07:00  --------------------------------------------------------  IN: 0 mL / OUT: 900 mL / NET: -900 mL          Appearance: In no distress	  HEENT:    PERRL, EOMI	  Cardiovascular:  S1 S2, No JVD  Respiratory: Lungs clear to auscultation	  Gastrointestinal:  Soft, Non-tender, + BS	  Vascularature:  No edema of LE  Psychiatric: Appropriate affect   Neuro: no acute focal deficits           03-10    139  |  101  |  78[H]  ----------------------------<  177[H]  3.3[L]   |  21[L]  |  1.83[H]    Ca    9.8      10 Mar 2025 07:02          Labs personally reviewed      ASSESSMENT/PLAN: 	    79yoF w/ PMHx of s/p CVA, CAD, HFpEF, Afib on Eliquis, IDDM, CKD, asthma (hospitalization per PCP note in 2024 for acute bronchitis and asthma exacerbation), anxiety, and obesity who presents w/ complaints of severe LE swelling over the past two weeks, right > left, along with new cough and wheezing, found to be positive for Influenza A, w/ LE Dopplers negative for acute DVT (though exam limited by body habitus and pain).       Problem/Plan - 1:  ·  Problem: Acute diastolic HF  ·  Plan: On Bumex 2mg PO BID as OP  - Recent OP TTE with preserved EF and grade 1 DD  - TTE unremarkable with normal filling pressures; Repeat TTE to assess filling pressures  - CXR with increased B/L pleural effusion with associated atelectasis   - Given degree of hypoxia and SOB, Bumex 2mg IV BID restarted  - 3/3 , down from 450.   - 3/4 , rec to transition to po bumex 2 mg BID    - 3/5 cont po diuretic, home bp meds, bp stable   - 3/6 stable on po diuretic   - 3/7 Cr up, po bumex decreased to QD   - 3/8 Cr peaked   - Rec discharge on PO bumex 2 mg QD     Problem/Plan - 2:  ·  Problem: Chronic atrial fibrillation.   ·  Plan: - continue home Cardizem, and Dronedarone  - continue home AC w/ Eliquis BID.    Problem/Plan - 3:  ·  Problem: Diabetes mellitus.   ·  Plan: - at home patient reportedly on Lantus 18units QHS and 9units in AM  - will continue w/ Lantus 10units QHS and 5units in AM for now, w/ mISS and serial FS monitoring.    Problem/Plan - 4:  ·  Problem:  DVT PPX  ·  Plan: Eliquis Iolani Behrbom, ASHLEY-NP   Cole Christopher,  [facility]  Cardiovascular Medicine  [address], [address]  Available through call or text on [employer] TE[employer]  Office: [phone]

## 2025-03-18 ENCOUNTER — APPOINTMENT (OUTPATIENT)
Dept: INTERNAL MEDICINE | Facility: CLINIC | Age: 80
End: 2025-03-18
Payer: MEDICARE

## 2025-03-18 ENCOUNTER — NON-APPOINTMENT (OUTPATIENT)
Age: 80
End: 2025-03-18

## 2025-03-18 VITALS
HEART RATE: 68 BPM | TEMPERATURE: 98.4 F | HEIGHT: 63 IN | OXYGEN SATURATION: 92 % | SYSTOLIC BLOOD PRESSURE: 120 MMHG | WEIGHT: 266 LBS | DIASTOLIC BLOOD PRESSURE: 82 MMHG | BODY MASS INDEX: 47.13 KG/M2

## 2025-03-18 VITALS — HEART RATE: 68 BPM | OXYGEN SATURATION: 95 %

## 2025-03-18 DIAGNOSIS — I10 ESSENTIAL (PRIMARY) HYPERTENSION: ICD-10-CM

## 2025-03-18 DIAGNOSIS — N28.9 DISORDER OF KIDNEY AND URETER, UNSPECIFIED: ICD-10-CM

## 2025-03-18 DIAGNOSIS — I25.10 ATHEROSCLEROTIC HEART DISEASE OF NATIVE CORONARY ARTERY W/OUT ANGINA PECTORIS: ICD-10-CM

## 2025-03-18 DIAGNOSIS — M10.9 GOUT, UNSPECIFIED: ICD-10-CM

## 2025-03-18 DIAGNOSIS — I48.91 UNSPECIFIED ATRIAL FIBRILLATION: ICD-10-CM

## 2025-03-18 DIAGNOSIS — E27.9 DISORDER OF ADRENAL GLAND, UNSPECIFIED: ICD-10-CM

## 2025-03-18 DIAGNOSIS — J45.909 UNSPECIFIED ASTHMA, UNCOMPLICATED: ICD-10-CM

## 2025-03-18 DIAGNOSIS — E78.5 HYPERLIPIDEMIA, UNSPECIFIED: ICD-10-CM

## 2025-03-18 DIAGNOSIS — F41.9 ANXIETY DISORDER, UNSPECIFIED: ICD-10-CM

## 2025-03-18 DIAGNOSIS — D64.9 ANEMIA, UNSPECIFIED: ICD-10-CM

## 2025-03-18 DIAGNOSIS — E11.9 TYPE 2 DIABETES MELLITUS W/OUT COMPLICATIONS: ICD-10-CM

## 2025-03-18 DIAGNOSIS — R93.89 ABNORMAL FINDINGS ON DIAGNOSTIC IMAGING OF OTHER SPECIFIED BODY STRUCTURES: ICD-10-CM

## 2025-03-18 DIAGNOSIS — I50.9 HEART FAILURE, UNSPECIFIED: ICD-10-CM

## 2025-03-18 DIAGNOSIS — M67.431 GANGLION, RIGHT WRIST: ICD-10-CM

## 2025-03-18 PROCEDURE — 93000 ELECTROCARDIOGRAM COMPLETE: CPT

## 2025-03-18 PROCEDURE — 99496 TRANSJ CARE MGMT HIGH F2F 7D: CPT

## 2025-03-19 LAB
ALBUMIN SERPL ELPH-MCNC: 4.5 G/DL
ALP BLD-CCNC: 86 U/L
ALT SERPL-CCNC: 30 U/L
ANION GAP SERPL CALC-SCNC: 18 MMOL/L
AST SERPL-CCNC: 34 U/L
BASOPHILS # BLD AUTO: 0.01 K/UL
BASOPHILS NFR BLD AUTO: 0.2 %
BILIRUB SERPL-MCNC: 0.8 MG/DL
BUN SERPL-MCNC: 64 MG/DL
CALCIUM SERPL-MCNC: 10 MG/DL
CHLORIDE SERPL-SCNC: 103 MMOL/L
CO2 SERPL-SCNC: 24 MMOL/L
CREAT SERPL-MCNC: 1.98 MG/DL
EGFRCR SERPLBLD CKD-EPI 2021: 25 ML/MIN/1.73M2
EOSINOPHIL # BLD AUTO: 0.14 K/UL
EOSINOPHIL NFR BLD AUTO: 2.4 %
GLUCOSE SERPL-MCNC: 159 MG/DL
HCT VFR BLD CALC: 43.4 %
HGB BLD-MCNC: 13.5 G/DL
IMM GRANULOCYTES NFR BLD AUTO: 0.5 %
LYMPHOCYTES # BLD AUTO: 1.69 K/UL
LYMPHOCYTES NFR BLD AUTO: 29.5 %
MAN DIFF?: NORMAL
MCHC RBC-ENTMCNC: 27.9 PG
MCHC RBC-ENTMCNC: 31.1 G/DL
MCV RBC AUTO: 89.7 FL
MONOCYTES # BLD AUTO: 0.53 K/UL
MONOCYTES NFR BLD AUTO: 9.3 %
NEUTROPHILS # BLD AUTO: 3.32 K/UL
NEUTROPHILS NFR BLD AUTO: 58.1 %
NT-PROBNP SERPL-MCNC: 555 PG/ML
PLATELET # BLD AUTO: 190 K/UL
POTASSIUM SERPL-SCNC: 3.6 MMOL/L
PROT SERPL-MCNC: 7.8 G/DL
RBC # BLD: 4.84 M/UL
RBC # FLD: 19.7 %
SODIUM SERPL-SCNC: 145 MMOL/L
T4 FREE SERPL-MCNC: 1.7 NG/DL
TSH SERPL-ACNC: 1.32 UIU/ML
URATE SERPL-MCNC: 10.3 MG/DL
WBC # FLD AUTO: 5.72 K/UL

## 2025-04-02 ENCOUNTER — APPOINTMENT (OUTPATIENT)
Dept: ENDOCRINOLOGY | Facility: CLINIC | Age: 80
End: 2025-04-02
Payer: MEDICARE

## 2025-04-02 VITALS
OXYGEN SATURATION: 93 % | BODY MASS INDEX: 47.66 KG/M2 | SYSTOLIC BLOOD PRESSURE: 123 MMHG | HEIGHT: 63 IN | WEIGHT: 269 LBS | DIASTOLIC BLOOD PRESSURE: 82 MMHG | HEART RATE: 68 BPM

## 2025-04-02 DIAGNOSIS — E55.9 VITAMIN D DEFICIENCY, UNSPECIFIED: ICD-10-CM

## 2025-04-02 DIAGNOSIS — E11.9 TYPE 2 DIABETES MELLITUS W/OUT COMPLICATIONS: ICD-10-CM

## 2025-04-02 DIAGNOSIS — E66.01 MORBID (SEVERE) OBESITY DUE TO EXCESS CALORIES: ICD-10-CM

## 2025-04-02 DIAGNOSIS — I10 ESSENTIAL (PRIMARY) HYPERTENSION: ICD-10-CM

## 2025-04-02 DIAGNOSIS — E78.5 HYPERLIPIDEMIA, UNSPECIFIED: ICD-10-CM

## 2025-04-02 LAB
GLUCOSE BLDC GLUCOMTR-MCNC: 160
HBA1C MFR BLD HPLC: 7

## 2025-04-02 PROCEDURE — 83036 HEMOGLOBIN GLYCOSYLATED A1C: CPT | Mod: QW

## 2025-04-02 PROCEDURE — G2211 COMPLEX E/M VISIT ADD ON: CPT

## 2025-04-02 PROCEDURE — 95251 CONT GLUC MNTR ANALYSIS I&R: CPT

## 2025-04-02 PROCEDURE — 99214 OFFICE O/P EST MOD 30 MIN: CPT

## 2025-04-03 RX ORDER — INSULIN GLARGINE 100 [IU]/ML
100 INJECTION, SOLUTION SUBCUTANEOUS
Qty: 2 | Refills: 3 | Status: ACTIVE | COMMUNITY
Start: 2025-04-03 | End: 1900-01-01

## 2025-04-11 ENCOUNTER — APPOINTMENT (OUTPATIENT)
Dept: RADIOLOGY | Facility: CLINIC | Age: 80
End: 2025-04-11
Payer: MEDICARE

## 2025-04-11 PROCEDURE — 71046 X-RAY EXAM CHEST 2 VIEWS: CPT

## 2025-04-14 ENCOUNTER — NON-APPOINTMENT (OUTPATIENT)
Age: 80
End: 2025-04-14

## 2025-04-24 ENCOUNTER — APPOINTMENT (OUTPATIENT)
Dept: PULMONOLOGY | Facility: CLINIC | Age: 80
End: 2025-04-24
Payer: MEDICARE

## 2025-04-24 ENCOUNTER — APPOINTMENT (OUTPATIENT)
Dept: CARDIOLOGY | Facility: CLINIC | Age: 80
End: 2025-04-24
Payer: MEDICARE

## 2025-04-24 VITALS
WEIGHT: 271 LBS | TEMPERATURE: 98 F | DIASTOLIC BLOOD PRESSURE: 60 MMHG | SYSTOLIC BLOOD PRESSURE: 120 MMHG | OXYGEN SATURATION: 97 % | HEART RATE: 82 BPM | BODY MASS INDEX: 48.02 KG/M2 | HEIGHT: 63 IN

## 2025-04-24 DIAGNOSIS — F41.9 ANXIETY DISORDER, UNSPECIFIED: ICD-10-CM

## 2025-04-24 DIAGNOSIS — E66.01 MORBID (SEVERE) OBESITY DUE TO EXCESS CALORIES: ICD-10-CM

## 2025-04-24 DIAGNOSIS — E78.5 HYPERLIPIDEMIA, UNSPECIFIED: ICD-10-CM

## 2025-04-24 DIAGNOSIS — N28.9 DISORDER OF KIDNEY AND URETER, UNSPECIFIED: ICD-10-CM

## 2025-04-24 DIAGNOSIS — J45.909 UNSPECIFIED ASTHMA, UNCOMPLICATED: ICD-10-CM

## 2025-04-24 DIAGNOSIS — I10 ESSENTIAL (PRIMARY) HYPERTENSION: ICD-10-CM

## 2025-04-24 DIAGNOSIS — I50.9 HEART FAILURE, UNSPECIFIED: ICD-10-CM

## 2025-04-24 DIAGNOSIS — Z13.228 ENCOUNTER FOR SCREENING FOR OTHER METABOLIC DISORDERS: ICD-10-CM

## 2025-04-24 DIAGNOSIS — I48.91 UNSPECIFIED ATRIAL FIBRILLATION: ICD-10-CM

## 2025-04-24 DIAGNOSIS — R74.8 ABNORMAL LEVELS OF OTHER SERUM ENZYMES: ICD-10-CM

## 2025-04-24 DIAGNOSIS — E11.9 TYPE 2 DIABETES MELLITUS W/OUT COMPLICATIONS: ICD-10-CM

## 2025-04-24 DIAGNOSIS — M67.431 GANGLION, RIGHT WRIST: ICD-10-CM

## 2025-04-24 DIAGNOSIS — I25.10 ATHEROSCLEROTIC HEART DISEASE OF NATIVE CORONARY ARTERY W/OUT ANGINA PECTORIS: ICD-10-CM

## 2025-04-24 DIAGNOSIS — D64.9 ANEMIA, UNSPECIFIED: ICD-10-CM

## 2025-04-24 DIAGNOSIS — E27.9 DISORDER OF ADRENAL GLAND, UNSPECIFIED: ICD-10-CM

## 2025-04-24 PROCEDURE — 71046 X-RAY EXAM CHEST 2 VIEWS: CPT

## 2025-04-24 PROCEDURE — G2211 COMPLEX E/M VISIT ADD ON: CPT

## 2025-04-24 PROCEDURE — 99214 OFFICE O/P EST MOD 30 MIN: CPT

## 2025-04-24 PROCEDURE — 93000 ELECTROCARDIOGRAM COMPLETE: CPT

## 2025-04-27 LAB
ALBUMIN SERPL ELPH-MCNC: 4.6 G/DL
ALP BLD-CCNC: 101 U/L
ALT SERPL-CCNC: 29 U/L
ANION GAP SERPL CALC-SCNC: 20 MMOL/L
AST SERPL-CCNC: 26 U/L
BASOPHILS # BLD AUTO: 0.03 K/UL
BASOPHILS NFR BLD AUTO: 0.5 %
BILIRUB DIRECT SERPL-MCNC: 0.2 MG/DL
BILIRUB INDIRECT SERPL-MCNC: 0.5 MG/DL
BILIRUB SERPL-MCNC: 0.7 MG/DL
BUN SERPL-MCNC: 61 MG/DL
CALCIUM SERPL-MCNC: 10 MG/DL
CHLORIDE SERPL-SCNC: 103 MMOL/L
CHOLEST SERPL-MCNC: 205 MG/DL
CK SERPL-CCNC: 206 U/L
CO2 SERPL-SCNC: 21 MMOL/L
CREAT SERPL-MCNC: 1.88 MG/DL
EGFRCR SERPLBLD CKD-EPI 2021: 27 ML/MIN/1.73M2
EOSINOPHIL # BLD AUTO: 0.06 K/UL
EOSINOPHIL NFR BLD AUTO: 1 %
ESTIMATED AVERAGE GLUCOSE: 171 MG/DL
GLUCOSE SERPL-MCNC: 144 MG/DL
HBA1C MFR BLD HPLC: 7.6 %
HCT VFR BLD CALC: 43.8 %
HDLC SERPL-MCNC: 64 MG/DL
HGB BLD-MCNC: 14.3 G/DL
IMM GRANULOCYTES NFR BLD AUTO: 0.3 %
LDLC SERPL-MCNC: 115 MG/DL
LYMPHOCYTES # BLD AUTO: 1.52 K/UL
LYMPHOCYTES NFR BLD AUTO: 26 %
MAN DIFF?: NORMAL
MCHC RBC-ENTMCNC: 27.9 PG
MCHC RBC-ENTMCNC: 32.6 G/DL
MCV RBC AUTO: 85.4 FL
MONOCYTES # BLD AUTO: 0.62 K/UL
MONOCYTES NFR BLD AUTO: 10.6 %
NEUTROPHILS # BLD AUTO: 3.59 K/UL
NEUTROPHILS NFR BLD AUTO: 61.6 %
NONHDLC SERPL-MCNC: 141 MG/DL
NT-PROBNP SERPL-MCNC: 307 PG/ML
PLATELET # BLD AUTO: 179 K/UL
POTASSIUM SERPL-SCNC: 3.8 MMOL/L
PROT SERPL-MCNC: 7.8 G/DL
RBC # BLD: 5.13 M/UL
RBC # FLD: 19.1 %
SODIUM SERPL-SCNC: 144 MMOL/L
TRIGL SERPL-MCNC: 146 MG/DL
WBC # FLD AUTO: 5.84 K/UL

## 2025-05-07 ENCOUNTER — NON-APPOINTMENT (OUTPATIENT)
Age: 80
End: 2025-05-07

## 2025-05-08 RX ORDER — EZETIMIBE 10 MG/1
10 TABLET ORAL DAILY
Qty: 90 | Refills: 0 | Status: ACTIVE | COMMUNITY
Start: 2025-05-08 | End: 1900-01-01

## 2025-05-16 ENCOUNTER — APPOINTMENT (OUTPATIENT)
Dept: PULMONOLOGY | Facility: CLINIC | Age: 80
End: 2025-05-16

## 2025-05-23 NOTE — PROGRESS NOTE ADULT - PROBLEM/PLAN-4
DISPLAY PLAN FREE TEXT
Encounter Date: 5/23/2025       History     Chief Complaint   Patient presents with    Fatigue    Leg Swelling     PT IN /[facility]ASI W REPORT OF WEAKNESS/SOB AND EDEMA TO LOWER LEGS. STATES HAS NOT EATEN X 18 HRS.  CBG EN ROUTE 137. 20G IV TO LT HAND /EMS.   PT FOUND TO HAVE BED BUGS /AASI. EKG TO BE OBTAINED.      Presents by EMS after a neighbor call due to weakness. Pt states his family was supposed to bring him food but they have not, last meal was yesterday afternoon and he lives alone. States he will like to go to a nursing home, states will not like to die alone at home. Hx of HTM, CAD, A Fib    The history is provided by the patient and the EMS personnel.     Review of patient's allergies indicates:  No Known Allergies  Past Medical History:   Diagnosis Date    A-fib     Anticoagulant long-term use     Anxiety     Aortic aneurysm     Cataract     CHF (congestive heart failure)     Chronic atrial fibrillation     COPD (chronic obstructive pulmonary disease)     Coronary artery disease     Depression     HLD (hyperlipidemia)     Hypertension     Mitral regurgitation     PAD (peripheral artery disease)     Primary open angle glaucoma (POAG)      Past Surgical History:   Procedure Laterality Date    ANGIOGRAM, CORONARY, WITH LEFT HEART CATHETERIZATION N/A 03/26/2024    Procedure: Angiogram, Coronary, with Left Heart Cath;  Surgeon: Adriano Montiel MD;  Location: [facility] CATH LAB;  Service: Cardiology;  Laterality: N/A;    ATHERECTOMY OF PERIPHERAL VESSEL Left 09/12/2022    LEFT SFA ATHERECTOMY, BALLOON ANGIOPLASTY    CATARACT EXTRACTION W/  INTRAOCULAR LENS IMPLANT Left 03/09/2023    Procedure: EXTRACTION, CATARACT, WITH IOL INSERTION;  Surgeon: Georgi Borja MD;  Location: [facility] OR;  Service: Ophthalmology;  Laterality: Left;  19.5  mac    EGD, WITH CLOSED BIOPSY  03/22/2024    Procedure: EGD, WITH CLOSED BIOPSY;  Surgeon: Ronald Calderon MD;  Location: [facility] ENDOSCOPY;  Service: Gastroenterology;;    
ESOPHAGOGASTRODUODENOSCOPY N/A 03/22/2024    Procedure: EGD;  Surgeon: Ronald Calderon MD;  Location: [facility] ENDOSCOPY;  Service: Gastroenterology;  Laterality: N/A;    Heart Stent N/A     > 10yrs. AGO    INCISION AND DRAINAGE N/A 03/18/2024    Procedure: Incision and Drainage;  Surgeon: Fahad Rivas MD;  Location: [facility] OR;  Service: Urology;  Laterality: N/A;  I&D SCROTAL ABSCESS    INSERTION OF STENT INTO PERIPHERAL VESSEL Right 10/17/2022    RIGHT SFA ATHERECTOMY, BALLOON ANGIOPLASTY, STENT; RIGHT ANTERIOR TIBIAL ARTERY ATHERECTOMY, BALLOON ANGIOPLASTY    ORCHIECTOMY Left 03/18/2024    Procedure: ORCHIECTOMY;  Surgeon: Fahad Rivas MD;  Location: [facility] OR;  Service: Urology;  Laterality: Left;     Family History   Problem Relation Name Age of Onset    Cancer Mother      Hypertension Mother      Heart failure Father      Stroke Father      Hypertension Father      No Known Problems Sister       Social History[1]  Review of Systems   Cardiovascular:  Positive for leg swelling.   Neurological:  Positive for weakness.   All other systems reviewed and are negative.      Physical Exam     Initial Vitals [05/23/25 1834]   BP Pulse Resp Temp SpO2   127/86 98 18 97.9 °F (36.6 °C) 98 %      MAP       --         Physical Exam    Nursing note and vitals reviewed.  Constitutional: He appears well-developed. No distress.   HENT:   Head: Normocephalic and atraumatic. Mouth/Throat: Oropharynx is clear and moist.   Eyes: Conjunctivae and EOM are normal. Pupils are equal, round, and reactive to light.   Neck: Neck supple. No thyromegaly present. No JVD present.   Normal range of motion.  Cardiovascular:  Normal rate, normal heart sounds and intact distal pulses.           Irregular, Irregular   Pulmonary/Chest: Breath sounds normal. No respiratory distress.   Abdominal: Abdomen is soft. Bowel sounds are normal. He exhibits no distension. There is no abdominal tenderness. There is no rebound and no 
guarding.   Musculoskeletal:         General: Edema (Bilateral lower extremities +1 pitting) present. Normal range of motion.      Cervical back: Normal range of motion and neck supple.     Neurological: He is alert and oriented to person, place, and time. GCS score is 15. GCS eye subscore is 4. GCS verbal subscore is 5. GCS motor subscore is 6.   Skin: Skin is warm and dry. No rash noted.   Psychiatric: Thought content normal.         ED Course   Procedures  Labs Reviewed   COMPREHENSIVE METABOLIC PANEL - Abnormal       Result Value    Sodium 129 (*)     Potassium 4.4      Chloride 92 (*)     CO2 19 (*)     Glucose 111      Blood Urea Nitrogen 36.6 (*)     Creatinine 1.45 (*)     Calcium 9.3      Protein Total 7.6      Albumin 3.6      Globulin 4.0 (*)     Albumin/Globulin Ratio 0.9 (*)     Bilirubin Total 5.4 (*)      (*)     ALT 37      AST 32      eGFR 53      Anion Gap 18.0      BUN/Creatinine Ratio 25     B-TYPE NATRIURETIC PEPTIDE - Abnormal    Natriuretic Peptide 2,615.6 (*)    URINALYSIS, REFLEX TO URINE CULTURE - Abnormal    Color, UA Yellow      Appearance, UA Clear      Specific Gravity, UA 1.014      pH, UA 5.5      Protein, UA Trace (*)     Glucose, UA Normal      Ketones, UA Negative      Blood, UA Negative      Bilirubin, UA Negative      Urobilinogen, UA 1+ (*)     Nitrites, UA Negative      Leukocyte Esterase,  (*)     RBC, UA 0-5      WBC, UA 21-50 (*)     Bacteria, UA Occasional (*)     Squamous Epithelial Cells, UA Trace (*)     Mucous, UA Trace (*)     Hyaline Casts, UA 3-5 (*)    CBC WITH DIFFERENTIAL - Abnormal    WBC 5.95      RBC 3.41 (*)     Hgb 8.6 (*)     Hct 25.9 (*)     MCV 76.0 (*)     MCH 25.2 (*)     MCHC 33.2      RDW 25.0 (*)     Platelet 141      MPV        IPF 3.6      Neut % 85.5      Lymph % 7.6      Mono % 6.4      Eos % 0.2      Basophil % 0.0      Imm Grans % 0.3      Neut # 5.09      Lymph # 0.45 (*)     Mono # 0.38      Eos # 0.01      Baso # 0.00      Imm 
Gran # 0.02      NRBC% 0.0     BLOOD SMEAR MICROSCOPIC EXAM (OL[facility]) - Abnormal    RBC Morph Abnormal (*)     Anisocytosis 2+ (*)     Microcytosis 1+ (*)     Platelets Adequate     CULTURE, URINE   BLOOD CULTURE [facility]   BLOOD CULTURE OLG   CBC W/ AUTO DIFFERENTIAL    Narrative:     The following orders were created for panel order CBC auto differential.  Procedure                               Abnormality         Status                     ---------                               -----------         ------                     CBC with Differential[[id number]]       Abnormal            Final result                 Please view results for these tests on the individual orders.   EXTRA TUBES    Narrative:     The following orders were created for panel order EXTRA TUBES.  Procedure                               Abnormality         Status                     ---------                               -----------         ------                     Light Blue Top Hold[[id number]]                             In process                 Light Green Top Hold[[id number]]                            In process                 Gold Top Hold[[id number]]                                   In process                   Please view results for these tests on the individual orders.   LIGHT BLUE TOP HOLD   LIGHT GREEN TOP HOLD   GOLD TOP HOLD   LACTIC ACID, PLASMA   TROPONIN I     EKG Readings: (Independently Interpreted)   Initial Reading: No STEMI. Rhythm: Atrial Fibrillation. Heart Rate: 98. Ectopy: Rare PVCs. Conduction: Normal. Axis: Normal. Clinical Impression: Atrial Fibrillation with PVCs       Imaging Results              X-Ray Chest 1 View (Final result)  Result time 05/23/25 19:54:58      Final result by Ramana Mcleod MD (05/23/25 19:54:58)                   Narrative:    EXAMINATION  XR CHEST 1 VIEW    CLINICAL HISTORY  weakness;    TECHNIQUE  A total of 1 frontal image(s) submitted of the chest.    COMPARISON  4 May 
2025    FINDINGS  Lines/tubes/devices: ECG leads overlie the imaged region.  Right PICC no longer visualized.    There is similar enlargement the cardiac silhouette, central vascular congestion, and widespread lung interstitial changes.  The trachea is midline. No new or worsening consolidation is developed in the interval.  There is no large pleural effusion or convincing pneumothorax.    Regional osseous structures and extrathoracic soft tissues are similar.    IMPRESSION  No acute process or other adverse interval change.  Chronic secondary findings discussed above.      Electronically signed by: Ramana Mcleod  Date:    05/23/2025  Time:    19:54                                     Medications   cefTRIAXone injection 1 g (has no administration in time range)   lactated ringers infusion (1,000 mLs Intravenous New Bag 5/23/25 2037)     Medical Decision Making  Amount and/or Complexity of Data Reviewed  Labs: ordered. Decision-making details documented in ED Course.  Radiology: ordered and independent interpretation performed. Decision-making details documented in ED Course.  ECG/medicine tests: ordered and independent interpretation performed. Decision-making details documented in ED Course.  Discussion of management or test interpretation with external provider(s): 8:49 PM Consult: I discussed the case with Dr. May ([facility]). Agrees with current management.   Recommends will evaluate in ED      Risk  Prescription drug management.  Decision regarding hospitalization.      Additional MDM:   Differential Diagnosis:   Hypothyroidism, medication side effect, orthostatic weakness, kidney failure, stroke, among others                                      Clinical Impression:  Final diagnoses:  [R53.1] Weakness  [I50.9] Chronic congestive heart failure, unspecified heart failure type (Primary)  [I48.11] Longstanding persistent atrial fibrillation  [N17.9] ELIZABETH (acute kidney injury)  [N30.00] Acute cystitis without 
hematuria  [Z78.9] Unable to care for self          ED Disposition Condition    Admit                       [1]   Social History  Tobacco Use    Smoking status: Every Day     Current packs/day: 0.50     Average packs/day: 0.8 packs/day for 50.4 years (40.9 ttl pk-yrs)     Types: Cigarettes     Start date: 1975     Passive exposure: Current    Smokeless tobacco: Never    Tobacco comments:     States smoke 2-3 cigarettes per day   Substance Use Topics    Alcohol use: Yes     Alcohol/week: 3.0 standard drinks of alcohol     Types: 3 Cans of beer per week     Comment: socially    Drug use: Not Currently     Frequency: 1.0 times per week     Types: Marijuana     Comment: no use in over 1 year        Obie Wright MD  05/23/25 2051    
DISPLAY PLAN FREE TEXT

## 2025-06-03 ENCOUNTER — RX RENEWAL (OUTPATIENT)
Age: 80
End: 2025-06-03

## 2025-06-26 ENCOUNTER — INPATIENT (INPATIENT)
Facility: HOSPITAL | Age: 80
LOS: 2 days | Discharge: HOME | DRG: 313 | End: 2025-06-29
Attending: STUDENT IN AN ORGANIZED HEALTH CARE EDUCATION/TRAINING PROGRAM | Admitting: STUDENT IN AN ORGANIZED HEALTH CARE EDUCATION/TRAINING PROGRAM
Payer: MEDICARE

## 2025-06-26 ENCOUNTER — RESULT REVIEW (OUTPATIENT)
Age: 80
End: 2025-06-26

## 2025-06-26 VITALS
OXYGEN SATURATION: 94 % | DIASTOLIC BLOOD PRESSURE: 80 MMHG | WEIGHT: 274.92 LBS | RESPIRATION RATE: 19 BRPM | SYSTOLIC BLOOD PRESSURE: 145 MMHG | HEART RATE: 85 BPM | TEMPERATURE: 98 F

## 2025-06-26 DIAGNOSIS — Z29.9 ENCOUNTER FOR PROPHYLACTIC MEASURES, UNSPECIFIED: ICD-10-CM

## 2025-06-26 DIAGNOSIS — E11.9 TYPE 2 DIABETES MELLITUS WITHOUT COMPLICATIONS: ICD-10-CM

## 2025-06-26 DIAGNOSIS — I48.91 UNSPECIFIED ATRIAL FIBRILLATION: ICD-10-CM

## 2025-06-26 DIAGNOSIS — I50.9 HEART FAILURE, UNSPECIFIED: ICD-10-CM

## 2025-06-26 DIAGNOSIS — R07.9 CHEST PAIN, UNSPECIFIED: ICD-10-CM

## 2025-06-26 DIAGNOSIS — N28.9 DISORDER OF KIDNEY AND URETER, UNSPECIFIED: ICD-10-CM

## 2025-06-26 DIAGNOSIS — I21.4 NON-ST ELEVATION (NSTEMI) MYOCARDIAL INFARCTION: ICD-10-CM

## 2025-06-26 DIAGNOSIS — I10 ESSENTIAL (PRIMARY) HYPERTENSION: ICD-10-CM

## 2025-06-26 LAB
ALBUMIN SERPL ELPH-MCNC: 3.8 G/DL — SIGNIFICANT CHANGE UP (ref 3.3–5)
ALP SERPL-CCNC: 86 U/L — SIGNIFICANT CHANGE UP (ref 40–120)
ALT FLD-CCNC: 45 U/L — SIGNIFICANT CHANGE UP (ref 12–78)
ANION GAP SERPL CALC-SCNC: 5 MMOL/L — SIGNIFICANT CHANGE UP (ref 5–17)
APTT BLD: 31.5 SEC — SIGNIFICANT CHANGE UP (ref 26.1–36.8)
AST SERPL-CCNC: 63 U/L — HIGH (ref 15–37)
BASOPHILS # BLD AUTO: 0.03 K/UL — SIGNIFICANT CHANGE UP (ref 0–0.2)
BASOPHILS NFR BLD AUTO: 0.5 % — SIGNIFICANT CHANGE UP (ref 0–2)
BILIRUB SERPL-MCNC: 1 MG/DL — SIGNIFICANT CHANGE UP (ref 0.2–1.2)
BUN SERPL-MCNC: 55 MG/DL — HIGH (ref 7–23)
CALCIUM SERPL-MCNC: 9 MG/DL — SIGNIFICANT CHANGE UP (ref 8.5–10.1)
CHLORIDE SERPL-SCNC: 112 MMOL/L — HIGH (ref 96–108)
CK MB BLD-MCNC: 9.5 % — HIGH (ref 0–3.5)
CK MB CFR SERPL CALC: 11.1 NG/ML — HIGH (ref 0–3.6)
CK MB CFR SERPL CALC: 77.3 NG/ML — HIGH (ref 0–3.6)
CK SERPL-CCNC: 817 U/L — HIGH (ref 26–192)
CO2 SERPL-SCNC: 24 MMOL/L — SIGNIFICANT CHANGE UP (ref 22–31)
CREAT SERPL-MCNC: 1.9 MG/DL — HIGH (ref 0.5–1.3)
EGFR: 27 ML/MIN/1.73M2 — LOW
EGFR: 27 ML/MIN/1.73M2 — LOW
EOSINOPHIL # BLD AUTO: 0.04 K/UL — SIGNIFICANT CHANGE UP (ref 0–0.5)
EOSINOPHIL NFR BLD AUTO: 0.6 % — SIGNIFICANT CHANGE UP (ref 0–6)
FLUAV AG NPH QL: SIGNIFICANT CHANGE UP
FLUBV AG NPH QL: SIGNIFICANT CHANGE UP
GLUCOSE SERPL-MCNC: 212 MG/DL — HIGH (ref 70–99)
HCT VFR BLD CALC: 44.9 % — SIGNIFICANT CHANGE UP (ref 34.5–45)
HGB BLD-MCNC: 14.7 G/DL — SIGNIFICANT CHANGE UP (ref 11.5–15.5)
IMM GRANULOCYTES # BLD AUTO: 0.03 K/UL — SIGNIFICANT CHANGE UP (ref 0–0.07)
IMM GRANULOCYTES NFR BLD AUTO: 0.5 % — SIGNIFICANT CHANGE UP (ref 0–0.9)
INR BLD: 1.31 RATIO — HIGH (ref 0.85–1.16)
LIDOCAIN IGE QN: 38 U/L — SIGNIFICANT CHANGE UP (ref 13–75)
LYMPHOCYTES # BLD AUTO: 1.14 K/UL — SIGNIFICANT CHANGE UP (ref 1–3.3)
LYMPHOCYTES NFR BLD AUTO: 17.6 % — SIGNIFICANT CHANGE UP (ref 13–44)
MAGNESIUM SERPL-MCNC: 2.1 MG/DL — SIGNIFICANT CHANGE UP (ref 1.6–2.6)
MCHC RBC-ENTMCNC: 29.6 PG — SIGNIFICANT CHANGE UP (ref 27–34)
MCHC RBC-ENTMCNC: 32.7 G/DL — SIGNIFICANT CHANGE UP (ref 32–36)
MCV RBC AUTO: 90.5 FL — SIGNIFICANT CHANGE UP (ref 80–100)
MONOCYTES # BLD AUTO: 0.41 K/UL — SIGNIFICANT CHANGE UP (ref 0–0.9)
MONOCYTES NFR BLD AUTO: 6.3 % — SIGNIFICANT CHANGE UP (ref 2–14)
NEUTROPHILS # BLD AUTO: 4.84 K/UL — SIGNIFICANT CHANGE UP (ref 1.8–7.4)
NEUTROPHILS NFR BLD AUTO: 74.5 % — SIGNIFICANT CHANGE UP (ref 43–77)
NRBC # BLD AUTO: 0 K/UL — SIGNIFICANT CHANGE UP (ref 0–0)
NRBC # FLD: 0 K/UL — SIGNIFICANT CHANGE UP (ref 0–0)
NRBC BLD AUTO-RTO: 0 /100 WBCS — SIGNIFICANT CHANGE UP (ref 0–0)
NT-PROBNP SERPL-SCNC: 713 PG/ML — HIGH (ref 0–450)
PLATELET # BLD AUTO: 130 K/UL — LOW (ref 150–400)
PMV BLD: 11.9 FL — SIGNIFICANT CHANGE UP (ref 7–13)
POTASSIUM SERPL-MCNC: 4.8 MMOL/L — SIGNIFICANT CHANGE UP (ref 3.5–5.3)
POTASSIUM SERPL-SCNC: 4.8 MMOL/L — SIGNIFICANT CHANGE UP (ref 3.5–5.3)
PROT SERPL-MCNC: 7.6 G/DL — SIGNIFICANT CHANGE UP (ref 6–8.3)
PROTHROM AB SERPL-ACNC: 15.3 SEC — HIGH (ref 9.9–13.4)
RBC # BLD: 4.96 M/UL — SIGNIFICANT CHANGE UP (ref 3.8–5.2)
RBC # FLD: 15.9 % — HIGH (ref 10.3–14.5)
RSV RNA NPH QL NAA+NON-PROBE: SIGNIFICANT CHANGE UP
SARS-COV-2 RNA SPEC QL NAA+PROBE: SIGNIFICANT CHANGE UP
SODIUM SERPL-SCNC: 141 MMOL/L — SIGNIFICANT CHANGE UP (ref 135–145)
SOURCE RESPIRATORY: SIGNIFICANT CHANGE UP
TROPONIN I, HIGH SENSITIVITY RESULT: 1255.9 NG/L — HIGH
TROPONIN I, HIGH SENSITIVITY RESULT: HIGH NG/L
WBC # BLD: 6.49 K/UL — SIGNIFICANT CHANGE UP (ref 3.8–10.5)
WBC # FLD AUTO: 6.49 K/UL — SIGNIFICANT CHANGE UP (ref 3.8–10.5)

## 2025-06-26 RX ORDER — HEPARIN SODIUM 1000 [USP'U]/ML
5000 INJECTION INTRAVENOUS; SUBCUTANEOUS ONCE
Refills: 0 | Status: COMPLETED | OUTPATIENT
Start: 2025-06-26 | End: 2025-06-26

## 2025-06-26 RX ORDER — BUMETANIDE 1 MG/1
1 TABLET ORAL
Refills: 0 | DISCHARGE

## 2025-06-26 RX ORDER — FUROSEMIDE 10 MG/ML
40 INJECTION INTRAMUSCULAR; INTRAVENOUS ONCE
Refills: 0 | Status: COMPLETED | OUTPATIENT
Start: 2025-06-26 | End: 2025-06-26

## 2025-06-26 RX ORDER — CLOPIDOGREL BISULFATE 75 MG/1
75 TABLET, FILM COATED ORAL DAILY
Refills: 0 | Status: DISCONTINUED | OUTPATIENT
Start: 2025-06-27 | End: 2025-06-29

## 2025-06-26 RX ORDER — DEXTROSE 50 % IN WATER 50 %
25 SYRINGE (ML) INTRAVENOUS ONCE
Refills: 0 | Status: DISCONTINUED | OUTPATIENT
Start: 2025-06-26 | End: 2025-06-27

## 2025-06-26 RX ORDER — INSULIN GLARGINE-YFGN 100 [IU]/ML
18 INJECTION, SOLUTION SUBCUTANEOUS
Refills: 0 | DISCHARGE

## 2025-06-26 RX ORDER — APIXABAN 2.5 MG/1
1 TABLET, FILM COATED ORAL
Refills: 0 | DISCHARGE

## 2025-06-26 RX ORDER — DAPAGLIFLOZIN 5 MG/1
1 TABLET, FILM COATED ORAL
Refills: 0 | DISCHARGE

## 2025-06-26 RX ORDER — DEXTROSE 50 % IN WATER 50 %
15 SYRINGE (ML) INTRAVENOUS ONCE
Refills: 0 | Status: DISCONTINUED | OUTPATIENT
Start: 2025-06-26 | End: 2025-06-27

## 2025-06-26 RX ORDER — ASPIRIN 325 MG
0 TABLET ORAL
Refills: 0 | DISCHARGE

## 2025-06-26 RX ORDER — ATORVASTATIN CALCIUM 80 MG/1
40 TABLET, FILM COATED ORAL AT BEDTIME
Refills: 0 | Status: DISCONTINUED | OUTPATIENT
Start: 2025-06-26 | End: 2025-06-29

## 2025-06-26 RX ORDER — GLUCAGON 3 MG/1
1 POWDER NASAL ONCE
Refills: 0 | Status: DISCONTINUED | OUTPATIENT
Start: 2025-06-26 | End: 2025-06-27

## 2025-06-26 RX ORDER — LORAZEPAM 4 MG/ML
0.5 VIAL (ML) INJECTION DAILY
Refills: 0 | Status: DISCONTINUED | OUTPATIENT
Start: 2025-06-26 | End: 2025-06-29

## 2025-06-26 RX ORDER — HEPARIN SODIUM 1000 [USP'U]/ML
INJECTION INTRAVENOUS; SUBCUTANEOUS
Qty: 25000 | Refills: 0 | Status: DISCONTINUED | OUTPATIENT
Start: 2025-06-26 | End: 2025-06-27

## 2025-06-26 RX ORDER — BUMETANIDE 1 MG/1
2 TABLET ORAL DAILY
Refills: 0 | Status: DISCONTINUED | OUTPATIENT
Start: 2025-06-26 | End: 2025-06-29

## 2025-06-26 RX ORDER — INSULIN LISPRO 100 U/ML
INJECTION, SOLUTION INTRAVENOUS; SUBCUTANEOUS
Refills: 0 | Status: DISCONTINUED | OUTPATIENT
Start: 2025-06-26 | End: 2025-06-27

## 2025-06-26 RX ORDER — GLIMEPIRIDE 4 MG/1
1 TABLET ORAL
Refills: 0 | DISCHARGE

## 2025-06-26 RX ORDER — ASPIRIN 325 MG
81 TABLET ORAL EVERY 24 HOURS
Refills: 0 | Status: DISCONTINUED | OUTPATIENT
Start: 2025-06-27 | End: 2025-06-27

## 2025-06-26 RX ORDER — INSULIN GLARGINE-YFGN 100 [IU]/ML
8 INJECTION, SOLUTION SUBCUTANEOUS
Refills: 0 | DISCHARGE

## 2025-06-26 RX ORDER — CLOPIDOGREL BISULFATE 75 MG/1
600 TABLET, FILM COATED ORAL ONCE
Refills: 0 | Status: COMPLETED | OUTPATIENT
Start: 2025-06-26 | End: 2025-06-26

## 2025-06-26 RX ORDER — ATORVASTATIN CALCIUM 80 MG/1
1 TABLET, FILM COATED ORAL
Refills: 0 | DISCHARGE

## 2025-06-26 RX ORDER — MELATONIN 5 MG
3 TABLET ORAL AT BEDTIME
Refills: 0 | Status: DISCONTINUED | OUTPATIENT
Start: 2025-06-26 | End: 2025-06-29

## 2025-06-26 RX ORDER — DOXAZOSIN MESYLATE 8 MG/1
1 TABLET ORAL
Refills: 0 | DISCHARGE

## 2025-06-26 RX ORDER — SODIUM CHLORIDE 9 G/1000ML
1000 INJECTION, SOLUTION INTRAVENOUS
Refills: 0 | Status: DISCONTINUED | OUTPATIENT
Start: 2025-06-26 | End: 2025-06-27

## 2025-06-26 RX ORDER — DEXTROSE 50 % IN WATER 50 %
12.5 SYRINGE (ML) INTRAVENOUS ONCE
Refills: 0 | Status: DISCONTINUED | OUTPATIENT
Start: 2025-06-26 | End: 2025-06-27

## 2025-06-26 RX ORDER — HEPARIN SODIUM 1000 [USP'U]/ML
6000 INJECTION INTRAVENOUS; SUBCUTANEOUS EVERY 6 HOURS
Refills: 0 | Status: DISCONTINUED | OUTPATIENT
Start: 2025-06-26 | End: 2025-06-27

## 2025-06-26 RX ORDER — ACETAMINOPHEN 500 MG/5ML
650 LIQUID (ML) ORAL EVERY 6 HOURS
Refills: 0 | Status: DISCONTINUED | OUTPATIENT
Start: 2025-06-26 | End: 2025-06-29

## 2025-06-26 RX ORDER — INSULIN LISPRO 100 U/ML
INJECTION, SOLUTION INTRAVENOUS; SUBCUTANEOUS AT BEDTIME
Refills: 0 | Status: DISCONTINUED | OUTPATIENT
Start: 2025-06-26 | End: 2025-06-27

## 2025-06-26 RX ORDER — LISINOPRIL 5 MG/1
1 TABLET ORAL
Refills: 0 | DISCHARGE

## 2025-06-26 RX ADMIN — HEPARIN SODIUM 5000 UNIT(S): 1000 INJECTION INTRAVENOUS; SUBCUTANEOUS at 18:49

## 2025-06-26 RX ADMIN — Medication 0.5 MILLIGRAM(S): at 23:39

## 2025-06-26 RX ADMIN — HEPARIN SODIUM 1000 UNIT(S)/HR: 1000 INJECTION INTRAVENOUS; SUBCUTANEOUS at 18:51

## 2025-06-26 RX ADMIN — CLOPIDOGREL BISULFATE 600 MILLIGRAM(S): 75 TABLET, FILM COATED ORAL at 18:45

## 2025-06-26 RX ADMIN — HEPARIN SODIUM 1000 UNIT(S)/HR: 1000 INJECTION INTRAVENOUS; SUBCUTANEOUS at 21:26

## 2025-06-26 RX ADMIN — FUROSEMIDE 40 MILLIGRAM(S): 10 INJECTION INTRAMUSCULAR; INTRAVENOUS at 16:52

## 2025-06-26 RX ADMIN — Medication 3 MILLIGRAM(S): at 23:38

## 2025-06-26 RX ADMIN — HEPARIN SODIUM 1000 UNIT(S)/HR: 1000 INJECTION INTRAVENOUS; SUBCUTANEOUS at 19:09

## 2025-06-26 RX ADMIN — ATORVASTATIN CALCIUM 40 MILLIGRAM(S): 80 TABLET, FILM COATED ORAL at 21:05

## 2025-06-26 NOTE — ED ADULT TRIAGE NOTE - NSSEPSISSUSPECTED_ED_A_ED
[facility] EMERGENCY DEPT  EMERGENCY DEPARTMENT ENCOUNTER      Pt Name: Dave Clements Jr.  MRN: [id number]  Birthdate 1957  Date of evaluation: 2/1/2024  Provider: Willy Salazar MD    CHIEF COMPLAINT       Chief Complaint   Patient presents with    Headache         HISTORY OF PRESENT ILLNESS   (Location/Symptom, Timing/Onset, Context/Setting, Quality, Duration, Modifying Factors, Severity)  Note limiting factors.   67M w/ hx TIA and HTN p/w 3wks HA. Pt reports 3wks intermittent b/l gradual onset throbbing HA w/ occasional blurry vision. Also intermittent mid chest tightness unrelated to activity or exertion. No diaphoresis, N/V, SOB. Feeling lightheaded but no syncope or vertigo. Denies any recent head injuries, hx of malignancy, brain surgeries, neck pain/stiffness, fevers, vomiting, vision/speech changes, gait abnl, facial droop, ext weakness/numbness, seizure or hx of intracranial aneurysms. Also no cardiac hx. No recent surgeries, hospitalizations, travel, hx of malignancy, exogenous estrogen use, hemoptysis, LE pain/swelling, or hx of PE/DVT. No drugs/etoh or hx of smoking. Hasn't taken anything for his symptoms.                Review of External Medical Records:     Nursing Notes were reviewed.    REVIEW OF SYSTEMS    (2-9 systems for level 4, 10 or more for level 5)     Review of Systems   Constitutional:  Negative for diaphoresis and fever.   HENT:  Negative for nosebleeds.    Eyes:  Negative for visual disturbance.   Respiratory:  Negative for cough, shortness of breath and wheezing.    Cardiovascular:  Positive for chest pain. Negative for palpitations and leg swelling.   Gastrointestinal:  Negative for abdominal distention, abdominal pain, anal bleeding, blood in stool, diarrhea, nausea and vomiting.   Endocrine: Negative for polyuria.   Genitourinary:  Negative for difficulty urinating, dysuria and hematuria.   Musculoskeletal:  Negative for joint swelling.   Skin:  Negative for wound.   Neurological:  
No

## 2025-06-26 NOTE — ED PROVIDER NOTE - CARE PLAN
Principal Discharge DX:	Chest pain  Secondary Diagnosis:	Elevated troponin  Secondary Diagnosis:	CHF exacerbation   1

## 2025-06-26 NOTE — H&P ADULT - NSHPPHYSICALEXAM_GEN_ALL_CORE
Physical Exam  General: awake, no apparent distress, moist mucous membranes  Cardiac: regular rate, no murmur  Respiratory: mild b/l crackles, no accessory muscle use, retractions, or nasal flaring  Abdomen: Soft, nontender not distended  Extremities: b/l LE edema   Skin: No rash. Warm and well perfused, cap refill<2 seconds  Neurologic: alert, oriented, CN intact, motor and sensation grossly intact T(C): 36.7 (06-26-25 @ 20:57), Max: 36.8 (06-26-25 @ 15:40)  HR: 67 (06-26-25 @ 20:57) (67 - 85)  BP: 149/63 (06-26-25 @ 20:57) (145/80 - 149/63)  RR: 18 (06-26-25 @ 20:57) (18 - 19)  SpO2: 100% (06-26-25 @ 20:57) (94% - 100%)    CONSTITUTIONAL: Well groomed, no apparent distress  EYES: PERRLA and symmetric, EOMI, No conjunctival or scleral injection, non-icteric  ENMT: Oral mucosa with moist membranes. Normal dentition; no pharyngeal injection or exudates             NECK: Supple, symmetric and without tracheal deviation   RESP: No respiratory distress, no use of accessory muscles; CTA b/l, no WRR  CV: RRR, +S1S2, no MRG; no JVD; no peripheral edema  GI: Soft, NT, ND, no rebound, no guarding; no palpable masses; no hepatosplenomegaly; no hernia palpated  LYMPH: No cervical LAD or tenderness; no axillary LAD or tenderness; no inguinal LAD or tenderness  MSK: Normal gait; No digital clubbing or cyanosis; examination of the (head/neck/spine/ribs/pelvis, RUE, LUE, RLE, LLE) without misalignment,            Normal ROM without pain, no spinal tenderness, normal muscle strength/tone  SKIN: No rashes or ulcers noted; no subcutaneous nodules or induration palpable  NEURO: CN II-XII intact; normal reflexes in upper and lower extremities, sensation intact in upper and lower extremities b/l to light touch   PSYCH: Appropriate insight/judgment; A+O x 3, mood and affect appropriate, recent/remote memory intact

## 2025-06-26 NOTE — CONSULT NOTE ADULT - ATTENDING COMMENTS
80 y/o female with a CAD s/p stents to prox and mid LAD in 2020, HFpEF (EF 70-75%in 2/2025) Anxiety, Afib on eliquis, Adrenal Nodule, Renal insufficiency baseline Cr 1.7-1.9 on previous labs, HLD, HTN, DM, ABDOUL presents to the ED with NSTEMI.    - reports chest burning and shortness of breath over the last few days  - troponin 1255, and I have concern for acs and nstemi  - will treat with asa 325, Plavix 600 (then 75 daily) and heparin gtt  - high intensity statin  - does have history of cad with pci to RCA and LAD in 2020   - echocardiogram  - does appear volume overloaded, and will give Bumex 2 mg iv x 1, then 2 mg iv daily  - 02 supplementation as needed  - hold eliquis and ccb. Will also hold multaq for now.  - creatinine near baseline at 1.9  - given symptoms and CE leak, will plan for cath in AM. Please keep NPO. Of note, she has a neurologic event post cath in 2020, and is worried about this.  - Other cardiovascular workup will depend on clinical course.  - Will continue to follow.

## 2025-06-26 NOTE — PATIENT PROFILE ADULT - PATIENT'S PREFERRED PRONOUN
Called phone number in chart.  Person answering phone states Xavier not there .   Asked her to inform pt Call office at [phone], agreed.   
Her/She

## 2025-06-26 NOTE — H&P ADULT - HISTORY OF PRESENT ILLNESS
78 y/o female with a pmhx of CAD s/p 2 stents to prox and mid LAD in 2020, HFpEF (EF 70-75%in 2/2025) Anxiety, Afib on eliquis, Adrenal Nodule, Renal insufficiency, HLD, HTN, DM, ABDOUL presents to the ED with3 days of Heartburn like chest pain and worsening shortness of breath. Over the past 3 days pt states she has had heart burn on the left side of her chest associated with progressively worsening dyspnea on exertion. She states today she tried taking tums and the pain just got worse along with shortness of breath at rest. She states she has also been feeling congested in her chest with an associated cough for the past couple of days. Also admits to associated leg swelling over the past week. Denies HA, Focal weakness, Palpitations, N/V, orthopnea.     ED course  Vitals: T 98.3 , HR 85 , /80 , RR 19 , SpO2 94% on 2L  Significant labs: BUN 55 Cr 1.9 Glucose 212 AST 63 Trop 1255.9 CKMB 11.1 proBNP 713   Imaging: CXR: Heart is enlarged despite AP film shallow inspiration. Bilateral predominantly basilar interstitial prominence favored to   represent congestive changes. no focal consolidation or pleural effusion. No pneumothorax. Calcific bursitis left shoulder  EKG: Sinus Rhythm with 1st Degree AV block. No evidence of significant change from previous EKG in March 2025  In ED patient given: Plavix 600mg x1, Lasix 40 IV x1,        78 y/o female with a pmhx of CAD s/p 2 stents to prox and mid LAD in 2020, HFpEF (EF 70-75%in 2/2025) Anxiety, Afib on eliquis, Adrenal Nodule, Renal insufficiency, HLD, HTN, DM, ABDOUL presents to the ED with3 days of Heartburn like chest pain and worsening shortness of breath. Over the past 3 days pt states she has had heart burn on the left side of her chest associated with progressively worsening dyspnea on exertion. She states today she tried taking tums and the pain just got worse along with shortness of breath at rest. She states she has also been feeling congested in her chest with an associated cough for the past couple of days. Also admits to associated leg swelling over the past week. She states the now she does not have chest pain and is feeling better. Denies HA, Focal weakness, Palpitations, N/V, orthopnea.     ED course  Vitals: T 98.3 , HR 85 , /80 , RR 19 , SpO2 94% on 2L  Significant labs: BUN 55 Cr 1.9 Glucose 212 AST 63 Trop 1255.9 CKMB 11.1 proBNP 713   Imaging: CXR: Heart is enlarged despite AP film shallow inspiration. Bilateral predominantly basilar interstitial prominence favored to   represent congestive changes. no focal consolidation or pleural effusion. No pneumothorax. Calcific bursitis left shoulder  EKG: Sinus Rhythm with 1st Degree AV block. No evidence of significant change from previous EKG in March 2025  In ED patient given: Plavix 600mg x1, Lasix 40 IV x1,

## 2025-06-26 NOTE — ED ADULT NURSE NOTE - JUGULAR VENOUS DISTENTION
absent Chonodrocutaneous Helical Advancement Flap Text: The defect edges were debeveled with a #15 scalpel blade.  Given the location of the defect and the proximity to free margins a chondrocutaneous helical advancement flap was deemed most appropriate.  Using a sterile surgical marker, the appropriate advancement flap was drawn incorporating the defect and placing the expected incisions within the relaxed skin tension lines where possible.    The area thus outlined was incised deep to adipose tissue with a #15 scalpel blade.  The skin margins were undermined to an appropriate distance in all directions utilizing iris scissors.

## 2025-06-26 NOTE — CONSULT NOTE ADULT - ASSESSMENT
A 80 y/o female with a pmhx of CAD s/p 2 stents to prox and mid LAD in 2020 @ [facility] ( Dr Fidel Parra) , HFpEF (EF 70-75%in 2/2025) Anxiety, Afib on eliquis, Adrenal Nodule, Renal insufficiency, HLD, HTN, DM, ABDOUL presents to the ED with3 days of Heartburn like chest pain and worsening shortness of breath. now with + troponins to 1255 / NSTEMI Asymptomatic at present  1) Admit to telemetry via Hospitalist team   PRE-PROCEDURE ASSESSMENT  -NPO after midnight for LHC in am with Dr Alistair Macias  -Eliquis discontinued ( last dose this am)  -Patient seen and examined  -Labs reviewed  -Pre-procedure teaching completed with patient   -Consent to be obtained by Interventional Cardiologist  -Questions answered  2) TTE ordered/ pending  3) Cardiac enzymes/ trend to peak  4) Continue heparin infusion  5) Continue DAPT, statin s/p  mg by EMS/ 600 mg Plavix loading dose in ED  6) remainder of care per cardiology/ hospitalist teams

## 2025-06-26 NOTE — H&P ADULT - ATTENDING COMMENTS
Simple: Patient demonstrates quick and easy understanding Patient is seen and examined with the Resident. Patient presented with chest pain and worsening SOB. Found to have NSTEMI. Started on Heparin drip. Continue Plavix and Aspirin. Scheduled to have [facility] tomorrow. also started on IV Bumex. Follow clinically in telemetry unit.

## 2025-06-26 NOTE — H&P ADULT - NSHPSOCIALHISTORY_GEN_ALL_CORE
Tobacco: denies  EtOH:  denies  Lives with:   independent Tobacco: denies  EtOH:  denies  Lives with: family  independent

## 2025-06-26 NOTE — ED PROVIDER NOTE - OBJECTIVE STATEMENT
79-year-old female PMH of CAD with cardiac stent x 2, A-fib on Eliquis, CHF renal insufficiency, HLD, HTN, DM, presents to the emergency department by ambulance with report of shortness of breath, chest discomfort, given aspirin 324 mg and given oxygen, patient took tums prior to arrival and is feeling better. 79-year-old female PMH of CAD with cardiac stent x 2, A-fib on Eliquis, CHF renal insufficiency, HLD, HTN, DM, presents to the emergency department by ambulance with report of shortness of breath, chest discomfort, given aspirin 324 mg and given oxygen, patient took tums prior to arrival and is feeling better. Patient states that for the past 2 days she has been having nonproductive cough, chest congestion, has been using her albuterol with no relief.

## 2025-06-26 NOTE — CONSULT NOTE ADULT - SUBJECTIVE AND OBJECTIVE BOX
Department of Cardiology                                                               Division of Interventional Cardiology                                                               [facility] [id number]                                                                                 [phone]                                                                                                                               Interventional Cardiology Consult / Pre-Procedure Note    Subjective/ROS:   Denies CP, SOB, palpitations, N/V, fever/chills, abd pain, numbness/tingling/weakness, other c/o at this time.  ROS negative x 10 systems except as documented as above.    HPI: A 78 y/o female with a pmhx of CAD s/p 2 stents to prox and mid LAD in 2020 @ [facility] ( Dr Fidel Parra) , HFpEF (EF 70-75%in 2/2025) Anxiety, Afib on eliquis, Adrenal Nodule, Renal insufficiency, HLD, HTN, DM, ABDOUL presents to the ED with3 days of Heartburn like chest pain and worsening shortness of breath. Over the past 3 days pt states she has had heart burn on the left side of her chest associated with progressively worsening dyspnea on exertion. She states today she tried taking tums and the pain just got worse along with shortness of breath at rest. She states she has also been feeling congested in her chest with an associated cough for the past couple of days. Also admits to associated leg swelling over the past week. Denies HA, Focal weakness, Palpitations, N/V, orthopnea.     06.26.25- Cardiology Dr Myke Tolbert requested IC evaluate pt for a LHC secondary to aforementioned HPI / heartburn/ + troponins 1255/ NSTEMI/ and PMH of CAD requiring 2 stents ( as above in 2020) with multiple risk factors-prior CAD, Obesity, T2DM, HTN, HLD,     PAST MEDICAL & SURGICAL HISTORY:  CAD (coronary artery disease)      HTN (hypertension)      Congestive heart failure (CHF)        FAMILY HISTORY: NC    Social History: lives with daughter, denies any tobacco, etoh caffeine or recreational drug usage      Symptoms:        Angina (Class): 4       Ischemic Symptoms: Heart burn      Echo: ordered/ pending    TTE 2/2025 70-75% HFpEF         Prior Cardiac Interventions: yes 2 stents prox & mLAD 2020 @ [facility]               Associated Risk Factors:        Frailty Screening: (N/A, mild, mod, severe)       Cerebrovascular Disease: N/A       Chronic Lung Disease: N/A       Peripheral Arterial Disease: N/A       Chronic Kidney Disease (if yes, what is GFR): N/A       Uncontrolled Diabetes (if yes, what is HgbA1C or FBS): N/A       Poorly Controlled Hypertension (if yes, what is SBP): N/A       Morbid Obesity (if yes, what is BMI): N/A       History of Recent Ventricular Arrhythmia: N/A       Inability to Ambulate Safely: N/A       Need for Therapeutic Anticoagulation: Yes on eliquis PAF  currently in NSR       Antiplatelet or Contrast Allergy: N/A      MEDICATIONS  (STANDING):  atorvastatin 40 milliGRAM(s) Oral at bedtime  bumetanide Injectable 2 milliGRAM(s) IV Push daily  heparin  Infusion.  Unit(s)/Hr (10 mL/Hr) IV Continuous <Continuous>    MEDICATIONS  (PRN):  heparin   Injectable 6000 Unit(s) IV Push every 6 hours PRN For aPTT less than 40      No Known Allergies        T(C): 36.8 (06-26-25 @ 15:40), Max: 36.8 (06-26-25 @ 15:40)  HR: 85 (06-26-25 @ 15:40) (85 - 85)  BP: 145/80 (06-26-25 @ 15:40) (145/80 - 145/80)  RR: 19 (06-26-25 @ 15:40) (19 - 19)  SpO2: 94% (06-26-25 @ 15:40) (94% - 94%)  Wt(kg): --    I&O's Summary        TELEMETRY: 	NSR with first degree AVB 70s       ECG:  	  NSR with first degree AVB LVH HR 85      LABS:	 	                        14.7   6.49  )-----------( 130      ( 26 Jun 2025 16:20 )             44.9     06-26    141  |  112[H]  |  55[H]  ----------------------------<  212[H]  4.8   |  24  |  1.90[H]    Ca    9.0      26 Jun 2025 16:20  Mg     2.1     06-26    TPro  7.6  /  Alb  3.8  /  TBili  1.0  /  DBili  x   /  AST  63[H]  /  ALT  45  /  AlkPhos  86  06-26      Constitutional: NAD O2 via NC @ 3LPM in use  Neuro: A+O x 3, non-focal, speech clear and intact  HEENT: NC/AT, PERRL, EOMI, anicteric sclerae, oral mucosa pink and moist  Neck: supple, no JVD  CV: regular rate, regular rhythm, +S1S2, no murmurs or rub  Pulm/chest: lung sounds CTA and equal bilaterally, no accessory muscle use noted  Abd: soft, NT, ND, +BS  Ext: VANEGAS x 4, no C/C/E  Pulses: R radial 2+, R femoral 2+, bilat DP 2+  Skin: warm, well perfused  Psych: calm, appropriate affect

## 2025-06-26 NOTE — H&P ADULT - NSICDXPASTMEDICALHX_GEN_ALL_CORE_FT
PAST MEDICAL HISTORY:  Afib     CAD (coronary artery disease)     Congestive heart failure (CHF)     DM (diabetes mellitus)     HTN (hypertension)     Renal insufficiency

## 2025-06-26 NOTE — ED ADULT NURSE NOTE - OBJECTIVE STATEMENT
patient comes in with complaints of epigastric pain ,chest pain and SOB starting this morning, pt. given 324 aspirin PO by EMS. patient states that for the past few days she has been feeling heartburn but it has gone away when she took tums.

## 2025-06-26 NOTE — CHART NOTE - NSCHARTNOTEFT_GEN_A_CORE
Following patient troponin trend from sign out, noticed patient troponin rise from 1200 --> 10166  - Patient admitted for NSTEMI, started on asa brillinta statin and heparin gtt  - Seen and examined at bedside, reports mild chest discomfort but is attributing to anxiety  - 12 Lead ECG ordered, repeat troponin for midnight  - Dr Tolbert and On Call cath team made aware, will monitor for further symptoms  - If worsening symptoms, may initiate cath team Following patient troponin trend from sign out, noticed patient troponin rise from 1200 --> 31559  - Patient admitted for NSTEMI, started on asa brillinta statin and heparin gtt  - Seen and examined at bedside, reports mild chest discomfort but is attributing to anxiety  - 12 Lead ECG ordered, repeat troponin for midnight  - Dr Tolbert and On Call cath team made aware, will monitor for further symptoms  - If worsening symptoms, may initiate cath team    -- Follow up --  - Repeat troponin 39K, patient having nausea  - Repeat ECG ordered

## 2025-06-26 NOTE — CONSULT NOTE ADULT - SUBJECTIVE AND OBJECTIVE BOX
Patient is a 79y old  Female who presents with a chief complaint of     HPI: 80 y/o female with a pmhx of CAD s/p 2 stents to prox and mid LAD in 2020, HFpEF (EF 70-75%in 2/2025) Anxiety, Afib on eliquis, Adrenal Nodule, Renal insufficiency, HLD, HTN, DM, ABDOUL presents to the ED with3 days of Heartburn like chest pain and worsening shortness of breath. Over the past 3 days pt states she has had heart burn on the left side of her chest associated with progressively worsening dyspnea on exertion. She states today she tried taking tums and the pain just got worse along with shortness of breath at rest. She states she has also been feeling congested in her chest with an associated cough for the past couple of days. Also admits to associated leg swelling over the past week. Denies HA, Focal weakness, Palpitations, N/V, orthopnea.     Took her AM Eliquis dose today.    Cardiologist Dr. [facility].      PAST MEDICAL & SURGICAL HISTORY:      ECHO  FINDINGS:      MEDICATIONS  (STANDING):  clopidogrel Tablet 600 milliGRAM(s) Oral Once  heparin   Injectable 5000 Unit(s) IV Push once  heparin  Infusion.  Unit(s)/Hr (10 mL/Hr) IV Continuous <Continuous>    MEDICATIONS  (PRN):  heparin   Injectable 6000 Unit(s) IV Push every 6 hours PRN For aPTT less than 40      FAMILY HISTORY:    Denies Family history of CAD or early MI    ROS negative except as noted above      SOCIAL HISTORY:    No tobacco, Alcohol or Ddrug use    Vital Signs Last 24 Hrs  T(C): 36.8 (26 Jun 2025 15:40), Max: 36.8 (26 Jun 2025 15:40)  T(F): 98.3 (26 Jun 2025 15:40), Max: 98.3 (26 Jun 2025 15:40)  HR: 85 (26 Jun 2025 15:40) (85 - 85)  BP: 145/80 (26 Jun 2025 15:40) (145/80 - 145/80)  BP(mean): --  RR: 19 (26 Jun 2025 15:40) (19 - 19)  SpO2: 94% (26 Jun 2025 15:40) (94% - 94%)    Parameters below as of 26 Jun 2025 15:40  Patient On (Oxygen Delivery Method): nasal cannula  O2 Flow (L/min): 2      Physical Exam:  General: Well developed, well nourished, NAD  HEENT: NCAT, PERRLA, EOMI bl, moist mucous membranes   Neck: Supple, nontender, no mass  Neurology: A&Ox3, nonfocal, sensation intact   Respiratory: CRackles present at b/l bases  CV: RRR, +S1/S2, no murmurs, rubs or gallops  Abdominal: Soft, NT, ND +BSx4, no palpable masses  Extremities: 2+ pitting edema in b/l LEs  MSK: Normal ROM, no joint erythema or warmth, no joint swelling   Heme: No obvious ecchymosis or petechiae   Skin: warm, dry, normal color      ECG:    I&O's Detail      LABS:                        14.7   6.49  )-----------( 130      ( 26 Jun 2025 16:20 )             44.9     06-26    141  |  112[H]  |  55[H]  ----------------------------<  212[H]  4.8   |  24  |  1.90[H]    Ca    9.0      26 Jun 2025 16:20  Mg     2.1     06-26    TPro  7.6  /  Alb  3.8  /  TBili  1.0  /  DBili  x   /  AST  63[H]  /  ALT  45  /  AlkPhos  86  06-26    CARDIAC MARKERS ( 26 Jun 2025 16:20 )  x     / x     / x     / x     / 11.1 ng/mL      PT/INR - ( 26 Jun 2025 16:20 )   PT: 15.3 sec;   INR: 1.31 ratio         PTT - ( 26 Jun 2025 16:20 )  PTT:31.5 sec  Urinalysis Basic - ( 26 Jun 2025 16:20 )    Color: x / Appearance: x / SG: x / pH: x  Gluc: 212 mg/dL / Ketone: x  / Bili: x / Urobili: x   Blood: x / Protein: x / Nitrite: x   Leuk Esterase: x / RBC: x / WBC x   Sq Epi: x / Non Sq Epi: x / Bacteria: x      I&O's Summary    BNP  RADIOLOGY & ADDITIONAL STUDIES: Patient is a 79y old  Female who presents with a chief complaint of chest pain    HPI: 80 y/o female with a pmhx of CAD s/p 2 stents to prox and mid LAD in 2020, HFpEF (EF 70-75%in 2/2025) Anxiety, Afib on eliquis, Adrenal Nodule, Renal insufficiency, HLD, HTN, DM, ABDOUL presents to the ED with3 days of Heartburn like chest pain and worsening shortness of breath. Over the past 3 days pt states she has had heart burn on the left side of her chest associated with progressively worsening dyspnea on exertion. She states today she tried taking tums and the pain just got worse along with shortness of breath at rest. She states she has also been feeling congested in her chest with an associated cough for the past couple of days. Also admits to associated leg swelling over the past week. Denies HA, Focal weakness, Palpitations, N/V, orthopnea.     Took her AM Eliquis dose today.    Cardiologist Dr. [facility].      PAST MEDICAL & SURGICAL HISTORY:      ECHO  FINDINGS:      MEDICATIONS  (STANDING):  clopidogrel Tablet 600 milliGRAM(s) Oral Once  heparin   Injectable 5000 Unit(s) IV Push once  heparin  Infusion.  Unit(s)/Hr (10 mL/Hr) IV Continuous <Continuous>    MEDICATIONS  (PRN):  heparin   Injectable 6000 Unit(s) IV Push every 6 hours PRN For aPTT less than 40      FAMILY HISTORY:    Denies Family history of CAD or early MI    ROS negative except as noted above      SOCIAL HISTORY:    No tobacco, Alcohol or Ddrug use    Vital Signs Last 24 Hrs  T(C): 36.8 (26 Jun 2025 15:40), Max: 36.8 (26 Jun 2025 15:40)  T(F): 98.3 (26 Jun 2025 15:40), Max: 98.3 (26 Jun 2025 15:40)  HR: 85 (26 Jun 2025 15:40) (85 - 85)  BP: 145/80 (26 Jun 2025 15:40) (145/80 - 145/80)  BP(mean): --  RR: 19 (26 Jun 2025 15:40) (19 - 19)  SpO2: 94% (26 Jun 2025 15:40) (94% - 94%)    Parameters below as of 26 Jun 2025 15:40  Patient On (Oxygen Delivery Method): nasal cannula  O2 Flow (L/min): 2      Physical Exam:  General: Well developed, well nourished, NAD  HEENT: NCAT, PERRLA, EOMI bl, moist mucous membranes   Neck: Supple, nontender, no mass  Neurology: A&Ox3, nonfocal, sensation intact   Respiratory: CRackles present at b/l bases  CV: RRR, +S1/S2, no murmurs, rubs or gallops  Abdominal: Soft, NT, ND +BSx4, no palpable masses  Extremities: 2+ pitting edema in b/l LEs  MSK: Normal ROM, no joint erythema or warmth, no joint swelling   Heme: No obvious ecchymosis or petechiae   Skin: warm, dry, normal color      ECG:    I&O's Detail      LABS:                        14.7   6.49  )-----------( 130      ( 26 Jun 2025 16:20 )             44.9     06-26    141  |  112[H]  |  55[H]  ----------------------------<  212[H]  4.8   |  24  |  1.90[H]    Ca    9.0      26 Jun 2025 16:20  Mg     2.1     06-26    TPro  7.6  /  Alb  3.8  /  TBili  1.0  /  DBili  x   /  AST  63[H]  /  ALT  45  /  AlkPhos  86  06-26    CARDIAC MARKERS ( 26 Jun 2025 16:20 )  x     / x     / x     / x     / 11.1 ng/mL      PT/INR - ( 26 Jun 2025 16:20 )   PT: 15.3 sec;   INR: 1.31 ratio         PTT - ( 26 Jun 2025 16:20 )  PTT:31.5 sec  Urinalysis Basic - ( 26 Jun 2025 16:20 )    Color: x / Appearance: x / SG: x / pH: x  Gluc: 212 mg/dL / Ketone: x  / Bili: x / Urobili: x   Blood: x / Protein: x / Nitrite: x   Leuk Esterase: x / RBC: x / WBC x   Sq Epi: x / Non Sq Epi: x / Bacteria: x      I&O's Summary    BNP  RADIOLOGY & ADDITIONAL STUDIES:

## 2025-06-26 NOTE — ED PROVIDER NOTE - CLINICAL SUMMARY MEDICAL DECISION MAKING FREE TEXT BOX
Attempted to schedule pt for injection. LM advising pt to contact [phone] to schedule. 79 female with chest pain, placed on cardiac monitor, pulse oximeter, patient already given aspirin by EMS, follow-up EKG, chest x-ray, CBC, CMP, coagulation studies, cardiac enzymes, proBNP, flu/COVID/RSV swab, hold IV fluids for concern of CHF exacerbation, will give Lasix

## 2025-06-26 NOTE — PATIENT PROFILE ADULT - FALL HARM RISK - HARM RISK INTERVENTIONS
Assistance with ambulation/Assistance OOB with selected safe patient handling equipment/Communicate Risk of Fall with Harm to all staff/Discuss with provider need for PT consult/Monitor gait and stability/Provide patient with walking aids - walker, cane, crutches/Reinforce activity limits and safety measures with patient and family/Reorient to person, place and time as needed/Review medications for side effects contributing to fall risk/Sit up slowly, dangle for a short time, stand at bedside before walking/Tailored Fall Risk Interventions/Toileting schedule using arm’s reach rule for commode and bathroom/Use of alarms - bed, chair and/or voice tab/Visual Cue: Yellow wristband and red socks/Bed in lowest position, wheels locked, appropriate side rails in place/Call bell, personal items and telephone in reach/Instruct patient to call for assistance before getting out of bed or chair/Non-slip footwear when patient is out of bed/[place] to call system/Physically safe environment - no spills, clutter or unnecessary equipment/Purposeful Proactive Rounding/Room/bathroom lighting operational, light cord in reach

## 2025-06-26 NOTE — H&P ADULT - PROBLEM SELECTOR PLAN 2
B/l LE swelling and Shortness of breath over the past 3 days.  - CXR shows enlarged heart and congestive changes.  - Pro bnp elevated to 713  - Echo from 2/2025 EF of 70-75% with grade 1 Diastolic Dysfunction  - S/p Lasix 40 mg in ED  - Continue Bumex 2 mg IV daily for diuresis   - TTE ordered f/u results eval for acute wall motion abnormalities  - Hold Home Diltiazem 360 mg daily  - remote Telemetry, cont pulse ox   - Continue Fluid restriction  - Strict I/Os, daily weights B/l LE swelling and Shortness of breath over the past 3 days.  - CXR shows enlarged heart and congestive changes.  - Pro bnp elevated to 713  - Echo from 2/2025 EF of 70-75% with grade 1 Diastolic Dysfunction  - S/p Lasix 40 mg in ED  - Continue Bumex 2 mg IV daily for diuresis   - TTE ordered f/u results eval for acute wall motion abnormalities  - Hold Home Diltiazem 360 mg daily   - remote Telemetry, cont pulse ox   - Continue Fluid restriction  - Strict I/Os, daily weights

## 2025-06-26 NOTE — H&P ADULT - PROBLEM SELECTOR PLAN 1
- Patient with new chest burning and shortness of breath over the past 3 days s/p  mg by EMS  - Troponin 1255, CKMB 11.1  - EKG: Sinus Rhythm with 1st Degree AV block. No evidence of significant change from previous EKG in March 2025  - Hold Eliquis. Start Heparin gtt for AC  - Plavix 600 mg load c/w Plavix 75mg daily   - Trend Cardiac Enzymes to Peak  - Hx of CAD s/p Cath x2 stents to LAD in 2020: Continue ASA, Plavix and Atorvastatin 40 mg daily.  - NPO after Midnight for Cath in the AM - Patient with new chest burning and shortness of breath over the past 3 days s/p  mg by EMS  - Hx of CAD s/p Cath x2 stents to LAD in 2020  - Troponin 1255, CKMB 11.1  - EKG: Sinus Rhythm with 1st Degree AV block. No evidence of significant change from previous EKG in March 2025  - Hold Eliquis. Start Heparin gtt for AC  - Plavix 600 mg load c/w Plavix 75mg daily   - Continue ASA, Plavix and Atorvastatin 40 mg daily.  - Trend Cardiac Enzymes to Peak  - NPO after Midnight for Cath in the AM  -cardio () following

## 2025-06-26 NOTE — H&P ADULT - PROBLEM SELECTOR PLAN 3
EKG: EKG: Sinus Rhythm with 1st Degree AV block. No evidence of significant change from previous EKG in March 2025  - Hold home Diltiazem pending Echo  - Hold Home Eliquis for Cath. Heparin gtt for AC  - hold home Multaq 400 mg BID for now   - remote Telemetry  - Monitor and replete lytes, keep K>4, Mg>2. EKG: EKG: Sinus Rhythm with 1st Degree AV block. No evidence of significant change from previous EKG in March 2025  - Hold home Diltiazem   - Hold Home Eliquis for Cath. Heparin gtt for AC  - hold home Multaq 400 mg BID for now   -unclear if pt is taking doxazosin 2mg daily for afib however will hold for now   - remote Telemetry  - Monitor and replete lytes, keep K>4, Mg>2.

## 2025-06-26 NOTE — ED ADULT TRIAGE NOTE - ESI TRIAGE ACUITY LEVEL, MLM
2 Information: Selecting Yes will display possible errors in your note based on the variables you have selected. This validation is only offered as a suggestion for you. PLEASE NOTE THAT THE VALIDATION TEXT WILL BE REMOVED WHEN YOU FINALIZE YOUR NOTE. IF YOU WANT TO FAX A PRELIMINARY NOTE YOU WILL NEED TO TOGGLE THIS TO 'NO' IF YOU DO NOT WANT IT IN YOUR FAXED NOTE.

## 2025-06-26 NOTE — CONSULT NOTE ADULT - ASSESSMENT
80 y/o female with a pmhx of CAD s/p 2 stents to prox and mid LAD in 2020, HFpEF (EF 70-75%in 2/2025) Anxiety, Afib on eliquis, Adrenal Nodule, Renal insufficiency baseline Cr 1.7-1.9 on previous labs, HLD, HTN, DM, ABDOUL presents to the ED with NSTEMI.    #NSTEMI  - Patient with new chest burning and shortness of breath over the past 3 days. S/p  mg by EMS  - Troponin 1255, CKMB 11.1  - EKG: Sinus Rhythm with 1st Degree AV block. No evidence of significant change from previous EKG in March 2025  - Hold Eliquis. Start Heparin gtt for AC  - Plavix 600 mg load  - Trend Cardiac Enzymes to Peak  - Hx of CAD s/p Cath x2 stents to LAD in 2020: Continue ASA, Plavix and Atorvastatin 40 mg daily.  - NPO after Midnight for Cath in the AM    #HFpEF   - B/l LE swelling and Shortness of breath over the past 3 days.  - CXR shows enlarged heart and congestive changes.  - Pro bnp elevated to 713  - S/p Lasix 40 mg in ED  - Continue Bumex 2 mg IV daily for diuresis  - Echo from 2/2025 EF of 70-75% with grade 1 Diastolic Dysfunction  - TTE ordered f/u results eval for acute wall motion abnormalities  - Hold Home Diltiazem 360 mg daily  - Continue Fluid restriction  - Strict I/Os, daily weights    #HX of Atrial Fibrillation on Eliquis  - EKG: EKG: Sinus Rhythm with 1st Degree AV block. No evidence of significant change from previous EKG in March 2025  - Hold home Diltiazem pending Echo  - Continue home Multaq 400 mg BID  - Monitor and replete lytes, keep K>4, Mg>2.    #HTN  - BP stable currently  - on Home Lisinopril 40 mg daily  - Continue to monitor hemodynamics     - Other cardiovascular workup will depend on clinical course.  - All other workup per primary team.  - Will continue to follow.       80 y/o female with a pmhx of CAD s/p 2 stents to prox and mid LAD in 2020, HFpEF (EF 70-75%in 2/2025) Anxiety, Afib on eliquis, Adrenal Nodule, Renal insufficiency baseline Cr 1.7-1.9 on previous labs, HLD, HTN, DM, ABDOUL presents to the ED with NSTEMI.    #NSTEMI  - Patient with new chest burning and shortness of breath over the past 3 days. S/p  mg by EMS  - Troponin 1255, CKMB 11.1  - EKG: Sinus Rhythm with 1st Degree AV block. No evidence of significant change from previous EKG in March 2025  - Hold Eliquis. Start Heparin gtt for AC  - Plavix 600 mg load  - Trend Cardiac Enzymes to Peak  - Hx of CAD s/p Cath x2 stents to LAD in 2020: Continue ASA, Plavix and Atorvastatin 40 mg daily.  - NPO after Midnight for Cath in the AM    #HFpEF   - B/l LE swelling and Shortness of breath over the past 3 days.  - CXR shows enlarged heart and congestive changes.  - Pro bnp elevated to 713  - Echo from 2/2025 EF of 70-75% with grade 1 Diastolic Dysfunction  - S/p Lasix 40 mg in ED  - Continue Bumex 2 mg IV daily for diuresis  - TTE ordered f/u results eval for acute wall motion abnormalities  - Hold Home Diltiazem 360 mg daily  - remote Telemetry,   - Continue Fluid restriction  - Strict I/Os, daily weights    #HX of Atrial Fibrillation on Eliquis  - EKG: EKG: Sinus Rhythm with 1st Degree AV block. No evidence of significant change from previous EKG in March 2025  - Hold home Diltiazem pending Echo  - Hold Home Eliquis for Cath. Heparin gtt for AC  - Continue home Multaq 400 mg BID  - remote Telemetry,   - Monitor and replete lytes, keep K>4, Mg>2.    #HTN  - BP stable currently  - on Home Lisinopril 40 mg daily  - Continue to monitor hemodynamics     - Other cardiovascular workup will depend on clinical course.  - All other workup per primary team.  - Will continue to follow.

## 2025-06-26 NOTE — H&P ADULT - PROBLEM SELECTOR PLAN 5
follows leilani Carmona   -on home lantus 6U AM and 16U at bedtime and farxiga 10mg and Glimepiride 2 MG  -will c/w LDISS and lantus 3U in AM and 10U at bedtime   -f/u AM a1c   -hypoglycemia protocol follows leilani Carmona   -on home lantus 8U AM and 18U at bedtime and farxiga 10mg and Glimepiride 2 MG BID   -hold home oral diabetes meds   -will c/w LDISS and lantus 4U in AM and 12U at bedtime   -f/u AM a1c   -hypoglycemia protocol follows leilani Carmona   -on home lantus 8U AM and 18U at bedtime and farxiga 10mg and Glimepiride 2 MG BID   -hold home oral diabetes meds   -will c/w MDISS -will hold lantus for now given pt will be NPO after midnight   -f/u AM a1c   -hypoglycemia protocol

## 2025-06-26 NOTE — ED ADULT TRIAGE NOTE - CHIEF COMPLAINT QUOTE
From home w/ chief complaint of chest pain and SOB starting this morning, pt. given 324 aspirin PO by EMS.

## 2025-06-26 NOTE — ED PROVIDER NOTE - PROGRESS NOTE DETAILS
Spoke with cardiology attending Dr. Tolbert, regarding EKG, chest x-ray, elevated troponin, will consult

## 2025-06-26 NOTE — ED PROVIDER NOTE - ABNORMAL RHYTHM
55 yoM with AF, HF, CAD, DM type 2, HTN here for AF management. He suffered a cardiac arrest 12/21 leading to EMS ACLS and recovery. He was admitted to [facility] and underwent DC ICD by Dr Bravo 12/23/21. He developed AF 3/22. No attempts at rhythm control were made. He was on eliquis for CVA prophylaxis. He developed worsening HF symptoms leading to evaluation for advanced options at [facility]. He was referred here to arrange CV.     Presents today for KIA/DCCV. ECG notable for AF     Dysphagia or odynophagia:  No  Liver Disease, esophageal disease, or known varices:  No  Upper GI Bleeding: No  Snoring:  No  Sleep Apnea:  No  Prior neck surgery or radiation:  No  History of anesthetic difficulties:  No  Family history of anesthetic difficulties:  No  Last oral intake:  12 hours ago  Able to move neck in all directions:  Yes    9/23/22 TTE   The left ventricle is severely enlarged with eccentric hypertrophy and severely decreased systolic function. The estimated ejection fraction is 15%.  Moderate right ventricular enlargement with severely reduced right ventricular systolic function.  Severe biatrial enlargement.  Mild mitral regurgitation.  The estimated PA systolic pressure is 41 mmHg.  Intermediate central venous pressure (8 mmHg).  Atrial fibrillation observed.  
1st degree heart block

## 2025-06-26 NOTE — H&P ADULT - PROBLEM SELECTOR PLAN 4
- BP stable currently  - on Home Lisinopril 40 mg daily will hold for now   - Continue to monitor hemodynamics - BP stable currently  - on Home Lisinopril 40 mg daily -will hold for now as per cardio recs  - Continue to monitor hemodynamics

## 2025-06-26 NOTE — H&P ADULT - PROBLEM SELECTOR PLAN 7
DVT ppx: heparin gtt DVT ppx: heparin gtt    #anxiety   -take lorazepam 0.5mg once a day at home - will c/w lorazepam 0.5mg prn

## 2025-06-26 NOTE — H&P ADULT - ASSESSMENT
78 y/o female with a pmhx of CAD s/p 2 stents to prox and mid LAD in 2020, HFpEF (EF 70-75%in 2/2025) Anxiety, Afib on eliquis, Adrenal Nodule, Renal insufficiency baseline Cr 1.7-1.9 on previous labs, HLD, HTN, DM, ABDOUL presents to the ED with heartburn and chest pain. Admitted for NSTEMI.

## 2025-06-27 ENCOUNTER — TRANSCRIPTION ENCOUNTER (OUTPATIENT)
Age: 80
End: 2025-06-27

## 2025-06-27 LAB
A1C WITH ESTIMATED AVERAGE GLUCOSE RESULT: 7.3 % — HIGH (ref 4–5.6)
ANION GAP SERPL CALC-SCNC: 5 MMOL/L — SIGNIFICANT CHANGE UP (ref 5–17)
ANION GAP SERPL CALC-SCNC: 6 MMOL/L — SIGNIFICANT CHANGE UP (ref 5–17)
APTT BLD: 108.7 SEC — HIGH (ref 26.1–36.8)
BUN SERPL-MCNC: 51 MG/DL — HIGH (ref 7–23)
BUN SERPL-MCNC: 54 MG/DL — HIGH (ref 7–23)
CALCIUM SERPL-MCNC: 8.6 MG/DL — SIGNIFICANT CHANGE UP (ref 8.5–10.1)
CALCIUM SERPL-MCNC: 9.5 MG/DL — SIGNIFICANT CHANGE UP (ref 8.5–10.1)
CHLORIDE SERPL-SCNC: 107 MMOL/L — SIGNIFICANT CHANGE UP (ref 96–108)
CHLORIDE SERPL-SCNC: 110 MMOL/L — HIGH (ref 96–108)
CHOLEST SERPL-MCNC: 174 MG/DL — SIGNIFICANT CHANGE UP
CK MB BLD-MCNC: 6.8 % — HIGH (ref 0–3.5)
CK MB CFR SERPL CALC: 78.2 NG/ML — HIGH (ref 0–3.6)
CK SERPL-CCNC: 1146 U/L — HIGH (ref 26–192)
CO2 SERPL-SCNC: 28 MMOL/L — SIGNIFICANT CHANGE UP (ref 22–31)
CO2 SERPL-SCNC: 28 MMOL/L — SIGNIFICANT CHANGE UP (ref 22–31)
CREAT SERPL-MCNC: 1.8 MG/DL — HIGH (ref 0.5–1.3)
CREAT SERPL-MCNC: 1.8 MG/DL — HIGH (ref 0.5–1.3)
EGFR: 28 ML/MIN/1.73M2 — LOW
ESTIMATED AVERAGE GLUCOSE: 163 MG/DL — HIGH (ref 68–114)
GLUCOSE SERPL-MCNC: 125 MG/DL — HIGH (ref 70–99)
GLUCOSE SERPL-MCNC: 189 MG/DL — HIGH (ref 70–99)
HCT VFR BLD CALC: 44.1 % — SIGNIFICANT CHANGE UP (ref 34.5–45)
HCT VFR BLD CALC: 44.6 % — SIGNIFICANT CHANGE UP (ref 34.5–45)
HDLC SERPL-MCNC: 52 MG/DL — SIGNIFICANT CHANGE UP
HGB BLD-MCNC: 14.2 G/DL — SIGNIFICANT CHANGE UP (ref 11.5–15.5)
HGB BLD-MCNC: 14.7 G/DL — SIGNIFICANT CHANGE UP (ref 11.5–15.5)
LDLC SERPL-MCNC: 97 MG/DL — SIGNIFICANT CHANGE UP
LIPID PNL WITH DIRECT LDL SERPL: 97 MG/DL — SIGNIFICANT CHANGE UP
MAGNESIUM SERPL-MCNC: 1.9 MG/DL — SIGNIFICANT CHANGE UP (ref 1.6–2.6)
MAGNESIUM SERPL-MCNC: 2.2 MG/DL — SIGNIFICANT CHANGE UP (ref 1.6–2.6)
MCHC RBC-ENTMCNC: 29 PG — SIGNIFICANT CHANGE UP (ref 27–34)
MCHC RBC-ENTMCNC: 29.7 PG — SIGNIFICANT CHANGE UP (ref 27–34)
MCHC RBC-ENTMCNC: 32.2 G/DL — SIGNIFICANT CHANGE UP (ref 32–36)
MCHC RBC-ENTMCNC: 33 G/DL — SIGNIFICANT CHANGE UP (ref 32–36)
MCV RBC AUTO: 90 FL — SIGNIFICANT CHANGE UP (ref 80–100)
MCV RBC AUTO: 90.1 FL — SIGNIFICANT CHANGE UP (ref 80–100)
MRSA PCR RESULT.: SIGNIFICANT CHANGE UP
NONHDLC SERPL-MCNC: 123 MG/DL — SIGNIFICANT CHANGE UP
NRBC # BLD AUTO: 0 K/UL — SIGNIFICANT CHANGE UP (ref 0–0)
NRBC # BLD AUTO: 0 K/UL — SIGNIFICANT CHANGE UP (ref 0–0)
NRBC # FLD: 0 K/UL — SIGNIFICANT CHANGE UP (ref 0–0)
NRBC # FLD: 0 K/UL — SIGNIFICANT CHANGE UP (ref 0–0)
NRBC BLD AUTO-RTO: 0 /100 WBCS — SIGNIFICANT CHANGE UP (ref 0–0)
NRBC BLD AUTO-RTO: 0 /100 WBCS — SIGNIFICANT CHANGE UP (ref 0–0)
PHOSPHATE SERPL-MCNC: 3.5 MG/DL — SIGNIFICANT CHANGE UP (ref 2.5–4.5)
PHOSPHATE SERPL-MCNC: 3.7 MG/DL — SIGNIFICANT CHANGE UP (ref 2.5–4.5)
PLATELET # BLD AUTO: 121 K/UL — LOW (ref 150–400)
PLATELET # BLD AUTO: 136 K/UL — LOW (ref 150–400)
PMV BLD: 11.4 FL — SIGNIFICANT CHANGE UP (ref 7–13)
PMV BLD: 12 FL — SIGNIFICANT CHANGE UP (ref 7–13)
POTASSIUM SERPL-MCNC: 3.3 MMOL/L — LOW (ref 3.5–5.3)
POTASSIUM SERPL-MCNC: 3.6 MMOL/L — SIGNIFICANT CHANGE UP (ref 3.5–5.3)
POTASSIUM SERPL-SCNC: 3.3 MMOL/L — LOW (ref 3.5–5.3)
POTASSIUM SERPL-SCNC: 3.6 MMOL/L — SIGNIFICANT CHANGE UP (ref 3.5–5.3)
RBC # BLD: 4.9 M/UL — SIGNIFICANT CHANGE UP (ref 3.8–5.2)
RBC # BLD: 4.95 M/UL — SIGNIFICANT CHANGE UP (ref 3.8–5.2)
RBC # FLD: 15.9 % — HIGH (ref 10.3–14.5)
RBC # FLD: 16.1 % — HIGH (ref 10.3–14.5)
S AUREUS DNA NOSE QL NAA+PROBE: DETECTED
SODIUM SERPL-SCNC: 141 MMOL/L — SIGNIFICANT CHANGE UP (ref 135–145)
SODIUM SERPL-SCNC: 143 MMOL/L — SIGNIFICANT CHANGE UP (ref 135–145)
TRIGL SERPL-MCNC: 148 MG/DL — SIGNIFICANT CHANGE UP
TROPONIN I, HIGH SENSITIVITY RESULT: HIGH NG/L
WBC # BLD: 5.36 K/UL — SIGNIFICANT CHANGE UP (ref 3.8–10.5)
WBC # BLD: 5.76 K/UL — SIGNIFICANT CHANGE UP (ref 3.8–10.5)
WBC # FLD AUTO: 5.36 K/UL — SIGNIFICANT CHANGE UP (ref 3.8–10.5)
WBC # FLD AUTO: 5.76 K/UL — SIGNIFICANT CHANGE UP (ref 3.8–10.5)

## 2025-06-27 RX ORDER — DEXTROSE 50 % IN WATER 50 %
25 SYRINGE (ML) INTRAVENOUS ONCE
Refills: 0 | Status: DISCONTINUED | OUTPATIENT
Start: 2025-06-27 | End: 2025-06-29

## 2025-06-27 RX ORDER — INSULIN LISPRO 100 U/ML
INJECTION, SOLUTION INTRAVENOUS; SUBCUTANEOUS EVERY 6 HOURS
Refills: 0 | Status: DISCONTINUED | OUTPATIENT
Start: 2025-06-27 | End: 2025-06-27

## 2025-06-27 RX ORDER — GLUCAGON 3 MG/1
1 POWDER NASAL ONCE
Refills: 0 | Status: DISCONTINUED | OUTPATIENT
Start: 2025-06-27 | End: 2025-06-29

## 2025-06-27 RX ORDER — DEXTROSE 50 % IN WATER 50 %
12.5 SYRINGE (ML) INTRAVENOUS ONCE
Refills: 0 | Status: DISCONTINUED | OUTPATIENT
Start: 2025-06-27 | End: 2025-06-29

## 2025-06-27 RX ORDER — INSULIN LISPRO 100 U/ML
INJECTION, SOLUTION INTRAVENOUS; SUBCUTANEOUS
Refills: 0 | Status: DISCONTINUED | OUTPATIENT
Start: 2025-06-27 | End: 2025-06-29

## 2025-06-27 RX ORDER — CLOPIDOGREL BISULFATE 75 MG/1
1 TABLET, FILM COATED ORAL
Qty: 90 | Refills: 3
Start: 2025-06-27 | End: 2026-06-21

## 2025-06-27 RX ORDER — INSULIN GLARGINE-YFGN 100 [IU]/ML
4 INJECTION, SOLUTION SUBCUTANEOUS ONCE
Refills: 0 | Status: COMPLETED | OUTPATIENT
Start: 2025-06-27 | End: 2025-06-27

## 2025-06-27 RX ORDER — SODIUM CHLORIDE 9 G/1000ML
1000 INJECTION, SOLUTION INTRAVENOUS
Refills: 0 | Status: DISCONTINUED | OUTPATIENT
Start: 2025-06-27 | End: 2025-06-29

## 2025-06-27 RX ORDER — APIXABAN 2.5 MG/1
5 TABLET, FILM COATED ORAL
Refills: 0 | Status: DISCONTINUED | OUTPATIENT
Start: 2025-06-28 | End: 2025-06-29

## 2025-06-27 RX ORDER — ASPIRIN 325 MG
81 TABLET ORAL DAILY
Refills: 0 | Status: DISCONTINUED | OUTPATIENT
Start: 2025-06-28 | End: 2025-06-29

## 2025-06-27 RX ORDER — DEXTROSE 50 % IN WATER 50 %
15 SYRINGE (ML) INTRAVENOUS ONCE
Refills: 0 | Status: DISCONTINUED | OUTPATIENT
Start: 2025-06-27 | End: 2025-06-29

## 2025-06-27 RX ADMIN — INSULIN LISPRO 2: 100 INJECTION, SOLUTION INTRAVENOUS; SUBCUTANEOUS at 13:00

## 2025-06-27 RX ADMIN — Medication 40 MILLIEQUIVALENT(S): at 08:32

## 2025-06-27 RX ADMIN — INSULIN LISPRO 2: 100 INJECTION, SOLUTION INTRAVENOUS; SUBCUTANEOUS at 17:06

## 2025-06-27 RX ADMIN — BUMETANIDE 2 MILLIGRAM(S): 1 TABLET ORAL at 06:49

## 2025-06-27 RX ADMIN — Medication 100 MILLIEQUIVALENT(S): at 12:56

## 2025-06-27 RX ADMIN — INSULIN GLARGINE-YFGN 4 UNIT(S): 100 INJECTION, SOLUTION SUBCUTANEOUS at 09:31

## 2025-06-27 RX ADMIN — Medication 100 MILLIEQUIVALENT(S): at 06:50

## 2025-06-27 RX ADMIN — CLOPIDOGREL BISULFATE 75 MILLIGRAM(S): 75 TABLET, FILM COATED ORAL at 07:25

## 2025-06-27 RX ADMIN — Medication 1 APPLICATION(S): at 17:14

## 2025-06-27 RX ADMIN — ATORVASTATIN CALCIUM 40 MILLIGRAM(S): 80 TABLET, FILM COATED ORAL at 21:53

## 2025-06-27 RX ADMIN — Medication 250 MILLILITER(S): at 10:30

## 2025-06-27 RX ADMIN — Medication 81 MILLIGRAM(S): at 06:05

## 2025-06-27 RX ADMIN — HEPARIN SODIUM 0 UNIT(S)/HR: 1000 INJECTION INTRAVENOUS; SUBCUTANEOUS at 01:45

## 2025-06-27 RX ADMIN — Medication 100 MILLIEQUIVALENT(S): at 08:32

## 2025-06-27 NOTE — CHART NOTE - NSCHARTNOTEFT_GEN_A_CORE
Pt s/p NSTEMI/ trops uptrending. Overnight events noted-pt now on ICU -tele strips reviewed with Dr Shah Cardiology & Cardiology NP-intermittent Second degree type I Wenckebach demonstrated, pt is NSR with First degree at baseline. K-3.3 this am -Cr-1.8 Aggressive supplementation in progress. For [facility] this am with Dr Alistair Macias.  PRE-PROCEDURE ASSESSMENT  -NPO after midnight confirmed  -K supplementation   -Patient seen and examined  -Labs reviewed

## 2025-06-27 NOTE — PROGRESS NOTE ADULT - PROBLEM SELECTOR PLAN 1
- Patient with new chest burning and shortness of breath over the past 3 days s/p  mg by EMS  - Hx of CAD s/p Cath x2 stents to LAD in 2020  - Trend troponin to peak   - EKG: Sinus Rhythm with 1st Degree AV block. No evidence of significant change from previous EKG in March 2025  - Hold Eliquis. Start Heparin gtt for AC  - Plavix 600 mg load c/w Plavix 75mg daily   - Continue ASA, Plavix and Atorvastatin 40 mg daily.  - Trend Cardiac Enzymes to Peak  - NPO for cardiac cath this AM   -cardio () following recs appreciated

## 2025-06-27 NOTE — PROVIDER CONTACT NOTE (EICU) - BACKGROUND
79 year old female with PMHx of Afib (on Eliquis), CAD s/p MARLY x2 to LAD, HFpEF, HTN, HLD, DM who presented to the ED on 6/26 with chest pain and dyspnea upon exertion. Found to have NSTEMI with trops 1255 initially and t wave inversions in inferior leads. Admitted to tele on heparin gtt and plan for Cath 6/27. Trop uptrending overnight now 40k. Patient with intermittent chest pain overnight and found to be in CHB briefly with rate in 30s. Cardiology aware and critical care consulted.

## 2025-06-27 NOTE — PROVIDER CONTACT NOTE (EICU) - ACTION/TREATMENT ORDERED:
Discussed over the phone with ICU PA. Patient's location precludes her from visual assessment over teladoc.    30 minutes spent reviewing patient's current and prior records, labs, imaging, formulating plan.

## 2025-06-27 NOTE — CARE COORDINATION ASSESSMENT. - PATIENT WAS INVOLVED WITH A HOMECARE AGENCY PRIOR TO ADMISSION?
Yes Detail Level: Zone General Sunscreen Counseling: I recommended a broad spectrum sunscreen with a SPF of 30 or higher.

## 2025-06-27 NOTE — DISCHARGE NOTE PROVIDER - NSDCMRMEDTOKEN_GEN_ALL_CORE_FT
aspirin 81 mg oral tablet: orally once a day  atorvastatin 40 mg oral tablet: 1 tab(s) orally once a day  Bumex 2 mg oral tablet: 1 tab(s) orally once a day  DilTIAZem (Eqv-Tiazac) 360 mg/24 hours oral capsule, extended release: 1 cap(s) orally once a day  doxazosin 2 mg oral tablet: 1 tab(s) orally once a day  Eliquis 5 mg oral tablet: 1 tab(s) orally 2 times a day  Farxiga 10 mg oral tablet: 1 tab(s) orally once a day  glimepiride 2 mg oral tablet: 1 tab(s) orally 2 times a day  Lantus Solostar Pen 100 units/mL subcutaneous solution: 8 unit(s) subcutaneous once a day (in the morning)  Lantus Solostar Pen 100 units/mL subcutaneous solution: 18 unit(s) subcutaneous once a day (at bedtime)  lisinopril 40 mg oral tablet: 1 tab(s) orally once a day  LORazepam 0.5 mg oral tablet: 1 tab(s) orally once a day  Multaq 400 mg oral tablet: 1 tab(s) orally 2 times a day  Plavix 75 mg oral tablet: 1 tab(s) orally once a day  potassium chloride: 20 milliequivalent(s) orally 2 times a day  PreserVision AREDS 2 oral capsule: 2 tab(s) orally once a day  Tradjenta 5 mg oral tablet: 1 tab(s) orally once a day   aspirin 81 mg oral tablet: orally once a day  atorvastatin 40 mg oral tablet: 1 tab(s) orally once a day  Bumex 2 mg oral tablet: 1 tab(s) orally once a day  doxazosin 2 mg oral tablet: 1 tab(s) orally once a day  Eliquis 5 mg oral tablet: 1 tab(s) orally 2 times a day  Farxiga 10 mg oral tablet: 1 tab(s) orally once a day  glimepiride 2 mg oral tablet: 1 tab(s) orally 2 times a day  Lantus Solostar Pen 100 units/mL subcutaneous solution: 8 unit(s) subcutaneous once a day (in the morning)  Lantus Solostar Pen 100 units/mL subcutaneous solution: 18 unit(s) subcutaneous once a day (at bedtime)  lisinopril 40 mg oral tablet: 1 tab(s) orally once a day  LORazepam 0.5 mg oral tablet: 1 tab(s) orally once a day  Plavix 75 mg oral tablet: 1 tab(s) orally once a day  potassium chloride: 20 milliequivalent(s) orally 2 times a day  PreserVision AREDS 2 oral capsule: 2 tab(s) orally once a day  Tradjenta 5 mg oral tablet: 1 tab(s) orally once a day

## 2025-06-27 NOTE — PROGRESS NOTE ADULT - ASSESSMENT
78 y/o female with a pmhx of CAD s/p 2 stents to prox and mid LAD in 2020, HFpEF (EF 70-75%in 2/2025) Anxiety, Afib on eliquis, Adrenal Nodule, Renal insufficiency baseline Cr 1.7-1.9 on previous labs, HLD, HTN, DM, ABDOUL presents to the ED with heartburn and chest pain. Admitted for NSTEMI. Elevated troponin overnight with Type 1 Mobitz heart block, patient transferred to ICU. Plan for [facility] this AM.

## 2025-06-27 NOTE — CONSULT NOTE ADULT - ASSESSMENT
Assessment:     Patient is a 79 year old female with PMHx of Afib (on Eliquis), CAD s/p MARLY x2 to LAD, HFpEF, HTN, HLD, DM who presented to the ED on 6/26 with chest pain and dyspnea upon exertion. Found to have NSTEMI with trops 1255 initially and t wave inversions in inferior leads. Admitted to tele on heparin gtt and plan for Cath 6/27. Trop uptrending overnight now 40k. Patient with intermittent chest pain overnight and found to be in CHB briefly with rate in 30s. Dr. Tolbert aware and critical care consulted.     1. NSTEMI   2. Bradycardia, resolved   3. CKD   4. Hypokalemia   5. DM     Plan:     Neuro:   Patient at baseline mentation  Avoid deleriogenic medications     Respiratory:   Patient on RA, maintain SpO2 >92%    Cardiovascular:   Normotensive, maintain MAP >65   NSTEMI with Trops 40k, pending repeat with CK. Remains on heparing gtt, ASA + plavix    Had intermittent CHB with rate in 30s, self resolved and patient asymptomatic during occurance. May be 2/2 RCA occlusion given inferior lead t wave inversion and AV block   Cardiology aware, Dr. Tolbert will see patient this AM   Plan for cath today   Most recent TTE on 6/26 with grossly normal LV function     GI:   NPO pending cath     Renal:   Monitor Cr/BUN ratio  Potassium repleted   Maintain K>4, Mg>2, P>3   Goal UOP 1cc/kg/hr   Reported CKD, unknown baseline Cr     ID:   Negative leukocytosis, afebrile   Observe off abx   Trend fever curve     Endo:   ISS for BGL >180     Heme:   On full AC with heparin gtt for NSTEMI       Patient upgraded to ICU level of care pending cardiac catheretization        Time spent on this patient encounter, which includes documenting this note in the electronic medical record, was 75 minutes including assessing the presenting problems with associated risks, reviewing medical records to prepare for the encounter, and meeting face to face with the patient to obtain additional history. I have also performed an appropriate physical exam, made interventions listed and ordered and interpreted appropriate diagnostic studies as documented. To improve communication and patient safety, I have coordinated care with the multidisciplinary team including the bedside nurse, appropriate attending of record and consultants as needed.      Assessment:     Patient is a 79 year old female with PMHx of Afib (on Eliquis), CAD s/p MARLY x2 to LAD, HFpEF, HTN, HLD, DM who presented to the ED on 6/26 with chest pain and dyspnea upon exertion. Found to have NSTEMI with trops 1255 initially and t wave inversions in inferior leads. Admitted to tele on heparin gtt and plan for Cath 6/27. Trop uptrending overnight now 40k. Patient with intermittent chest pain overnight and found to be in CHB briefly with rate in 30s. Dr. Tolbert aware and critical care consulted.     1. NSTEMI   2. Bradycardia  3. CKD   4. Hypokalemia   5. DM     Plan:     Neuro:   Patient at baseline mentation  Avoid deleriogenic medications     Respiratory:   Patient on RA, maintain SpO2 >92%    Cardiovascular:   Normotensive, maintain MAP >65   NSTEMI with Trops 40k, pending repeat with CK. Remains on heparing gtt, ASA + plavix    Had intermittent CHB with rate in 30s, self resolved and patient asymptomatic during occurance. May be 2/2 RCA occlusion given inferior lead t wave inversion and AV block   Cardiology aware, Dr. Tolbert will see patient this AM   Plan for cath today   Most recent TTE on 6/26 with grossly normal LV function       GI:   NPO pending cath     Renal:   Monitor Cr/BUN ratio  Potassium repleted   Maintain K>4, Mg>2, P>3   Goal UOP 1cc/kg/hr   Reported CKD, unknown baseline Cr     ID:   Negative leukocytosis, afebrile   Observe off abx   Trend fever curve     Endo:   ISS for BGL >180     Heme:   On full AC with heparin gtt for NSTEMI       Patient upgraded to ICU level of care pending cardiac catheretization due to intermittent CHB in setting of NSTEMI.         Time spent on this patient encounter, which includes documenting this note in the electronic medical record, was 75 minutes including assessing the presenting problems with associated risks, reviewing medical records to prepare for the encounter, and meeting face to face with the patient to obtain additional history. I have also performed an appropriate physical exam, made interventions listed and ordered and interpreted appropriate diagnostic studies as documented. To improve communication and patient safety, I have coordinated care with the multidisciplinary team including the bedside nurse, appropriate attending of record and consultants as needed.      Assessment:     Patient is a 79 year old female with PMHx of Afib (on Eliquis), CAD s/p MARLY x2 to LAD, HFpEF, HTN, HLD, DM who presented to the ED on 6/26 with chest pain and dyspnea upon exertion. Found to have NSTEMI with trops 1255 initially and t wave inversions in inferior leads. Admitted to tele on heparin gtt and plan for Cath 6/27. Trop uptrending overnight now 40k. Patient with intermittent chest pain overnight and found to be in CHB briefly with rate in 30s. Dr. Tolbert aware and critical care consulted.     1. NSTEMI   2. Bradycardia  3. CKD   4. Hypokalemia   5. DM     Plan:     Neuro:   Patient at baseline mentation  Avoid deleriogenic medications     Respiratory:   Patient on RA, maintain SpO2 >92%    Cardiovascular:   Normotensive, maintain MAP >65   NSTEMI with Trops 40k, pending repeat with CK. Remains on heparing gtt, ASA + plavix    Had intermittent CHB with rate in 30s, self resolved and patient asymptomatic during occurance. May be 2/2 RCA occlusion given inferior lead t wave inversion and AV block   Cardiology aware, Dr. Tolbert will see patient this AM   Plan for cath today   Most recent TTE on 6/26 with grossly normal LV function       GI:   NPO pending cath     Renal:   Monitor Cr/BUN ratio  Potassium repleted   Maintain K>4, Mg>2, P>3   Goal UOP 1cc/kg/hr   Reported CKD, unknown baseline Cr     ID:   Negative leukocytosis, afebrile   Observe off abx   Trend fever curve     Endo:   ISS for BGL >180     Heme:   On full AC with heparin gtt for NSTEMI       Patient upgraded to ICU level of care pending cardiac catheretization due to intermittent CHB in setting of NSTEMI. Case discussed with EICU attending.         Time spent on this patient encounter, which includes documenting this note in the electronic medical record, was 75 minutes including assessing the presenting problems with associated risks, reviewing medical records to prepare for the encounter, and meeting face to face with the patient to obtain additional history. I have also performed an appropriate physical exam, made interventions listed and ordered and interpreted appropriate diagnostic studies as documented. To improve communication and patient safety, I have coordinated care with the multidisciplinary team including the bedside nurse, appropriate attending of record and consultants as needed.

## 2025-06-27 NOTE — CARE COORDINATION ASSESSMENT. - OTHER PERTINENT REFERRAL INFORMATION
Sw met with Pt at bedside. Pt is  A& O x3 and able to make her needs known. Sw introduced self and role and pt expressed verbal understanding. Pt lives in a pvt home with DTR and has 2 AVA but stays on the first floor and everything is set up for her there. Pt has a RW. Pt was at [facility] from 2/21-3/10 and was discharged home with services from [facility]. Sw attempted to reach patients daughter and a message was left. Pt would like to return home when she is medically stable. PCP Regis Danielle [phone]

## 2025-06-27 NOTE — PROGRESS NOTE ADULT - PROBLEM SELECTOR PLAN 2
B/l LE swelling and Shortness of breath over the past 3 days.  - CXR shows enlarged heart and congestive changes.  - Pro bnp elevated to 713  - Echo from 2/2025 EF of 70-75% with grade 1 Diastolic Dysfunction  - S/p Lasix 40 mg in ED  - Continue Bumex 2 mg IV daily for diuresis   - TTE ordered f/u results eval for acute wall motion abnormalities  - Hold Home Diltiazem 360 mg daily   - remote Telemetry, cont pulse ox   - Continue Fluid restriction  - Strict I/Os, daily weights

## 2025-06-27 NOTE — DISCHARGE NOTE PROVIDER - CARE PROVIDER_API CALL
Regis Strange  Cardiovascular Disease  [address] [address], [address]  [place] [address]  Phone: [phone]  Fax: [phone]  Established Patient  Follow Up Time: 1 week

## 2025-06-27 NOTE — PROGRESS NOTE ADULT - ASSESSMENT
80 y/o female with a pmhx of CAD s/p 2 stents to prox and mid LAD in 2020, HFpEF (EF 70-75%in 2/2025) Anxiety, Afib on eliquis, Adrenal Nodule, Renal insufficiency, HLD, HTN, DM, ABDOUL presents to the ED with chest pain, admitted for NSTEMI with LHC this AM.     Neuro:  - no concerns     Cardio:  - NSTEMI  - Trop 1255.9 -> 77130.7 -> 61826.8 -> 03747.9  -  -> 1146 | CKMB 77.3 -> 78.2 | CPK% 9.5 -> 6.8   - ProBNP 713   - Overnight concern for complete heart block on tele monitoring, upon review with cardiology, determined 2:1 second degree type 1 block  - EKG with new T wave inversions   - Hypokalemic, K 40 mg PO x1 + 10 mEq IV   - Continue heparin infusion, Holding Eliquis (last dose 2/26 AM)   - Continue DAPT, statin s/p  mg by EMS/ 600 mg Plavix loading dose in ED  - TTE : LVEF 56%, Mild left ventricular hypertrophy, Normal RV size and function, mild MR, normal IVC, LV grossly normal function   - Hx of HFpEF on Bumex 2 mg, Farxiga, ACEi, Aspirin, Statin; continue   - LHC this morning with Dr. Gilberto Burden:  - SOB yesterday evening   - CXR Heart is enlarged despite AP film shallow inspiration. Bilateral predominantly basilar interstitial prominence favored to represent congestive changes.     ID:  - No concern     GI:  - No concern     Renal/lytes:  - Cr 1.9 -> 1.8   - Hypokalemic, K 40 mg PO x1 + 10 mEq IV   - COntinue Home Bumex     Endo:  - Insulin dependent diabetic; Lantus 8 units AM, 18 units PM   - Home medications including Farxiga 10 mg Tadjenta 5 mg, Glimepiride 2 mg    - Currently only ordered for Moderate ISS   - Glucose stable, NPO since midnight, [facility] this AM   - START Lantus 4 untis AM, 9 units PM for now, titrate accordingly   - Continue Farxiga     Heme/Onc:   - DAPT   - Heparin gtt

## 2025-06-27 NOTE — PROGRESS NOTE ADULT - ASSESSMENT
s/p NSTEMI, s/p  Left Heart Cardiac Catheterization / s/p PCI/MARLY x1 to pCX ( 99% occluded)  LVEDP=10 EF 70-75%  Admitted  to ICU last night, now s/p  post PCI (CX) via RRA  post cath/PCI routine VS, access site, neuro-vascular monitoring and RUE  post access precautions ordered  post cath hydration as ordered-100 cc/hr x 4 hrs  Bedrest. May get OOB 30 minutes after radial band removed if wrist and hemodynamics remain stable   EKG post cath done  f/u labs and EKG in am  continue dual anti platelet therapy with aspirin AND clopidogrel AND ELIQUIS x 1 week triple therapy then DISCONTINUE ASA   Pt education provided/reinforced re: importance of strict adherence to uninterrupted DAPT for minimum of 3-12 months (cardiologist will determine duration)  continue statin, beta blocker,   Patient educated on benefits of Cardiac Rehab Program; referral provided to patient. Referral faxed and copy placed in medical record. Patient given list of locations with phone numbers of local rehab facilities and advised to contact their insurance company for participating providers. Patient educated on need to bring discharge documents including cardiovascular history, medications, and testing/treatments to first appointment.  may resume prior diet  Lifestyle modifications discussed to reduce cardiovascular risk factors including weight reduction, smoking cessation (referral provided if applicable), medication compliance, and routine follow up with Cardiologist to track your BMI, cholesterol, and glucose levels.   follow-up in 2 weeks with outpatient/referring cardiologist/ Dr Strange  continue home medication regimen as appropriate  remainder of plan per primary / hospitalist/ Cardiology  teams  above d/w hospitalist  s/p NSTEMI, s/p  Left Heart Cardiac Catheterization / s/p PCI/MARLY x1 to pCX ( 99% occluded)  LVEDP=10 EF 70-75%  Admitted  to ICU last night, now s/p  post PCI (CX) via RRA  post cath/PCI routine VS, access site, neuro-vascular monitoring and RUE  post access precautions ordered  post cath hydration as ordered-100 cc/hr x 4 hrs  Bedrest. May get OOB 30 minutes after radial band removed if wrist and hemodynamics remain stable   EKG post cath done  f/u labs and EKG in am  restart eliquis tomorrow am post pci 6/28  continue dual anti platelet therapy with aspirin AND clopidogrel AND ELIQUIS x 1 week triple therapy then DISCONTINUE ASA   Pt education provided/reinforced re: importance of strict adherence to uninterrupted DAPT for minimum of 3-12 months (cardiologist will determine duration)  continue statin, beta blocker,   Patient educated on benefits of Cardiac Rehab Program; referral provided to patient. Referral faxed and copy placed in medical record. Patient given list of locations with phone numbers of local rehab facilities and advised to contact their insurance company for participating providers. Patient educated on need to bring discharge documents including cardiovascular history, medications, and testing/treatments to first appointment.  may resume prior diet  Lifestyle modifications discussed to reduce cardiovascular risk factors including weight reduction, smoking cessation (referral provided if applicable), medication compliance, and routine follow up with Cardiologist to track your BMI, cholesterol, and glucose levels.   follow-up in 2 weeks with outpatient/referring cardiologist/ Dr Strange  continue home medication regimen as appropriate  remainder of plan per primary / hospitalist/ Cardiology  teams  above d/w hospitalist  s/p NSTEMI, s/p  Left Heart Cardiac Catheterization / s/p PCI/MARLY x1 to pCX ( 99% occluded)  LVEDP=10 EF 70-75%  Admitted  to ICU last night, now s/p  post PCI (CX) via RRA  post cath/PCI routine VS, access site, neuro-vascular monitoring and RUE  post access precautions ordered  post cath hydration as ordered-100 cc/hr x 4 hrs  Bedrest. May get OOB 30 minutes after radial band removed if wrist and hemodynamics remain stable   EKG post cath done  f/u labs and EKG in am  restart eliquis tomorrow am post pci 6/28  continue dual anti platelet therapy with aspirin AND clopidogrel AND ELIQUIS x 1 week triple therapy then DISCONTINUE ASA   Pt education provided/reinforced re: importance of strict adherence to uninterrupted DAPT for minimum of 3-12 months (cardiologist will determine duration)  continue statin  ACE/ARB/ BB per clinical course & cardiology team  Patient educated on benefits of Cardiac Rehab Program; referral provided to patient. Referral faxed and copy placed in medical record. Patient given list of locations with phone numbers of local rehab facilities and advised to contact their insurance company for participating providers. Patient educated on need to bring discharge documents including cardiovascular history, medications, and testing/treatments to first appointment.  may resume prior diet  Lifestyle modifications discussed to reduce cardiovascular risk factors including weight reduction, smoking cessation (referral provided if applicable), medication compliance, and routine follow up with Cardiologist to track your BMI, cholesterol, and glucose levels.   follow-up in 2 weeks with outpatient/referring cardiologist/ Dr Strange  continue home medication regimen as appropriate  remainder of plan per primary / hospitalist/ Cardiology  teams  above d/w hospitalist

## 2025-06-27 NOTE — CARE COORDINATION ASSESSMENT. - NSCAREPROVIDERS_GEN_ALL_CORE_FT
CARE PROVIDERS:  Accepting Physician: Anai Pizano  Access Services: Rocco Chow  Administration: Monique Nevarez  Admitting: Anai Pizano  Attending: Anai Pizano  Consultant: Alistair Macias  Consultant: Eddy Calles  Consultant: Diamante Ramirez  Consultant: Addison Fonseca  Consultant: Shakeel Wray  Consultant: Beni Shah  Consultant: Allyn Rodarte  Consultant: Myke Tolbert  Consultant: Julia Mirza  Consultant: Marla Greenberg  Covering Team: Beni Smart  Covering Team: Zandra Peters  Covering Team: Niraj Nunez  ED Attending: Mark Anthony Quintero  ED Nurse: Jesse Miller  Food & Nutrition Services: Emeli Desai  Nurse: Maye Galvez  Nurse: Deidra Hyman  Nurse: Janet Steele  Nurse: Christina Hartley  Nurse: Fariba Fox  Nurse: Megan Lainez  Nurse: Adrianne Tavares  Nurse: Maricruz Walker  Ordered: ADM, User  Ordered: Doctor, Unknown  Override: Shirley Mejia  Override: Adrianne Tavares  Override: Samara Joseph  PCA/Nursing Assistant: Nette Demarco  PCA/Nursing Assistant: Abelino Massey  Primary Team: Alley Leigh  Primary Team: John Alberto  Primary Team: [place]  Primary Team: Anai Pizano  Primary Team: Derrick Castañeda  Primary Team: Laquita Bach  Primary Team: Mavis Read  Primary Team: Warren Walker  Primary Team: Myke Rea  Primary Team: Shay Gonzales  Primary Team: Bekah Stacy  Primary Team: Grey Saavedra  Quality Review: Mlodynia, April  : Reena Negron  : Neema Borjas  Team: [place], Team  UR// Supp. Assoc.: Nadya August

## 2025-06-27 NOTE — DISCHARGE NOTE PROVIDER - HOSPITAL COURSE
FROM ADMISSION H+P:   HPI:  80 y/o female with a pmhx of CAD s/p 2 stents to prox and mid LAD in 2020, HFpEF (EF 70-75%in 2/2025) Anxiety, Afib on eliquis, Adrenal Nodule, Renal insufficiency, HLD, HTN, DM, ABDOUL presents to the ED with3 days of Heartburn like chest pain and worsening shortness of breath. Over the past 3 days pt states she has had heart burn on the left side of her chest associated with progressively worsening dyspnea on exertion. She states today she tried taking tums and the pain just got worse along with shortness of breath at rest. She states she has also been feeling congested in her chest with an associated cough for the past couple of days. Also admits to associated leg swelling over the past week. She states the now she does not have chest pain and is feeling better. Denies HA, Focal weakness, Palpitations, N/V, orthopnea.     ED course  Vitals: T 98.3 , HR 85 , /80 , RR 19 , SpO2 94% on 2L  Significant labs: BUN 55 Cr 1.9 Glucose 212 AST 63 Trop 1255.9 CKMB 11.1 proBNP 713   Imaging: CXR: Heart is enlarged despite AP film shallow inspiration. Bilateral predominantly basilar interstitial prominence favored to   represent congestive changes. no focal consolidation or pleural effusion. No pneumothorax. Calcific bursitis left shoulder  EKG: Sinus Rhythm with 1st Degree AV block. No evidence of significant change from previous EKG in March 2025  In ED patient given: Plavix 600mg x1, Lasix 40 IV x1,        (26 Jun 2025 18:59)      ---  HOSPITAL COURSE/PERTINENT LABS/PROCEDURES PERFORMED/PENDING TESTS:  Admitted for NSTEMI with uptrending troponin. Initiated Heparin gtt, Aspirin, Plavix load, high intensity statin initiated in ED.     ICU Course:  Telemetry monitoring suggestive of 3rd degree heart block, Cardiology requesting ICU monitoring given cardiac hx and uptrending cardiac enzymes. Tele strips reviewed, found to be in 2nd degree Type 1 heart block. Patient underwent LHC with Interventional Cardiology, 1x stent placed in XXX, tolerated procedure well without complication.     Patient remained stable in the ICU and was medically optimized for downgrade to general medicine floor.     ---  PATIENT CONDITION:  - stable    ---  PHYSICAL EXAM ON DAY OF DISCHARGE:    ---  CONSULTANTS:     ---  ADVANCED CARE PLANNING:  - Code status:      - MOLST completed:      [  ] NO     [  ] YES    ---  TIME SPENT:  I, the attending physician, was physically present for the key portions of the evaluation and management (E/M) service provided. The total amount of time spent reviewing the hospital notes, laboratory values, imaging findings, assessing/counseling the patient, discussing with consultant physicians, social work, nursing staff was -- minutes FROM ADMISSION H+P:   HPI:  78 y/o female with a pmhx of CAD s/p 2 stents to prox and mid LAD in 2020, HFpEF (EF 70-75%in 2/2025) Anxiety, Afib on eliquis, Adrenal Nodule, Renal insufficiency, HLD, HTN, DM, ABDOUL presents to the ED with3 days of Heartburn like chest pain and worsening shortness of breath. Over the past 3 days pt states she has had heart burn on the left side of her chest associated with progressively worsening dyspnea on exertion. She states today she tried taking tums and the pain just got worse along with shortness of breath at rest. She states she has also been feeling congested in her chest with an associated cough for the past couple of days. Also admits to associated leg swelling over the past week. She states the now she does not have chest pain and is feeling better. Denies HA, Focal weakness, Palpitations, N/V, orthopnea.     ED course  Vitals: T 98.3 , HR 85 , /80 , RR 19 , SpO2 94% on 2L  Significant labs: BUN 55 Cr 1.9 Glucose 212 AST 63 Trop 1255.9 CKMB 11.1 proBNP 713   Imaging: CXR: Heart is enlarged despite AP film shallow inspiration. Bilateral predominantly basilar interstitial prominence favored to   represent congestive changes. no focal consolidation or pleural effusion. No pneumothorax. Calcific bursitis left shoulder  EKG: Sinus Rhythm with 1st Degree AV block. No evidence of significant change from previous EKG in March 2025  In ED patient given: Plavix 600mg x1, Lasix 40 IV x1,        (26 Jun 2025 18:59)  ---  HOSPITAL COURSE/PERTINENT LABS/PROCEDURES PERFORMED/PENDING TESTS:  Admitted for NSTEMI with uptrending troponin. Initiated Heparin gtt, Aspirin, Plavix load, high intensity statin initiated in ED.     ICU Course:  Telemetry monitoring suggestive of 3rd degree heart block, Cardiology requesting ICU monitoring given cardiac hx and uptrending cardiac enzymes. Tele strips reviewed, found to be in 2nd degree Type 1 heart block. Patient underwent LHC with Interventional Cardiology, w/ PCI/MARLY x1 to pCX ( 99% occluded) LVEDP=10 EF 70-75% tolerated procedure well without complication. Was transitioned to oral eliquis. Continued on triple therapy with ASA, Plavix and eliquis for 1 week. IV bumex given daily for diuresis. Repeat TTE showed mild LVH. Hx of DM2, insulin adjusted accordingly. Patient remained stable in the ICU and was medically optimized for downgrade to general medicine floor.   PT recommended no skilled PT needs    ---  PATIENT CONDITION:  - stable    ---  PHYSICAL EXAM ON DAY OF DISCHARGE:    ---  CONSULTANTS:   Cardio- [facility]  Interventional cardio - Dr Macias   ---      ---  TIME SPENT:  I, the attending physician, was physically present for the key portions of the evaluation and management (E/M) service provided. The total amount of time spent reviewing the hospital notes, laboratory values, imaging findings, assessing/counseling the patient, discussing with consultant physicians, social work, nursing staff was -- minutes   FROM ADMISSION H+P:   HPI:  78 y/o female with a pmhx of CAD s/p 2 stents to prox and mid LAD in 2020, HFpEF (EF 70-75%in 2/2025) Anxiety, Afib on eliquis, Adrenal Nodule, Renal insufficiency, HLD, HTN, DM, ABDOUL presents to the ED with3 days of Heartburn like chest pain and worsening shortness of breath. Over the past 3 days pt states she has had heart burn on the left side of her chest associated with progressively worsening dyspnea on exertion. She states today she tried taking tums and the pain just got worse along with shortness of breath at rest. She states she has also been feeling congested in her chest with an associated cough for the past couple of days. Also admits to associated leg swelling over the past week. She states the now she does not have chest pain and is feeling better. Denies HA, Focal weakness, Palpitations, N/V, orthopnea.     ED course  Vitals: T 98.3 , HR 85 , /80 , RR 19 , SpO2 94% on 2L  Significant labs: BUN 55 Cr 1.9 Glucose 212 AST 63 Trop 1255.9 CKMB 11.1 proBNP 713   Imaging: CXR: Heart is enlarged despite AP film shallow inspiration. Bilateral predominantly basilar interstitial prominence favored to   represent congestive changes. no focal consolidation or pleural effusion. No pneumothorax. Calcific bursitis left shoulder  EKG: Sinus Rhythm with 1st Degree AV block. No evidence of significant change from previous EKG in March 2025  In ED patient given: Plavix 600mg x1, Lasix 40 IV x1,        (26 Jun 2025 18:59)  ---  HOSPITAL COURSE/PERTINENT LABS/PROCEDURES PERFORMED/PENDING TESTS:  Admitted for NSTEMI with uptrending troponin. Initiated Heparin gtt, Aspirin, Plavix load, high intensity statin initiated in ED.     ICU Course:  Telemetry monitoring suggestive of 3rd degree heart block, Cardiology requesting ICU monitoring given cardiac hx and uptrending cardiac enzymes. Tele strips reviewed, found to be in 2nd degree Type 1 heart block. Patient underwent LHC with Interventional Cardiology, w/ PCI/MARLY x1 to pCX ( 99% occluded) LVEDP=10 EF 70-75% tolerated procedure well without complication. Was transitioned to oral eliquis. Continued on triple therapy with ASA, Plavix and eliquis, pt to continue for 1 week and then dc ASA. IV bumex given daily for diuresis, transitioned to oral. Repeat TTE showed mild LVH. Hx of DM2, insulin adjusted accordingly. Patient remained stable in the ICU and was medically optimized for downgrade to general medicine floor.   PT recommended no skilled PT needs    ---  PATIENT CONDITION:  - stable    ---  PHYSICAL EXAM ON DAY OF DISCHARGE:    ---  CONSULTANTS:   Cardio- [facility]  Interventional cardio - Dr Macias   ---      ---  TIME SPENT:  I, the attending physician, was physically present for the key portions of the evaluation and management (E/M) service provided. The total amount of time spent reviewing the hospital notes, laboratory values, imaging findings, assessing/counseling the patient, discussing with consultant physicians, social work, nursing staff was -- minutes   FROM ADMISSION H+P:   HPI:  78 y/o female with a pmhx of CAD s/p 2 stents to prox and mid LAD in 2020, HFpEF (EF 70-75%in 2/2025) Anxiety, Afib on eliquis, Adrenal Nodule, Renal insufficiency, HLD, HTN, DM, ABDOUL presents to the ED with3 days of Heartburn like chest pain and worsening shortness of breath. Over the past 3 days pt states she has had heart burn on the left side of her chest associated with progressively worsening dyspnea on exertion. She states today she tried taking tums and the pain just got worse along with shortness of breath at rest. She states she has also been feeling congested in her chest with an associated cough for the past couple of days. Also admits to associated leg swelling over the past week. She states the now she does not have chest pain and is feeling better. Denies HA, Focal weakness, Palpitations, N/V, orthopnea.     ED course  Vitals: T 98.3 , HR 85 , /80 , RR 19 , SpO2 94% on 2L  Significant labs: BUN 55 Cr 1.9 Glucose 212 AST 63 Trop 1255.9 CKMB 11.1 proBNP 713   Imaging: CXR: Heart is enlarged despite AP film shallow inspiration. Bilateral predominantly basilar interstitial prominence favored to   represent congestive changes. no focal consolidation or pleural effusion. No pneumothorax. Calcific bursitis left shoulder  EKG: Sinus Rhythm with 1st Degree AV block. No evidence of significant change from previous EKG in March 2025  In ED patient given: Plavix 600mg x1, Lasix 40 IV x1,        (26 Jun 2025 18:59)  ---  HOSPITAL COURSE/PERTINENT LABS/PROCEDURES PERFORMED/PENDING TESTS:  Admitted for NSTEMI with uptrending troponin. Initiated Heparin gtt, Aspirin, Plavix load, high intensity statin initiated in ED.   ICU Course:  Telemetry monitoring suggestive of 3rd degree heart block, Cardiology requesting ICU monitoring given cardiac hx and uptrending cardiac enzymes. Tele strips reviewed, found to be in 2nd degree Type 1 heart block. Patient underwent LHC with Interventional Cardiology, w/ PCI/MARLY x1 to pCX ( 99% occluded) LVEDP=10 EF 70-75% tolerated procedure well without complication. Was transitioned to oral eliquis. Continued on triple therapy with ASA, Plavix and eliquis, pt to continue for 1 week and then dc ASA. IV bumex given daily for diuresis, transitioned to oral. Repeat TTE showed mild LVH. Hx of DM2, insulin adjusted accordingly. Patient remained stable in the ICU and was medically optimized for downgrade to general medicine floor. PT recommended no skilled PT needs. Given Mobitz type 1, All AVN are to be held until she sees her out pt Cardiologist ([facility])    ---  PATIENT CONDITION:  - stable    ---  PHYSICAL EXAM ON DAY OF DISCHARGE:  PHYSICAL EXAM  GENERAL: NAD,   HEAD:  Atraumatic, Normocephalic  EYES:  conjunctiva and sclera clear  ENT: Moist mucous membranes  CHEST/LUNG: Clear to auscultation bilaterally; No rales, rhonchi, wheezing, or rubs. Unlabored respirations  HEART: Regular rate and rhythm; S1S2  ABDOMEN: Bowel sounds present; Soft, Nontender, Nondistended.  EXTREMITIES:  trace leg edema b/l  NERVOUS SYSTEM:  Alert & awake, speech clear, answers questions appropriately and follows commands  SKIN: Warm, dry    ---  CONSULTANTS:   Cardio- [facility]  Interventional cardio - Dr Macias   ---      ---  TIME SPENT:  I, the attending physician, was physically present for the key portions of the evaluation and management (E/M) service provided. The total amount of time spent reviewing the hospital notes, laboratory values, imaging findings, assessing/counseling the patient, discussing with consultant physicians, social work, nursing staff was -- minutes

## 2025-06-27 NOTE — DISCHARGE NOTE PROVIDER - DETAILS OF MALNUTRITION DIAGNOSIS/DIAGNOSES
This patient has been assessed with a concern for Malnutrition and was treated during this hospitalization for the following Nutrition diagnosis/diagnoses:     -  06/28/2025: Morbid obesity (BMI > 40)

## 2025-06-27 NOTE — PROVIDER CONTACT NOTE (EICU) - SITUATION
EICU Attending Location: [address] [place] [address] (remote)  Patient location: [facility] Tele [address] Window

## 2025-06-27 NOTE — PROGRESS NOTE ADULT - PROBLEM SELECTOR PLAN 3
EKG: EKG: Sinus Rhythm with 1st Degree AV block. No evidence of significant change from previous EKG in March 2025  - Hold home Diltiazem   - Hold Home Eliquis for Cath. Heparin gtt for AC  - hold home Multaq 400 mg BID for now   -unclear if pt is taking doxazosin 2mg daily for afib however will hold for now   - Monitor and replete lytes, keep K>4, Mg>2.

## 2025-06-27 NOTE — PROGRESS NOTE ADULT - NSPROGADDITIONALINFOA_GEN_ALL_CORE
s/p stent placement   restart eliquis tomorrow am post pci 6/28  continue dual anti platelet therapy with aspirin AND clopidogrel AND ELIQUIS x 1 week triple therapy then DISCONTINUE ASA

## 2025-06-27 NOTE — PROGRESS NOTE ADULT - SUBJECTIVE AND OBJECTIVE BOX
Patient is a 79y old  Female who presents with a chief complaint of NSTEMI (27 Jun 2025 11:45)      INTERVAL HPI/OVERNIGHT EVENTS: Patient seen and examined at bedside. Elevated troponin overnight and possible heart block transferred to ICU  Plan for cardiac cath this AM    MEDICATIONS  (STANDING):  atorvastatin 40 milliGRAM(s) Oral at bedtime  bumetanide Injectable 2 milliGRAM(s) IV Push daily  chlorhexidine 2% Cloths 1 Application(s) Topical <User Schedule>  clopidogrel Tablet 75 milliGRAM(s) Oral daily  dextrose 5%. 1000 milliLiter(s) (50 mL/Hr) IV Continuous <Continuous>  dextrose 5%. 1000 milliLiter(s) (100 mL/Hr) IV Continuous <Continuous>  dextrose 50% Injectable 25 Gram(s) IV Push once  dextrose 50% Injectable 12.5 Gram(s) IV Push once  dextrose 50% Injectable 25 Gram(s) IV Push once  glucagon  Injectable 1 milliGRAM(s) IntraMuscular once  insulin lispro (ADMELOG) corrective regimen sliding scale   SubCutaneous every 6 hours  potassium chloride  10 mEq/100 mL IVPB 10 milliEquivalent(s) IV Intermittent every 1 hour  potassium chloride  10 mEq/100 mL IVPB 10 milliEquivalent(s) IV Intermittent once  sodium chloride 0.9%. 1000 milliLiter(s) (75 mL/Hr) IV Continuous <Continuous>  sodium chloride 0.9%. 1000 milliLiter(s) (100 mL/Hr) IV Continuous <Continuous>    MEDICATIONS  (PRN):  acetaminophen     Tablet .. 650 milliGRAM(s) Oral every 6 hours PRN Temp greater or equal to 38C (100.4F), Mild Pain (1 - 3)  dextrose Oral Gel 15 Gram(s) Oral once PRN Blood Glucose LESS THAN 70 milliGRAM(s)/deciliter  LORazepam     Tablet 0.5 milliGRAM(s) Oral daily PRN Anxiety  melatonin 3 milliGRAM(s) Oral at bedtime PRN Insomnia      Allergies    No Known Allergies    Intolerances        REVIEW OF SYSTEMS:  CONSTITUTIONAL: No fever or chills  CARDIOVASCULAR: positive chest pain, positive palpitations  GASTROINTESTINAL: No abd pain, nausea, vomiting, or diarrhea    Vital Signs Last 24 Hrs  T(C): 36.7 (27 Jun 2025 07:45), Max: 37.2 (27 Jun 2025 01:51)  T(F): 98 (27 Jun 2025 07:45), Max: 99 (27 Jun 2025 01:51)  HR: 66 (27 Jun 2025 12:30) (62 - 88)  BP: 115/70 (27 Jun 2025 12:30) (105/64 - 157/75)  BP(mean): 84 (27 Jun 2025 12:30) (68 - 134)  RR: 21 (27 Jun 2025 12:30) (13 - 26)  SpO2: 100% (27 Jun 2025 12:30) (93% - 100%)    Parameters below as of 27 Jun 2025 08:09  Patient On (Oxygen Delivery Method): nasal cannula  O2 Flow (L/min): 2    I&O's Summary        PHYSICAL EXAM:  GENERAL: NAD  HEENT:  AT/NC, anicteric, moist mucous membranes, EOMI, PERRL, conjunctiva and sclera clear  CHEST/LUNG:  diminished at bases overall CTA b/l, no rales, wheezes, or rhonchi,  normal respiratory effort, no intercostal retractions  HEART:  RRR, S1, S2, no murmurs; 1+ pitting edema  ABDOMEN:  BS+, soft, nontender, nondistended  MSK/EXTREMITIES: palpable peripheral pulses, no clubbing or cyanosis  NERVOUS SYSTEM: answers questions and follows commands appropriately, A&Ox3 grossly moves all extremities   PSYCH: Appropriate affect, Alert & Awake; Good judgement      LABS: Personally reviewed  CBC                        14.7   5.36  )-----------( 121      ( 27 Jun 2025 05:39 )             44.6     CMP  06-27    143  |  110  |  54  ----------------------------<  125  3.3   |  28  |  1.80    Ca    9.5      27 Jun 2025 05:39  Phos  3.7     06-27  Mg     2.2     06-27    TPro  7.6  /  Alb  3.8  /  TBili  1.0  /  DBili  x   /  AST  63  /  ALT  45  /  AlkPhos  86  06-26      Lipase: 38 U/L (06-26-25 @ 16:20)      PT/INR - ( 26 Jun 2025 16:20 )   PT: 15.3 sec;   INR: 1.31 ratio         PTT - ( 27 Jun 2025 00:31 )  PTT:108.7 sec      CARDIAC MARKERS ( 27 Jun 2025 05:39 )  x     / 98936.9 ng/L / x     / x     / x      CARDIAC MARKERS ( 27 Jun 2025 00:31 )  x     / 25232.8 ng/L / x     / x     / 78.2 ng/mL  CARDIAC MARKERS ( 26 Jun 2025 20:58 )  x     / 57339.7 ng/L / x     / x     / 77.3 ng/mL  CARDIAC MARKERS ( 26 Jun 2025 16:20 )  x     / 1255.9 ng/L / x     / x     / 11.1 ng/mL      06-27 Chol 174 mg/dL LDL -- HDL 52 mg/dL Trig 148 mg/dL              POCT Blood Glucose.: 155 mg/dL (27 Jun 2025 09:33)  POCT Blood Glucose.: 159 mg/dL (27 Jun 2025 08:31)  POCT Blood Glucose.: 177 mg/dL (26 Jun 2025 21:10)      Urinalysis Basic - ( 27 Jun 2025 05:39 )    Color: x / Appearance: x / SG: x / pH: x  Gluc: 125 mg/dL / Ketone: x  / Bili: x / Urobili: x   Blood: x / Protein: x / Nitrite: x   Leuk Esterase: x / RBC: x / WBC x   Sq Epi: x / Non Sq Epi: x / Bacteria: x                RADIOLOGY & ADDITIONAL TESTS: Personally reviewed.     Consultant(s) Notes Reviewed:  [x] YES  [ ] NO   Discussed with RALF/JOSHUA, RN

## 2025-06-27 NOTE — PROGRESS NOTE ADULT - PROBLEM SELECTOR PLAN 7
DVT ppx: heparin gtt    #anxiety   -take lorazepam 0.5mg once a day at home - will c/w lorazepam 0.5mg prn      Management per ICU

## 2025-06-27 NOTE — PROGRESS NOTE ADULT - PROBLEM SELECTOR PLAN 5
follows leilani Carmona   -on home lantus 8U AM and 18U at bedtime and farxiga 10mg and Glimepiride 2 MG BID   -hold home oral diabetes meds   -will c/w MDISS -will hold lantus for now given pt will be NPO after midnight   -f/u AM a1c   -hypoglycemia protocol

## 2025-06-27 NOTE — DISCHARGE NOTE PROVIDER - NSDCCPCAREPLAN_GEN_ALL_CORE_FT
PRINCIPAL DISCHARGE DIAGNOSIS  Diagnosis: NSTEMI (non-ST elevation myocardial infarction)  Assessment and Plan of Treatment: NSTEMI, or non-ST-elevation myocardial infarction, is a type of heart attack where a blockage in one of the coronary arteries partially restricts blood flow to the heart. You underwent a heart catheterization procedure and were found to have vessel that was 99% occluded, a stent was placed by the interventional cardiologist.  - CONTINUE Plavix 75 mg once a day, Aspirin 81 mg once a day, and Eliquis 5 mg twice a day -- you must continue all 3 of these medications for 1 week. after 1 week you can stop taking aspirin  - CONTINUE atorvastatin 40 mg once a day  Follow up with your PCP and cardiologist 1-2 weeks after discharge  It is very important you continue your medications unless otherwise told for 3-12 months, your cardiologist will determine when you can stop these medications      SECONDARY DISCHARGE DIAGNOSES  Diagnosis: Afib  Assessment and Plan of Treatment: You have a history of atrial fibrillation.  ??STOP lisinopril??  ??STOP multaq  ?? Dilt?      Diagnosis: DM (diabetes mellitus), type 2  Assessment and Plan of Treatment: You have a history of type 2 diabetes  RESUME home farxiga 10 mg  RESUME home glimepiride 2 mg twice a day  TAKE LANTUS ???   Follow up with your PCP and endocrinologist 1-2 weeks after discharge     PRINCIPAL DISCHARGE DIAGNOSIS  Diagnosis: NSTEMI (non-ST elevation myocardial infarction)  Assessment and Plan of Treatment: NSTEMI, or non-ST-elevation myocardial infarction, is a type of heart attack where a blockage in one of the coronary arteries partially restricts blood flow to the heart. You underwent a heart catheterization procedure and were found to have vessel that was 99% occluded, a stent was placed by the interventional cardiologist.  - CONTINUE Plavix 75 mg once a day, Aspirin 81 mg once a day, and Eliquis 5 mg twice a day -- you must continue all 3 of these medications for 1 week. after 1 week you can stop taking aspirin  - CONTINUE atorvastatin 40 mg once a day  Follow up with your PCP and cardiologist 1-2 weeks after discharge  It is very important you continue your medications unless otherwise told for 3-12 months, your cardiologist will determine when you can stop these medications      SECONDARY DISCHARGE DIAGNOSES  Diagnosis: Afib  Assessment and Plan of Treatment: You have a history of atrial fibrillation.  ??STOP lisinopril??  ??STOP multaq  ?? Dilt?      Diagnosis: Congestive heart failure (CHF)  Assessment and Plan of Treatment: You have a history of heart failure. You were given IV diuretics in the hospital  RESUME oral Bumex ??    Diagnosis: DM (diabetes mellitus), type 2  Assessment and Plan of Treatment: You have a history of type 2 diabetes  RESUME home farxiga 10 mg  RESUME home glimepiride 2 mg twice a day  TAKE LANTUS ???   Follow up with your PCP and endocrinologist 1-2 weeks after discharge     PRINCIPAL DISCHARGE DIAGNOSIS  Diagnosis: NSTEMI (non-ST elevation myocardial infarction)  Assessment and Plan of Treatment: NSTEMI, or non-ST-elevation myocardial infarction, is a type of heart attack where a blockage in one of the coronary arteries partially restricts blood flow to the heart. You underwent a heart catheterization procedure and were found to have vessel that was 99% occluded, a stent was placed by the interventional cardiologist.  - CONTINUE Plavix 75 mg once a day, Aspirin 81 mg once a day, and Eliquis 5 mg twice a day -- you must continue all 3 of these medications for 1 week. after 1 week you can stop taking aspirin (last day of Aspirin July 4th)  - CONTINUE atorvastatin 40 mg once a day  Follow up with your PCP and cardiologist 1-2 weeks after discharge  Please schedule your cardiac rehab appointments per the instructions from interventional cardiology team.  It is very important you continue your medications unless otherwise told for 3-12 months, your cardiologist will determine when you can stop these medications      SECONDARY DISCHARGE DIAGNOSES  Diagnosis: Afib  Assessment and Plan of Treatment: You have a history of atrial fibrillation.  STOP multaq and STOP Diltiazem as you have an abnormal heart rhymn called Mobitz type I. These medications can worsen this condition.  It is important to follow up with your Cardiologist within 1 week of discharge to discuss these changes.       Diagnosis: Congestive heart failure (CHF)  Assessment and Plan of Treatment: You have a history of heart failure. You were given IV diuretics in the hospital  RESUME oral Bumex 2mg daily at your home dose.

## 2025-06-27 NOTE — CARE COORDINATION ASSESSMENT. - NSPASTMEDSURGHISTORY_GEN_ALL_CORE_FT
PAST MEDICAL & SURGICAL HISTORY:  Renal insufficiency      DM (diabetes mellitus)      Afib      Congestive heart failure (CHF)      HTN (hypertension)      CAD (coronary artery disease)      No significant past surgical history

## 2025-06-27 NOTE — DISCHARGE NOTE PROVIDER - ATTENDING DISCHARGE PHYSICAL EXAMINATION:
PHYSICAL EXAM:    Vital Signs Last 24 Hrs  T(C): 37.1 (29 Jun 2025 05:05), Max: 37.1 (29 Jun 2025 05:05)  T(F): 98.7 (29 Jun 2025 05:05), Max: 98.7 (29 Jun 2025 05:05)  HR: 80 (29 Jun 2025 05:05) (65 - 83)  BP: 125/73 (29 Jun 2025 05:05) (92/58 - 125/73)  BP(mean): 66 (28 Jun 2025 13:00) (66 - 77)  RR: 18 (29 Jun 2025 05:05) (16 - 22)  SpO2: 91% (29 Jun 2025 05:05) (91% - 100%)    General: No acute distress.  HEENT: No scleral icterus or injection.    Neck: Supple.  Full ROM.  No JVD.    Heart: RRR.  Normal S1 and S2.  No murmurs, rubs, or gallops.   Lungs: CTAB. No wheezes, crackles, or rhonchi.    Abdomen: BS+, soft, NT/ND.    Skin: Warm and dry.  No rashes.  Extremities: No edema, clubbing, or cyanosis.   Musculoskeletal: No deformities  Neuro: A&Ox3.

## 2025-06-27 NOTE — PROGRESS NOTE ADULT - SUBJECTIVE AND OBJECTIVE BOX
INTERVAL HPI/OVERNIGHT EVENTS: Pt admitted for NSTEMI with uptrending trops, care escalated to the ICU for 3rd degree heart block on Tele Monitoring. Patient remained stable throughout course.    SUBJECTIVE: Seen and examined pt at bedside. Pt states that she feels well overall without shortness of breath, chest pain, or palpitations. Known hx of Afib, but does not feel when she is in the rhythm. Pt underwent cardiac cath in 2020 and expresses severe anxiety surrounding the procedure. Endorses increased swelling in left lower extremity compared to right. No other complaints at this time.     Review of Systems: As per above     ICU Vital Signs Last 24 Hrs  T(C): 36.7 (27 Jun 2025 07:45), Max: 37.2 (27 Jun 2025 01:51)  T(F): 98 (27 Jun 2025 07:45), Max: 99 (27 Jun 2025 01:51)  HR: 69 (27 Jun 2025 13:00) (62 - 88)  BP: 90/69 (27 Jun 2025 13:00) (90/69 - 157/75)  BP(mean): 75 (27 Jun 2025 13:00) (68 - 134)  ABP: --  ABP(mean): --  RR: 21 (27 Jun 2025 12:30) (13 - 26)  SpO2: 99% (27 Jun 2025 13:00) (93% - 100%)    O2 Parameters below as of 27 Jun 2025 08:09  Patient On (Oxygen Delivery Method): nasal cannula  O2 Flow (L/min): 2              CAPILLARY BLOOD GLUCOSE      POCT Blood Glucose.: 161 mg/dL (27 Jun 2025 12:55)      I&O's Summary      PHYSICAL EXAM:  T(C): 36.7 (06-27-25 @ 07:45), Max: 37.2 (06-27-25 @ 01:51)  HR: 69 (06-27-25 @ 13:00) (62 - 88)  BP: 90/69 (06-27-25 @ 13:00) (90/69 - 157/75)  RR: 21 (06-27-25 @ 12:30) (13 - 26)  SpO2: 99% (06-27-25 @ 13:00) (93% - 100%)    GENERAL: patient appears well, no acute distress, appropriately interactive  LUNGS: mild crackles in bilateral lower lung fields   HEART: soft S1/S2, regular rate and rhythm, no murmurs noted, L>R lower extremity edema  GASTROINTESTINAL: abdomen is soft, nontender, nondistended, normoactive bowel sounds  INTEGUMENT: good skin turgor, warm skin, appears well perfused  MUSCULOSKELETAL: no clubbing or cyanosis, no obvious deformity  NEUROLOGIC: awake, alert, oriented x3, good muscle tone in all 4 extremities  HEME/LYMPH: no obvious ecchymosis or petechiae       Meds:    bumetanide Injectable IV Push    atorvastatin Oral  dextrose 50% Injectable IV Push  dextrose 50% Injectable IV Push  dextrose 50% Injectable IV Push  dextrose Oral Gel Oral PRN  glucagon  Injectable IntraMuscular  insulin lispro (ADMELOG) corrective regimen sliding scale SubCutaneous      acetaminophen     Tablet .. Oral PRN  LORazepam     Tablet Oral PRN  melatonin Oral PRN      clopidogrel Tablet Oral        dextrose 5%. IV Continuous  dextrose 5%. IV Continuous  sodium chloride 0.9%. IV Continuous  sodium chloride 0.9%. IV Continuous      chlorhexidine 2% Cloths Topical                              14.7   5.36  )-----------( 121      ( 27 Jun 2025 05:39 )             44.6       06-27    143  |  110[H]  |  54[H]  ----------------------------<  125[H]  3.3[L]   |  28  |  1.80[H]    Ca    9.5      27 Jun 2025 05:39  Phos  3.7     06-27  Mg     2.2     06-27    TPro  7.6  /  Alb  3.8  /  TBili  1.0  /  DBili  x   /  AST  63[H]  /  ALT  45  /  AlkPhos  86  06-26      CARDIAC MARKERS ( 27 Jun 2025 00:31 )  x     / x     / x     / x     / 78.2 ng/mL  CARDIAC MARKERS ( 26 Jun 2025 20:58 )  x     / x     / x     / x     / 77.3 ng/mL  CARDIAC MARKERS ( 26 Jun 2025 16:20 )  x     / x     / x     / x     / 11.1 ng/mL      PT/INR - ( 26 Jun 2025 16:20 )   PT: 15.3 sec;   INR: 1.31 ratio         PTT - ( 27 Jun 2025 00:31 )  PTT:108.7 sec  Urinalysis Basic - ( 27 Jun 2025 05:39 )    Color: x / Appearance: x / SG: x / pH: x  Gluc: 125 mg/dL / Ketone: x  / Bili: x / Urobili: x   Blood: x / Protein: x / Nitrite: x   Leuk Esterase: x / RBC: x / WBC x   Sq Epi: x / Non Sq Epi: x / Bacteria: x                  Radiology: TTE reviewed     Bedside Ultrasound: N/A     Tubes/Lines: Peripheral lines       GLOBAL ISSUE/BEST PRACTICE:  Analgesia: Y  Sedation: N  HOB elevation: Y  Stress ulcer prophylaxis: N  VTE prophylaxis: Y  Glycemic control: Y  Nutrition: NPO     CODE STATUS: Full Code

## 2025-06-27 NOTE — PROGRESS NOTE ADULT - SUBJECTIVE AND OBJECTIVE BOX
[facility]    Alyssa Baeza, Karlos, Chinedu, Didi, Bruce      [phone]    CHIEF COMPLAINT: Patient is a 79y old  Female who presents with a chief complaint of NSTEMI (27 Jun 2025 06:01)      Follow Up: nstemi, heart block    Interim history: The patient reports no new symptoms.  Denies active chest discomfort, which apparently resolved overnight. Denies shortness of breath.  No abdominal pain.  No new neurologic symptoms. Transferred to icu in setting of Mobitz 1 heart block this am.      MEDICATIONS  (STANDING):  aspirin enteric coated 81 milliGRAM(s) Oral every 24 hours  atorvastatin 40 milliGRAM(s) Oral at bedtime  bumetanide Injectable 2 milliGRAM(s) IV Push daily  chlorhexidine 2% Cloths 1 Application(s) Topical <User Schedule>  clopidogrel Tablet 75 milliGRAM(s) Oral daily  dextrose 5%. 1000 milliLiter(s) (50 mL/Hr) IV Continuous <Continuous>  dextrose 5%. 1000 milliLiter(s) (100 mL/Hr) IV Continuous <Continuous>  dextrose 50% Injectable 25 Gram(s) IV Push once  dextrose 50% Injectable 12.5 Gram(s) IV Push once  dextrose 50% Injectable 25 Gram(s) IV Push once  glucagon  Injectable 1 milliGRAM(s) IntraMuscular once  heparin  Infusion.  Unit(s)/Hr (10 mL/Hr) IV Continuous <Continuous>  insulin lispro (ADMELOG) corrective regimen sliding scale   SubCutaneous every 6 hours  potassium chloride  10 mEq/100 mL IVPB 10 milliEquivalent(s) IV Intermittent every 1 hour    MEDICATIONS  (PRN):  acetaminophen     Tablet .. 650 milliGRAM(s) Oral every 6 hours PRN Temp greater or equal to 38C (100.4F), Mild Pain (1 - 3)  dextrose Oral Gel 15 Gram(s) Oral once PRN Blood Glucose LESS THAN 70 milliGRAM(s)/deciliter  heparin   Injectable 6000 Unit(s) IV Push every 6 hours PRN For aPTT less than 40  LORazepam     Tablet 0.5 milliGRAM(s) Oral daily PRN Anxiety  melatonin 3 milliGRAM(s) Oral at bedtime PRN Insomnia      REVIEW OF SYSTEMS:  eye, ent, GI, , allergic, dermatologic, musculoskeletal and neurologic are negative except as described above    Vital Signs Last 24 Hrs  T(C): 36.7 (27 Jun 2025 07:45), Max: 37.2 (27 Jun 2025 01:51)  T(F): 98 (27 Jun 2025 07:45), Max: 99 (27 Jun 2025 01:51)  HR: 88 (27 Jun 2025 09:00) (62 - 88)  BP: 157/75 (27 Jun 2025 09:00) (112/90 - 157/75)  BP(mean): 93 (27 Jun 2025 09:00) (93 - 134)  RR: 26 (27 Jun 2025 09:00) (14 - 26)  SpO2: 96% (27 Jun 2025 09:00) (93% - 100%)    Parameters below as of 27 Jun 2025 08:09  Patient On (Oxygen Delivery Method): nasal cannula  O2 Flow (L/min): 2      I&O's Summary      Telemetry past 24h: jose bolton 1 avb    PHYSICAL EXAM:    Constitutional: well-nourished, well-developed, NAD   HEENT:  MMM, sclerae anicteric, conjunctivae clear, no oral cyanosis.  Pulmonary: Non-labored, breath sounds are clear bilaterally, No wheezing, rales or rhonchi  Cardiovascular: Regular, S1 and S2.  No murmur.  No rubs, gallops or clicks  Gastrointestinal: Bowel Sounds present, soft, nontender.   Lymph: No peripheral edema.   Neurological: Alert, no focal deficits  Skin: No rashes.  Psych:  Mood & affect appropriate    LABS: All Labs Reviewed:                        14.7   5.36  )-----------( 121      ( 27 Jun 2025 05:39 )             44.6                         14.2   5.76  )-----------( 136      ( 27 Jun 2025 00:31 )             44.1                         14.7   6.49  )-----------( 130      ( 26 Jun 2025 16:20 )             44.9     27 Jun 2025 05:39    143    |  110    |  54     ----------------------------<  125    3.3     |  28     |  1.80   26 Jun 2025 16:20    141    |  112    |  55     ----------------------------<  212    4.8     |  24     |  1.90     Ca    9.5        27 Jun 2025 05:39  Ca    9.0        26 Jun 2025 16:20  Phos  3.7       27 Jun 2025 05:39  Mg     2.2       27 Jun 2025 05:39  Mg     2.1       26 Jun 2025 16:20    TPro  7.6    /  Alb  3.8    /  TBili  1.0    /  DBili  x      /  AST  63     /  ALT  45     /  AlkPhos  86     26 Jun 2025 16:20    PT/INR - ( 26 Jun 2025 16:20 )   PT: 15.3 sec;   INR: 1.31 ratio         PTT - ( 27 Jun 2025 00:31 )  PTT:108.7 sec  CARDIAC MARKERS ( 27 Jun 2025 00:31 )  x     / x     / x     / x     / 78.2 ng/mL  CARDIAC MARKERS ( 26 Jun 2025 20:58 )  x     / x     / x     / x     / 77.3 ng/mL  CARDIAC MARKERS ( 26 Jun 2025 16:20 )  x     / x     / x     / x     / 11.1 ng/mL      Blood Culture:        RADIOLOGY:    EKG:    Echo:

## 2025-06-27 NOTE — PROVIDER CONTACT NOTE (EICU) - RECOMMENDATIONS
-Monitor in ICU before her cath today  -Cardiac monitoring  -Continue heparin gtt  -Replete potassium aggressively  -Keep pads on patient

## 2025-06-27 NOTE — PROGRESS NOTE ADULT - SUBJECTIVE AND OBJECTIVE BOX
Department of Cardiology                                                                     Division of Interventional Cardiology                                                                  [facility]/ [address] [address]                                                                             Telephone: [phone]                                                    Post- Procedure Note: s/p NSTEMI, s/p  Left Heart Cardiac Catheterization / s/p PCI/MARLY x1 to pCX ( 99% occluded)  LVEDP=10 EF 70-75% via RRA by Dr Alistair Macias      Subjective/ROS:  Denies CP, palpitations, SOB, N/V, fever/chills, dizziness, weakness, numbness/tingling.      HPI:  80 y/o female with a pmhx of CAD s/p 2 stents to prox and mid LAD in 2020, HFpEF (EF 70-75%in 2/2025) Anxiety, Afib on eliquis, Adrenal Nodule, Renal insufficiency, HLD, HTN, DM, ABDOUL presents to the ED with3 days of Heartburn like chest pain and worsening shortness of breath. Over the past 3 days pt states she has had heart burn on the left side of her chest associated with progressively worsening dyspnea on exertion. She states today she tried taking tums and the pain just got worse along with shortness of breath at rest. She states she has also been feeling congested in her chest with an associated cough for the past couple of days. Also admits to associated leg swelling over the past week. She states the now she does not have chest pain and is feeling better. Denies HA, Focal weakness, Palpitations, N/V, orthopnea.     ED course  Vitals: T 98.3 , HR 85 , /80 , RR 19 , SpO2 94% on 2L  Significant labs: BUN 55 Cr 1.9 Glucose 212 AST 63 Trop 1255.9 CKMB 11.1 proBNP 713   Imaging: CXR: Heart is enlarged despite AP film shallow inspiration. Bilateral predominantly basilar interstitial prominence favored to   represent congestive changes. no focal consolidation or pleural effusion. No pneumothorax. Calcific bursitis left shoulder  EKG: Sinus Rhythm with 1st Degree AV block. No evidence of significant change from previous EKG in March 2025  In ED patient given: Plavix 600mg x1, Lasix 40 IV x1,        (26 Jun 2025 18:59)      PAST MEDICAL & SURGICAL HISTORY:  CAD (coronary artery disease)      HTN (hypertension)      Congestive heart failure (CHF)      Afib      DM (diabetes mellitus)      Renal insufficiency      No significant past surgical history      MEDICATIONS  (STANDING):  aspirin enteric coated 81 milliGRAM(s) Oral every 24 hours  atorvastatin 40 milliGRAM(s) Oral at bedtime  bumetanide Injectable 2 milliGRAM(s) IV Push daily  chlorhexidine 2% Cloths 1 Application(s) Topical <User Schedule>  clopidogrel Tablet 75 milliGRAM(s) Oral daily  dextrose 5%. 1000 milliLiter(s) (50 mL/Hr) IV Continuous <Continuous>  dextrose 5%. 1000 milliLiter(s) (100 mL/Hr) IV Continuous <Continuous>  dextrose 50% Injectable 25 Gram(s) IV Push once  dextrose 50% Injectable 12.5 Gram(s) IV Push once  dextrose 50% Injectable 25 Gram(s) IV Push once  glucagon  Injectable 1 milliGRAM(s) IntraMuscular once  insulin lispro (ADMELOG) corrective regimen sliding scale   SubCutaneous every 6 hours  potassium chloride  10 mEq/100 mL IVPB 10 milliEquivalent(s) IV Intermittent every 1 hour  sodium chloride 0.9%. 1000 milliLiter(s) (75 mL/Hr) IV Continuous <Continuous>  sodium chloride 0.9%. 1000 milliLiter(s) (100 mL/Hr) IV Continuous <Continuous>    MEDICATIONS  (PRN):  acetaminophen     Tablet .. 650 milliGRAM(s) Oral every 6 hours PRN Temp greater or equal to 38C (100.4F), Mild Pain (1 - 3)  dextrose Oral Gel 15 Gram(s) Oral once PRN Blood Glucose LESS THAN 70 milliGRAM(s)/deciliter  LORazepam     Tablet 0.5 milliGRAM(s) Oral daily PRN Anxiety  melatonin 3 milliGRAM(s) Oral at bedtime PRN Insomnia    Allergies: No Known Allergies                            14.7   5.36  )-----------( 121      ( 27 Jun 2025 05:39 )             44.6     06-27    143  |  110[H]  |  54[H]  ----------------------------<  125[H]  3.3[L]   |  28  |  1.80[H]    Ca    9.5      27 Jun 2025 05:39  Phos  3.7     06-27  Mg     2.2     06-27    TPro  7.6  /  Alb  3.8  /  TBili  1.0  /  DBili  x   /  AST  63[H]  /  ALT  45  /  AlkPhos  86  06-26    PT/INR - ( 26 Jun 2025 16:20 )   PT: 15.3 sec;   INR: 1.31 ratio         PTT - ( 27 Jun 2025 00:31 )  PTT:108.7 sec      Vital Signs Last 24 Hrs  T(C): 36.7 (27 Jun 2025 07:45), Max: 37.2 (27 Jun 2025 01:51)  T(F): 98 (27 Jun 2025 07:45), Max: 99 (27 Jun 2025 01:51)  HR: 88 (27 Jun 2025 09:00) (62 - 88)  BP: 157/75 (27 Jun 2025 09:00) (112/90 - 157/75)  BP(mean): 93 (27 Jun 2025 09:00) (93 - 134)  RR: 26 (27 Jun 2025 09:00) (14 - 26)  SpO2: 96% (27 Jun 2025 09:00) (93% - 100%)    Parameters below as of 27 Jun 2025 08:09  Patient On (Oxygen Delivery Method): nasal cannula  O2 Flow (L/min): 2        Post PCI ECG:     Constitutional: NAD  Neuro: A+O x 3, non-focal, speech clear and intact  HEENT: NC/AT, PERRL, EOMI, anicteric sclerae, oral mucosa pink and moist  Neck: supple, no JVD  CV: regular rate, regular rhythm, +S1S2, no murmurs or rub  Pulm/chest: lung sounds CTA and equal bilaterally, no accessory muscle use noted  Abd: soft, NT, ND, +BS  Ext: VANEGAS x 4, no C/C/E  Access site: R wrist C/D/I/soft without hematoma, distal motor/neuro/circ intact. R radial pulse 2+.  Skin: warm, well perfused  Psych: calm, appropriate affect

## 2025-06-27 NOTE — CONSULT NOTE ADULT - SUBJECTIVE AND OBJECTIVE BOX
BRIEF HOSPITAL COURSE:     Patient is a 79 year old female with PMHx of Afib (on Eliquis), CAD s/p MARLY x2 to LAD, HFpEF, HTN, HLD, DM who presented to the ED on 6/26 with chest pain and dyspnea upon exertion. Found to have NSTEMI with trops 1255 initially and t wave inversions in inferior leads. Admitted to [facility] on heparin gtt and plan for Cath 6/27. Patient with intermittent chest pain overnight and found to be in CHB briefly with rate in 30s. Dr. Tolbert aware and critical care consulted.       Patient evaluated at bedside on tele floor, alert and oriented in no acute distress. EKG demonstrating CHB, now in NSR with t wave inversions in inferior leads, similar but improved to previous ekg upon admission. Patient denies any chest pain or dizziness at this time. Denies numbness/tingling.       PAST MEDICAL & SURGICAL HISTORY:  CAD (coronary artery disease)  HTN (hypertension)  Congestive heart failure (CHF)  Afib  DM (diabetes mellitus)  Renal insufficiency  No significant past surgical history      Review of Systems:  All additional ROS negative.       Medications:    bumetanide Injectable 2 milliGRAM(s) IV Push daily  acetaminophen     Tablet .. 650 milliGRAM(s) Oral every 6 hours PRN  LORazepam     Tablet 0.5 milliGRAM(s) Oral daily PRN  melatonin 3 milliGRAM(s) Oral at bedtime PRN  aspirin enteric coated 81 milliGRAM(s) Oral every 24 hours  clopidogrel Tablet 75 milliGRAM(s) Oral daily  heparin   Injectable 6000 Unit(s) IV Push every 6 hours PRN  heparin  Infusion.  Unit(s)/Hr IV Continuous <Continuous>  atorvastatin 40 milliGRAM(s) Oral at bedtime  dextrose 50% Injectable 25 Gram(s) IV Push once  dextrose 50% Injectable 12.5 Gram(s) IV Push once  dextrose 50% Injectable 25 Gram(s) IV Push once  dextrose Oral Gel 15 Gram(s) Oral once PRN  glucagon  Injectable 1 milliGRAM(s) IntraMuscular once  insulin lispro (ADMELOG) corrective regimen sliding scale   SubCutaneous three times a day before meals  insulin lispro (ADMELOG) corrective regimen sliding scale   SubCutaneous at bedtime  dextrose 5%. 1000 milliLiter(s) IV Continuous <Continuous>  dextrose 5%. 1000 milliLiter(s) IV Continuous <Continuous>                ICU Vital Signs Last 24 Hrs  T(C): 37.2 (27 Jun 2025 05:26), Max: 37.2 (27 Jun 2025 01:51)  T(F): 98.9 (27 Jun 2025 05:26), Max: 99 (27 Jun 2025 01:51)  HR: 69 (27 Jun 2025 05:26) (62 - 85)  BP: 152/79 (27 Jun 2025 05:26) (133/72 - 152/79)  BP(mean): --  ABP: --  ABP(mean): --  RR: 20 (27 Jun 2025 05:26) (18 - 21)  SpO2: 95% (27 Jun 2025 05:26) (93% - 100%)    O2 Parameters below as of 27 Jun 2025 05:26  Patient On (Oxygen Delivery Method): nasal cannula  O2 Flow (L/min): 2              I&O's Detail        LABS:                        14.7   5.36  )-----------( 121      ( 27 Jun 2025 05:39 )             44.6     06-27    143  |  110[H]  |  54[H]  ----------------------------<  125[H]  3.3[L]   |  28  |  1.80[H]    Ca    9.5      27 Jun 2025 05:39  Phos  3.7     06-27  Mg     2.2     06-27    TPro  7.6  /  Alb  3.8  /  TBili  1.0  /  DBili  x   /  AST  63[H]  /  ALT  45  /  AlkPhos  86  06-26      CARDIAC MARKERS ( 27 Jun 2025 00:31 )  x     / x     / x     / x     / 78.2 ng/mL  CARDIAC MARKERS ( 26 Jun 2025 20:58 )  x     / x     / x     / x     / 77.3 ng/mL  CARDIAC MARKERS ( 26 Jun 2025 16:20 )  x     / x     / x     / x     / 11.1 ng/mL      CAPILLARY BLOOD GLUCOSE      POCT Blood Glucose.: 177 mg/dL (26 Jun 2025 21:10)    PT/INR - ( 26 Jun 2025 16:20 )   PT: 15.3 sec;   INR: 1.31 ratio         PTT - ( 27 Jun 2025 00:31 )  PTT:108.7 sec  Urinalysis Basic - ( 27 Jun 2025 05:39 )    Color: x / Appearance: x / SG: x / pH: x  Gluc: 125 mg/dL / Ketone: x  / Bili: x / Urobili: x   Blood: x / Protein: x / Nitrite: x   Leuk Esterase: x / RBC: x / WBC x   Sq Epi: x / Non Sq Epi: x / Bacteria: x      CULTURES:            Physical Examination:    General: No acute distress.      HEENT: Pupils equal, reactive to light.  Symmetric.    PULM: Clear to auscultation bilaterally, no significant sputum production    CVS: Regular rate and rhythm, +S1/S2.     ABD: Soft, nondistended, nontender, normoactive bowel sounds, no masses    EXT: No edema, nontender    SKIN: Warm and well perfused, no rashes noted.    NEURO: Alert, oriented, interactive, nonfocal           BRIEF HOSPITAL COURSE:     Patient is a 79 year old female with PMHx of Afib (on Eliquis), CAD s/p MARLY x2 to LAD, HFpEF, HTN, HLD, DM who presented to the ED on 6/26 with chest pain and dyspnea upon exertion. Found to have NSTEMI with trops 1255 initially and t wave inversions in inferior leads. Admitted to [facility] on heparin gtt and plan for Cath 6/27. Patient with intermittent chest pain overnight and found to be in CHB briefly with rate in 30s. Dr. Tolbert aware and critical care consulted.       Patient evaluated at bedside on tele floor, alert and oriented in no acute distress. EKG demonstrating CHB, now in NSR with t wave inversions in inferior leads, similar but improved to previous ekg upon admission. Patient denies any chest pain or dizziness at this time. Denies numbness/tingling.       PAST MEDICAL & SURGICAL HISTORY:  CAD (coronary artery disease)  HTN (hypertension)  Congestive heart failure (CHF)  Afib  DM (diabetes mellitus)  Renal insufficiency  No significant past surgical history      Review of Systems:  All additional ROS negative.       Medications:    bumetanide Injectable 2 milliGRAM(s) IV Push daily  acetaminophen     Tablet .. 650 milliGRAM(s) Oral every 6 hours PRN  LORazepam     Tablet 0.5 milliGRAM(s) Oral daily PRN  melatonin 3 milliGRAM(s) Oral at bedtime PRN  aspirin enteric coated 81 milliGRAM(s) Oral every 24 hours  clopidogrel Tablet 75 milliGRAM(s) Oral daily  heparin   Injectable 6000 Unit(s) IV Push every 6 hours PRN  heparin  Infusion.  Unit(s)/Hr IV Continuous <Continuous>  atorvastatin 40 milliGRAM(s) Oral at bedtime  dextrose 50% Injectable 25 Gram(s) IV Push once  dextrose 50% Injectable 12.5 Gram(s) IV Push once  dextrose 50% Injectable 25 Gram(s) IV Push once  dextrose Oral Gel 15 Gram(s) Oral once PRN  glucagon  Injectable 1 milliGRAM(s) IntraMuscular once  insulin lispro (ADMELOG) corrective regimen sliding scale   SubCutaneous three times a day before meals  insulin lispro (ADMELOG) corrective regimen sliding scale   SubCutaneous at bedtime  dextrose 5%. 1000 milliLiter(s) IV Continuous <Continuous>  dextrose 5%. 1000 milliLiter(s) IV Continuous <Continuous>                ICU Vital Signs Last 24 Hrs  T(C): 37.2 (27 Jun 2025 05:26), Max: 37.2 (27 Jun 2025 01:51)  T(F): 98.9 (27 Jun 2025 05:26), Max: 99 (27 Jun 2025 01:51)  HR: 69 (27 Jun 2025 05:26) (62 - 85)  BP: 152/79 (27 Jun 2025 05:26) (133/72 - 152/79)  BP(mean): --  ABP: --  ABP(mean): --  RR: 20 (27 Jun 2025 05:26) (18 - 21)  SpO2: 95% (27 Jun 2025 05:26) (93% - 100%)    O2 Parameters below as of 27 Jun 2025 05:26  Patient On (Oxygen Delivery Method): nasal cannula  O2 Flow (L/min): 2              I&O's Detail        LABS:                        14.7   5.36  )-----------( 121      ( 27 Jun 2025 05:39 )             44.6     06-27    143  |  110[H]  |  54[H]  ----------------------------<  125[H]  3.3[L]   |  28  |  1.80[H]    Ca    9.5      27 Jun 2025 05:39  Phos  3.7     06-27  Mg     2.2     06-27    TPro  7.6  /  Alb  3.8  /  TBili  1.0  /  DBili  x   /  AST  63[H]  /  ALT  45  /  AlkPhos  86  06-26      CARDIAC MARKERS ( 27 Jun 2025 00:31 )  x     / x     / x     / x     / 78.2 ng/mL  CARDIAC MARKERS ( 26 Jun 2025 20:58 )  x     / x     / x     / x     / 77.3 ng/mL  CARDIAC MARKERS ( 26 Jun 2025 16:20 )  x     / x     / x     / x     / 11.1 ng/mL      CAPILLARY BLOOD GLUCOSE      POCT Blood Glucose.: 177 mg/dL (26 Jun 2025 21:10)    PT/INR - ( 26 Jun 2025 16:20 )   PT: 15.3 sec;   INR: 1.31 ratio         PTT - ( 27 Jun 2025 00:31 )  PTT:108.7 sec  Urinalysis Basic - ( 27 Jun 2025 05:39 )    Color: x / Appearance: x / SG: x / pH: x  Gluc: 125 mg/dL / Ketone: x  / Bili: x / Urobili: x   Blood: x / Protein: x / Nitrite: x   Leuk Esterase: x / RBC: x / WBC x   Sq Epi: x / Non Sq Epi: x / Bacteria: x      CULTURES:            Physical Examination:    General: No acute distress.      HEENT: Pupils equal, reactive to light.  Symmetric.    PULM: Clear to auscultation bilaterally, no significant sputum production    CVS: Regular rate and rhythm, +S1/S2.     ABD: Soft, nondistended, nontender, normoactive bowel sounds, no masses    EXT: +1 pitting edema in b/l LE, as per patient this is chronic.     SKIN: Warm and well perfused, no rashes noted.    NEURO: Alert, oriented, interactive, nonfocal

## 2025-06-27 NOTE — PROGRESS NOTE ADULT - ASSESSMENT
80 y/o female with a CAD s/p stents to prox and mid LAD in 2020, HFpEF (EF 70-75%in 2/2025) Anxiety, Afib on eliquis, Adrenal Nodule, Renal insufficiency baseline Cr 1.7-1.9 on previous labs, HLD, HTN, DM, ABDOUL presents to the ED with NSTEMI.    -known cad s/p pci of rca and lad 2020  - presented w/chest burning and shortness of breath over the last few days  - initial troponin 1255, the 35k, 39k, 41k, with ck rising to ~1100 so far  -suspect mostly completed infarct at this point given ce trends  -Mobitz 1 avb overnight, with no convincing evidence of 3rd deg avb, which had been a concern overnight  -cont dapt and heparin  -cont statin  -planning cath this am      -did appear vol ol yesterday and has been on diuretics   - remains on bumex  -cath will define lvedp and need for addl diuresis  -tte

## 2025-06-28 DIAGNOSIS — F41.9 ANXIETY DISORDER, UNSPECIFIED: ICD-10-CM

## 2025-06-28 LAB
ANION GAP SERPL CALC-SCNC: 9 MMOL/L — SIGNIFICANT CHANGE UP (ref 5–17)
BUN SERPL-MCNC: 54 MG/DL — HIGH (ref 7–23)
CALCIUM SERPL-MCNC: 8.6 MG/DL — SIGNIFICANT CHANGE UP (ref 8.5–10.1)
CHLORIDE SERPL-SCNC: 108 MMOL/L — SIGNIFICANT CHANGE UP (ref 96–108)
CO2 SERPL-SCNC: 25 MMOL/L — SIGNIFICANT CHANGE UP (ref 22–31)
CREAT SERPL-MCNC: 1.8 MG/DL — HIGH (ref 0.5–1.3)
EGFR: 28 ML/MIN/1.73M2 — LOW
EGFR: 28 ML/MIN/1.73M2 — LOW
GLUCOSE SERPL-MCNC: 131 MG/DL — HIGH (ref 70–99)
HCT VFR BLD CALC: 44.6 % — SIGNIFICANT CHANGE UP (ref 34.5–45)
HGB BLD-MCNC: 14.4 G/DL — SIGNIFICANT CHANGE UP (ref 11.5–15.5)
MAGNESIUM SERPL-MCNC: 2.1 MG/DL — SIGNIFICANT CHANGE UP (ref 1.6–2.6)
MCHC RBC-ENTMCNC: 29.4 PG — SIGNIFICANT CHANGE UP (ref 27–34)
MCHC RBC-ENTMCNC: 32.3 G/DL — SIGNIFICANT CHANGE UP (ref 32–36)
MCV RBC AUTO: 91.2 FL — SIGNIFICANT CHANGE UP (ref 80–100)
NRBC # BLD AUTO: 0 K/UL — SIGNIFICANT CHANGE UP (ref 0–0)
NRBC # FLD: 0 K/UL — SIGNIFICANT CHANGE UP (ref 0–0)
NRBC BLD AUTO-RTO: 0 /100 WBCS — SIGNIFICANT CHANGE UP (ref 0–0)
PHOSPHATE SERPL-MCNC: 3.8 MG/DL — SIGNIFICANT CHANGE UP (ref 2.5–4.5)
PLATELET # BLD AUTO: 128 K/UL — LOW (ref 150–400)
PMV BLD: 11.8 FL — SIGNIFICANT CHANGE UP (ref 7–13)
POTASSIUM SERPL-MCNC: 3.7 MMOL/L — SIGNIFICANT CHANGE UP (ref 3.5–5.3)
POTASSIUM SERPL-SCNC: 3.7 MMOL/L — SIGNIFICANT CHANGE UP (ref 3.5–5.3)
RBC # BLD: 4.89 M/UL — SIGNIFICANT CHANGE UP (ref 3.8–5.2)
RBC # FLD: 16 % — HIGH (ref 10.3–14.5)
SODIUM SERPL-SCNC: 142 MMOL/L — SIGNIFICANT CHANGE UP (ref 135–145)
TROPONIN I, HIGH SENSITIVITY RESULT: HIGH NG/L
WBC # BLD: 5.48 K/UL — SIGNIFICANT CHANGE UP (ref 3.8–10.5)
WBC # FLD AUTO: 5.48 K/UL — SIGNIFICANT CHANGE UP (ref 3.8–10.5)

## 2025-06-28 RX ORDER — NYSTATIN 100000 [USP'U]/G
1 CREAM TOPICAL ONCE
Refills: 0 | Status: COMPLETED | OUTPATIENT
Start: 2025-06-28 | End: 2025-06-28

## 2025-06-28 RX ORDER — INSULIN GLARGINE-YFGN 100 [IU]/ML
4 INJECTION, SOLUTION SUBCUTANEOUS
Refills: 0 | Status: DISCONTINUED | OUTPATIENT
Start: 2025-06-28 | End: 2025-06-29

## 2025-06-28 RX ADMIN — BUMETANIDE 2 MILLIGRAM(S): 1 TABLET ORAL at 06:09

## 2025-06-28 RX ADMIN — NYSTATIN 1 APPLICATION(S): 100000 CREAM TOPICAL at 22:15

## 2025-06-28 RX ADMIN — Medication 40 MILLIEQUIVALENT(S): at 11:20

## 2025-06-28 RX ADMIN — Medication 1 APPLICATION(S): at 06:17

## 2025-06-28 RX ADMIN — CLOPIDOGREL BISULFATE 75 MILLIGRAM(S): 75 TABLET, FILM COATED ORAL at 11:20

## 2025-06-28 RX ADMIN — Medication 81 MILLIGRAM(S): at 11:20

## 2025-06-28 RX ADMIN — ATORVASTATIN CALCIUM 40 MILLIGRAM(S): 80 TABLET, FILM COATED ORAL at 21:33

## 2025-06-28 RX ADMIN — INSULIN GLARGINE-YFGN 4 UNIT(S): 100 INJECTION, SOLUTION SUBCUTANEOUS at 11:21

## 2025-06-28 RX ADMIN — APIXABAN 5 MILLIGRAM(S): 2.5 TABLET, FILM COATED ORAL at 17:20

## 2025-06-28 NOTE — PROGRESS NOTE ADULT - PROBLEM SELECTOR PLAN 3
EKG: EKG: Sinus Rhythm with 1st Degree AV block. No evidence of significant change from previous EKG in March 2025  - Hold home Diltiazem   - Hold Home Eliquis for Cath. Heparin gtt for AC  - hold home Multaq 400 mg BID for now   -unclear if pt is taking doxazosin 2mg daily for afib however will hold for now   - Monitor and replete lytes, keep K>4, Mg>2. EKG: Sinus Rhythm with 1st Degree AV block; later found to have Mobitz type 1 heart block  - Hold home Diltiazem   - Resumed Home Eliquis   - hold home Multaq 400 mg BID for now   -unclear if pt is taking doxazosin 2mg daily for afib however will hold for now   - Monitor and replete lytes, keep K>4, Mg>2.

## 2025-06-28 NOTE — PROGRESS NOTE ADULT - SUBJECTIVE AND OBJECTIVE BOX
Date of service  is equal to the date of entry    Patient is a 79y old  Female who presents with a chief complaint of NSTEMI (27 Jun 2025 16:09)    PAST MEDICAL & SURGICAL HISTORY:  CAD (coronary artery disease)      HTN (hypertension)      Congestive heart failure (CHF)      Afib      DM (diabetes mellitus)      Renal insufficiency      No significant past surgical history        JASON KEENAN   79y    Female    BRIEF HOSPITAL COURSE:    Review of Systems:                       All other ROS are negative.    Allergies    No Known Allergies    Intolerances      Weight (kg): 123.8 (06-27-25 @ 07:09)  BMI (kg/m2): 48.4 (06-27-25 @ 07:09)    ICU Vital Signs Last 24 Hrs  T(C): 36.6 (28 Jun 2025 05:11), Max: 36.9 (27 Jun 2025 21:01)  T(F): 97.8 (28 Jun 2025 05:11), Max: 98.5 (27 Jun 2025 21:01)  HR: 71 (28 Jun 2025 05:00) (56 - 88)  BP: 94/51 (28 Jun 2025 01:00) (90/69 - 157/75)  BP(mean): 66 (28 Jun 2025 01:00) (53 - 134)  ABP: --  ABP(mean): --  RR: 22 (28 Jun 2025 05:00) (13 - 27)  SpO2: 93% (28 Jun 2025 05:00) (93% - 100%)    O2 Parameters below as of 27 Jun 2025 08:09  Patient On (Oxygen Delivery Method): nasal cannula  O2 Flow (L/min): 2        Physical Examination:    General:     HEENT:     PULM:     CVS:     ABD:     EXT:     SKIN:     Neuro:          LABS:                        14.7   5.36  )-----------( 121      ( 27 Jun 2025 05:39 )             44.6     06-27    141  |  107  |  51[H]  ----------------------------<  189[H]  3.6   |  28  |  1.80[H]    Ca    8.6      27 Jun 2025 17:30  Phos  3.5     06-27  Mg     1.9     06-27    TPro  7.6  /  Alb  3.8  /  TBili  1.0  /  DBili  x   /  AST  63[H]  /  ALT  45  /  AlkPhos  86  06-26      CARDIAC MARKERS ( 27 Jun 2025 00:31 )  x     / x     / x     / x     / 78.2 ng/mL  CARDIAC MARKERS ( 26 Jun 2025 20:58 )  x     / x     / x     / x     / 77.3 ng/mL  CARDIAC MARKERS ( 26 Jun 2025 16:20 )  x     / x     / x     / x     / 11.1 ng/mL      CAPILLARY BLOOD GLUCOSE      POCT Blood Glucose.: 149 mg/dL (27 Jun 2025 21:52)  POCT Blood Glucose.: 176 mg/dL (27 Jun 2025 17:03)  POCT Blood Glucose.: 161 mg/dL (27 Jun 2025 12:55)  POCT Blood Glucose.: 155 mg/dL (27 Jun 2025 09:33)  POCT Blood Glucose.: 159 mg/dL (27 Jun 2025 08:31)    PT/INR - ( 26 Jun 2025 16:20 )   PT: 15.3 sec;   INR: 1.31 ratio         PTT - ( 27 Jun 2025 00:31 )  PTT:108.7 sec  Urinalysis Basic - ( 27 Jun 2025 17:30 )    Color: x / Appearance: x / SG: x / pH: x  Gluc: 189 mg/dL / Ketone: x  / Bili: x / Urobili: x   Blood: x / Protein: x / Nitrite: x   Leuk Esterase: x / RBC: x / WBC x   Sq Epi: x / Non Sq Epi: x / Bacteria: x      CULTURES:      Medications:  MEDICATIONS  (STANDING):  apixaban 5 milliGRAM(s) Oral two times a day  aspirin enteric coated 81 milliGRAM(s) Oral daily  atorvastatin 40 milliGRAM(s) Oral at bedtime  bumetanide Injectable 2 milliGRAM(s) IV Push daily  chlorhexidine 2% Cloths 1 Application(s) Topical <User Schedule>  clopidogrel Tablet 75 milliGRAM(s) Oral daily  dextrose 5%. 1000 milliLiter(s) (50 mL/Hr) IV Continuous <Continuous>  dextrose 5%. 1000 milliLiter(s) (100 mL/Hr) IV Continuous <Continuous>  dextrose 50% Injectable 25 Gram(s) IV Push once  dextrose 50% Injectable 12.5 Gram(s) IV Push once  dextrose 50% Injectable 25 Gram(s) IV Push once  glucagon  Injectable 1 milliGRAM(s) IntraMuscular once  insulin lispro (ADMELOG) corrective regimen sliding scale   SubCutaneous Before meals and at bedtime  sodium chloride 0.9%. 1000 milliLiter(s) (75 mL/Hr) IV Continuous <Continuous>  sodium chloride 0.9%. 1000 milliLiter(s) (100 mL/Hr) IV Continuous <Continuous>    MEDICATIONS  (PRN):  acetaminophen     Tablet .. 650 milliGRAM(s) Oral every 6 hours PRN Temp greater or equal to 38C (100.4F), Mild Pain (1 - 3)  dextrose Oral Gel 15 Gram(s) Oral once PRN Blood Glucose LESS THAN 70 milliGRAM(s)/deciliter  LORazepam     Tablet 0.5 milliGRAM(s) Oral daily PRN Anxiety  melatonin 3 milliGRAM(s) Oral at bedtime PRN Insomnia        06-27 @ 07:01  -  06-28 @ 05:32  --------------------------------------------------------  IN: 1300 mL / OUT: 2100 mL / NET: -800 mL        RADIOLOGY/IMAGING/ECHO    Critical care point of care ultrasound:    Assessment/Plan:          CRITICAL CARE TIME SPENT:    37 minutes assessing presenting problems of acute illness, which pose high probability of life threatening deterioration or end organ damage/dysfunction, as well as medical decision making including initiating plan of care, reviewing data, reviewing radiologic exams, discussing with multidisciplinary team,  discussing goals of care with patient/family, and writing this note.  Non-inclusive of procedures performed.  The date of service for this encounter is the entered date.         Date of service  is equal to the date of entry    Patient is a 79y old  Female who presents with a chief complaint of NSTEMI (27 Jun 2025 16:09)    PAST MEDICAL & SURGICAL HISTORY:  CAD (coronary artery disease)      HTN (hypertension)      Congestive heart failure (CHF)      Afib      DM (diabetes mellitus)      Renal insufficiency      No significant past surgical history        JASON KEENAN   79y    Female    BRIEF HOSPITAL COURSE:    Review of Systems:  No CP                      All other ROS are negative.    Allergies    No Known Allergies    Intolerances      Weight (kg): 123.8 (06-27-25 @ 07:09)  BMI (kg/m2): 48.4 (06-27-25 @ 07:09)    ICU Vital Signs Last 24 Hrs  T(C): 36.6 (28 Jun 2025 05:11), Max: 36.9 (27 Jun 2025 21:01)  T(F): 97.8 (28 Jun 2025 05:11), Max: 98.5 (27 Jun 2025 21:01)  HR: 71 (28 Jun 2025 05:00) (56 - 88)  BP: 94/51 (28 Jun 2025 01:00) (90/69 - 157/75)  BP(mean): 66 (28 Jun 2025 01:00) (53 - 134)  ABP: --  ABP(mean): --  RR: 22 (28 Jun 2025 05:00) (13 - 27)  SpO2: 93% (28 Jun 2025 05:00) (93% - 100%)    O2 Parameters below as of 27 Jun 2025 08:09  Patient On (Oxygen Delivery Method): nasal cannula  O2 Flow (L/min): 2        Physical Examination:    General: NAd    HEENT: No JVD    PULM: Bilateral BS     CVS: s1 s2 irreg    ABD: soft NT     EXT: no edema     SKIN: warm   R radial art site  Ok no hematoma     Neuro: Alert           LABS:                        14.7   5.36  )-----------( 121      ( 27 Jun 2025 05:39 )             44.6     06-27    141  |  107  |  51[H]  ----------------------------<  189[H]  3.6   |  28  |  1.80[H]    Ca    8.6      27 Jun 2025 17:30  Phos  3.5     06-27  Mg     1.9     06-27    TPro  7.6  /  Alb  3.8  /  TBili  1.0  /  DBili  x   /  AST  63[H]  /  ALT  45  /  AlkPhos  86  06-26      CARDIAC MARKERS ( 27 Jun 2025 00:31 )  x     / x     / x     / x     / 78.2 ng/mL  CARDIAC MARKERS ( 26 Jun 2025 20:58 )  x     / x     / x     / x     / 77.3 ng/mL  CARDIAC MARKERS ( 26 Jun 2025 16:20 )  x     / x     / x     / x     / 11.1 ng/mL      CAPILLARY BLOOD GLUCOSE      POCT Blood Glucose.: 149 mg/dL (27 Jun 2025 21:52)  POCT Blood Glucose.: 176 mg/dL (27 Jun 2025 17:03)  POCT Blood Glucose.: 161 mg/dL (27 Jun 2025 12:55)  POCT Blood Glucose.: 155 mg/dL (27 Jun 2025 09:33)  POCT Blood Glucose.: 159 mg/dL (27 Jun 2025 08:31)    PT/INR - ( 26 Jun 2025 16:20 )   PT: 15.3 sec;   INR: 1.31 ratio         PTT - ( 27 Jun 2025 00:31 )  PTT:108.7 sec  Urinalysis Basic - ( 27 Jun 2025 17:30 )    Color: x / Appearance: x / SG: x / pH: x  Gluc: 189 mg/dL / Ketone: x  / Bili: x / Urobili: x   Blood: x / Protein: x / Nitrite: x   Leuk Esterase: x / RBC: x / WBC x   Sq Epi: x / Non Sq Epi: x / Bacteria: x      CULTURES:      Medications:  MEDICATIONS  (STANDING):  apixaban 5 milliGRAM(s) Oral two times a day  aspirin enteric coated 81 milliGRAM(s) Oral daily  atorvastatin 40 milliGRAM(s) Oral at bedtime  bumetanide Injectable 2 milliGRAM(s) IV Push daily  chlorhexidine 2% Cloths 1 Application(s) Topical <User Schedule>  clopidogrel Tablet 75 milliGRAM(s) Oral daily  dextrose 5%. 1000 milliLiter(s) (50 mL/Hr) IV Continuous <Continuous>  dextrose 5%. 1000 milliLiter(s) (100 mL/Hr) IV Continuous <Continuous>  dextrose 50% Injectable 25 Gram(s) IV Push once  dextrose 50% Injectable 12.5 Gram(s) IV Push once  dextrose 50% Injectable 25 Gram(s) IV Push once  glucagon  Injectable 1 milliGRAM(s) IntraMuscular once  insulin lispro (ADMELOG) corrective regimen sliding scale   SubCutaneous Before meals and at bedtime  sodium chloride 0.9%. 1000 milliLiter(s) (75 mL/Hr) IV Continuous <Continuous>  sodium chloride 0.9%. 1000 milliLiter(s) (100 mL/Hr) IV Continuous <Continuous>    MEDICATIONS  (PRN):  acetaminophen     Tablet .. 650 milliGRAM(s) Oral every 6 hours PRN Temp greater or equal to 38C (100.4F), Mild Pain (1 - 3)  dextrose Oral Gel 15 Gram(s) Oral once PRN Blood Glucose LESS THAN 70 milliGRAM(s)/deciliter  LORazepam     Tablet 0.5 milliGRAM(s) Oral daily PRN Anxiety  melatonin 3 milliGRAM(s) Oral at bedtime PRN Insomnia        06-27 @ 07:01  -  06-28 @ 05:32  --------------------------------------------------------  IN: 1300 mL / OUT: 2100 mL / NET: -800 mL        RADIOLOGY/IMAGING/ECHO      < from: Xray Chest 1 View- PORTABLE-Urgent (Xray Chest 1 View- PORTABLE-Urgent .) (06.26.25 @ 16:31) >  IMPRESSION: Heart is enlarged despite AP film shallow inspiration.   Bilateral predominantly basilar interstitial prominence favored to   represent congestive changes. Correlate clinically for concomitant   infection.  no focal consolidation or pleural effusion. No pneumothorax.   Calcific bursitis left shoulder      < end of copied text >        Assessment/plan      79F  CAD s/p 2 LAD stents  HFpEF EF 70% Anxiety, Afib on apixaban  CKD 3 adrenal Nodule,  HLD HTN DM (2)    Admit with CP NSTEEMI complicated by CHB, s/p MARLY to the CX.    CP free      Triple therapy DAPT/AC  for a few weeks then Plavix and apixaban without ASA      Lipitor  Bumex some congestion on CXR.        R wrist cath site OK

## 2025-06-28 NOTE — PROGRESS NOTE ADULT - ASSESSMENT
80 y/o female with a pmhx of CAD s/p 2 stents to prox and mid LAD in 2020, HFpEF (EF 70-75%in 2/2025) Anxiety, Afib on eliquis, Adrenal Nodule, Renal insufficiency, HLD, HTN, DM, ABDOUL presents to the ED with chest pain, admitted for NSTEMI with [facility] this AM.     Neuro:  - no concerns     Cardio:  - NSTEMI, s/p  Left Heart Cardiac Catheterization / s/p PCI/MARLY x1 to pCX ( 99% occluded)   - Trops peaked at 41k with CK to 1000s  - ProBNP 713   - Overnight continues to have mobitz 1 avb on tele monitoring, no evidence of third degree heart block as per review with cardio  - Resumed eliquis  - Continue DAPT with aspirin and clopidogrel, can discontinue ASA after 1 week  - TTE : LVEF 56%, Mild left ventricular hypertrophy, Normal RV size and function, mild MR, normal IVC, LV grossly normal function   - Hx of HFpEF on Bumex 2 mg, Farxiga, ACEi, Aspirin, Statin  - Unable to tolerate BB 2/2 mobitz 1 avb    Pulm:  - Stable on room air  - CXR Heart is enlarged despite AP film shallow inspiration. Bilateral predominantly basilar interstitial prominence favored to represent congestive changes.     ID:  - No concern     GI:  - Resumed diet    Renal/lytes:  - Cr 1.9 -> 1.8, baseline Cr unknown  - Keep K>4, Mg >2, Phos >3  - Continue Home Bumex     Endo:  - Insulin dependent diabetic deena regimen includes Lantus 8 units AM, 18 units PM   - Home medications including Farxiga 10 mg Tadjenta 5 mg, Glimepiride 2 mg    - Moderate ISS   - Continue Lantus 4 units in the AM, titrate accordingly   - A1c 7.3      Heme/Onc:   - Continue DAPT   - Continue eliquis      Dispo: stable for downgrade to telemetry floor

## 2025-06-28 NOTE — PROGRESS NOTE ADULT - PROBLEM SELECTOR PLAN 2
B/l LE swelling and Shortness of breath over the past 3 days.  - CXR shows enlarged heart and congestive changes.  - Pro bnp elevated to 713  - Echo from 2/2025 EF of 70-75% with grade 1 Diastolic Dysfunction  - S/p Lasix 40 mg in ED  - Continue Bumex 2 mg IV daily for diuresis   - TTE ordered f/u results eval for acute wall motion abnormalities  - Hold Home Diltiazem 360 mg daily   - remote Telemetry, cont pulse ox   - Continue Fluid restriction  - Strict I/Os, daily weights B/l LE swelling and SOB x 3 days,  S/p Lasix 40 mg in ED  - CXR shows enlarged heart and congestive changes.  - Pro bnp elevated to 713  - Prior Echo from 2/2025 EF of 70-75% with grade 1 Diastolic Dysfunction  - Continue Bumex 2 mg IV daily for diuresis   - Repeat TTE showed mild LVH  - Hold Home Diltiazem 360 mg daily   - remote Telemetry, cont pulse ox   - Strict I/Os, daily weights B/l LE swelling and SOB x 3 days,  S/p Lasix 40 mg in ED  - CXR shows enlarged heart and congestive changes.  - Pro bnp elevated to 713  - Prior Echo from 2/2025 EF of 70-75% with grade 1 Diastolic Dysfunction  - Continue Bumex 2 mg IV daily for diuresis, can transition to oral tmrw possibly  - Repeat TTE showed mild LVH  - Hold Home Diltiazem 360 mg daily   - remote Telemetry, cont pulse ox   - Strict I/Os, daily weights

## 2025-06-28 NOTE — PROGRESS NOTE ADULT - PROBLEM SELECTOR PLAN 7
DVT ppx: heparin gtt    #anxiety   -take lorazepam 0.5mg once a day at home - will c/w lorazepam 0.5mg prn      Management per ICU chronic, takes lorazepam 0.5mg once a day at home   - will c/w lorazepam 0.5mg prn

## 2025-06-28 NOTE — PROGRESS NOTE ADULT - PROBLEM SELECTOR PLAN 1
- Patient with new chest burning and shortness of breath over the past 3 days s/p  mg by EMS  - Hx of CAD s/p Cath x2 stents to LAD in 2020  - Trend troponin to peak   - EKG: Sinus Rhythm with 1st Degree AV block. No evidence of significant change from previous EKG in March 2025  - Hold Eliquis. Start Heparin gtt for AC  - Plavix 600 mg load c/w Plavix 75mg daily   - Continue ASA, Plavix and Atorvastatin 40 mg daily.  - Trend Cardiac Enzymes to Peak  - NPO for cardiac cath this AM   -cardio () following recs appreciated initially p/w new chest burning and SOB x 3 days s/p  mg by EMS; admitted for NSTEMI  - Hx of CAD s/p Cath x2 stents to LAD in 2020  - EKG: Sinus Rhythm with 1st Degree AV block. No change from prior EKG in 3/2025  - then found to have mobitz type 1 heart block  - troponins elevated, trended to peak  - s/p LHC w/ PCI/MARLY x1 to pCX ( 99% occluded) LVEDP=10 EF 70-75%  - s/p heparin gtt, now resumed on Eliquis  - s/p Plavix load; c/w Plavix 75mg qd  - Continue ASA and Atorvastatin 40 mg daily.  -cardio () following recs appreciated initially p/w new chest burning and SOB x 3 days s/p  mg by EMS; admitted for NSTEMI  - Hx of CAD s/p Cath x2 stents to LAD in 2020  - EKG: Sinus Rhythm with 1st Degree AV block. No change from prior EKG in 3/2025  - then found to have mobitz type 1 heart block  - troponins elevated, trended to peak  - s/p LHC w/ PCI/MARLY x1 to pCX ( 99% occluded) LVEDP=10 EF 70-75%  - s/p heparin gtt, now resumed on Eliquis  - s/p Plavix load; c/w Plavix 75mg qd  - Continue ASA and Atorvastatin 40 mg daily.  -cardio () following recs appreciated  - plan to continue triple therapy (with aspirin AND clopidogrel AND ELIQUIS) x 1 week, then DISCONTINUE ASA

## 2025-06-28 NOTE — DIETITIAN INITIAL EVALUATION ADULT - PROBLEM SELECTOR PLAN 1
- Patient with new chest burning and shortness of breath over the past 3 days s/p  mg by EMS  - Hx of CAD s/p Cath x2 stents to LAD in 2020  - Troponin 1255, CKMB 11.1  - EKG: Sinus Rhythm with 1st Degree AV block. No evidence of significant change from previous EKG in March 2025  - Hold Eliquis. Start Heparin gtt for AC  - Plavix 600 mg load c/w Plavix 75mg daily   - Continue ASA, Plavix and Atorvastatin 40 mg daily.  - Trend Cardiac Enzymes to Peak  - NPO after Midnight for Cath in the AM  -cardio () following

## 2025-06-28 NOTE — DIETITIAN INITIAL EVALUATION ADULT - NS FNS DIET ORDER
Diet, DASH/TLC:   Sodium & Cholesterol Restricted  Consistent Carbohydrate {Evening Snack} (06-26-25 @ 19:42) [Active]

## 2025-06-28 NOTE — PROGRESS NOTE ADULT - TIME BILLING
I have spent 55 minutes of time on the encounter which excludes teaching and separately reported services.
I have spent 55 minutes of time on the encounter which excludes teaching and separately reported services.
Reviewing chart notes and data, reviewing telemetry monitor records, face to face time counseling the patient

## 2025-06-28 NOTE — DIETITIAN INITIAL EVALUATION ADULT - PERTINENT MEDS FT
MEDICATIONS  (STANDING):  apixaban 5 milliGRAM(s) Oral two times a day  aspirin enteric coated 81 milliGRAM(s) Oral daily  atorvastatin 40 milliGRAM(s) Oral at bedtime  bumetanide Injectable 2 milliGRAM(s) IV Push daily  chlorhexidine 2% Cloths 1 Application(s) Topical <User Schedule>  clopidogrel Tablet 75 milliGRAM(s) Oral daily  dextrose 5%. 1000 milliLiter(s) (50 mL/Hr) IV Continuous <Continuous>  dextrose 5%. 1000 milliLiter(s) (100 mL/Hr) IV Continuous <Continuous>  dextrose 50% Injectable 25 Gram(s) IV Push once  dextrose 50% Injectable 12.5 Gram(s) IV Push once  dextrose 50% Injectable 25 Gram(s) IV Push once  glucagon  Injectable 1 milliGRAM(s) IntraMuscular once  insulin glargine Injectable (LANTUS) 4 Unit(s) SubCutaneous before breakfast  insulin lispro (ADMELOG) corrective regimen sliding scale   SubCutaneous Before meals and at bedtime  potassium chloride    Tablet ER 40 milliEquivalent(s) Oral once    MEDICATIONS  (PRN):  acetaminophen     Tablet .. 650 milliGRAM(s) Oral every 6 hours PRN Temp greater or equal to 38C (100.4F), Mild Pain (1 - 3)  dextrose Oral Gel 15 Gram(s) Oral once PRN Blood Glucose LESS THAN 70 milliGRAM(s)/deciliter  LORazepam     Tablet 0.5 milliGRAM(s) Oral daily PRN Anxiety  melatonin 3 milliGRAM(s) Oral at bedtime PRN Insomnia

## 2025-06-28 NOTE — PHYSICAL THERAPY INITIAL EVALUATION ADULT - ADDITIONAL COMMENTS
Patient reports that she lives in a private house with kids, indep with ADLs (however needs assist with socks), ambulates with RW due to chronic (R) hip/knee pain, 2 AVA, bed/bath on main level.

## 2025-06-28 NOTE — PROGRESS NOTE ADULT - ASSESSMENT
Pt dc'd in stable condition. Dc instructions and scripts given and understood with no questions. No reports of pain, bleeding or sob at dc. 80 y/o female with a pmhx of CAD s/p 2 stents to prox and mid LAD in 2020, HFpEF (EF 70-75%in 2/2025) Anxiety, Afib on eliquis, Adrenal Nodule, Renal insufficiency baseline Cr 1.7-1.9 on previous labs, HLD, HTN, DM, ABDOUL presents to the ED with heartburn and chest pain. Admitted for NSTEMI. Elevated troponin overnight with Type 1 Mobitz heart block, patient transferred to ICU. Plan for [facility] this AM.  78 y/o female with a pmhx of CAD s/p 2 stents to prox and mid LAD in 2020, HFpEF (EF 70-75%in 2/2025) Anxiety, Afib on eliquis, Adrenal Nodule, Renal insufficiency baseline Cr 1.7-1.9 on previous labs, HLD, HTN, DM, ABDOUL presents to the ED with chest pain. Admitted for NSTEMI. Elevated troponin  with Type 1 Mobitz heart block, patient transferred to ICU. Underwent  LHC 6/27 w/ PCI/MARLY x1 to pCX.

## 2025-06-28 NOTE — PROGRESS NOTE ADULT - PROBLEM SELECTOR PLAN 8
DVT ppx: eliquis       Dispo: planned for downgrade from ICU today, possible DC tmrw  PT eval pending DVT ppx: eliquis       Dispo: planned for downgrade from ICU today, possible DC tmrw  PT eval no skilled PT needs

## 2025-06-28 NOTE — PROGRESS NOTE ADULT - ASSESSMENT
78 y/o female with a CAD s/p stents to prox and mid LAD in 2020, HFpEF (EF 70-75%in 2/2025) Anxiety, Afib on eliquis, Adrenal Nodule, Renal insufficiency baseline Cr 1.7-1.9 on previous labs, HLD, HTN, DM, ABDOUL presents to the ED with NSTEMI.    -known cad s/p pci of rca and lad 2020  - presented w/chest burning and shortness of breath over the last few days  - initial troponin 1255, the 35k, 39k, 41k, with ck rising to ~1100   -trop now downtrending  -suspect mostly completed infarct prior to pci  -is now s/p pci of proximal lcx  -continues to exhibit Mobitz 1 avb overnight, with no convincing evidence of 3rd deg avb, which had been a concern overnight 6/27  -cont dapt    -cont statin   - back on ac  -did appear vol ol and has been on diuretics   - remains on bumex, would continue for now, and can hopefully change to po tomorrow  -tte tls but grossly preserved ef

## 2025-06-28 NOTE — DIETITIAN INITIAL EVALUATION ADULT - ADD RECOMMEND
1) Continue current diet as tolerated; honor food preferences as able to optimize po intake/tolerance  2) Recommend MVI daily and Vit C 500mg daily  3) Monitor po intake, diet tolerance, weight trends, labs, GI function, skin integrity

## 2025-06-28 NOTE — PROGRESS NOTE ADULT - CRITICAL CARE ATTENDING COMMENT
Upon my evaluation, this patient is at high risk for imminent or life threatening deterioration due to heart failure, ischemia and other active medical issues which require my direct attention, intervention, and personal management.  I have personally spent >35 minutes  of critical care time exclusive of time spent on separate billing procedures. This includes review of laboratory data, radiology results, discussion with primary team\patient, and monitoring for potential decompensation Interventions were performed as documented above.
Upon my evaluation, this patient is at high risk for imminent or life threatening deterioration due to ischemia, bradycardia,  and other active medical issues which require my direct attention, intervention, and personal management.  I have personally spent >35 minutes  of critical care time exclusive of time spent on separate billing procedures. This includes review of laboratory data, radiology results, discussion with primary team\patient, and monitoring for potential decompensation Interventions were performed as documented above.

## 2025-06-28 NOTE — DIETITIAN INITIAL EVALUATION ADULT - OTHER INFO
Pt is a "78 y/o female with a pmhx of CAD s/p 2 stents to prox and mid LAD in 2020, HFpEF (EF 70-75%in 2/2025) Anxiety, Afib on eliquis, Adrenal Nodule, Renal insufficiency, HLD, HTN, DM, ABDOUL presents to the ED with chest pain, admitted for NSTEMI with [facility] this AM."  S/p NSTEMI, s/p  Left Heart Cardiac Catheterization / s/p PCI/MARLY x1 to pCX ( 99% occluded)    Visited pt this morning. Pt sitting in chair during visit. Reports good appetite/intake. Observed pt consume 100% of breakfast meal this am. Tolerating diet well. No food allergies. No chewing/swallowing difficulties. No report N/V. No BMs thus far. CBW on admission 272#. 2+ BL leg edema noted. Skin: stage I to sacrum, stage I to BL heels, SDTI to BL buttocks. Pt currently on DASH/TLC, Consistent Carbohydrate diet. Hx of DM, A1c of 7.3%. Home medications including Farxiga 10 mg, Tadjenta 5 mg, Glimepiride 2 mg. Discussed current diet order. Provided verbal and written diet education during visit. Food preferences obtained to optimize po intake/tolerance. RD remains available and will continue to follow-up.  Pt is a "78 y/o female with a pmhx of CAD s/p 2 stents to prox and mid LAD in 2020, HFpEF (EF 70-75%in 2/2025) Anxiety, Afib on eliquis, Adrenal Nodule, Renal insufficiency, HLD, HTN, DM, ABDOUL presents to the ED with chest pain, admitted for NSTEMI with [facility] this AM."  S/p NSTEMI, s/p  Left Heart Cardiac Catheterization / s/p PCI/MARLY x1 to pCX ( 99% occluded)    Visited pt this morning. Pt sitting in chair during visit. Reports good appetite/intake. Observed pt consume 100% of breakfast meal this am. Tolerating diet well. No food allergies. No chewing/swallowing difficulties. No report N/V. No BMs thus far. CBW on admission 272#. 2+ BL leg edema noted. Skin: stage I to sacrum, stage I to BL heels, SDTI to BL buttocks. Pt currently on DASH/TLC, Consistent Carbohydrate diet. Hx of DM, A1c of 7.3%. Home medications including Farxiga 10 mg, Tadjenta 5 mg, Glimepiride 2 mg. Discussed current diet order. Provided verbal and written DM and heart healthy diet education during visit. Discussed tips to support weight loss and importance of adequate protein intake for wound healing. Pt receptive to information provided. RD remains available and will continue to follow-up.

## 2025-06-28 NOTE — PROGRESS NOTE ADULT - PROBLEM SELECTOR PLAN 5
follows leilani Carmona   -on home lantus 8U AM and 18U at bedtime and farxiga 10mg and Glimepiride 2 MG BID   -hold home oral diabetes meds   -will c/w MDISS -will hold lantus for now given pt will be NPO after midnight   -f/u AM a1c   -hypoglycemia protocol chronic, follows leilani Carmona   -on home lantus 8U AM and 18U at bedtime and farxiga 10mg and Glimepiride 2 MG BID   -hold home oral diabetes meds   -start lantus 4U in AM  - MDISS at bedtime  - a1c 7.3%  -hypoglycemia protocol

## 2025-06-28 NOTE — DIETITIAN INITIAL EVALUATION ADULT - PERTINENT LABORATORY DATA
06-28    142  |  108  |  54[H]  ----------------------------<  131[H]  3.7   |  25  |  1.80[H]    Ca    8.6      28 Jun 2025 05:50  Phos  3.8     06-28  Mg     2.1     06-28    TPro  7.6  /  Alb  3.8  /  TBili  1.0  /  DBili  x   /  AST  63[H]  /  ALT  45  /  AlkPhos  86  06-26  POCT Blood Glucose.: 158 mg/dL (06-28-25 @ 08:37)  A1C with Estimated Average Glucose Result: 7.3 % (06-27-25 @ 05:39)

## 2025-06-28 NOTE — DIETITIAN INITIAL EVALUATION ADULT - ORAL INTAKE PTA/DIET HISTORY
Pt reports good appetite/intake PTA. Typically consumes 3 meals/day. Pt prepares own meals and does grocery shopping. Pt states that she tries to follow a low salt diet. Tries to limit portion of carbohydrates. Avoids sweetened beverages. Uses sugar substitute in coffee. Uses 1% milk at home. Most meals consumed at home.

## 2025-06-28 NOTE — DIETITIAN INITIAL EVALUATION ADULT - PROBLEM SELECTOR PLAN 4
- BP stable currently  - on Home Lisinopril 40 mg daily -will hold for now as per cardio recs  - Continue to monitor hemodynamics

## 2025-06-28 NOTE — DIETITIAN INITIAL EVALUATION ADULT - NSFNSPHYEXAMSKINFT_GEN_A_CORE
Pressure Injury 1: sacrum, Stage I  Pressure Injury 2: Right:, buttocks, Suspected deep tissue injury  Pressure Injury 3: Left:, buttocks, Suspected deep tissue injury  Pressure Injury 4: Left:, heel, Stage I  Pressure Injury 5: Right:, heel, Stage I

## 2025-06-28 NOTE — DIETITIAN INITIAL EVALUATION ADULT - LITERATURE/VIDEOS GIVEN
Plate Method for Diabetes  Carbohydrate Counting for People with Diabetes  Heart Healthy Nutrition Therapy

## 2025-06-28 NOTE — PROGRESS NOTE ADULT - SUBJECTIVE AND OBJECTIVE BOX
Patient is a 79y old  Female who presents with a chief complaint of NSTEMI (28 Jun 2025 05:32)      Subjective:  INTERVAL HPI/OVERNIGHT EVENTS: Patient seen and examined at bedside. No overnight events occurred. Patient has no complaints at this time. Denies fevers, chills, headache, lightheadedness, chest pain, dyspnea, abdominal pain, n/v/d/c.    MEDICATIONS  (STANDING):  apixaban 5 milliGRAM(s) Oral two times a day  aspirin enteric coated 81 milliGRAM(s) Oral daily  atorvastatin 40 milliGRAM(s) Oral at bedtime  bumetanide Injectable 2 milliGRAM(s) IV Push daily  chlorhexidine 2% Cloths 1 Application(s) Topical <User Schedule>  clopidogrel Tablet 75 milliGRAM(s) Oral daily  dextrose 5%. 1000 milliLiter(s) (50 mL/Hr) IV Continuous <Continuous>  dextrose 5%. 1000 milliLiter(s) (100 mL/Hr) IV Continuous <Continuous>  dextrose 50% Injectable 25 Gram(s) IV Push once  dextrose 50% Injectable 12.5 Gram(s) IV Push once  dextrose 50% Injectable 25 Gram(s) IV Push once  glucagon  Injectable 1 milliGRAM(s) IntraMuscular once  insulin lispro (ADMELOG) corrective regimen sliding scale   SubCutaneous Before meals and at bedtime  sodium chloride 0.9%. 1000 milliLiter(s) (75 mL/Hr) IV Continuous <Continuous>  sodium chloride 0.9%. 1000 milliLiter(s) (100 mL/Hr) IV Continuous <Continuous>    MEDICATIONS  (PRN):  acetaminophen     Tablet .. 650 milliGRAM(s) Oral every 6 hours PRN Temp greater or equal to 38C (100.4F), Mild Pain (1 - 3)  dextrose Oral Gel 15 Gram(s) Oral once PRN Blood Glucose LESS THAN 70 milliGRAM(s)/deciliter  LORazepam     Tablet 0.5 milliGRAM(s) Oral daily PRN Anxiety  melatonin 3 milliGRAM(s) Oral at bedtime PRN Insomnia      Allergies    No Known Allergies    Intolerances        REVIEW OF SYSTEMS:  CONSTITUTIONAL: No fever or chills  HEENT:  No headache, no sore throat  RESPIRATORY: No cough, wheezing, or shortness of breath  CARDIOVASCULAR: No chest pain, palpitations  GASTROINTESTINAL: No abd pain, nausea, vomiting, or diarrhea  GENITOURINARY: No dysuria, frequency, or hematuria  NEUROLOGICAL: no focal weakness or dizziness  MUSCULOSKELETAL: no myalgias     Objective:  Vital Signs Last 24 Hrs  T(C): 36.9 (28 Jun 2025 07:50), Max: 36.9 (27 Jun 2025 21:01)  T(F): 98.5 (28 Jun 2025 07:50), Max: 98.5 (27 Jun 2025 21:01)  HR: 62 (28 Jun 2025 07:00) (56 - 83)  BP: 121/64 (28 Jun 2025 07:00) (90/69 - 138/59)  BP(mean): 81 (28 Jun 2025 07:00) (53 - 96)  RR: 19 (28 Jun 2025 07:00) (13 - 27)  SpO2: 97% (28 Jun 2025 07:00) (93% - 100%)        GENERAL: NAD, lying in bed comfortably  HEAD:  Atraumatic, Normocephalic  EYES: EOMI, conjunctiva and sclera clear  ENT: Moist mucous membranes  CHEST/LUNG: Clear to auscultation bilaterally; No rales, rhonchi, wheezing, or rubs. Unlabored respirations  HEART: Regular rate and rhythm; No murmurs, rubs, or gallops  ABDOMEN: Bowel sounds present; Soft, Nontender, Nondistended.  EXTREMITIES:  No leg edema  NERVOUS SYSTEM:  Alert & Oriented X3, speech clear, answers questions appropriately and follows commands  SKIN: Warm, dry    LABS:                        14.4   5.48  )-----------( 128      ( 28 Jun 2025 05:50 )             44.6     CBC Full  -  ( 28 Jun 2025 05:50 )  WBC Count : 5.48 K/uL  Hemoglobin : 14.4 g/dL  Hematocrit : 44.6 %  Platelet Count - Automated : 128 K/uL  Mean Cell Volume : 91.2 fl  Mean Cell Hemoglobin : 29.4 pg  Mean Cell Hemoglobin Concentration : 32.3 g/dL  Auto Neutrophil # : x  Auto Lymphocyte # : x  Auto Monocyte # : x  Auto Eosinophil # : x  Auto Basophil # : x  Auto Neutrophil % : x  Auto Lymphocyte % : x  Auto Monocyte % : x  Auto Eosinophil % : x  Auto Basophil % : x    28 Jun 2025 05:50    142    |  108    |  54     ----------------------------<  131    3.7     |  25     |  1.80     Ca    8.6        28 Jun 2025 05:50  Phos  3.8       28 Jun 2025 05:50  Mg     2.1       28 Jun 2025 05:50      PT/INR - ( 26 Jun 2025 16:20 )   PT: 15.3 sec;   INR: 1.31 ratio         PTT - ( 27 Jun 2025 00:31 )  PTT:108.7 sec  Urinalysis Basic - ( 28 Jun 2025 05:50 )    Color: x / Appearance: x / SG: x / pH: x  Gluc: 131 mg/dL / Ketone: x  / Bili: x / Urobili: x   Blood: x / Protein: x / Nitrite: x   Leuk Esterase: x / RBC: x / WBC x   Sq Epi: x / Non Sq Epi: x / Bacteria: x      CAPILLARY BLOOD GLUCOSE      POCT Blood Glucose.: 158 mg/dL (28 Jun 2025 08:37)  POCT Blood Glucose.: 149 mg/dL (27 Jun 2025 21:52)  POCT Blood Glucose.: 176 mg/dL (27 Jun 2025 17:03)  POCT Blood Glucose.: 161 mg/dL (27 Jun 2025 12:55)  POCT Blood Glucose.: 155 mg/dL (27 Jun 2025 09:33)          RADIOLOGY & ADDITIONAL TESTS:    Personally reviewed.     Consultant(s) Notes Reviewed:  [x] YES  [ ] NO     Patient is a 79y old  Female who presents with a chief complaint of NSTEMI (28 Jun 2025 05:32)      Subjective:  INTERVAL HPI/OVERNIGHT EVENTS: Patient seen and examined at bedside. No overnight events occurred. Patient has no complaints at this time. Reports her breathing and leg swelling is much better. Denies fevers, chills, chest pain, dyspnea, abdominal pain. Reports last BM was 2 days ago. Reports good PO intake.    MEDICATIONS  (STANDING):  apixaban 5 milliGRAM(s) Oral two times a day  aspirin enteric coated 81 milliGRAM(s) Oral daily  atorvastatin 40 milliGRAM(s) Oral at bedtime  bumetanide Injectable 2 milliGRAM(s) IV Push daily  chlorhexidine 2% Cloths 1 Application(s) Topical <User Schedule>  clopidogrel Tablet 75 milliGRAM(s) Oral daily  dextrose 5%. 1000 milliLiter(s) (50 mL/Hr) IV Continuous <Continuous>  dextrose 5%. 1000 milliLiter(s) (100 mL/Hr) IV Continuous <Continuous>  dextrose 50% Injectable 25 Gram(s) IV Push once  dextrose 50% Injectable 12.5 Gram(s) IV Push once  dextrose 50% Injectable 25 Gram(s) IV Push once  glucagon  Injectable 1 milliGRAM(s) IntraMuscular once  insulin lispro (ADMELOG) corrective regimen sliding scale   SubCutaneous Before meals and at bedtime  sodium chloride 0.9%. 1000 milliLiter(s) (75 mL/Hr) IV Continuous <Continuous>  sodium chloride 0.9%. 1000 milliLiter(s) (100 mL/Hr) IV Continuous <Continuous>    MEDICATIONS  (PRN):  acetaminophen     Tablet .. 650 milliGRAM(s) Oral every 6 hours PRN Temp greater or equal to 38C (100.4F), Mild Pain (1 - 3)  dextrose Oral Gel 15 Gram(s) Oral once PRN Blood Glucose LESS THAN 70 milliGRAM(s)/deciliter  LORazepam     Tablet 0.5 milliGRAM(s) Oral daily PRN Anxiety  melatonin 3 milliGRAM(s) Oral at bedtime PRN Insomnia      Allergies    No Known Allergies    Intolerances        REVIEW OF SYSTEMS:  CONSTITUTIONAL: No fever or chills  RESPIRATORY: No cough, wheezing, or shortness of breath  CARDIOVASCULAR: No chest pain,   GASTROINTESTINAL: No abd pain or diarrhea    Objective:  Vital Signs Last 24 Hrs  T(C): 36.9 (28 Jun 2025 07:50), Max: 36.9 (27 Jun 2025 21:01)  T(F): 98.5 (28 Jun 2025 07:50), Max: 98.5 (27 Jun 2025 21:01)  HR: 62 (28 Jun 2025 07:00) (56 - 83)  BP: 121/64 (28 Jun 2025 07:00) (90/69 - 138/59)  BP(mean): 81 (28 Jun 2025 07:00) (53 - 96)  RR: 19 (28 Jun 2025 07:00) (13 - 27)  SpO2: 97% (28 Jun 2025 07:00) (93% - 100%)      PHYSICAL EXAM  GENERAL: NAD,   HEAD:  Atraumatic, Normocephalic  EYES:  conjunctiva and sclera clear  ENT: Moist mucous membranes  CHEST/LUNG: Clear to auscultation bilaterally; No rales, rhonchi, wheezing, or rubs. Unlabored respirations  HEART: Regular rate and rhythm; S1S2  ABDOMEN: Bowel sounds present; Soft, Nontender, Nondistended.  EXTREMITIES:  trace leg edema b/l  NERVOUS SYSTEM:  Alert & awake, speech clear, answers questions appropriately and follows commands  SKIN: Warm, dry    LABS:                        14.4   5.48  )-----------( 128      ( 28 Jun 2025 05:50 )             44.6     CBC Full  -  ( 28 Jun 2025 05:50 )  WBC Count : 5.48 K/uL  Hemoglobin : 14.4 g/dL  Hematocrit : 44.6 %  Platelet Count - Automated : 128 K/uL  Mean Cell Volume : 91.2 fl  Mean Cell Hemoglobin : 29.4 pg  Mean Cell Hemoglobin Concentration : 32.3 g/dL  Auto Neutrophil # : x  Auto Lymphocyte # : x  Auto Monocyte # : x  Auto Eosinophil # : x  Auto Basophil # : x  Auto Neutrophil % : x  Auto Lymphocyte % : x  Auto Monocyte % : x  Auto Eosinophil % : x  Auto Basophil % : x    28 Jun 2025 05:50    142    |  108    |  54     ----------------------------<  131    3.7     |  25     |  1.80     Ca    8.6        28 Jun 2025 05:50  Phos  3.8       28 Jun 2025 05:50  Mg     2.1       28 Jun 2025 05:50      PT/INR - ( 26 Jun 2025 16:20 )   PT: 15.3 sec;   INR: 1.31 ratio         PTT - ( 27 Jun 2025 00:31 )  PTT:108.7 sec  Urinalysis Basic - ( 28 Jun 2025 05:50 )    Color: x / Appearance: x / SG: x / pH: x  Gluc: 131 mg/dL / Ketone: x  / Bili: x / Urobili: x   Blood: x / Protein: x / Nitrite: x   Leuk Esterase: x / RBC: x / WBC x   Sq Epi: x / Non Sq Epi: x / Bacteria: x      CAPILLARY BLOOD GLUCOSE      POCT Blood Glucose.: 158 mg/dL (28 Jun 2025 08:37)  POCT Blood Glucose.: 149 mg/dL (27 Jun 2025 21:52)  POCT Blood Glucose.: 176 mg/dL (27 Jun 2025 17:03)  POCT Blood Glucose.: 161 mg/dL (27 Jun 2025 12:55)  POCT Blood Glucose.: 155 mg/dL (27 Jun 2025 09:33)          RADIOLOGY & ADDITIONAL TESTS:    Personally reviewed.     Consultant(s) Notes Reviewed:  [x] YES  [ ] NO

## 2025-06-28 NOTE — DIETITIAN INITIAL EVALUATION ADULT - PROBLEM SELECTOR PLAN 7
DVT ppx: heparin gtt    #anxiety   -take lorazepam 0.5mg once a day at home - will c/w lorazepam 0.5mg prn

## 2025-06-28 NOTE — PROGRESS NOTE ADULT - ATTENDING COMMENTS
79F with HLD, HTN, T2DM, CAD s/p stents, HFpEF, pAF on eliquis, anxiety, adrenal nodule, CKD, who presents with chest pain 2/2 NSTEMI s/p MARLY to LCx    #NSTEMI  - s/p MARLY to LCx  - continue DAPT and eliquis given afib history--- will need triple therapy for one week to eventually transition to plavix and eliquis  - continue tele monitoring   - trend EKG and trops  - LVEDP 9 during cath    #pAF  - continue eliquis  - tele monitoring as below    agree with plan as above  pt stable for transfer to tele
79F with HLD, HTN, T2DM, CAD s/p stents, HFpEF, pAF on eliquis, anxiety, adrenal nodule, CKD, who presents with chest pain 2/2 NSTEMI s/p MARLY to LCx    #NSTEMI  - s/p MARLY to LCx  - continue DAPT and eliquis given afib history--- will need triple therapy for one week to eventually transition to plavix and eliquis  - continue tele monitoring   - trend EKG and trops  - LVEDP 9 during cath    #pAF  - continue eliquis  - tele monitoring as below    agree with plan as above
79F with HLD, HTN, T2DM, CAD s/p stents, HFpEF, pAF on eliquis, anxiety, adrenal nodule, CKD, who presents with chest pain 2/2 NSTEMI s/p MARLY to LCx. Continue DAPT and eliquis given afib history. Will need triple therapy for one week and eventually transition to plavix and eliquis. PT pending    Rest of care per plan above  d/w HS

## 2025-06-28 NOTE — DIETITIAN INITIAL EVALUATION ADULT - PROBLEM SELECTOR PLAN 3
EKG: EKG: Sinus Rhythm with 1st Degree AV block. No evidence of significant change from previous EKG in March 2025  - Hold home Diltiazem   - Hold Home Eliquis for Cath. Heparin gtt for AC  - hold home Multaq 400 mg BID for now   -unclear if pt is taking doxazosin 2mg daily for afib however will hold for now   - remote Telemetry  - Monitor and replete lytes, keep K>4, Mg>2.

## 2025-06-28 NOTE — PHYSICAL THERAPY INITIAL EVALUATION ADULT - PERTINENT HX OF CURRENT PROBLEM, REHAB EVAL
78 y/o female with a pmhx of CAD s/p 2 stents to prox and mid LAD in 2020, HFpEF (EF 70-75%in 2/2025) Anxiety, Afib on eliquis, Adrenal Nodule, Renal insufficiency baseline Cr 1.7-1.9 on previous labs, HLD, HTN, DM, ABDOUL presents to the ED with heartburn and chest pain. Admitted for NSTEMI. Elevated troponin overnight with Type 1 Mobitz heart block, patient transferred to ICU.

## 2025-06-28 NOTE — PROGRESS NOTE ADULT - SUBJECTIVE AND OBJECTIVE BOX
Interval Events:     Review of Systems:  Constitutional: No fever, chills, fatigue  Neuro: No headache, numbness, weakness  Resp: No cough, wheezing, shortness of breath  CVS: No chest pain, palpitations, leg swelling  GI: No abdominal pain, nausea, vomiting, diarrhea   : No dysuria, frequency, incontinence  Skin: No itching, burning, rashes, or lesions   Msk: No joint pain or swelling  Psych: No depression, anxiety, mood swings    ICU Vital Signs Last 24 Hrs  T(C): 36.9 (28 Jun 2025 07:50), Max: 36.9 (27 Jun 2025 21:01)  T(F): 98.5 (28 Jun 2025 07:50), Max: 98.5 (27 Jun 2025 21:01)  HR: 68 (28 Jun 2025 11:00) (56 - 83)  BP: 112/73 (28 Jun 2025 11:00) (90/69 - 138/59)  BP(mean): 79 (28 Jun 2025 11:00) (53 - 96)  ABP: --  ABP(mean): --  RR: 23 (28 Jun 2025 11:00) (13 - 27)  SpO2: 93% (28 Jun 2025 11:00) (93% - 100%)    O2 Parameters below as of 28 Jun 2025 11:00  Patient On (Oxygen Delivery Method): room air              06-27-25 @ 07:01  -  06-28-25 @ 07:00  --------------------------------------------------------  IN: 1300 mL / OUT: 2100 mL / NET: -800 mL    06-28-25 @ 07:01  -  06-28-25 @ 11:39  --------------------------------------------------------  IN: 0 mL / OUT: 1500 mL / NET: -1500 mL        CAPILLARY BLOOD GLUCOSE      POCT Blood Glucose.: 216 mg/dL (28 Jun 2025 11:07)      I&O's Summary    27 Jun 2025 07:01  -  28 Jun 2025 07:00  --------------------------------------------------------  IN: 1300 mL / OUT: 2100 mL / NET: -800 mL    28 Jun 2025 07:01  -  28 Jun 2025 11:39  --------------------------------------------------------  IN: 0 mL / OUT: 1500 mL / NET: -1500 mL        Physical Exam:   Gen:   Neuro:   HEENT:  Resp:  CVS:  Abd:  Ext:  Skin:     Meds:    bumetanide Injectable IV Push    atorvastatin Oral  dextrose 50% Injectable IV Push  dextrose 50% Injectable IV Push  dextrose 50% Injectable IV Push  dextrose Oral Gel Oral PRN  glucagon  Injectable IntraMuscular  insulin glargine Injectable (LANTUS) SubCutaneous  insulin lispro (ADMELOG) corrective regimen sliding scale SubCutaneous      acetaminophen     Tablet .. Oral PRN  LORazepam     Tablet Oral PRN  melatonin Oral PRN      apixaban Oral  aspirin enteric coated Oral  clopidogrel Tablet Oral        dextrose 5%. IV Continuous  dextrose 5%. IV Continuous      chlorhexidine 2% Cloths Topical                              14.4   5.48  )-----------( 128      ( 28 Jun 2025 05:50 )             44.6       06-28    142  |  108  |  54[H]  ----------------------------<  131[H]  3.7   |  25  |  1.80[H]    Ca    8.6      28 Jun 2025 05:50  Phos  3.8     06-28  Mg     2.1     06-28    TPro  7.6  /  Alb  3.8  /  TBili  1.0  /  DBili  x   /  AST  63[H]  /  ALT  45  /  AlkPhos  86  06-26      CARDIAC MARKERS ( 27 Jun 2025 00:31 )  x     / x     / x     / x     / 78.2 ng/mL  CARDIAC MARKERS ( 26 Jun 2025 20:58 )  x     / x     / x     / x     / 77.3 ng/mL  CARDIAC MARKERS ( 26 Jun 2025 16:20 )  x     / x     / x     / x     / 11.1 ng/mL      PT/INR - ( 26 Jun 2025 16:20 )   PT: 15.3 sec;   INR: 1.31 ratio         PTT - ( 27 Jun 2025 00:31 )  PTT:108.7 sec  Urinalysis Basic - ( 28 Jun 2025 05:50 )    Color: x / Appearance: x / SG: x / pH: x  Gluc: 131 mg/dL / Ketone: x  / Bili: x / Urobili: x   Blood: x / Protein: x / Nitrite: x   Leuk Esterase: x / RBC: x / WBC x   Sq Epi: x / Non Sq Epi: x / Bacteria: x                  Radiology:    Bedside Ultrasound:    Tubes/Lines:      GLOBAL ISSUE/BEST PRACTICE:  Analgesia:  Sedation:  HOB elevation: Y  Stress ulcer prophylaxis:  VTE prophylaxis:  Glycemic control:  Nutrition:    CODE STATUS:        Interval Events: Overnight, with jose Solomon avb on tele monitoring.  Pt seen and examined at bedside. She is doing well, has no complaints at this time. Denies any chest pain, palpitations, sob.    Review of Systems:  Constitutional: No fever, chills, fatigue  Neuro: No headache, numbness, weakness  Resp: No cough, wheezing, shortness of breath  CVS: No chest pain, palpitations, leg swelling  GI: No abdominal pain, nausea, vomiting, diarrhea   : No dysuria, frequency, incontinence  Skin: No itching, burning, rashes, or lesions   Msk: No joint pain or swelling  Psych: No depression, anxiety, mood swings    ICU Vital Signs Last 24 Hrs  T(C): 36.9 (28 Jun 2025 07:50), Max: 36.9 (27 Jun 2025 21:01)  T(F): 98.5 (28 Jun 2025 07:50), Max: 98.5 (27 Jun 2025 21:01)  HR: 68 (28 Jun 2025 11:00) (56 - 83)  BP: 112/73 (28 Jun 2025 11:00) (90/69 - 138/59)  BP(mean): 79 (28 Jun 2025 11:00) (53 - 96)  ABP: --  ABP(mean): --  RR: 23 (28 Jun 2025 11:00) (13 - 27)  SpO2: 93% (28 Jun 2025 11:00) (93% - 100%)    O2 Parameters below as of 28 Jun 2025 11:00  Patient On (Oxygen Delivery Method): room air              06-27-25 @ 07:01  -  06-28-25 @ 07:00  --------------------------------------------------------  IN: 1300 mL / OUT: 2100 mL / NET: -800 mL    06-28-25 @ 07:01  -  06-28-25 @ 11:39  --------------------------------------------------------  IN: 0 mL / OUT: 1500 mL / NET: -1500 mL        CAPILLARY BLOOD GLUCOSE      POCT Blood Glucose.: 216 mg/dL (28 Jun 2025 11:07)      I&O's Summary    27 Jun 2025 07:01  -  28 Jun 2025 07:00  --------------------------------------------------------  IN: 1300 mL / OUT: 2100 mL / NET: -800 mL    28 Jun 2025 07:01  -  28 Jun 2025 11:39  --------------------------------------------------------  IN: 0 mL / OUT: 1500 mL / NET: -1500 mL        Physical Exam:   Gen: NAD  Neuro: awake and alert  HEENT: NCAT  Resp: CTA b/l, no wheeze  CVS: RRR, +s1/s2  Abd: soft, nontender, nondistended  Ext: trace b/l le edema  Skin: warm and dry    Meds:    bumetanide Injectable IV Push    atorvastatin Oral  dextrose 50% Injectable IV Push  dextrose 50% Injectable IV Push  dextrose 50% Injectable IV Push  dextrose Oral Gel Oral PRN  glucagon  Injectable IntraMuscular  insulin glargine Injectable (LANTUS) SubCutaneous  insulin lispro (ADMELOG) corrective regimen sliding scale SubCutaneous      acetaminophen     Tablet .. Oral PRN  LORazepam     Tablet Oral PRN  melatonin Oral PRN      apixaban Oral  aspirin enteric coated Oral  clopidogrel Tablet Oral        dextrose 5%. IV Continuous  dextrose 5%. IV Continuous      chlorhexidine 2% Cloths Topical                              14.4   5.48  )-----------( 128      ( 28 Jun 2025 05:50 )             44.6       06-28    142  |  108  |  54[H]  ----------------------------<  131[H]  3.7   |  25  |  1.80[H]    Ca    8.6      28 Jun 2025 05:50  Phos  3.8     06-28  Mg     2.1     06-28    TPro  7.6  /  Alb  3.8  /  TBili  1.0  /  DBili  x   /  AST  63[H]  /  ALT  45  /  AlkPhos  86  06-26      CARDIAC MARKERS ( 27 Jun 2025 00:31 )  x     / x     / x     / x     / 78.2 ng/mL  CARDIAC MARKERS ( 26 Jun 2025 20:58 )  x     / x     / x     / x     / 77.3 ng/mL  CARDIAC MARKERS ( 26 Jun 2025 16:20 )  x     / x     / x     / x     / 11.1 ng/mL      PT/INR - ( 26 Jun 2025 16:20 )   PT: 15.3 sec;   INR: 1.31 ratio         PTT - ( 27 Jun 2025 00:31 )  PTT:108.7 sec  Urinalysis Basic - ( 28 Jun 2025 05:50 )    Color: x / Appearance: x / SG: x / pH: x  Gluc: 131 mg/dL / Ketone: x  / Bili: x / Urobili: x   Blood: x / Protein: x / Nitrite: x   Leuk Esterase: x / RBC: x / WBC x   Sq Epi: x / Non Sq Epi: x / Bacteria: x                    Radiology: TTE reviewed       Tubes/Lines: Peripheral lines       GLOBAL ISSUE/BEST PRACTICE:  Analgesia: Y  Sedation: N  HOB elevation: Y  Stress ulcer prophylaxis: N  VTE prophylaxis: Y  Glycemic control: Y  Nutrition: Y    CODE STATUS: Full Code

## 2025-06-28 NOTE — PROGRESS NOTE ADULT - SUBJECTIVE AND OBJECTIVE BOX
[facility]    Aryan, Alyssa, Karlos, Chinedu, Didi, Bruce      [phone]    CHIEF COMPLAINT: Patient is a 79y old  Female who presents with a chief complaint of NSTEMI (28 Jun 2025 10:17)      Follow Up: nstemi, mobitz 1 avb    Interim history: The patient reports no new symptoms.  Denies chest discomfort and shortness of breath.  No abdominal pain.  No new neurologic symptoms.      MEDICATIONS  (STANDING):  apixaban 5 milliGRAM(s) Oral two times a day  aspirin enteric coated 81 milliGRAM(s) Oral daily  atorvastatin 40 milliGRAM(s) Oral at bedtime  bumetanide Injectable 2 milliGRAM(s) IV Push daily  chlorhexidine 2% Cloths 1 Application(s) Topical <User Schedule>  clopidogrel Tablet 75 milliGRAM(s) Oral daily  dextrose 5%. 1000 milliLiter(s) (50 mL/Hr) IV Continuous <Continuous>  dextrose 5%. 1000 milliLiter(s) (100 mL/Hr) IV Continuous <Continuous>  dextrose 50% Injectable 25 Gram(s) IV Push once  dextrose 50% Injectable 12.5 Gram(s) IV Push once  dextrose 50% Injectable 25 Gram(s) IV Push once  glucagon  Injectable 1 milliGRAM(s) IntraMuscular once  insulin glargine Injectable (LANTUS) 4 Unit(s) SubCutaneous before breakfast  insulin lispro (ADMELOG) corrective regimen sliding scale   SubCutaneous Before meals and at bedtime  potassium chloride    Tablet ER 40 milliEquivalent(s) Oral once    MEDICATIONS  (PRN):  acetaminophen     Tablet .. 650 milliGRAM(s) Oral every 6 hours PRN Temp greater or equal to 38C (100.4F), Mild Pain (1 - 3)  dextrose Oral Gel 15 Gram(s) Oral once PRN Blood Glucose LESS THAN 70 milliGRAM(s)/deciliter  LORazepam     Tablet 0.5 milliGRAM(s) Oral daily PRN Anxiety  melatonin 3 milliGRAM(s) Oral at bedtime PRN Insomnia      REVIEW OF SYSTEMS:  eye, ent, GI, , allergic, dermatologic, musculoskeletal and neurologic are negative except as described above    Vital Signs Last 24 Hrs  T(C): 36.9 (28 Jun 2025 07:50), Max: 36.9 (27 Jun 2025 21:01)  T(F): 98.5 (28 Jun 2025 07:50), Max: 98.5 (27 Jun 2025 21:01)  HR: 72 (28 Jun 2025 10:00) (56 - 83)  BP: 114/85 (28 Jun 2025 10:00) (90/69 - 138/59)  BP(mean): 93 (28 Jun 2025 10:00) (53 - 96)  RR: 18 (28 Jun 2025 10:00) (13 - 27)  SpO2: 95% (28 Jun 2025 10:00) (93% - 100%)    Parameters below as of 28 Jun 2025 08:00  Patient On (Oxygen Delivery Method): room air        I&O's Summary    27 Jun 2025 07:01  -  28 Jun 2025 07:00  --------------------------------------------------------  IN: 1300 mL / OUT: 2100 mL / NET: -800 mL    28 Jun 2025 07:01  -  28 Jun 2025 11:19  --------------------------------------------------------  IN: 0 mL / OUT: 1500 mL / NET: -1500 mL        Telemetry past 24h: sr, first deg,  mobitz 1 avb    PHYSICAL EXAM:    Constitutional: well-nourished, well-developed, NAD   HEENT:  MMM, sclerae anicteric, conjunctivae clear, no oral cyanosis.  Pulmonary: Non-labored, breath sounds are clear bilaterally, No wheezing, rales or rhonchi  Cardiovascular: Regular, S1 and S2.  No murmur.  No rubs, gallops or clicks  Gastrointestinal: Bowel Sounds present, soft, nontender.   Lymph: No peripheral edema.   Neurological: Alert, no focal deficits  Skin: No rashes.  Psych:  Mood & affect appropriate    LABS: All Labs Reviewed:                        14.4   5.48  )-----------( 128      ( 28 Jun 2025 05:50 )             44.6                         14.7   5.36  )-----------( 121      ( 27 Jun 2025 05:39 )             44.6                         14.2   5.76  )-----------( 136      ( 27 Jun 2025 00:31 )             44.1     28 Jun 2025 05:50    142    |  108    |  54     ----------------------------<  131    3.7     |  25     |  1.80   27 Jun 2025 17:30    141    |  107    |  51     ----------------------------<  189    3.6     |  28     |  1.80   27 Jun 2025 05:39    143    |  110    |  54     ----------------------------<  125    3.3     |  28     |  1.80     Ca    8.6        28 Jun 2025 05:50  Ca    8.6        27 Jun 2025 17:30  Ca    9.5        27 Jun 2025 05:39  Phos  3.8       28 Jun 2025 05:50  Phos  3.5       27 Jun 2025 17:30  Phos  3.7       27 Jun 2025 05:39  Mg     2.1       28 Jun 2025 05:50  Mg     1.9       27 Jun 2025 17:30  Mg     2.2       27 Jun 2025 05:39    TPro  7.6    /  Alb  3.8    /  TBili  1.0    /  DBili  x      /  AST  63     /  ALT  45     /  AlkPhos  86     26 Jun 2025 16:20    PT/INR - ( 26 Jun 2025 16:20 )   PT: 15.3 sec;   INR: 1.31 ratio         PTT - ( 27 Jun 2025 00:31 )  PTT:108.7 sec  CARDIAC MARKERS ( 27 Jun 2025 00:31 )  x     / x     / x     / x     / 78.2 ng/mL  CARDIAC MARKERS ( 26 Jun 2025 20:58 )  x     / x     / x     / x     / 77.3 ng/mL  CARDIAC MARKERS ( 26 Jun 2025 16:20 )  x     / x     / x     / x     / 11.1 ng/mL      Blood Culture:        RADIOLOGY:    EKG:    Echo:    < from: TTE W or WO Ultrasound Enhancing Agent (06.26.25 @ 20:16) >    TRANSTHORACIC ECHOCARDIOGRAM REPORT  ________________________________________________________________________________                                      _______       Pt. Name:       JASON KEENAN Study Date:    6/26/2025  MRN:            [id number]       YOB: 1945  Accession #:    [id number]       Age:           79 years  Account#:       [id number]      Gender:        F  Heart Rate:                     Height:        63.00 in (160.02 cm)  Rhythm:                         Weight:       275.56 lb (125.00 kg)  Blood Pressure: 145/80 mmHg     BSA/BMI:       2.22 m² / 48.81 kg/m²  ________________________________________________________________________________________  Referring Physician:    [id number] Anai Pizano  Interpreting Physician: Grey Villafuerte M.D.  Primary Sonographer:    Rohit Jarrett    CPT:               ECHO TTE WO CON COMP W DOPP - 32352.m  Indication(s):     Abnormal electrocardiogram ECG EKG - R94.31  Procedure:         Transthoracic echocardiogram with 2-D, M-mode and complete                     spectral and color flow Doppler.  Ordering Location: [facility]  Admission Status:  Inpatient  Study Information: Image quality for this study is technically difficult.    _______________________________________________________________________________________     CONCLUSIONS:      1. Technically difficult image quality.   2. Mild left ventricular hypertrophy.   3. Normal right ventricular cavity size and probably normal right ventricular systolic function.   4. Left atrium is normal in size.   5. The right atrium is normal in size.   6. There is calcification of the aortic valve leaflets.   7. Mitral valve leaflets have focal calcification.   8. Mild mitral regurgitation.   9. The inferior vena cava is normal in size measuring 1.00 cm in diameter, (normal <2.1cm) with normal inspiratory collapse (normal >50%) consistent with normal right atrial pressure (~3, range 0-5mmHg).  10. Aortic valve was not well visualized.  11. Left ventricular endocardium is not well visualized; however, the left ventricular systolic function appears grossly normal.  12. Pulmonary artery systolic pressure could not be estimated.    ________________________________________________________________________________________  FINDINGS:     Left Ventricle:  Mild left ventricular hypertrophy. Left ventricular endocardium is not well visualized; however, the left ventricular systolic function appears grossly normal.     Right Ventricle:  The right ventricle is not well visualized. The right ventricular cavity is normal in size and right ventricular systolic function is probably normal. Tricuspid annular plane systolic excursion (TAPSE) is 2.7 cm (normal >=1.7 cm).     Left Atrium:  The left atrium is normal in size with an indexed volume of 31.09 ml/m².     Right Atrium:  The right atrium is normal in size with an indexed volume of 17.87 ml/m² and an indexed area of 7.31 cm²/m².     Interatrial Septum:  The interatrial septum was not well visualized.     Aortic Valve:  The aortic valve was not well visualized. There is calcification of the aortic valve leaflets.     Mitral Valve:  There is calcification of the mitral valve annulus. Mitral valve leaflets have focal calcification. There is mild mitral regurgitation.     Tricuspid Valve:  The tricuspid valve is structurally normal with normal leaflet excursion. There is trace tricuspid regurgitation. There is insufficient tricuspid regurgitation detected to calculate pulmonary artery systolic pressure.     Pulmonic Valve:  The pulmonic valve was not well visualized.     Aorta:  The aortic root at the sinuses of Valsalva is normal in size, measuring 2.90 cm (indexed 1.31 cm/m²). The ascending aorta is normal in size, measuring 2.70 cm (indexed 1.22 cm/m²). The aortic arch diameter is normal in size, measuring 3.0 cm (indexed 1.35 cm/m²).     Systemic Veins:  The inferior vena cava is normal in size measuring 1.00 cm in diameter, (normal <2.1cm) with normal inspiratory collapse (normal >50%) consistent with normal right atrial pressure (~3, range 0-5mmHg).  ____________________________________________________________________  QUANTITATIVE DATA:  Left Ventricle Measurements: (Indexed to BSA)     IVSd (2D):   1.3 cm  LVPWd (2D):  1.0 cm  LVIDd (2D):  5.1cm  LVIDs (2D):  3.7 cm  LV Mass:     229 g  103.2 g/m²  LV Vol d, MOD A2C: 58.9 ml 26.58 ml/m²  LV Vol d, MOD A4C: 77.8 ml 35.11 ml/m²  LV Vol d, MOD BP:  71.3 ml 32.16 ml/m²  LV Vol s, MOD A2C: 26.9 ml 12.14 ml/m²  LV Vol s, MOD A4C: 34.7 ml 15.65 ml/m²  LV Vol s, MOD BP:  31.2 ml 14.10 ml/m²  LVOT SV MOD BP:    40.0 ml  LV EF% MOD BP:     56 %     MV E Vmax:    1.23 m/s  MV A Vmax:    0.96 m/s  MV E/A:       1.28  e' lateral:   7.18 cm/s  e' medial:    8.59 cm/s  E/e' lateral: 17.13  E/e' medial:  14.32  E/e' Average: 15.60  MV DT:        135 msec    Aorta Measurements: (Normal range) (Indexed to BSA)     Ao Root d     2.90 cm (2.7 - 3.3 cm) 1.31 cm/m²  Ao Asc d, 2D: 2.70  Ao Asc prox:  2.70 cm                1.22 cm/m²  Ao Arch:      3.0 cm            Left Atrium Measurements: (Indexed to BSA)  LA Diam 2D: 4.00 cm         Right Ventricle Measurements: Right Atrial Measurements:     TAPSE:            2.7 cm      RA Vol s, MOD A4C         39.6 ml  RV Base (RVID1):  4.3 cm      RA Vol s,MOD A4C i BSA   17.87 ml/m²  RV Mid (RVID2):   3.0 cm      RA Area s, MOD A4C        16.2 cm²  RV Major (RVID3): 6.0 cm      RA Area s, MOD A4C, i BSA 7.31 cm²/m²       LVOT / RVOT/ Qp/Qs Data: (Indexed to BSA)  LVOT Diameter,s: 2.00 cm  LVOT Area:    3.14 cm²    Mitral Valve Measurements:     MV E Vmax: 1.2 m/s         MR Vmax:          4.14 m/s  MV A Vmax: 1.0 m/s         MR Peak Gradient: 68.6 mmHg  MV E/A:    1.3       Tricuspid Valve Measurements:     RA Pressure: 3 mmHg    ________________________________________________________________________________________  Electronically signed on 6/26/2025 at 10:52:13 PM by Grey Villafuerte M.D.         *** Final ***    < end of copied text >

## 2025-06-29 VITALS
OXYGEN SATURATION: 94 % | HEART RATE: 72 BPM | SYSTOLIC BLOOD PRESSURE: 117 MMHG | DIASTOLIC BLOOD PRESSURE: 64 MMHG | RESPIRATION RATE: 16 BRPM | TEMPERATURE: 98 F

## 2025-06-29 LAB
ANION GAP SERPL CALC-SCNC: 9 MMOL/L — SIGNIFICANT CHANGE UP (ref 5–17)
BUN SERPL-MCNC: 52 MG/DL — HIGH (ref 7–23)
CALCIUM SERPL-MCNC: 9.5 MG/DL — SIGNIFICANT CHANGE UP (ref 8.5–10.1)
CHLORIDE SERPL-SCNC: 107 MMOL/L — SIGNIFICANT CHANGE UP (ref 96–108)
CO2 SERPL-SCNC: 25 MMOL/L — SIGNIFICANT CHANGE UP (ref 22–31)
CREAT SERPL-MCNC: 1.9 MG/DL — HIGH (ref 0.5–1.3)
EGFR: 27 ML/MIN/1.73M2 — LOW
EGFR: 27 ML/MIN/1.73M2 — LOW
GLUCOSE SERPL-MCNC: 139 MG/DL — HIGH (ref 70–99)
HCT VFR BLD CALC: 51 % — HIGH (ref 34.5–45)
HGB BLD-MCNC: 15.8 G/DL — HIGH (ref 11.5–15.5)
MAGNESIUM SERPL-MCNC: 2 MG/DL — SIGNIFICANT CHANGE UP (ref 1.6–2.6)
MCHC RBC-ENTMCNC: 28.7 PG — SIGNIFICANT CHANGE UP (ref 27–34)
MCHC RBC-ENTMCNC: 31 G/DL — LOW (ref 32–36)
MCV RBC AUTO: 92.6 FL — SIGNIFICANT CHANGE UP (ref 80–100)
NRBC # BLD AUTO: 0 K/UL — SIGNIFICANT CHANGE UP (ref 0–0)
NRBC # FLD: 0 K/UL — SIGNIFICANT CHANGE UP (ref 0–0)
NRBC BLD AUTO-RTO: 0 /100 WBCS — SIGNIFICANT CHANGE UP (ref 0–0)
PHOSPHATE SERPL-MCNC: 4 MG/DL — SIGNIFICANT CHANGE UP (ref 2.5–4.5)
PLATELET # BLD AUTO: 147 K/UL — LOW (ref 150–400)
PMV BLD: 12.5 FL — SIGNIFICANT CHANGE UP (ref 7–13)
POTASSIUM SERPL-MCNC: 3.8 MMOL/L — SIGNIFICANT CHANGE UP (ref 3.5–5.3)
POTASSIUM SERPL-SCNC: 3.8 MMOL/L — SIGNIFICANT CHANGE UP (ref 3.5–5.3)
RBC # BLD: 5.51 M/UL — HIGH (ref 3.8–5.2)
RBC # FLD: 15.8 % — HIGH (ref 10.3–14.5)
SODIUM SERPL-SCNC: 141 MMOL/L — SIGNIFICANT CHANGE UP (ref 135–145)
WBC # BLD: 6.16 K/UL — SIGNIFICANT CHANGE UP (ref 3.8–10.5)
WBC # FLD AUTO: 6.16 K/UL — SIGNIFICANT CHANGE UP (ref 3.8–10.5)

## 2025-06-29 RX ORDER — LORAZEPAM 4 MG/ML
1 VIAL (ML) INJECTION
Refills: 0 | DISCHARGE

## 2025-06-29 RX ORDER — LINAGLIPTIN 5 MG/1
1 TABLET, FILM COATED ORAL
Refills: 0 | DISCHARGE

## 2025-06-29 RX ORDER — CLOPIDOGREL BISULFATE 75 MG/1
1 TABLET, FILM COATED ORAL
Qty: 30 | Refills: 1
Start: 2025-06-29

## 2025-06-29 RX ORDER — DILTIAZEM HYDROCHLORIDE 240 MG/1
1 TABLET, EXTENDED RELEASE ORAL
Refills: 0 | DISCHARGE

## 2025-06-29 RX ORDER — DRONEDARONE 400 MG/1
1 TABLET, FILM COATED ORAL
Refills: 0 | DISCHARGE

## 2025-06-29 RX ORDER — CYST/ALA/Q10/PHOS.SER/DHA/BROC 100-20-50
2 POWDER (GRAM) ORAL
Refills: 0 | DISCHARGE

## 2025-06-29 RX ADMIN — CLOPIDOGREL BISULFATE 75 MILLIGRAM(S): 75 TABLET, FILM COATED ORAL at 11:40

## 2025-06-29 RX ADMIN — BUMETANIDE 2 MILLIGRAM(S): 1 TABLET ORAL at 05:38

## 2025-06-29 RX ADMIN — APIXABAN 5 MILLIGRAM(S): 2.5 TABLET, FILM COATED ORAL at 05:38

## 2025-06-29 RX ADMIN — INSULIN GLARGINE-YFGN 4 UNIT(S): 100 INJECTION, SOLUTION SUBCUTANEOUS at 08:31

## 2025-06-29 RX ADMIN — Medication 81 MILLIGRAM(S): at 11:40

## 2025-06-29 NOTE — DISCHARGE NOTE NURSING/CASE MANAGEMENT/SOCIAL WORK - PATIENT PORTAL LINK FT
You can access the FollowMyHealth Patient Portal offered by  by registering at the following website: [email or web address]. By joining [facility]’s FollowMyHealth portal, you will also be able to view your health information using other applications (apps) compatible with our system.

## 2025-06-29 NOTE — DISCHARGE NOTE NURSING/CASE MANAGEMENT/SOCIAL WORK - FINANCIAL ASSISTANCE
[facility] provides services at a reduced cost to those who are determined to be eligible through [facility]’s financial assistance program. Information regarding [facility]’s financial assistance program can be found by going to [email or web address] or by calling [phone].

## 2025-06-29 NOTE — PROGRESS NOTE ADULT - PROVIDER SPECIALTY LIST ADULT
Cardiology
Hospitalist
Cardiology
Critical Care
Cardiology
Critical Care
Critical Care
Intervent Cardiology
Hospitalist

## 2025-06-29 NOTE — PROGRESS NOTE ADULT - ASSESSMENT
78 y/o female with a CAD s/p stents to prox and mid LAD in 2020, HFpEF (EF 70-75%in 2/2025) Anxiety, Afib on eliquis, Adrenal Nodule, Renal insufficiency baseline Cr 1.7-1.9 on previous labs, HLD, HTN, DM, ABDOUL presents to the ED with NSTEMI.    NSTEMI/PAfib  - Known CAD s/p PCI of RCA and LAD 2020  - Presented w/chest burning and shortness of breath over the last few days  - initial troponin 1255, then peaked to 41k, with ck rising to ~1100; now downtrended  - She is s/p [facility], 6/27.  found to hahve patent stents in LAD,  of RCA.  s/p MARLY to pLCx (99% stenosis)  - Continue triple therapy x 1 week, then d/c ASA  - Continue high intensity statin  - Will hold off on BB in the setting of intermittent Mobitz 1.  No evidence of high grade AVB  - Will continue to monitor on tele for now     - No evidence of volume overload  - LVED in lab = 10  - TTE showed normal LVF/RVF, RAP~3mmHg  - Creatinine uptrended a bit.  Would switch Bumex IV to PO 2 mg daily (home dose) and continue to monitor renal function    - No evidence of Afib on tele  - Avoid AVNB for now    - BP stable and controlled with one soft reading yesterday  - Monitor electrolytes, replete bear keep K>4 and Mag>2    Allyn Rodarte DNP, NP-C, AGACNP-C  Cardiology   Call TEAMS          80 y/o female with a CAD s/p stents to prox and mid LAD in 2020, HFpEF (EF 70-75%in 2/2025) Anxiety, Afib on eliquis, Adrenal Nodule, Renal insufficiency baseline Cr 1.7-1.9 on previous labs, HLD, HTN, DM, ABDOUL presents to the ED with NSTEMI.    NSTEMI/PAfib  - Known CAD s/p PCI of RCA and LAD 2020  - Presented w/chest burning and shortness of breath over the last few days  - initial troponin 1255, then peaked to 41k, with ck rising to ~1100; now downtrended  - She is s/p [facility], 6/27.  found to hahve patent stents in LAD,  of RCA.  s/p MARLY to pLCx (99% stenosis)  - Continue triple therapy x 1 week, then d/c ASA  - Continue high intensity statin  - Will hold off on BB in the setting of intermittent Mobitz 1.  No evidence of high grade AVB  - Will continue to monitor on tele for now     - No evidence of volume overload  - LVED in lab = 10  - TTE showed normal LVF/RVF, RAP~3mmHg  - Creatinine uptrended a bit.  Would switch Bumex IV to PO 2 mg daily (home dose) and continue to monitor renal function    - No evidence of Afib on tele  - Avoid AVNB for now, including home Multaq    - BP stable and controlled with one soft reading yesterday  - Monitor electrolytes, replete bear keep K>4 and Mag>2    Allyn Rodarte DNP, NP-C, AGACNP-C  Cardiology   Call TEAMS

## 2025-06-29 NOTE — CASE MANAGEMENT PROGRESS NOTE - NSCMPROGRESSNOTE_GEN_ALL_CORE
Patient is medically cleared for DC, per MD. Patient is identified as a CMS STAR patient. CM met with patient to review transitional care plan for DC home today. Patient in agreement to [facility] services and has been referred to [facility], pending acceptance. Patient confirmed that her daughter will be transporting her home upon DC. She confirmed she does have a RW at home. Patient educated on importance of post-hospitalization MD f/u upon DC; she verbalized understanding. [facility] reviewed and explained to patient; she verbalized understanding. She is in agreement to the DC plan. CM remains available.

## 2025-06-29 NOTE — PROGRESS NOTE ADULT - NS ATTEND AMEND GEN_ALL_CORE FT
78 y/o female with a CAD s/p stents to prox and mid LAD in 2020, HFpEF (EF 70-75%in 2/2025) Anxiety, Afib on eliquis, Adrenal Nodule, Renal insufficiency baseline Cr 1.7-1.9 on previous labs, HLD, HTN, DM, ABDOUL presents to the ED with NSTEMI.    -known cad s/p pci of rca and lad 2020  - presented w/chest burning and shortness of breath over the last few days  - initial troponin 1255, the 35k, 39k, 41k, with ck rising to ~1100   -trop now downtrending  -suspect mostly completed infarct prior to pci  -is now s/p pci of proximal lcx  -continues to exhibit Mobitz 1 avb overnight, with no convincing evidence of 3rd deg avb, which had been a concern overnight 6/27  -cont dapt    -cont statin   - back on ac  -paf had been on ccb and multaq, both on hold in the setting of mobitz 1, would not resume for now  -did appear vol ol and has been on diuretics   - change bumex to po   -tte tls but grossly preserved ef

## 2025-06-29 NOTE — PROGRESS NOTE ADULT - SUBJECTIVE AND OBJECTIVE BOX
[facility] -- Alyssa Baeza, Chinedu Everett Savella, , Yaneth Barrera  Office # [phone]    Follow Up:  NSTEMI    Subjective/Observations: Denies SOB, SCHAEFER, orthopnea, CP or palpitations.  Overnight, she felt that she went into Afib.  Tele events reviewed    REVIEW OF SYSTEMS: All other review of systems is negative unless indicated above  PAST MEDICAL & SURGICAL HISTORY:  CAD (coronary artery disease)      HTN (hypertension)      Congestive heart failure (CHF)      Afib      DM (diabetes mellitus)      Renal insufficiency      No significant past surgical history      MEDICATIONS  (STANDING):  apixaban 5 milliGRAM(s) Oral two times a day  aspirin enteric coated 81 milliGRAM(s) Oral daily  atorvastatin 40 milliGRAM(s) Oral at bedtime  bumetanide Injectable 2 milliGRAM(s) IV Push daily  clopidogrel Tablet 75 milliGRAM(s) Oral daily  dextrose 5%. 1000 milliLiter(s) (50 mL/Hr) IV Continuous <Continuous>  dextrose 5%. 1000 milliLiter(s) (100 mL/Hr) IV Continuous <Continuous>  dextrose 50% Injectable 25 Gram(s) IV Push once  dextrose 50% Injectable 12.5 Gram(s) IV Push once  dextrose 50% Injectable 25 Gram(s) IV Push once  glucagon  Injectable 1 milliGRAM(s) IntraMuscular once  insulin glargine Injectable (LANTUS) 4 Unit(s) SubCutaneous before breakfast  insulin lispro (ADMELOG) corrective regimen sliding scale   SubCutaneous Before meals and at bedtime    MEDICATIONS  (PRN):  acetaminophen     Tablet .. 650 milliGRAM(s) Oral every 6 hours PRN Temp greater or equal to 38C (100.4F), Mild Pain (1 - 3)  dextrose Oral Gel 15 Gram(s) Oral once PRN Blood Glucose LESS THAN 70 milliGRAM(s)/deciliter  LORazepam     Tablet 0.5 milliGRAM(s) Oral daily PRN Anxiety  melatonin 3 milliGRAM(s) Oral at bedtime PRN Insomnia    Allergies    No Known Allergies    Intolerances      Vital Signs Last 24 Hrs  T(C): 37.1 (29 Jun 2025 05:05), Max: 37.1 (29 Jun 2025 05:05)  T(F): 98.7 (29 Jun 2025 05:05), Max: 98.7 (29 Jun 2025 05:05)  HR: 80 (29 Jun 2025 05:05) (65 - 83)  BP: 125/73 (29 Jun 2025 05:05) (92/58 - 125/73)  BP(mean): 66 (28 Jun 2025 13:00) (66 - 79)  RR: 18 (29 Jun 2025 05:05) (16 - 23)  SpO2: 91% (29 Jun 2025 05:05) (91% - 100%)    Parameters below as of 29 Jun 2025 05:05  Patient On (Oxygen Delivery Method): room air      I&O's Summary    28 Jun 2025 07:01  -  29 Jun 2025 07:00  --------------------------------------------------------  IN: 0 mL / OUT: 2700 mL / NET: -2700 mL        PHYSICAL EXAM:  TELE: NSR with multiple intermittent Mobitz 1  Constitutional: NAD, awake and alert, obese  HEENT: Moist Mucous Membranes, Anicteric  Pulmonary: Non-labored, breath sounds are clear bilaterally, No wheezing, rales or rhonchi  Cardiovascular: Regular, S1 and S2, No murmurs, rubs, gallops or clicks  Gastrointestinal: Bowel Sounds present, soft, nontender.   Lymph: No peripheral edema. No lymphadenopathy.  Skin: No visible rashes or ulcers.  Psych:  Mood & affect appropriate  LABS: All Labs Reviewed:                        15.8   6.16  )-----------( 147      ( 29 Jun 2025 06:45 )             51.0                         14.4   5.48  )-----------( 128      ( 28 Jun 2025 05:50 )             44.6                         14.7   5.36  )-----------( 121      ( 27 Jun 2025 05:39 )             44.6     29 Jun 2025 06:45    141    |  107    |  52     ----------------------------<  139    3.8     |  25     |  1.90   28 Jun 2025 05:50    142    |  108    |  54     ----------------------------<  131    3.7     |  25     |  1.80   27 Jun 2025 17:30    141    |  107    |  51     ----------------------------<  189    3.6     |  28     |  1.80     Ca    9.5        29 Jun 2025 06:45  Ca    8.6        28 Jun 2025 05:50  Ca    8.6        27 Jun 2025 17:30  Phos  4.0       29 Jun 2025 06:45  Phos  3.8       28 Jun 2025 05:50  Phos  3.5       27 Jun 2025 17:30  Mg     2.0       29 Jun 2025 06:45  Mg     2.1       28 Jun 2025 05:50  Mg     1.9       27 Jun 2025 17:30    TPro  7.6    /  Alb  3.8    /  TBili  1.0    /  DBili  x      /  AST  63     /  ALT  45     /  AlkPhos  86     26 Jun 2025 16:20    12 Lead ECG:   Ventricular Rate 67 BPM    Atrial Rate 67 BPM    P-R Interval 246 ms    QRS Duration 108 ms    Q-T Interval 430 ms    QTC Calculation(Bazett) 454 ms    P Axis 73 degrees    R Axis -62 degrees    T Axis 120 degrees    Diagnosis Line Sinus rhythm with 1st degree AV block  Left anterior fascicular block  Left ventricular hypertrophy with repolarization abnormality ( R in aVL , [place] product )  Cannot rule out Septal infarct , age undetermined  Abnormal ECG  No previous ECGs available (06-28-25 @ 06:45)      TRANSTHORACIC ECHOCARDIOGRAM REPORT  ________________________________________________________________________________                                      _______       Pt. Name:       JASON KEENAN Study Date:    6/26/2025  MRN:            [id number]       YOB: 1945  Accession #:    [id number]       Age:           79 years  Account#:       [id number]      Gender:        F  Heart Rate:                     Height:        63.00 in (160.02 cm)  Rhythm:                         Weight:       275.56 lb (125.00 kg)  Blood Pressure: 145/80 mmHg     BSA/BMI:       2.22 m² / 48.81 kg/m²  ________________________________________________________________________________________  Referring Physician:    [id number] Anai Pizano  Interpreting Physician: Grey Villafuerte M.D.  Primary Sonographer:    Rohit Jarrett    CPT:               ECHO TTE WO CON COMP W DOPP - 32852.m  Indication(s):     Abnormal electrocardiogram ECG EKG - R94.31  Procedure:         Transthoracic echocardiogram with 2-D, M-mode and complete                     spectral and color flow Doppler.  Ordering Location: [facility]  Admission Status:  Inpatient  Study Information: Image quality for this study is technically difficult.    _______________________________________________________________________________________     CONCLUSIONS:      1. Technically difficult image quality.   2. Mild left ventricular hypertrophy.   3. Normal right ventricular cavity size and probably normal right ventricular systolic function.   4. Left atrium is normal in size.   5. The right atrium is normal in size.   6. There is calcification of the aortic valve leaflets.   7. Mitral valve leaflets have focal calcification.   8. Mild mitral regurgitation.   9. The inferior vena cava is normal in size measuring 1.00 cm in diameter, (normal <2.1cm) with normal inspiratory collapse (normal >50%) consistent with normal right atrial pressure (~3, range 0-5mmHg).  10. Aortic valve was not well visualized.  11. Left ventricular endocardium is not well visualized; however, the left ventricular systolic function appears grossly normal.  12. Pulmonary artery systolic pressure could not be estimated.    ________________________________________________________________________________________  FINDINGS:     Left Ventricle:  Mild left ventricular hypertrophy. Left ventricular endocardium is not well visualized; however, the left ventricular systolic function appears grossly normal.     Right Ventricle:  The right ventricle is not well visualized. The right ventricular cavity is normal in size and right ventricular systolic function is probably normal. Tricuspid annular plane systolic excursion (TAPSE) is 2.7 cm (normal >=1.7 cm).     Left Atrium:  The left atrium is normal in size with an indexed volume of 31.09 ml/m².     Right Atrium:  The right atrium is normal in size with an indexed volume of 17.87 ml/m² and an indexed area of 7.31 cm²/m².     Interatrial Septum:  The interatrial septum was not well visualized.     Aortic Valve:  The aortic valve was not well visualized. There is calcification of the aortic valve leaflets.     Mitral Valve:  There is calcification of the mitral valve annulus. Mitral valve leaflets have focal calcification. There is mild mitral regurgitation.     Tricuspid Valve:  The tricuspid valve is structurally normal with normal leaflet excursion. There is trace tricuspid regurgitation. There is insufficient tricuspid regurgitation detected to calculate pulmonary artery systolic pressure.     Pulmonic Valve:  The pulmonic valve was not well visualized.     Aorta:  The aortic root at the sinuses of Valsalva is normal in size, measuring 2.90 cm (indexed 1.31 cm/m²). The ascending aorta is normal in size, measuring 2.70 cm (indexed 1.22 cm/m²). The aortic arch diameter is normal in size, measuring 3.0 cm (indexed 1.35 cm/m²).     Systemic Veins:  The inferior vena cava is normal in size measuring 1.00 cm in diameter, (normal <2.1cm) with normal inspiratory collapse (normal >50%) consistent with normal right atrial pressure (~3, range 0-5mmHg).  ____________________________________________________________________  QUANTITATIVE DATA:  Left Ventricle Measurements: (Indexed to BSA)     IVSd (2D):   1.3 cm  LVPWd (2D):  1.0 cm  LVIDd (2D):  5.1cm  LVIDs (2D):  3.7 cm  LV Mass:     229 g  103.2 g/m²  LV Vol d, MOD A2C: 58.9 ml 26.58 ml/m²  LV Vol d, MOD A4C: 77.8 ml 35.11 ml/m²  LV Vol d, MOD BP:  71.3 ml 32.16 ml/m²  LV Vol s, MOD A2C: 26.9 ml 12.14 ml/m²  LV Vol s, MOD A4C: 34.7 ml 15.65 ml/m²  LV Vol s, MOD BP:  31.2 ml 14.10 ml/m²  LVOT SV MOD BP:    40.0 ml  LV EF% MOD BP:     56 %     MV E Vmax:    1.23 m/s  MV A Vmax:    0.96 m/s  MV E/A:       1.28  e' lateral:   7.18 cm/s  e' medial:    8.59 cm/s  E/e' lateral: 17.13  E/e' medial:  14.32  E/e' Average: 15.60  MV DT:        135 msec    Aorta Measurements: (Normal range) (Indexed to BSA)     Ao Root d     2.90 cm (2.7 - 3.3 cm) 1.31 cm/m²  Ao Asc d, 2D: 2.70  Ao Asc prox:  2.70 cm                1.22 cm/m²  Ao Arch:      3.0 cm            Left Atrium Measurements: (Indexed to BSA)  LA Diam 2D: 4.00 cm         Right Ventricle Measurements: Right Atrial Measurements:     TAPSE:            2.7 cm      RA Vol s, MOD A4C         39.6 ml  RV Base (RVID1):  4.3 cm      RA Vol s,MOD A4C i BSA   17.87 ml/m²  RV Mid (RVID2):   3.0 cm      RA Area s, MOD A4C        16.2 cm²  RV Major (RVID3): 6.0 cm      RA Area s, MOD A4C, i BSA 7.31 cm²/m²       LVOT / RVOT/ Qp/Qs Data: (Indexed to BSA)  LVOT Diameter,s: 2.00 cm  LVOT Area:    3.14 cm²    Mitral Valve Measurements:     MV E Vmax: 1.2 m/s         MR Vmax:          4.14 m/s  MV A Vmax: 1.0 m/s         MR Peak Gradient: 68.6 mmHg  MV E/A:    1.3       Tricuspid Valve Measurements:     RA Pressure: 3 mmHg    ________________________________________________________________________________________  Electronically signed on 6/26/2025 at 10:52:13 PM by Grey Villafuerte M.D.         *** Final ***

## 2025-07-02 ENCOUNTER — APPOINTMENT (OUTPATIENT)
Dept: INTERNAL MEDICINE | Facility: CLINIC | Age: 80
End: 2025-07-02
Payer: MEDICARE

## 2025-07-02 VITALS — SYSTOLIC BLOOD PRESSURE: 140 MMHG | DIASTOLIC BLOOD PRESSURE: 70 MMHG

## 2025-07-02 VITALS
OXYGEN SATURATION: 97 % | DIASTOLIC BLOOD PRESSURE: 70 MMHG | HEART RATE: 95 BPM | BODY MASS INDEX: 47.84 KG/M2 | WEIGHT: 270 LBS | SYSTOLIC BLOOD PRESSURE: 130 MMHG | HEIGHT: 63 IN

## 2025-07-02 PROCEDURE — 93000 ELECTROCARDIOGRAM COMPLETE: CPT

## 2025-07-02 PROCEDURE — 99496 TRANSJ CARE MGMT HIGH F2F 7D: CPT

## 2025-07-02 RX ORDER — CLOPIDOGREL BISULFATE 75 MG/1
75 TABLET, FILM COATED ORAL
Qty: 90 | Refills: 1 | Status: ACTIVE | COMMUNITY
Start: 2025-07-02 | End: 1900-01-01

## 2025-07-03 LAB
ALBUMIN SERPL ELPH-MCNC: 4.6 G/DL
ALP BLD-CCNC: 89 U/L
ALT SERPL-CCNC: 65 U/L
ANION GAP SERPL CALC-SCNC: 17 MMOL/L
AST SERPL-CCNC: 56 U/L
BASOPHILS # BLD AUTO: 0.03 K/UL
BASOPHILS NFR BLD AUTO: 0.6 %
BILIRUB SERPL-MCNC: 0.7 MG/DL
BUN SERPL-MCNC: 69 MG/DL
CALCIUM SERPL-MCNC: 9.7 MG/DL
CHLORIDE SERPL-SCNC: 103 MMOL/L
CHOLEST SERPL-MCNC: 164 MG/DL
CK SERPL-CCNC: 308 U/L
CO2 SERPL-SCNC: 22 MMOL/L
CREAT SERPL-MCNC: 1.74 MG/DL
EGFRCR SERPLBLD CKD-EPI 2021: 29 ML/MIN/1.73M2
EOSINOPHIL # BLD AUTO: 0.13 K/UL
EOSINOPHIL NFR BLD AUTO: 2.8 %
ESTIMATED AVERAGE GLUCOSE: 163 MG/DL
GLUCOSE SERPL-MCNC: 176 MG/DL
HBA1C MFR BLD HPLC: 7.3 %
HCT VFR BLD CALC: 47.2 %
HDLC SERPL-MCNC: 53 MG/DL
HGB BLD-MCNC: 14.9 G/DL
IMM GRANULOCYTES NFR BLD AUTO: 0.2 %
LDLC SERPL-MCNC: 89 MG/DL
LYMPHOCYTES # BLD AUTO: 1.19 K/UL
LYMPHOCYTES NFR BLD AUTO: 25.3 %
MAN DIFF?: NORMAL
MCHC RBC-ENTMCNC: 29.8 PG
MCHC RBC-ENTMCNC: 31.6 G/DL
MCV RBC AUTO: 94.4 FL
MONOCYTES # BLD AUTO: 0.49 K/UL
MONOCYTES NFR BLD AUTO: 10.4 %
NEUTROPHILS # BLD AUTO: 2.86 K/UL
NEUTROPHILS NFR BLD AUTO: 60.7 %
NONHDLC SERPL-MCNC: 111 MG/DL
NT-PROBNP SERPL-MCNC: 472 PG/ML
PLATELET # BLD AUTO: 119 K/UL
POTASSIUM SERPL-SCNC: 4.2 MMOL/L
PROT SERPL-MCNC: 7.5 G/DL
RBC # BLD: 5 M/UL
RBC # FLD: 15.9 %
SODIUM SERPL-SCNC: 141 MMOL/L
T4 FREE SERPL-MCNC: 1.3 NG/DL
TRIGL SERPL-MCNC: 125 MG/DL
TSH SERPL-ACNC: 2.66 UIU/ML
WBC # FLD AUTO: 4.71 K/UL

## 2025-07-06 LAB
ALBUMIN SERPL ELPH-MCNC: 4.7 G/DL
ALP BLD-CCNC: 92 U/L
ALT SERPL-CCNC: 59 U/L
AST SERPL-CCNC: 48 U/L
BASOPHILS # BLD AUTO: 0.03 K/UL
BASOPHILS NFR BLD AUTO: 0.6 %
BILIRUB DIRECT SERPL-MCNC: 0.27 MG/DL
BILIRUB INDIRECT SERPL-MCNC: 0.5 MG/DL
BILIRUB SERPL-MCNC: 0.7 MG/DL
CK SERPL-CCNC: 289 U/L
EOSINOPHIL # BLD AUTO: 0.09 K/UL
EOSINOPHIL NFR BLD AUTO: 1.7 %
HCT VFR BLD CALC: 47.5 %
HGB BLD-MCNC: 15.1 G/DL
IMM GRANULOCYTES NFR BLD AUTO: 0.4 %
LYMPHOCYTES # BLD AUTO: 1.64 K/UL
LYMPHOCYTES NFR BLD AUTO: 30.8 %
MAN DIFF?: NORMAL
MCHC RBC-ENTMCNC: 29.5 PG
MCHC RBC-ENTMCNC: 31.8 G/DL
MCV RBC AUTO: 93 FL
MONOCYTES # BLD AUTO: 0.57 K/UL
MONOCYTES NFR BLD AUTO: 10.7 %
NEUTROPHILS # BLD AUTO: 2.97 K/UL
NEUTROPHILS NFR BLD AUTO: 55.8 %
PLATELET # BLD AUTO: 133 K/UL
PROT SERPL-MCNC: 7.6 G/DL
RBC # BLD: 5.11 M/UL
RBC # FLD: 15.6 %
WBC # FLD AUTO: 5.32 K/UL

## 2025-07-10 ENCOUNTER — APPOINTMENT (OUTPATIENT)
Dept: CARDIOLOGY | Facility: CLINIC | Age: 80
End: 2025-07-10
Payer: MEDICARE

## 2025-07-10 VITALS
OXYGEN SATURATION: 98 % | BODY MASS INDEX: 48.37 KG/M2 | DIASTOLIC BLOOD PRESSURE: 70 MMHG | HEART RATE: 84 BPM | WEIGHT: 273 LBS | HEIGHT: 63 IN | TEMPERATURE: 97.9 F | SYSTOLIC BLOOD PRESSURE: 140 MMHG

## 2025-07-10 PROBLEM — I44.1 MOBITZ I: Status: ACTIVE | Noted: 2025-07-02

## 2025-07-10 PROCEDURE — G2211 COMPLEX E/M VISIT ADD ON: CPT

## 2025-07-10 PROCEDURE — 99214 OFFICE O/P EST MOD 30 MIN: CPT

## 2025-07-10 PROCEDURE — 93000 ELECTROCARDIOGRAM COMPLETE: CPT

## 2025-07-11 LAB
ALBUMIN SERPL ELPH-MCNC: 4.6 G/DL
ALP BLD-CCNC: 84 U/L
ALT SERPL-CCNC: 40 U/L
ANION GAP SERPL CALC-SCNC: 14 MMOL/L
AST SERPL-CCNC: 34 U/L
BASOPHILS # BLD AUTO: 0.02 K/UL
BASOPHILS NFR BLD AUTO: 0.4 %
BILIRUB SERPL-MCNC: 0.9 MG/DL
BUN SERPL-MCNC: 56 MG/DL
CALCIUM SERPL-MCNC: 10 MG/DL
CHLORIDE SERPL-SCNC: 108 MMOL/L
CO2 SERPL-SCNC: 24 MMOL/L
CREAT SERPL-MCNC: 1.93 MG/DL
EGFRCR SERPLBLD CKD-EPI 2021: 26 ML/MIN/1.73M2
EOSINOPHIL # BLD AUTO: 0.09 K/UL
EOSINOPHIL NFR BLD AUTO: 1.7 %
GGT SERPL-CCNC: 17 U/L
GLUCOSE SERPL-MCNC: 124 MG/DL
HCT VFR BLD CALC: 46.1 %
HGB BLD-MCNC: 14.6 G/DL
IMM GRANULOCYTES NFR BLD AUTO: 0.6 %
LYMPHOCYTES # BLD AUTO: 1.48 K/UL
LYMPHOCYTES NFR BLD AUTO: 27.3 %
MAGNESIUM SERPL-MCNC: 2 MG/DL
MAN DIFF?: NORMAL
MCHC RBC-ENTMCNC: 29.4 PG
MCHC RBC-ENTMCNC: 31.7 G/DL
MCV RBC AUTO: 92.9 FL
MONOCYTES # BLD AUTO: 0.45 K/UL
MONOCYTES NFR BLD AUTO: 8.3 %
NEUTROPHILS # BLD AUTO: 3.36 K/UL
NEUTROPHILS NFR BLD AUTO: 61.7 %
NT-PROBNP SERPL-MCNC: 915 PG/ML
PHOSPHATE SERPL-MCNC: 3.4 MG/DL
PLATELET # BLD AUTO: 131 K/UL
POTASSIUM SERPL-SCNC: 3.8 MMOL/L
PROT SERPL-MCNC: 7.5 G/DL
RBC # BLD: 4.96 M/UL
RBC # FLD: 15.7 %
SODIUM SERPL-SCNC: 146 MMOL/L
WBC # FLD AUTO: 5.43 K/UL

## 2025-07-11 RX ORDER — POTASSIUM CHLORIDE 750 MG/1
10 TABLET, FILM COATED, EXTENDED RELEASE ORAL DAILY
Qty: 10 | Refills: 0 | Status: ACTIVE | COMMUNITY
Start: 2025-07-11 | End: 1900-01-01

## 2025-07-11 RX ORDER — BUMETANIDE 1 MG/1
1 TABLET ORAL
Qty: 2 | Refills: 0 | Status: ACTIVE | COMMUNITY
Start: 2025-07-11 | End: 1900-01-01

## 2025-07-15 LAB
ALBUMIN SERPL ELPH-MCNC: 4.8 G/DL
ALP BLD-CCNC: 86 U/L
ALT SERPL-CCNC: 37 U/L
ANION GAP SERPL CALC-SCNC: 23 MMOL/L
AST SERPL-CCNC: 42 U/L
BASOPHILS # BLD AUTO: 0.03 K/UL
BASOPHILS NFR BLD AUTO: 0.5 %
BILIRUB SERPL-MCNC: 1 MG/DL
BUN SERPL-MCNC: 68 MG/DL
CALCIUM SERPL-MCNC: 9.9 MG/DL
CHLORIDE SERPL-SCNC: 98 MMOL/L
CO2 SERPL-SCNC: 22 MMOL/L
CREAT SERPL-MCNC: 2.03 MG/DL
EGFRCR SERPLBLD CKD-EPI 2021: 24 ML/MIN/1.73M2
EOSINOPHIL # BLD AUTO: 0.08 K/UL
EOSINOPHIL NFR BLD AUTO: 1.4 %
GLUCOSE SERPL-MCNC: 100 MG/DL
HCT VFR BLD CALC: 48.2 %
HGB BLD-MCNC: 15.4 G/DL
IMM GRANULOCYTES NFR BLD AUTO: 0.2 %
LYMPHOCYTES # BLD AUTO: 1.77 K/UL
LYMPHOCYTES NFR BLD AUTO: 30.9 %
MAGNESIUM SERPL-MCNC: 2 MG/DL
MAN DIFF?: NORMAL
MCHC RBC-ENTMCNC: 29.9 PG
MCHC RBC-ENTMCNC: 32 G/DL
MCV RBC AUTO: 93.6 FL
MONOCYTES # BLD AUTO: 0.55 K/UL
MONOCYTES NFR BLD AUTO: 9.6 %
NEUTROPHILS # BLD AUTO: 3.29 K/UL
NEUTROPHILS NFR BLD AUTO: 57.4 %
NT-PROBNP SERPL-MCNC: 873 PG/ML
PLATELET # BLD AUTO: 135 K/UL
PLATELET # PLAS AUTO: NORMAL
POTASSIUM SERPL-SCNC: 3.9 MMOL/L
PROT SERPL-MCNC: 7.6 G/DL
RBC # BLD: 5.15 M/UL
RBC # FLD: 15.7 %
SODIUM SERPL-SCNC: 143 MMOL/L
WBC # FLD AUTO: 5.73 K/UL

## 2025-07-16 ENCOUNTER — APPOINTMENT (OUTPATIENT)
Dept: PULMONOLOGY | Facility: CLINIC | Age: 80
End: 2025-07-16
Payer: COMMERCIAL

## 2025-07-16 ENCOUNTER — APPOINTMENT (OUTPATIENT)
Dept: PULMONOLOGY | Facility: CLINIC | Age: 80
End: 2025-07-16
Payer: MEDICARE

## 2025-07-16 VITALS — SYSTOLIC BLOOD PRESSURE: 109 MMHG | HEART RATE: 90 BPM | OXYGEN SATURATION: 95 % | DIASTOLIC BLOOD PRESSURE: 69 MMHG

## 2025-07-16 PROBLEM — G47.19 EXCESSIVE DAYTIME SLEEPINESS: Status: ACTIVE | Noted: 2025-07-16

## 2025-07-16 PROCEDURE — 94727 GAS DIL/WSHOT DETER LNG VOL: CPT

## 2025-07-16 PROCEDURE — 95012 NITRIC OXIDE EXP GAS DETER: CPT

## 2025-07-16 PROCEDURE — 99204 OFFICE O/P NEW MOD 45 MIN: CPT | Mod: 25

## 2025-07-16 PROCEDURE — 99214 OFFICE O/P EST MOD 30 MIN: CPT | Mod: 25

## 2025-07-16 PROCEDURE — 94729 DIFFUSING CAPACITY: CPT

## 2025-07-16 PROCEDURE — 94010 BREATHING CAPACITY TEST: CPT

## 2025-07-16 PROCEDURE — ZZZZZ: CPT

## 2025-07-17 RX ORDER — LINAGLIPTIN 5 MG/1
5 TABLET, FILM COATED ORAL DAILY
Qty: 90 | Refills: 1 | Status: ACTIVE | COMMUNITY
Start: 2025-07-17 | End: 1900-01-01

## 2025-07-21 LAB
ANION GAP SERPL CALC-SCNC: 19 MMOL/L
BUN SERPL-MCNC: 73 MG/DL
CALCIUM SERPL-MCNC: 10.2 MG/DL
CHLORIDE SERPL-SCNC: 101 MMOL/L
CO2 SERPL-SCNC: 23 MMOL/L
CREAT SERPL-MCNC: 1.96 MG/DL
EGFRCR SERPLBLD CKD-EPI 2021: 25 ML/MIN/1.73M2
GLUCOSE SERPL-MCNC: 167 MG/DL
NT-PROBNP SERPL-MCNC: 900 PG/ML
POTASSIUM SERPL-SCNC: 3.6 MMOL/L
SODIUM SERPL-SCNC: 143 MMOL/L

## 2025-07-24 ENCOUNTER — APPOINTMENT (OUTPATIENT)
Dept: ENDOCRINOLOGY | Facility: CLINIC | Age: 80
End: 2025-07-24

## 2025-07-25 ENCOUNTER — APPOINTMENT (OUTPATIENT)
Dept: PULMONOLOGY | Facility: CLINIC | Age: 80
End: 2025-07-25
Payer: MEDICARE

## 2025-07-25 ENCOUNTER — APPOINTMENT (OUTPATIENT)
Dept: INTERNAL MEDICINE | Facility: CLINIC | Age: 80
End: 2025-07-25
Payer: MEDICARE

## 2025-07-25 VITALS
HEIGHT: 63 IN | OXYGEN SATURATION: 96 % | TEMPERATURE: 98.4 F | BODY MASS INDEX: 47.84 KG/M2 | DIASTOLIC BLOOD PRESSURE: 60 MMHG | SYSTOLIC BLOOD PRESSURE: 120 MMHG | HEART RATE: 81 BPM | WEIGHT: 270 LBS

## 2025-07-25 DIAGNOSIS — I48.91 UNSPECIFIED ATRIAL FIBRILLATION: ICD-10-CM

## 2025-07-25 DIAGNOSIS — I50.9 HEART FAILURE, UNSPECIFIED: ICD-10-CM

## 2025-07-25 DIAGNOSIS — D69.6 THROMBOCYTOPENIA, UNSPECIFIED: ICD-10-CM

## 2025-07-25 DIAGNOSIS — F41.9 ANXIETY DISORDER, UNSPECIFIED: ICD-10-CM

## 2025-07-25 DIAGNOSIS — D64.9 ANEMIA, UNSPECIFIED: ICD-10-CM

## 2025-07-25 DIAGNOSIS — R79.89 OTHER SPECIFIED ABNORMAL FINDINGS OF BLOOD CHEMISTRY: ICD-10-CM

## 2025-07-25 DIAGNOSIS — I25.10 ATHEROSCLEROTIC HEART DISEASE OF NATIVE CORONARY ARTERY W/OUT ANGINA PECTORIS: ICD-10-CM

## 2025-07-25 PROCEDURE — G2211 COMPLEX E/M VISIT ADD ON: CPT

## 2025-07-25 PROCEDURE — 99214 OFFICE O/P EST MOD 30 MIN: CPT

## 2025-07-26 LAB
ANION GAP SERPL CALC-SCNC: 16 MMOL/L
BUN SERPL-MCNC: 56 MG/DL
CALCIUM SERPL-MCNC: 9.4 MG/DL
CHLORIDE SERPL-SCNC: 106 MMOL/L
CO2 SERPL-SCNC: 24 MMOL/L
CREAT SERPL-MCNC: 1.81 MG/DL
EGFRCR SERPLBLD CKD-EPI 2021: 28 ML/MIN/1.73M2
GLUCOSE SERPL-MCNC: 131 MG/DL
NT-PROBNP SERPL-MCNC: 1624 PG/ML
POTASSIUM SERPL-SCNC: 3.9 MMOL/L
SODIUM SERPL-SCNC: 147 MMOL/L

## 2025-07-26 PROCEDURE — 95800 SLP STDY UNATTENDED: CPT

## 2025-07-27 PROBLEM — D69.6 THROMBOCYTOPENIA: Status: ACTIVE | Noted: 2025-07-11

## 2025-07-27 PROCEDURE — 95800 SLP STDY UNATTENDED: CPT | Mod: 52

## 2025-07-31 LAB
ANION GAP SERPL CALC-SCNC: 19 MMOL/L
BUN SERPL-MCNC: 61 MG/DL
CALCIUM SERPL-MCNC: 9.6 MG/DL
CHLORIDE SERPL-SCNC: 102 MMOL/L
CO2 SERPL-SCNC: 25 MMOL/L
CREAT SERPL-MCNC: 1.84 MG/DL
EGFRCR SERPLBLD CKD-EPI 2021: 27 ML/MIN/1.73M2
GLUCOSE SERPL-MCNC: 173 MG/DL
NT-PROBNP SERPL-MCNC: 1169 PG/ML
POTASSIUM SERPL-SCNC: 3.5 MMOL/L
SODIUM SERPL-SCNC: 146 MMOL/L

## 2025-08-06 LAB
ANION GAP SERPL CALC-SCNC: 18 MMOL/L
BUN SERPL-MCNC: 63 MG/DL
CALCIUM SERPL-MCNC: 9.9 MG/DL
CHLORIDE SERPL-SCNC: 103 MMOL/L
CO2 SERPL-SCNC: 24 MMOL/L
CREAT SERPL-MCNC: 1.8 MG/DL
EGFRCR SERPLBLD CKD-EPI 2021: 28 ML/MIN/1.73M2
GLUCOSE SERPL-MCNC: 141 MG/DL
NT-PROBNP SERPL-MCNC: 774 PG/ML
POTASSIUM SERPL-SCNC: 3.6 MMOL/L
SODIUM SERPL-SCNC: 144 MMOL/L

## 2025-08-14 ENCOUNTER — APPOINTMENT (OUTPATIENT)
Dept: ENDOCRINOLOGY | Facility: CLINIC | Age: 80
End: 2025-08-14
Payer: MEDICARE

## 2025-08-14 VITALS
OXYGEN SATURATION: 95 % | HEART RATE: 96 BPM | BODY MASS INDEX: 47.7 KG/M2 | WEIGHT: 269.19 LBS | DIASTOLIC BLOOD PRESSURE: 70 MMHG | TEMPERATURE: 97.8 F | SYSTOLIC BLOOD PRESSURE: 127 MMHG | HEIGHT: 63 IN

## 2025-08-14 DIAGNOSIS — E11.9 TYPE 2 DIABETES MELLITUS W/OUT COMPLICATIONS: ICD-10-CM

## 2025-08-14 DIAGNOSIS — I10 ESSENTIAL (PRIMARY) HYPERTENSION: ICD-10-CM

## 2025-08-14 DIAGNOSIS — E66.813 OBESITY, CLASS 3: ICD-10-CM

## 2025-08-14 DIAGNOSIS — N28.9 DISORDER OF KIDNEY AND URETER, UNSPECIFIED: ICD-10-CM

## 2025-08-14 DIAGNOSIS — E55.9 VITAMIN D DEFICIENCY, UNSPECIFIED: ICD-10-CM

## 2025-08-14 DIAGNOSIS — E78.5 HYPERLIPIDEMIA, UNSPECIFIED: ICD-10-CM

## 2025-08-14 PROCEDURE — 99214 OFFICE O/P EST MOD 30 MIN: CPT

## 2025-08-14 PROCEDURE — 95251 CONT GLUC MNTR ANALYSIS I&R: CPT

## 2025-08-14 PROCEDURE — 36415 COLL VENOUS BLD VENIPUNCTURE: CPT

## 2025-08-14 PROCEDURE — G2211 COMPLEX E/M VISIT ADD ON: CPT

## 2025-08-15 LAB — GLUCOSE BLDC GLUCOMTR-MCNC: 150

## 2025-08-29 ENCOUNTER — APPOINTMENT (OUTPATIENT)
Dept: PULMONOLOGY | Facility: CLINIC | Age: 80
End: 2025-08-29
Payer: MEDICARE

## 2025-08-29 VITALS
HEIGHT: 63 IN | OXYGEN SATURATION: 94 % | HEART RATE: 76 BPM | WEIGHT: 271 LBS | SYSTOLIC BLOOD PRESSURE: 133 MMHG | DIASTOLIC BLOOD PRESSURE: 76 MMHG | RESPIRATION RATE: 18 BRPM | BODY MASS INDEX: 48.02 KG/M2

## 2025-08-29 DIAGNOSIS — R06.02 SHORTNESS OF BREATH: ICD-10-CM

## 2025-08-29 DIAGNOSIS — R06.83 SNORING: ICD-10-CM

## 2025-08-29 DIAGNOSIS — G47.33 OBSTRUCTIVE SLEEP APNEA (ADULT) (PEDIATRIC): ICD-10-CM

## 2025-08-29 PROCEDURE — 99214 OFFICE O/P EST MOD 30 MIN: CPT

## 2025-08-29 PROCEDURE — G2211 COMPLEX E/M VISIT ADD ON: CPT

## 2025-09-03 ENCOUNTER — RX RENEWAL (OUTPATIENT)
Age: 80
End: 2025-09-03

## 2025-09-04 ENCOUNTER — RX RENEWAL (OUTPATIENT)
Age: 80
End: 2025-09-04

## 2025-09-04 LAB
ANION GAP SERPL CALC-SCNC: 14 MMOL/L
BUN SERPL-MCNC: 43 MG/DL
CALCIUM SERPL-MCNC: 10.1 MG/DL
CHLORIDE SERPL-SCNC: 103 MMOL/L
CO2 SERPL-SCNC: 27 MMOL/L
CREAT SERPL-MCNC: 1.69 MG/DL
EGFRCR SERPLBLD CKD-EPI 2021: 30 ML/MIN/1.73M2
GLUCOSE SERPL-MCNC: 185 MG/DL
POTASSIUM SERPL-SCNC: 3.6 MMOL/L
SODIUM SERPL-SCNC: 144 MMOL/L

## 2025-09-10 ENCOUNTER — NON-APPOINTMENT (OUTPATIENT)
Age: 80
End: 2025-09-10